# Patient Record
Sex: FEMALE | Race: WHITE | NOT HISPANIC OR LATINO | Employment: OTHER | URBAN - METROPOLITAN AREA
[De-identification: names, ages, dates, MRNs, and addresses within clinical notes are randomized per-mention and may not be internally consistent; named-entity substitution may affect disease eponyms.]

---

## 2017-01-23 ENCOUNTER — OFFICE VISIT (OUTPATIENT)
Dept: ORTHOPEDIC SURGERY | Facility: CLINIC | Age: 72
End: 2017-01-23

## 2017-01-23 VITALS — HEIGHT: 65 IN | BODY MASS INDEX: 19.99 KG/M2 | WEIGHT: 120 LBS

## 2017-01-23 DIAGNOSIS — Z98.890 S/P ROTATOR CUFF REPAIR: Primary | ICD-10-CM

## 2017-01-23 PROCEDURE — 99212 OFFICE O/P EST SF 10 MIN: CPT | Performed by: ORTHOPAEDIC SURGERY

## 2017-01-23 NOTE — MR AVS SNAPSHOT
Ashley Mata   1/23/2017 11:00 AM   Office Visit    Dept Phone:  [phone]   Encounter #:  [id number]    Provider:  Ash Jimenez MD   Department:  [facility]                Your Full Care Plan              Your Updated Medication List          This list is accurate as of: 1/23/17 11:53 AM.  Always use your most recent med list.                atorvastatin 80 MG tablet   Commonly known as:  LIPITOR       CALCIUM 600 PO       CoQ10 400 MG capsule       estrogens (conjugated) 0.625 MG tablet   Commonly known as:  PREMARIN       fexofenadine-pseudoephedrine 180-240 MG per 24 hr tablet   Commonly known as:  ALLEGRA-D 24   Take 1 tablet by mouth daily.       finasteride 5 MG tablet   Commonly known as:  PROSCAR       fluticasone 50 MCG/ACT nasal spray   Commonly known as:  FLONASE       ketoconazole 2 % shampoo   Commonly known as:  NIZORAL       lisinopril-hydrochlorothiazide 10-12.5 MG per tablet   Commonly known as:  PRINZIDE,ZESTORETIC   TAKE ONE TABLET BY MOUTH EVERY MORNING       MethylPREDNISolone 4 MG tablet   Commonly known as:  MEDROL (JUAN A)   Take as directed on package instructions.       multivitamin tablet tablet       naproxen 500 MG tablet   Commonly known as:  NAPROSYN   TAKE ONE TABLET BY MOUTH TWICE A DAY       omeprazole 40 MG capsule   Commonly known as:  priLOSEC   TAKE ONE CAPSULE BY MOUTH DAILY       saccharomyces boulardii 250 MG capsule   Commonly known as:  FLORASTOR               Instructions     None    Patient Instructions History      Upcoming Appointments     Visit Type Date Time Department    FOLLOW UP 1/23/2017 11:00 AM [facility]    FOLLOW UP 1/31/2017 11:00 AM [facility]      MyChart Signup     Our records indicate that you have an active [employer] account.    You can view your After Visit Summary by going to [employer] and logging in with your [employer]  "username and password.  If you don't have a [employer] username and password but a parent or guardian has access to your record, the parent or guardian should login with their own [employer] username and password and access your record to view the After Visit Summary.    If you have questions, you can email [email or web address] or call [phone] to talk to our [employer] staff.  Remember, [employer] is NOT to be used for urgent needs.  For medical emergencies, dial 911.               Other Info from Your Visit           Your Appointments     Jan 31, 2017 11:00 AM EST   Follow Up with Carloz SAXENA MD   [facility] (--)    [address] [place] [id number] [phone]           Arrive 15 minutes prior to appointment.              Allergies     No Known Allergies      Reason for Visit     Right Shoulder - Follow-up           Vital Signs     Height Weight Body Mass Index Smoking Status          65\" (165.1 cm) 120 lb (54.4 kg) 19.97 kg/m2 Never Smoker          "

## 2017-01-23 NOTE — PROGRESS NOTES
Ashley Mata : 1945 MRN: [id number] DATE: 2017      CC: 6 months s/p right shoulder rotator cuff repair     HPI: Pt. returns to clinic today for follow up.  Reports that pain is resolved.  Has some occasional soreness after activity only.  Therapy progressing well.  Denies any concerns or issues.      Current Outpatient Prescriptions:   •  atorvastatin (LIPITOR) 80 MG tablet, Take 80 mg by mouth every night., Disp: , Rfl:   •  Calcium Carbonate (CALCIUM 600 PO), Take 2 tablets by mouth daily., Disp: , Rfl:   •  Coenzyme Q10 (COQ10) 400 MG capsule, Take 1 capsule by mouth daily. Take with a meal., Disp: , Rfl:   •  estrogens, conjugated, (PREMARIN) 0.625 MG tablet, Take 1 tablet by mouth daily., Disp: , Rfl:   •  fexofenadine-pseudoephedrine (ALLEGRA-D 24) 180-240 MG per 24 hr tablet, Take 1 tablet by mouth daily., Disp: 30 tablet, Rfl: 5  •  finasteride (PROSCAR) 5 MG tablet, , Disp: , Rfl:   •  fluticasone (FLONASE) 50 MCG/ACT nasal spray, 2 sprays into each nostril daily. Spray in each nostril., Disp: , Rfl:   •  ketoconazole (NIZORAL) 2 % shampoo, , Disp: , Rfl:   •  lisinopril-hydrochlorothiazide (PRINZIDE,ZESTORETIC) 10-12.5 MG per tablet, TAKE ONE TABLET BY MOUTH EVERY MORNING, Disp: 30 tablet, Rfl: 2  •  MethylPREDNISolone (MEDROL, JUAN A,) 4 MG tablet, Take as directed on package instructions., Disp: 1 each, Rfl: 0  •  multivitamin (THERAGRAN) tablet tablet, Take 1 tablet by mouth daily., Disp: , Rfl:   •  naproxen (NAPROSYN) 500 MG tablet, TAKE ONE TABLET BY MOUTH TWICE A DAY, Disp: 180 tablet, Rfl: 0  •  omeprazole (PriLOSEC) 40 MG capsule, TAKE ONE CAPSULE BY MOUTH DAILY, Disp: 30 capsule, Rfl: 0  •  saccharomyces boulardii (FLORASTOR) 250 MG capsule, Take 1 capsule by mouth 2 (two) times a day., Disp: , Rfl:     Past Medical History   Diagnosis Date   • History of abdominal pain 2014--patient reports that her abdominal pain and diarrhea had improved but not totally  resolved. She developed worsening reflux symptoms while on the Cipro and Flagyl. She discontinued it 4 days prior and her reflux did not get better. Dexilant samples given. Patient instructed to restart the Cipro and Flagyl tomorrow and try to complete the course.   06/24/2014--patient presents with appro   • History of Acute upper respiratory infection 03/18/2014 03/18/2014--patient reports symptoms have resolved.  12/30/2013--patient reports a 2 day history of head congestion associated with posterior nasal drainage and cough without fever or chills. The phlegm was clear in color. There was no shortness of breath. She was diagnosed with acute upper respiratory infection, probable viral. She was given a prescription for CPAP and was instructed not to fill    • History of Bicipital tendinitis of right shoulder 3/17/2015     06/08/2015--patient seen in follow-up and reports she continues to have right upper arm/shoulder pain. Examination today reveals tenderness over the subdeltoid bursal region and possibly the long head of the biceps tendon.  The shoulder injected with 40 mg of Depo-Medrol in divided doses.  03/17/2015--patient reports roughly one-week history of right arm and shoulder pain. This occurred after lifting some heavy linens. Examination consistent with bicipital tendinitis. X-ray of the shoulder ordered which revealed mild right AC joint arthropathy. Nothing acute. Recommend Vimovo 500/20, one by mouth twice a day. Patient should hold naproxen while taking this. If symptoms persist we could consider a cortisone injection.   • History of bone density study 4/18/2016 06/09/2016--DEXA scan reveals lumbar spine T score -0.5.  Left hip T score -0.4.  05/01/2013--DEXA scan reveals lumbar spine T score -0.5. Left hip T score -0.9.  04/03/2010--DEXA scan reveals lumbar spine T score -0.8. Left hip T score -0.4.   • History of carotid Doppler/vascular screen 03/24/2014 03/24/2014--vascular screen  was negative for carotid plaque, negative for AAA, negative for PAD.   02/16/2011--vascular screen negative for carotid plaque, negative for AAA, negative for PAD.   • History of Change in bowel habits 09/22/2015 09/22/2015--patient seen in follow-up and reports her symptoms have definitely improved with the probiotic.   07/15/2015--patient presents with a 2 to three-week history of a change in her bowel habits. She has periodic discharge that is mucousy but sometimes is reddish brown. She notes loose stools and sometimes pellet-like stools and occasionally has constipation that is mild. This can be interm   • History of chest x-ray 4/18/2016 07/26/2016--preoperative chest x-ray reveals heart, mediastinum, and pulmonary vasculature normal.  Mild chronic pulmonary change without evidence of active process.  Negative chest.  06/15/2011--normal chest x-ray.   • History of diarrhea 10/08/2014     10/08/2014--patient seen in follow up and she has had no further abdominal pain or diarrhea after recent cholecystectomy. It is unsure if the gallbladder was playing a role in her abdominal pain and diarrhea.   07/03/2014--patient reports that her abdominal pain and diarrhea had improved but not totally resolved. She developed worsening reflux symptoms while on the Cipro and Flagyl. She discontinu   • History of Hyperplastic polyps of stomach 06/09/2015 06/09/2015--EGD revealed Z line irregular, 35 cm from incisors. Biopsied. Small hiatal hernia. Gastritis. Biopsied. Bilious gastric fluid. Fluid aspiration performed. Normal duodenal bulb and second part of duodenum. Biopsied. Pathology revealed the antral biopsy revealed small intestinal mucosa with no pathologic diagnosis. Good preservation of villous architecture. Duodenum biopsy revealed large   • History of Intramuscular hematoma, left 09/11/2013 09/11/2013--left humerus x-rays revealed mild degenerative change at the a.c. joint. The fluid collection  resolved spontaneously  09/06/2013--bilateral duplex scan of the veins of the upper extremities revealed a fluid collection of the left arm near the biceps. No evidence of DVT in the right internal jugular vein. No evidence of DVT in the right subclavian vein. No evidence of deep or superficia   • History of Left rotator cuff tear 07/10/2014     07/10/2014--left rotator cuff repair.   03/18/2014--patient seen in followup and reports that her range of motion has improved and her pain is not debilitating. She feels that she is making progress with physical therapy. Surgery scheduled 07/10/2014.   01/10/2014--patient was seen in evaluation by the orthopedist and he recommended conservative treatment consisting of physical therapy/rehabilitat   • History of mammogram 06/13/2016 06/13/2016--normal mammogram performed at [facility].  05/27/2015--normal mammogram.   05/12/2014--normal mammogram.   05/02/2013--normal mammogram.   04/26/2012--normal mammogram.   • History of Muscular aches 03/18/2014 03/18/2014--patient reports that her wrist symptoms have improved and her myalgias have resolved.  12/30/2013--patient reports a several month history of bilateral wrist pain as well as diffuse myalgias and she feels this is related to arthritis. There is no numbness or tingling of the hands and fingers that would be consistent with a neuro- compressive syndrome. She notes that her wrist pain incr   • History of pneumococcal vaccination 01/15/2016     01/15/2016--PPSV 23 given. No further pneumococcal vaccinations required.   07/15/2015--Prevnar 13 and given. Patient is pneumococcal vaccination sage and therefore will need PPSV 23 in 6-12 months.   • History of Subdeltoid bursitis of right shoulder joint 4/20/2016 07/12/2016--MR arthrogram of the right shoulder reveals a large full thickness supraspinatus tendon insertional tear with retraction.  Moderate acromioclavicular joint arthritis.   Patient scheduled for surgery 08/03/2016.  04/20/2016--patient presents with a one-month history of right shoulder pain that is located in the subdeltoid region.  No history of trauma.  Examination reveals tenderness over the subdeltoid bursa.  The area in question was injected with 40 mg of Depo-Medrol and 3 cc of Xylocaine.   • History of Zostavax administration 11/20/2014 11/20/2014--Zostavax given at the local drug store.       Past Surgical History   Procedure Laterality Date   • Colonoscopy  03/17/2009 03/17/2009--colonoscopy revealed external hemorrhoids, torturous colon with a sigmoid stricture, sigmoid diverticulosis.   • Esophagoscopy / egd  06/09/2015 06/09/2015--EGD revealed Z line irregular, 35 cm from incisors. Biopsied. Small hiatal hernia. Gastritis. Biopsied. Bilious gastric fluid. Fluid aspiration performed. Normal duodenal bulb and second part of duodenum. Biopsied. Pathology revealed the antral biopsy revealed small intestinal mucosa with no pathologic diagnosis. Good preservation of villous architecture. Duodenum biopsy revealed large   • Ercp w/ sphicterotomy  08/13/2014 08/13/2014--ERCP, endoscopic sphincterotomy and removal of common bile duct stones. 08/12/2014--laparoscopic cholecystectomy. This was complicated by retained common bile duct stones 2.   • Rotator cuff repair Left 07/10/2014     07/10/2014--left rotator cuff repair. 03/18/2014--patient seen in followup and reports that her range of motion has improved and her pain is not debilitating. She feels that she is making progress with physical therapy. Surgery scheduled 07/10/2014. 01/10/2014--patient was seen in evaluation by the orthopedist and he recommended conservative treatment consisting of physical therapy/rehabilitation.   • Partial hysterectomy  1978     Partial hysterectomy 1978.   • Laparoscopic cholecystectomy  08/12/2014 08/13/2014--ERCP, endoscopic sphincterotomy and removal of common bile duct  stones. 08/12/2014--laparoscopic cholecystectomy. This was complicated by retained common bile duct stones 2.       Family History   Problem Relation Age of Onset   • Other Father      Hodgkin's Disease   • Cancer Father      Lung Cancer       Social History     Social History   • Marital status:      Spouse name: N/A   • Number of children: N/A   • Years of education: N/A     Occupational History   •  - [employer]      Social History Main Topics   • Smoking status: Never Smoker   • Smokeless tobacco: Never Used   • Alcohol use Yes      Comment: Socially   • Drug use: No   • Sexual activity: Yes     Partners: Male     Other Topics Concern   • Not on file     Social History Narrative     Exam:    General:  Awake and alert.  No acute distress.    Extremities:  Wounds are healed.  Arm and forearm soft.  Motion is full. Good strength with resistive testing of rotator cuff and no pain.  Negative Neer and Nugent maneuver.  Good motor and sensory function distally.  Palpable pulses with good cap refill.      Imaging   none    Impression:  6 months s/p right shoulder rotator cuff repair     Plan:    1.  Patient is released to unrestricted activity.  2.  I explained that the patient can expect continued improvement in any residual soreness or strength deficits for up to 1 year.  3.  Follow up as needed.

## 2017-01-31 ENCOUNTER — OFFICE VISIT (OUTPATIENT)
Dept: GASTROENTEROLOGY | Facility: CLINIC | Age: 72
End: 2017-01-31

## 2017-01-31 VITALS
BODY MASS INDEX: 23.35 KG/M2 | DIASTOLIC BLOOD PRESSURE: 72 MMHG | WEIGHT: 136.8 LBS | HEIGHT: 64 IN | SYSTOLIC BLOOD PRESSURE: 128 MMHG

## 2017-01-31 DIAGNOSIS — R19.8 CHANGE IN BOWEL FUNCTION: ICD-10-CM

## 2017-01-31 DIAGNOSIS — Z83.71 FAMILY HISTORY OF POLYPS IN THE COLON: Primary | ICD-10-CM

## 2017-01-31 PROCEDURE — 99214 OFFICE O/P EST MOD 30 MIN: CPT | Performed by: INTERNAL MEDICINE

## 2017-01-31 RX ORDER — SODIUM CHLORIDE 0.9 % (FLUSH) 0.9 %
1-10 SYRINGE (ML) INJECTION AS NEEDED
Status: CANCELLED | OUTPATIENT
Start: 2017-01-31

## 2017-01-31 NOTE — MR AVS SNAPSHOT
Ashley Mata   1/31/2017 11:00 AM   Office Visit    Dept Phone:  [phone]   Encounter #:  [id number]    Provider:  Carloz SAXENA MD   Department:  [facility]                Your Full Care Plan              Your Updated Medication List          This list is accurate as of: 1/31/17 11:56 AM.  Always use your most recent med list.                atorvastatin 80 MG tablet   Commonly known as:  LIPITOR       CALCIUM 600 PO       CoQ10 400 MG capsule       estrogens (conjugated) 0.625 MG tablet   Commonly known as:  PREMARIN       fexofenadine-pseudoephedrine 180-240 MG per 24 hr tablet   Commonly known as:  ALLEGRA-D 24   Take 1 tablet by mouth daily.       finasteride 5 MG tablet   Commonly known as:  PROSCAR       fluticasone 50 MCG/ACT nasal spray   Commonly known as:  FLONASE       ketoconazole 2 % shampoo   Commonly known as:  NIZORAL       lisinopril-hydrochlorothiazide 10-12.5 MG per tablet   Commonly known as:  PRINZIDE,ZESTORETIC   TAKE ONE TABLET BY MOUTH EVERY MORNING       MethylPREDNISolone 4 MG tablet   Commonly known as:  MEDROL (JUAN A)   Take as directed on package instructions.       multivitamin tablet tablet       naproxen 500 MG tablet   Commonly known as:  NAPROSYN   TAKE ONE TABLET BY MOUTH TWICE A DAY       omeprazole 40 MG capsule   Commonly known as:  priLOSEC   TAKE ONE CAPSULE BY MOUTH DAILY       saccharomyces boulardii 250 MG capsule   Commonly known as:  FLORASTOR               Instructions     None    Patient Instructions History      Upcoming Appointments     Visit Type Date Time Department    FOLLOW UP 1/31/2017 11:00 AM [facility]      [employer] Signup     Our records indicate that you have an active [email or web address] account.    You can view your After Visit Summary by going to [employer] and logging in with your [employer] username and password.  If you don't have a [employer] username and  "password but a parent or guardian has access to your record, the parent or guardian should login with their own [employer] username and password and access your record to view the After Visit Summary.    If you have questions, you can email [email or web address] or call [phone] to talk to our [employer] staff.  Remember, [employer] is NOT to be used for urgent needs.  For medical emergencies, dial 911.               Other Info from Your Visit           Allergies     No Known Allergies      Reason for Visit     Heartburn     change in bowel habits           Vital Signs     Blood Pressure Height Weight Body Mass Index Smoking Status       128/72 64\" (162.6 cm) 136 lb 12.8 oz (62.1 kg) 23.48 kg/m2 Never Smoker         "

## 2017-01-31 NOTE — PROGRESS NOTES
Chief Complaint   Patient presents with   • Heartburn   • change in bowel habits        Ashley Mata is a  71 y.o. female here for a follow up visit for family history of colon polyps, change in bowel function.    HPI this 71-year-old white female presents in follow-up since her last upper endoscopy was performed in the June 2015.  That study had been done a cousin with a history of Hale's esophagus and reflux.  Biopsies of the GE junction were negative for any Hale's changes.  She presents now with report of polyps involving her mother diagnosed 2-3 years ago.  Her last colonoscopy was performed in March 2009.  She also notes that she's had a change in bowel pattern over the past year requiring use of a stimulant her laxative every few days because of constipation.  Her previous bowel pattern was once daily.  She also describes occasional mucus discharge with her bowel movements.  We discussed follow-up colonoscopic evaluation at this time.  Given her family history and change in bowel pattern this would be appropriate.    Past Medical History   Diagnosis Date   • Allergic rhinitis    • GERD (gastroesophageal reflux disease)    • History of abdominal pain 07/30/2014 07/03/2014--patient reports that her abdominal pain and diarrhea had improved but not totally resolved. She developed worsening reflux symptoms while on the Cipro and Flagyl. She discontinued it 4 days prior and her reflux did not get better. Dexilant samples given. Patient instructed to restart the Cipro and Flagyl tomorrow and try to complete the course.   06/24/2014--patient presents with appro   • History of Acute upper respiratory infection 03/18/2014 03/18/2014--patient reports symptoms have resolved.  12/30/2013--patient reports a 2 day history of head congestion associated with posterior nasal drainage and cough without fever or chills. The phlegm was clear in color. There was no shortness of breath. She was diagnosed with  acute upper respiratory infection, probable viral. She was given a prescription for CPAP and was instructed not to fill    • History of Bicipital tendinitis of right shoulder 3/17/2015     06/08/2015--patient seen in follow-up and reports she continues to have right upper arm/shoulder pain. Examination today reveals tenderness over the subdeltoid bursal region and possibly the long head of the biceps tendon.  The shoulder injected with 40 mg of Depo-Medrol in divided doses.  03/17/2015--patient reports roughly one-week history of right arm and shoulder pain. This occurred after lifting some heavy linens. Examination consistent with bicipital tendinitis. X-ray of the shoulder ordered which revealed mild right AC joint arthropathy. Nothing acute. Recommend Vimovo 500/20, one by mouth twice a day. Patient should hold naproxen while taking this. If symptoms persist we could consider a cortisone injection.   • History of bone density study 4/18/2016 06/09/2016--DEXA scan reveals lumbar spine T score -0.5.  Left hip T score -0.4.  05/01/2013--DEXA scan reveals lumbar spine T score -0.5. Left hip T score -0.9.  04/03/2010--DEXA scan reveals lumbar spine T score -0.8. Left hip T score -0.4.   • History of carotid Doppler/vascular screen 03/24/2014 03/24/2014--vascular screen was negative for carotid plaque, negative for AAA, negative for PAD.   02/16/2011--vascular screen negative for carotid plaque, negative for AAA, negative for PAD.   • History of Change in bowel habits 09/22/2015 09/22/2015--patient seen in follow-up and reports her symptoms have definitely improved with the probiotic.   07/15/2015--patient presents with a 2 to three-week history of a change in her bowel habits. She has periodic discharge that is mucousy but sometimes is reddish brown. She notes loose stools and sometimes pellet-like stools and occasionally has constipation that is mild. This can be interm   • History of chest x-ray  4/18/2016 07/26/2016--preoperative chest x-ray reveals heart, mediastinum, and pulmonary vasculature normal.  Mild chronic pulmonary change without evidence of active process.  Negative chest.  06/15/2011--normal chest x-ray.   • History of diarrhea 10/08/2014     10/08/2014--patient seen in follow up and she has had no further abdominal pain or diarrhea after recent cholecystectomy. It is unsure if the gallbladder was playing a role in her abdominal pain and diarrhea.   07/03/2014--patient reports that her abdominal pain and diarrhea had improved but not totally resolved. She developed worsening reflux symptoms while on the Cipro and Flagyl. She discontinu   • History of Hyperplastic polyps of stomach 06/09/2015 06/09/2015--EGD revealed Z line irregular, 35 cm from incisors. Biopsied. Small hiatal hernia. Gastritis. Biopsied. Bilious gastric fluid. Fluid aspiration performed. Normal duodenal bulb and second part of duodenum. Biopsied. Pathology revealed the antral biopsy revealed small intestinal mucosa with no pathologic diagnosis. Good preservation of villous architecture. Duodenum biopsy revealed large   • History of Intramuscular hematoma, left 09/11/2013 09/11/2013--left humerus x-rays revealed mild degenerative change at the a.c. joint. The fluid collection resolved spontaneously  09/06/2013--bilateral duplex scan of the veins of the upper extremities revealed a fluid collection of the left arm near the biceps. No evidence of DVT in the right internal jugular vein. No evidence of DVT in the right subclavian vein. No evidence of deep or superficia   • History of Left rotator cuff tear 07/10/2014     07/10/2014--left rotator cuff repair.   03/18/2014--patient seen in followup and reports that her range of motion has improved and her pain is not debilitating. She feels that she is making progress with physical therapy. Surgery scheduled 07/10/2014.   01/10/2014--patient was seen in evaluation by the  orthopedist and he recommended conservative treatment consisting of physical therapy/rehabilitat   • History of mammogram 06/13/2016 06/13/2016--normal mammogram performed at [facility] for [facility].  05/27/2015--normal mammogram.   05/12/2014--normal mammogram.   05/02/2013--normal mammogram.   04/26/2012--normal mammogram.   • History of Muscular aches 03/18/2014 03/18/2014--patient reports that her wrist symptoms have improved and her myalgias have resolved.  12/30/2013--patient reports a several month history of bilateral wrist pain as well as diffuse myalgias and she feels this is related to arthritis. There is no numbness or tingling of the hands and fingers that would be consistent with a neuro- compressive syndrome. She notes that her wrist pain incr   • History of pneumococcal vaccination 01/15/2016     01/15/2016--PPSV 23 given. No further pneumococcal vaccinations required.   07/15/2015--Prevnar 13 and given. Patient is pneumococcal vaccination sage and therefore will need PPSV 23 in 6-12 months.   • History of Subdeltoid bursitis of right shoulder joint 4/20/2016 07/12/2016--MR arthrogram of the right shoulder reveals a large full thickness supraspinatus tendon insertional tear with retraction.  Moderate acromioclavicular joint arthritis.  Patient scheduled for surgery 08/03/2016.  04/20/2016--patient presents with a one-month history of right shoulder pain that is located in the subdeltoid region.  No history of trauma.  Examination reveals tenderness over the subdeltoid bursa.  The area in question was injected with 40 mg of Depo-Medrol and 3 cc of Xylocaine.   • History of Zostavax administration 11/20/2014 11/20/2014--Zostavax given at the local drug store.   • Hypertension        Current Outpatient Prescriptions   Medication Sig Dispense Refill   • atorvastatin (LIPITOR) 80 MG tablet Take 80 mg by mouth every night.     • Calcium Carbonate (CALCIUM 600 PO) Take 2 tablets by  mouth daily.     • Coenzyme Q10 (COQ10) 400 MG capsule Take 1 capsule by mouth daily. Take with a meal.     • estrogens, conjugated, (PREMARIN) 0.625 MG tablet Take 1 tablet by mouth daily.     • fexofenadine-pseudoephedrine (ALLEGRA-D 24) 180-240 MG per 24 hr tablet Take 1 tablet by mouth daily. 30 tablet 5   • finasteride (PROSCAR) 5 MG tablet      • fluticasone (FLONASE) 50 MCG/ACT nasal spray 2 sprays into each nostril daily. Spray in each nostril.     • lisinopril-hydrochlorothiazide (PRINZIDE,ZESTORETIC) 10-12.5 MG per tablet TAKE ONE TABLET BY MOUTH EVERY MORNING 30 tablet 2   • multivitamin (THERAGRAN) tablet tablet Take 1 tablet by mouth daily.     • naproxen (NAPROSYN) 500 MG tablet TAKE ONE TABLET BY MOUTH TWICE A  tablet 0   • omeprazole (PriLOSEC) 40 MG capsule TAKE ONE CAPSULE BY MOUTH DAILY 30 capsule 0   • saccharomyces boulardii (FLORASTOR) 250 MG capsule Take 1 capsule by mouth 2 (two) times a day.     • ketoconazole (NIZORAL) 2 % shampoo      • MethylPREDNISolone (MEDROL, JUAN A,) 4 MG tablet Take as directed on package instructions. 1 each 0     No current facility-administered medications for this visit.        PRN Meds:.    No Known Allergies    Social History     Social History   • Marital status:      Spouse name: N/A   • Number of children: N/A   • Years of education: N/A     Occupational History   •  - [employer]      Social History Main Topics   • Smoking status: Never Smoker   • Smokeless tobacco: Never Used   • Alcohol use Yes      Comment: Socially   • Drug use: No   • Sexual activity: Yes     Partners: Male     Other Topics Concern   • Not on file     Social History Narrative       Family History   Problem Relation Age of Onset   • Other Father      Hodgkin's Disease   • Cancer Father      Lung Cancer       Review of Systems   Constitutional: Negative for activity change, appetite change, fatigue and unexpected weight change.   HENT: Negative for  congestion, facial swelling, sore throat, trouble swallowing and voice change.    Eyes: Negative for photophobia and visual disturbance.   Respiratory: Negative for cough and choking.    Cardiovascular: Negative for chest pain.   Gastrointestinal: Positive for constipation. Negative for abdominal distention, abdominal pain, anal bleeding, blood in stool, diarrhea, nausea, rectal pain and vomiting.   Endocrine: Negative for polyphagia.   Musculoskeletal: Negative for arthralgias, gait problem and joint swelling.   Skin: Negative for color change, pallor and rash.   Allergic/Immunologic: Negative for food allergies.   Neurological: Negative for speech difficulty and headaches.   Hematological: Does not bruise/bleed easily.   Psychiatric/Behavioral: Negative for agitation, confusion and sleep disturbance.       Vitals:    01/31/17 1105   BP: 128/72       Physical Exam   Constitutional: She is oriented to person, place, and time. She appears well-developed and well-nourished.   HENT:   Head: Normocephalic.   Mouth/Throat: Oropharynx is clear and moist.   Eyes: Conjunctivae and EOM are normal.   Neck: Normal range of motion.   Cardiovascular: Normal rate and regular rhythm.    Pulmonary/Chest: Breath sounds normal.   Abdominal: Soft. Bowel sounds are normal.   Musculoskeletal: Normal range of motion.   Neurological: She is alert and oriented to person, place, and time.   Skin: Skin is warm and dry.   Psychiatric: She has a normal mood and affect. Her behavior is normal.       ASSESSMENT AND PLAN      ICD-10-CM ICD-9-CM   1. Family history of polyps in the colon Z83.71 V18.51   2. Change in bowel function R19.4 787.99

## 2017-02-08 RX ORDER — FEXOFENADINE HCL AND PSEUDOEPHEDRINE HCI 180; 240 MG/1; MG/1
TABLET, EXTENDED RELEASE ORAL
Qty: 30 TABLET | Refills: 0 | Status: SHIPPED | OUTPATIENT
Start: 2017-02-08 | End: 2017-03-14 | Stop reason: SDUPTHER

## 2017-02-14 RX ORDER — NAPROXEN 500 MG/1
TABLET ORAL
Qty: 180 TABLET | Refills: 0 | Status: SHIPPED | OUTPATIENT
Start: 2017-02-14 | End: 2017-05-22 | Stop reason: SDUPTHER

## 2017-03-10 ENCOUNTER — ANESTHESIA EVENT (OUTPATIENT)
Dept: GASTROENTEROLOGY | Facility: HOSPITAL | Age: 72
End: 2017-03-10

## 2017-03-10 ENCOUNTER — ANESTHESIA (OUTPATIENT)
Dept: GASTROENTEROLOGY | Facility: HOSPITAL | Age: 72
End: 2017-03-10

## 2017-03-10 ENCOUNTER — HOSPITAL ENCOUNTER (OUTPATIENT)
Facility: HOSPITAL | Age: 72
Setting detail: HOSPITAL OUTPATIENT SURGERY
Discharge: HOME | End: 2017-03-10
Attending: INTERNAL MEDICINE | Admitting: INTERNAL MEDICINE

## 2017-03-10 VITALS
WEIGHT: 133.13 LBS | OXYGEN SATURATION: 99 % | HEART RATE: 78 BPM | BODY MASS INDEX: 22.18 KG/M2 | SYSTOLIC BLOOD PRESSURE: 152 MMHG | HEIGHT: 65 IN | DIASTOLIC BLOOD PRESSURE: 82 MMHG | TEMPERATURE: 97.9 F | RESPIRATION RATE: 16 BRPM

## 2017-03-10 DIAGNOSIS — R19.8 CHANGE IN BOWEL FUNCTION: ICD-10-CM

## 2017-03-10 DIAGNOSIS — Z83.71 FAMILY HISTORY OF POLYPS IN THE COLON: ICD-10-CM

## 2017-03-10 PROCEDURE — 45378 DIAGNOSTIC COLONOSCOPY: CPT | Performed by: INTERNAL MEDICINE

## 2017-03-10 PROCEDURE — 25010000002 PROPOFOL 10 MG/ML EMULSION: Performed by: ANESTHESIOLOGY

## 2017-03-10 RX ORDER — LIDOCAINE HYDROCHLORIDE 20 MG/ML
INJECTION, SOLUTION INFILTRATION; PERINEURAL AS NEEDED
Status: DISCONTINUED | OUTPATIENT
Start: 2017-03-10 | End: 2017-03-10 | Stop reason: SURG

## 2017-03-10 RX ORDER — PROPOFOL 10 MG/ML
VIAL (ML) INTRAVENOUS CONTINUOUS PRN
Status: DISCONTINUED | OUTPATIENT
Start: 2017-03-10 | End: 2017-03-10 | Stop reason: SURG

## 2017-03-10 RX ORDER — SODIUM CHLORIDE 0.9 % (FLUSH) 0.9 %
1-10 SYRINGE (ML) INJECTION AS NEEDED
Status: DISCONTINUED | OUTPATIENT
Start: 2017-03-10 | End: 2017-03-10 | Stop reason: HOSPADM

## 2017-03-10 RX ORDER — SODIUM CHLORIDE, SODIUM LACTATE, POTASSIUM CHLORIDE, CALCIUM CHLORIDE 600; 310; 30; 20 MG/100ML; MG/100ML; MG/100ML; MG/100ML
9 INJECTION, SOLUTION INTRAVENOUS CONTINUOUS PRN
Status: DISCONTINUED | OUTPATIENT
Start: 2017-03-10 | End: 2017-03-10 | Stop reason: HOSPADM

## 2017-03-10 RX ADMIN — LIDOCAINE HYDROCHLORIDE 4 MG: 20 INJECTION, SOLUTION INFILTRATION; PERINEURAL at 10:19

## 2017-03-10 RX ADMIN — PROPOFOL 160 MCG/KG/MIN: 10 INJECTION, EMULSION INTRAVENOUS at 10:19

## 2017-03-10 RX ADMIN — ALFENTANIL HYDROCHLORIDE 500 MCG: 500 INJECTION, SOLUTION INTRAVENOUS at 10:19

## 2017-03-10 RX ADMIN — SODIUM CHLORIDE, POTASSIUM CHLORIDE, SODIUM LACTATE AND CALCIUM CHLORIDE 9 ML/HR: 600; 310; 30; 20 INJECTION, SOLUTION INTRAVENOUS at 10:05

## 2017-03-10 NOTE — H&P
[facility], [facility]  Pre Procedure History & Physical    Chief Complaint:   Family history of polyps, change in bowel function    Subjective     HPI:   This 71-year-old female presents to the endoscopy suite for colonoscopic evaluation.  She has a family history of polyps involving her mother.  She also had a change in bowel pattern over the past year with constipation.  Last Colonoscopy of record was March 2009.    Past Medical History:   Past Medical History   Diagnosis Date   • Allergic rhinitis    • GERD (gastroesophageal reflux disease)    • History of abdominal pain 07/30/2014 07/03/2014--patient reports that her abdominal pain and diarrhea had improved but not totally resolved. She developed worsening reflux symptoms while on the Cipro and Flagyl. She discontinued it 4 days prior and her reflux did not get better. Dexilant samples given. Patient instructed to restart the Cipro and Flagyl tomorrow and try to complete the course.   06/24/2014--patient presents with appro   • History of Acute upper respiratory infection 03/18/2014 03/18/2014--patient reports symptoms have resolved.  12/30/2013--patient reports a 2 day history of head congestion associated with posterior nasal drainage and cough without fever or chills. The phlegm was clear in color. There was no shortness of breath. She was diagnosed with acute upper respiratory infection, probable viral. She was given a prescription for CPAP and was instructed not to fill    • History of Bicipital tendinitis of right shoulder 3/17/2015     06/08/2015--patient seen in follow-up and reports she continues to have right upper arm/shoulder pain. Examination today reveals tenderness over the subdeltoid bursal region and possibly the long head of the biceps tendon.  The shoulder injected with 40 mg of Depo-Medrol in divided doses.  03/17/2015--patient reports roughly one-week history of right arm and shoulder pain. This occurred after lifting  some heavy linens. Examination consistent with bicipital tendinitis. X-ray of the shoulder ordered which revealed mild right AC joint arthropathy. Nothing acute. Recommend Vimovo 500/20, one by mouth twice a day. Patient should hold naproxen while taking this. If symptoms persist we could consider a cortisone injection.   • History of bone density study 4/18/2016 06/09/2016--DEXA scan reveals lumbar spine T score -0.5.  Left hip T score -0.4.  05/01/2013--DEXA scan reveals lumbar spine T score -0.5. Left hip T score -0.9.  04/03/2010--DEXA scan reveals lumbar spine T score -0.8. Left hip T score -0.4.   • History of carotid Doppler/vascular screen 03/24/2014 03/24/2014--vascular screen was negative for carotid plaque, negative for AAA, negative for PAD.   02/16/2011--vascular screen negative for carotid plaque, negative for AAA, negative for PAD.   • History of Change in bowel habits 09/22/2015 09/22/2015--patient seen in follow-up and reports her symptoms have definitely improved with the probiotic.   07/15/2015--patient presents with a 2 to three-week history of a change in her bowel habits. She has periodic discharge that is mucousy but sometimes is reddish brown. She notes loose stools and sometimes pellet-like stools and occasionally has constipation that is mild. This can be interm   • History of chest x-ray 4/18/2016 07/26/2016--preoperative chest x-ray reveals heart, mediastinum, and pulmonary vasculature normal.  Mild chronic pulmonary change without evidence of active process.  Negative chest.  06/15/2011--normal chest x-ray.   • History of diarrhea 10/08/2014     10/08/2014--patient seen in follow up and she has had no further abdominal pain or diarrhea after recent cholecystectomy. It is unsure if the gallbladder was playing a role in her abdominal pain and diarrhea.   07/03/2014--patient reports that her abdominal pain and diarrhea had improved but not totally resolved. She developed  worsening reflux symptoms while on the Cipro and Flagyl. She discontinu   • History of Hyperplastic polyps of stomach 06/09/2015 06/09/2015--EGD revealed Z line irregular, 35 cm from incisors. Biopsied. Small hiatal hernia. Gastritis. Biopsied. Bilious gastric fluid. Fluid aspiration performed. Normal duodenal bulb and second part of duodenum. Biopsied. Pathology revealed the antral biopsy revealed small intestinal mucosa with no pathologic diagnosis. Good preservation of villous architecture. Duodenum biopsy revealed large   • History of Intramuscular hematoma, left 09/11/2013 09/11/2013--left humerus x-rays revealed mild degenerative change at the a.c. joint. The fluid collection resolved spontaneously  09/06/2013--bilateral duplex scan of the veins of the upper extremities revealed a fluid collection of the left arm near the biceps. No evidence of DVT in the right internal jugular vein. No evidence of DVT in the right subclavian vein. No evidence of deep or superficia   • History of Left rotator cuff tear 07/10/2014     07/10/2014--left rotator cuff repair.   03/18/2014--patient seen in followup and reports that her range of motion has improved and her pain is not debilitating. She feels that she is making progress with physical therapy. Surgery scheduled 07/10/2014.   01/10/2014--patient was seen in evaluation by the orthopedist and he recommended conservative treatment consisting of physical therapy/rehabilitat   • History of mammogram 06/13/2016 06/13/2016--normal mammogram performed at [facility].  05/27/2015--normal mammogram.   05/12/2014--normal mammogram.   05/02/2013--normal mammogram.   04/26/2012--normal mammogram.   • History of Muscular aches 03/18/2014 03/18/2014--patient reports that her wrist symptoms have improved and her myalgias have resolved.  12/30/2013--patient reports a several month history of bilateral wrist pain as well as diffuse myalgias and she  feels this is related to arthritis. There is no numbness or tingling of the hands and fingers that would be consistent with a neuro- compressive syndrome. She notes that her wrist pain incr   • History of pneumococcal vaccination 01/15/2016     01/15/2016--PPSV 23 given. No further pneumococcal vaccinations required.   07/15/2015--Prevnar 13 and given. Patient is pneumococcal vaccination sage and therefore will need PPSV 23 in 6-12 months.   • History of Subdeltoid bursitis of right shoulder joint 4/20/2016 07/12/2016--MR arthrogram of the right shoulder reveals a large full thickness supraspinatus tendon insertional tear with retraction.  Moderate acromioclavicular joint arthritis.  Patient scheduled for surgery 08/03/2016.  04/20/2016--patient presents with a one-month history of right shoulder pain that is located in the subdeltoid region.  No history of trauma.  Examination reveals tenderness over the subdeltoid bursa.  The area in question was injected with 40 mg of Depo-Medrol and 3 cc of Xylocaine.   • History of Zostavax administration 11/20/2014 11/20/2014--Zostavax given at the local drug store.   • Hypertension        Family History:  Family History   Problem Relation Age of Onset   • Other Father      Hodgkin's Disease   • Cancer Father      Lung Cancer       Social History:   reports that she has never smoked. She has never used smokeless tobacco. She reports that she drinks alcohol. She reports that she does not use illicit drugs.    Medications:   Prescriptions Prior to Admission   Medication Sig Dispense Refill Last Dose   • atorvastatin (LIPITOR) 80 MG tablet Take 80 mg by mouth every night.   Taking   • Calcium Carbonate (CALCIUM 600 PO) Take 2 tablets by mouth daily.   Taking   • Coenzyme Q10 (COQ10) 400 MG capsule Take 1 capsule by mouth daily. Take with a meal.   Taking   • estrogens, conjugated, (PREMARIN) 0.625 MG tablet Take 1 tablet by mouth daily.   Taking   •  fexofenadine-pseudoephedrine (ALLEGRA-D 24) 180-240 MG per 24 hr tablet TAKE ONE TABLET BY MOUTH DAILY 30 tablet 0    • finasteride (PROSCAR) 5 MG tablet    Taking   • fluticasone (FLONASE) 50 MCG/ACT nasal spray 2 sprays into each nostril daily. Spray in each nostril.   Taking   • ketoconazole (NIZORAL) 2 % shampoo    Taking Differently   • lisinopril-hydrochlorothiazide (PRINZIDE,ZESTORETIC) 10-12.5 MG per tablet TAKE ONE TABLET BY MOUTH EVERY MORNING 30 tablet 2 Taking   • MethylPREDNISolone (MEDROL, JUAN A,) 4 MG tablet Take as directed on package instructions. 1 each 0 Not Taking   • multivitamin (THERAGRAN) tablet tablet Take 1 tablet by mouth daily.   Taking   • naproxen (NAPROSYN) 500 MG tablet TAKE ONE TABLET BY MOUTH TWICE A  tablet 0    • omeprazole (PriLOSEC) 40 MG capsule TAKE ONE CAPSULE BY MOUTH DAILY 30 capsule 0 Taking   • saccharomyces boulardii (FLORASTOR) 250 MG capsule Take 1 capsule by mouth 2 (two) times a day.   Taking       Allergies:  Review of patient's allergies indicates no known allergies.    ROS:    Pertinent items are noted in HPI, all other systems reviewed and negative     Objective     There were no vitals taken for this visit.    Physical Exam   Constitutional: Pt is oriented to person, place, and time and well-developed, well-nourished, and in no distress.   HENT:   Mouth/Throat: Oropharynx is clear and moist.   Neck: Normal range of motion. Neck supple.   Cardiovascular: Normal rate, regular rhythm and normal heart sounds.    Pulmonary/Chest: Effort normal and breath sounds normal. No respiratory distress. No  wheezes.   Abdominal: Soft. Bowel sounds are normal.   Skin: Skin is warm and dry.   Psychiatric: Mood, memory, affect and judgment normal.     Assessment/Plan     Diagnosis:  Family history of polyps  Change in bowel pattern with constipation    Anticipated Surgical Procedure:  Colonoscopy    The risks, benefits, and alternatives of this procedure have been discussed  with the patient or the responsible party- the patient understands and agrees to proceed.

## 2017-03-10 NOTE — PLAN OF CARE
Problem: Patient Care Overview (Adult)  Goal: Plan of Care Review  Outcome: Ongoing (interventions implemented as appropriate)    03/10/17 0941   Coping/Psychosocial Response Interventions   Plan Of Care Reviewed With patient       Goal: Adult Individualization and Mutuality  Outcome: Ongoing (interventions implemented as appropriate)  Goal: Discharge Needs Assessment  Outcome: Ongoing (interventions implemented as appropriate)    03/10/17 0941   Discharge Needs Assessment   Concerns To Be Addressed no discharge needs identified   Discharge Disposition home or self-care   Living Environment   Transportation Available car         Problem: GI Endoscopy (Adult)  Goal: Signs and Symptoms of Listed Potential Problems Will be Absent or Manageable (GI Endoscopy)  Outcome: Ongoing (interventions implemented as appropriate)    03/10/17 0941   GI Endoscopy   Problems Assessed (GI Endoscopy) pain;bleeding   Problems Present (GI Endoscopy) none

## 2017-03-10 NOTE — ANESTHESIA POSTPROCEDURE EVALUATION
Patient: Ashley Mata    Procedure Summary     Date Anesthesia Start Anesthesia Stop Room / Location    03/10/17 1010 1051  [facility] /  [facility]       Procedure Diagnosis Surgeon Provider    COLONOSCOPY INTO TERMINAL ILEUM (N/A ) Diverticulosis; Tortuous colon; Hemorrhoids  (Family history of polyps in the colon [Z83.71]; Change in bowel function [R19.4]) MD Colton Hedrick MD          Anesthesia Type: MAC  Last vitals  /86 (03/10/17 1102)    Temp      Pulse 83 (03/10/17 1102)   Resp 16 (03/10/17 1102)    SpO2 99 % (03/10/17 1102)      Post Anesthesia Care and Evaluation    Patient location during evaluation: PACU  Patient participation: complete - patient participated  Level of consciousness: awake and alert  Pain management: adequate  Airway patency: patent  Anesthetic complications: No anesthetic complications  PONV Status: none  Cardiovascular status: acceptable  Hydration status: acceptable

## 2017-03-11 RX ORDER — ATORVASTATIN CALCIUM 80 MG/1
TABLET, FILM COATED ORAL
Qty: 30 TABLET | Refills: 0 | Status: SHIPPED | OUTPATIENT
Start: 2017-03-11 | End: 2017-03-14

## 2017-03-14 RX ORDER — FEXOFENADINE HCL AND PSEUDOEPHEDRINE HCI 180; 240 MG/1; MG/1
1 TABLET, EXTENDED RELEASE ORAL DAILY
Qty: 30 TABLET | Refills: 0 | Status: SHIPPED | OUTPATIENT
Start: 2017-03-14 | End: 2017-05-23 | Stop reason: SDUPTHER

## 2017-03-14 RX ORDER — LISINOPRIL AND HYDROCHLOROTHIAZIDE 12.5; 1 MG/1; MG/1
TABLET ORAL
Qty: 30 TABLET | Refills: 0 | Status: SHIPPED | OUTPATIENT
Start: 2017-03-14 | End: 2017-04-16 | Stop reason: SDUPTHER

## 2017-03-28 DIAGNOSIS — E55.9 VITAMIN D DEFICIENCY: ICD-10-CM

## 2017-03-28 DIAGNOSIS — Z51.81 ENCOUNTER FOR THERAPEUTIC DRUG MONITORING: ICD-10-CM

## 2017-03-28 DIAGNOSIS — I10 BENIGN ESSENTIAL HYPERTENSION: Primary | ICD-10-CM

## 2017-03-28 DIAGNOSIS — E78.5 HYPERLIPIDEMIA, UNSPECIFIED HYPERLIPIDEMIA TYPE: ICD-10-CM

## 2017-03-28 DIAGNOSIS — E03.8 SUBCLINICAL HYPOTHYROIDISM: ICD-10-CM

## 2017-03-29 ENCOUNTER — RESULTS ENCOUNTER (OUTPATIENT)
Dept: INTERNAL MEDICINE | Facility: CLINIC | Age: 72
End: 2017-03-29

## 2017-03-29 DIAGNOSIS — E78.5 HYPERLIPIDEMIA, UNSPECIFIED HYPERLIPIDEMIA TYPE: ICD-10-CM

## 2017-03-29 DIAGNOSIS — I10 BENIGN ESSENTIAL HYPERTENSION: ICD-10-CM

## 2017-03-29 DIAGNOSIS — Z51.81 ENCOUNTER FOR THERAPEUTIC DRUG MONITORING: ICD-10-CM

## 2017-03-29 DIAGNOSIS — E03.8 SUBCLINICAL HYPOTHYROIDISM: ICD-10-CM

## 2017-03-29 DIAGNOSIS — E55.9 VITAMIN D DEFICIENCY: ICD-10-CM

## 2017-03-30 LAB
25(OH)D3+25(OH)D2 SERPL-MCNC: 66.3 NG/ML (ref 30–100)
ALBUMIN SERPL-MCNC: 4 G/DL (ref 3.5–5.2)
ALBUMIN/GLOB SERPL: 1.6 G/DL
ALP SERPL-CCNC: 64 U/L (ref 39–117)
ALT SERPL-CCNC: 19 U/L (ref 1–33)
APPEARANCE UR: ABNORMAL
AST SERPL-CCNC: 23 U/L (ref 1–32)
BACTERIA #/AREA URNS HPF: ABNORMAL /HPF
BILIRUB SERPL-MCNC: 0.2 MG/DL (ref 0.1–1.2)
BILIRUB UR QL STRIP: NEGATIVE
BUN SERPL-MCNC: 27 MG/DL (ref 8–23)
BUN/CREAT SERPL: 33.3 (ref 7–25)
CALCIUM SERPL-MCNC: 10.1 MG/DL (ref 8.6–10.5)
CHLORIDE SERPL-SCNC: 103 MMOL/L (ref 98–107)
CHOLEST SERPL-MCNC: 153 MG/DL (ref 100–199)
CK SERPL-CCNC: 45 U/L (ref 20–180)
CO2 SERPL-SCNC: 25.7 MMOL/L (ref 22–29)
COLOR UR: ABNORMAL
CREAT SERPL-MCNC: 0.81 MG/DL (ref 0.57–1)
CRYSTALS URNS MICRO: ABNORMAL
EPI CELLS #/AREA URNS HPF: ABNORMAL /HPF
ERYTHROCYTE [DISTWIDTH] IN BLOOD BY AUTOMATED COUNT: 13 % (ref 11.7–13)
GLOBULIN SER CALC-MCNC: 2.5 GM/DL
GLUCOSE SERPL-MCNC: 98 MG/DL (ref 65–99)
GLUCOSE UR QL: NEGATIVE
HCT VFR BLD AUTO: 40.2 % (ref 35.6–45.5)
HDL SERPL-SCNC: 51.1 UMOL/L
HDLC SERPL-MCNC: 85 MG/DL
HGB BLD-MCNC: 13.3 G/DL (ref 11.9–15.5)
HGB UR QL STRIP: NEGATIVE
KETONES UR QL STRIP: ABNORMAL
LDL SERPL QN: 20.3 NM
LDL SERPL-SCNC: 741 NMOL/L
LDL SMALL SERPL-SCNC: 338 NMOL/L
LDLC SERPL CALC-MCNC: 36 MG/DL (ref 0–99)
LEUKOCYTE ESTERASE UR QL STRIP: ABNORMAL
MCH RBC QN AUTO: 30.4 PG (ref 26.9–32)
MCHC RBC AUTO-ENTMCNC: 33.1 G/DL (ref 32.4–36.3)
MCV RBC AUTO: 91.8 FL (ref 80.5–98.2)
MUCOUS THREADS URNS QL MICRO: ABNORMAL /HPF
NITRITE UR QL STRIP: NEGATIVE
PH UR STRIP: 5.5 [PH] (ref 5–8)
PLATELET # BLD AUTO: 311 10*3/MM3 (ref 140–500)
POTASSIUM SERPL-SCNC: 4.7 MMOL/L (ref 3.5–5.2)
PROT SERPL-MCNC: 6.5 G/DL (ref 6–8.5)
PROT UR QL STRIP: NEGATIVE
RBC # BLD AUTO: 4.38 10*6/MM3 (ref 3.9–5.2)
RBC #/AREA URNS HPF: ABNORMAL /HPF
SODIUM SERPL-SCNC: 143 MMOL/L (ref 136–145)
SP GR UR: ABNORMAL (ref 1–1.03)
T3FREE SERPL-MCNC: 3.2 PG/ML (ref 2–4.4)
T4 FREE SERPL-MCNC: 1.31 NG/DL (ref 0.93–1.7)
TRIGL SERPL-MCNC: 160 MG/DL (ref 0–149)
TSH SERPL DL<=0.005 MIU/L-ACNC: 3.07 MIU/ML (ref 0.27–4.2)
UROBILINOGEN UR STRIP-MCNC: ABNORMAL MG/DL
WBC # BLD AUTO: 7.53 10*3/MM3 (ref 4.5–10.7)
WBC #/AREA URNS HPF: ABNORMAL /HPF

## 2017-04-11 PROBLEM — Z51.81 THERAPEUTIC DRUG MONITORING: Status: ACTIVE | Noted: 2017-04-11

## 2017-04-12 ENCOUNTER — OFFICE VISIT (OUTPATIENT)
Dept: INTERNAL MEDICINE | Facility: CLINIC | Age: 72
End: 2017-04-12

## 2017-04-12 VITALS
HEIGHT: 65 IN | BODY MASS INDEX: 22.82 KG/M2 | SYSTOLIC BLOOD PRESSURE: 130 MMHG | WEIGHT: 137 LBS | OXYGEN SATURATION: 99 % | DIASTOLIC BLOOD PRESSURE: 82 MMHG | HEART RATE: 91 BPM

## 2017-04-12 DIAGNOSIS — M19.032 PRIMARY OSTEOARTHRITIS OF BOTH WRISTS: Chronic | ICD-10-CM

## 2017-04-12 DIAGNOSIS — E03.8 SUBCLINICAL HYPOTHYROIDISM: Chronic | ICD-10-CM

## 2017-04-12 DIAGNOSIS — E78.5 HYPERLIPIDEMIA, UNSPECIFIED HYPERLIPIDEMIA TYPE: Primary | Chronic | ICD-10-CM

## 2017-04-12 DIAGNOSIS — Z51.81 THERAPEUTIC DRUG MONITORING: ICD-10-CM

## 2017-04-12 DIAGNOSIS — M19.042 PRIMARY OSTEOARTHRITIS OF BOTH HANDS: Chronic | ICD-10-CM

## 2017-04-12 DIAGNOSIS — L65.9 ALOPECIA: Chronic | ICD-10-CM

## 2017-04-12 DIAGNOSIS — F51.04 CHRONIC INSOMNIA: Chronic | ICD-10-CM

## 2017-04-12 DIAGNOSIS — K21.9 GASTROESOPHAGEAL REFLUX DISEASE WITHOUT ESOPHAGITIS: Chronic | ICD-10-CM

## 2017-04-12 DIAGNOSIS — E55.9 VITAMIN D DEFICIENCY: Chronic | ICD-10-CM

## 2017-04-12 DIAGNOSIS — I10 BENIGN ESSENTIAL HYPERTENSION: Chronic | ICD-10-CM

## 2017-04-12 DIAGNOSIS — M19.041 PRIMARY OSTEOARTHRITIS OF BOTH HANDS: Chronic | ICD-10-CM

## 2017-04-12 DIAGNOSIS — Z91.09 MULTIPLE ENVIRONMENTAL ALLERGIES: Chronic | ICD-10-CM

## 2017-04-12 DIAGNOSIS — M19.031 PRIMARY OSTEOARTHRITIS OF BOTH WRISTS: Chronic | ICD-10-CM

## 2017-04-12 PROCEDURE — 99214 OFFICE O/P EST MOD 30 MIN: CPT | Performed by: INTERNAL MEDICINE

## 2017-04-12 RX ORDER — OMEPRAZOLE 20 MG/1
20 TABLET, DELAYED RELEASE ORAL DAILY
Start: 2017-04-12 | End: 2022-05-09

## 2017-04-12 RX ORDER — ATORVASTATIN CALCIUM 40 MG/1
TABLET, FILM COATED ORAL
Qty: 30 TABLET | Refills: 11
Start: 2017-04-12 | End: 2017-05-08 | Stop reason: SDUPTHER

## 2017-04-12 RX ORDER — FINASTERIDE 5 MG/1
5 TABLET, FILM COATED ORAL DAILY
COMMUNITY
End: 2017-04-12 | Stop reason: SDUPTHER

## 2017-04-12 RX ORDER — FINASTERIDE 5 MG/1
5 TABLET, FILM COATED ORAL DAILY
Start: 2017-04-12 | End: 2019-03-18

## 2017-04-12 NOTE — PROGRESS NOTES
04/12/2017    Patient Information  Ashley Mata                                                                                          [address] [facility] [address]      1945  [phone]    Chief Complaint:     Follow-up hyperlipidemia, hypertension, reflux, environmental allergies, chronic insomnia, subclinical hypothyroidism, recent colonoscopy, recent rotator cuff surgery, recent thyroid ultrasound, primary osteoarthritis of the wrists and hands.  No new acute complaints.    History of Present Illness:    Patient with a history of medical problems as outlined in the chief complaint that have been fairly stable over at least the past year.  She presents today for a follow-up with lab prior to monitor her chronic medical issues.  Patient had a colonoscopy last year which I reviewed today and it is normal except for diverticulosis.  She also had a right rotator cuff repair and is doing fine in that regard.  Patient has subclinical hypothyroidism and had a thyroid ultrasound which we will review today as well.  Her past medical history extensively reviewed and updated where necessary including her health maintenance parameters.  This reveals she needs a TDaP which we haven't declined today due to the fact that Medicare does not cover this.  She needs her annual Medicare wellness visit which we will schedule a later date.  The computer indicates she needs a pneumococcal vaccination but this is not true.  She is up-to-date and does not need any further pneumococcal vaccinations.    Review of Systems   Constitution: Negative.   HENT: Negative.    Eyes: Negative.    Cardiovascular: Negative.    Respiratory: Negative.    Endocrine: Negative.    Hematologic/Lymphatic: Negative.    Skin: Negative.    Musculoskeletal: Negative.    Gastrointestinal: Negative.    Genitourinary: Negative.    Neurological: Negative.    Psychiatric/Behavioral: Negative.     Allergic/Immunologic: Negative.        Active Problems:    Patient Active Problem List   Diagnosis   • Allergic rhinitis   • Benign essential hypertension   • Diverticulosis of colon   • Gastroesophageal reflux disease without esophagitis   • Hyperlipidemia   • Multiple environmental allergies   • Primary osteoarthritis of both wrists   • Primary osteoarthritis of both hands   • Chronic insomnia   • Postmenopausal hormone replacement therapy   • Subclinical hypothyroidism   • Vitamin D deficiency   • Therapeutic drug monitoring         Past Medical History:   Diagnosis Date   • History of abdominal pain 07/30/2014 07/03/2014--patient reports that her abdominal pain and diarrhea had improved but not totally resolved. She developed worsening reflux symptoms while on the Cipro and Flagyl. She discontinued it 4 days prior and her reflux did not get better. Dexilant samples given. Patient instructed to restart the Cipro and Flagyl tomorrow and try to complete the course.   06/24/2014--patient presents with appro   • History of Acute upper respiratory infection 03/18/2014 03/18/2014--patient reports symptoms have resolved.  12/30/2013--patient reports a 2 day history of head congestion associated with posterior nasal drainage and cough without fever or chills. The phlegm was clear in color. There was no shortness of breath. She was diagnosed with acute upper respiratory infection, probable viral. She was given a prescription for CPAP and was instructed not to fill    • History of Bicipital tendinitis of right shoulder 3/17/2015    06/08/2015--patient seen in follow-up and reports she continues to have right upper arm/shoulder pain. Examination today reveals tenderness over the subdeltoid bursal region and possibly the long head of the biceps tendon.  The shoulder injected with 40 mg of Depo-Medrol in divided doses.  03/17/2015--patient reports roughly one-week history of right arm and shoulder pain. This occurred  after lifting some heavy linens. Examination consistent with bicipital tendinitis. X-ray of the shoulder ordered which revealed mild right AC joint arthropathy. Nothing acute. Recommend Vimovo 500/20, one by mouth twice a day. Patient should hold naproxen while taking this. If symptoms persist we could consider a cortisone injection.   • History of bone density study 4/18/2016 06/09/2016--DEXA scan reveals lumbar spine T score -0.5.  Left hip T score -0.4.  05/01/2013--DEXA scan reveals lumbar spine T score -0.5. Left hip T score -0.9.  04/03/2010--DEXA scan reveals lumbar spine T score -0.8. Left hip T score -0.4.   • History of carotid Doppler/vascular screen 03/24/2014 03/24/2014--vascular screen was negative for carotid plaque, negative for AAA, negative for PAD.   02/16/2011--vascular screen negative for carotid plaque, negative for AAA, negative for PAD.   • History of Change in bowel habits 09/22/2015 09/22/2015--patient seen in follow-up and reports her symptoms have definitely improved with the probiotic.   07/15/2015--patient presents with a 2 to three-week history of a change in her bowel habits. She has periodic discharge that is mucousy but sometimes is reddish brown. She notes loose stools and sometimes pellet-like stools and occasionally has constipation that is mild. This can be interm   • History of chest x-ray 4/18/2016 07/26/2016--preoperative chest x-ray reveals heart, mediastinum, and pulmonary vasculature normal.  Mild chronic pulmonary change without evidence of active process.  Negative chest.  06/15/2011--normal chest x-ray.   • History of diarrhea 10/08/2014    10/08/2014--patient seen in follow up and she has had no further abdominal pain or diarrhea after recent cholecystectomy. It is unsure if the gallbladder was playing a role in her abdominal pain and diarrhea.   07/03/2014--patient reports that her abdominal pain and diarrhea had improved but not totally resolved. She  developed worsening reflux symptoms while on the Cipro and Flagyl. She discontinu   • History of Hyperplastic polyps of stomach 06/09/2015 06/09/2015--EGD revealed Z line irregular, 35 cm from incisors. Biopsied. Small hiatal hernia. Gastritis. Biopsied. Bilious gastric fluid. Fluid aspiration performed. Normal duodenal bulb and second part of duodenum. Biopsied. Pathology revealed the antral biopsy revealed small intestinal mucosa with no pathologic diagnosis. Good preservation of villous architecture. Duodenum biopsy revealed large   • History of Intramuscular hematoma, left 09/11/2013 09/11/2013--left humerus x-rays revealed mild degenerative change at the a.c. joint. The fluid collection resolved spontaneously  09/06/2013--bilateral duplex scan of the veins of the upper extremities revealed a fluid collection of the left arm near the biceps. No evidence of DVT in the right internal jugular vein. No evidence of DVT in the right subclavian vein. No evidence of deep or superficia   • History of Left rotator cuff tear 07/10/2014    07/10/2014--left rotator cuff repair.   03/18/2014--patient seen in followup and reports that her range of motion has improved and her pain is not debilitating. She feels that she is making progress with physical therapy. Surgery scheduled 07/10/2014.   01/10/2014--patient was seen in evaluation by the orthopedist and he recommended conservative treatment consisting of physical therapy/rehabilitat   • History of mammogram 06/13/2016 06/13/2016--normal mammogram performed at [facility].  05/27/2015--normal mammogram.   05/12/2014--normal mammogram.   05/02/2013--normal mammogram.   04/26/2012--normal mammogram.   • History of Muscular aches 03/18/2014 03/18/2014--patient reports that her wrist symptoms have improved and her myalgias have resolved.  12/30/2013--patient reports a several month history of bilateral wrist pain as well as diffuse myalgias and  she feels this is related to arthritis. There is no numbness or tingling of the hands and fingers that would be consistent with a neuro- compressive syndrome. She notes that her wrist pain incr   • History of pneumococcal vaccination 01/15/2016    01/15/2016--PPSV 23 given. No further pneumococcal vaccinations required.   07/15/2015--Prevnar 13 and given. Patient is pneumococcal vaccination sage and therefore will need PPSV 23 in 6-12 months.   • History of rotator cuff tear, right 7/1/2016 08/03/2016--arthroscopic right rotator cuff repair.  Debridement of degenerative anterior superior labral tear.  07/12/2016--MR arthrogram of the right shoulder reveals a large full thickness supraspinatus tendon insertional tear with retraction.  Moderate acromioclavicular joint arthritis.  Patient scheduled for surgery 08/03/2016.  04/20/2016--patient presents with a one-month history of right shoulder pain that is located in the subdeltoid region.  No history of trauma.  Examination reveals tenderness over the subdeltoid bursa.  The area in question was injected with 40 mg of Depo-Medrol and 3 cc of Xylocaine.   • History of Subdeltoid bursitis of right shoulder joint 4/20/2016 07/12/2016--MR arthrogram of the right shoulder reveals a large full thickness supraspinatus tendon insertional tear with retraction.  Moderate acromioclavicular joint arthritis.  Patient scheduled for surgery 08/03/2016.  04/20/2016--patient presents with a one-month history of right shoulder pain that is located in the subdeltoid region.  No history of trauma.  Examination reveals tenderness over the subdeltoid bursa.  The area in question was injected with 40 mg of Depo-Medrol and 3 cc of Xylocaine.   • History of Zostavax administration 11/20/2014 11/20/2014--Zostavax given at the local drug store.         Past Surgical History:   Procedure Laterality Date   • COLONOSCOPY  03/17/2009 03/17/2009--colonoscopy revealed external hemorrhoids,  torturous colon with a sigmoid stricture, sigmoid diverticulosis.   • COLONOSCOPY N/A 3/10/2017    03/10/2017--colonoscopy revealed many small and large mouth diverticula in the sigmoid colon and descending colon.  Nonthrombosed external hemorrhoids and internal hemorrhoids noted.  Otherwise, normal colonoscopy.   • ERCP WITH SPHINCTEROTOMY/PAPILLOTOMY  08/13/2014 08/13/2014--ERCP, endoscopic sphincterotomy and removal of common bile duct stones. 08/12/2014--laparoscopic cholecystectomy. This was complicated by retained common bile duct stones 2.   • ESOPHAGOSCOPY / EGD  06/09/2015 06/09/2015--EGD revealed Z line irregular, 35 cm from incisors. Biopsied. Small hiatal hernia. Gastritis. Biopsied. Bilious gastric fluid. Fluid aspiration performed. Normal duodenal bulb and second part of duodenum. Biopsied. Pathology revealed the antral biopsy revealed small intestinal mucosa with no pathologic diagnosis. Good preservation of villous architecture. Duodenum biopsy revealed large   • LAPAROSCOPIC CHOLECYSTECTOMY  08/12/2014 08/13/2014--ERCP, endoscopic sphincterotomy and removal of common bile duct stones. 08/12/2014--laparoscopic cholecystectomy. This was complicated by retained common bile duct stones 2.   • PARTIAL HYSTERECTOMY  1978    Partial hysterectomy 1978.   • ROTATOR CUFF REPAIR Left 07/10/2014    07/10/2014--left rotator cuff repair. 03/18/2014--patient seen in followup and reports that her range of motion has improved and her pain is not debilitating. She feels that she is making progress with physical therapy. Surgery scheduled 07/10/2014. 01/10/2014--patient was seen in evaluation by the orthopedist and he recommended conservative treatment consisting of physical therapy/rehabilitation.   • ROTATOR CUFF REPAIR Right 08/03/2016 08/03/2016--arthroscopic right rotator cuff repair.  Debridement of degenerative anterior superior labral tear.         No Known Allergies        Current Outpatient  Prescriptions:   •  Calcium Carbonate (CALCIUM 600 PO), Take 2 tablets by mouth daily., Disp: , Rfl:   •  Coenzyme Q10 (COQ10) 400 MG capsule, Take 1 capsule by mouth daily. Take with a meal., Disp: , Rfl:   •  estrogens, conjugated, (PREMARIN) 0.625 MG tablet, Take 1 tablet by mouth daily., Disp: , Rfl:   •  fexofenadine-pseudoephedrine (ALLEGRA-D 24) 180-240 MG per 24 hr tablet, Take 1 tablet by mouth Daily., Disp: 30 tablet, Rfl: 0  •  finasteride (PROSCAR) 5 MG tablet, Take 5 mg by mouth Daily., Disp: , Rfl:   •  fluticasone (FLONASE) 50 MCG/ACT nasal spray, 2 sprays into each nostril daily. Spray in each nostril., Disp: , Rfl:   •  ketoconazole (NIZORAL) 2 % shampoo, , Disp: , Rfl:   •  lisinopril-hydrochlorothiazide (PRINZIDE,ZESTORETIC) 10-12.5 MG per tablet, TAKE ONE TABLET BY MOUTH EVERY MORNING, Disp: 30 tablet, Rfl: 0  •  multivitamin (THERAGRAN) tablet tablet, Take 1 tablet by mouth daily., Disp: , Rfl:   •  naproxen (NAPROSYN) 500 MG tablet, TAKE ONE TABLET BY MOUTH TWICE A DAY, Disp: 180 tablet, Rfl: 0  •  omeprazole (PriLOSEC) 40 MG capsule, TAKE ONE CAPSULE BY MOUTH DAILY, Disp: 30 capsule, Rfl: 0  •  saccharomyces boulardii (FLORASTOR) 250 MG capsule, Take 1 capsule by mouth 2 (two) times a day., Disp: , Rfl:       Family History   Problem Relation Age of Onset   • Other Father      Hodgkin's Disease   • Cancer Father      Lung Cancer         Social History     Social History   • Marital status:      Spouse name: N/A   • Number of children: N/A   • Years of education: N/A     Occupational History   •  - [employer]      Social History Main Topics   • Smoking status: Never Smoker   • Smokeless tobacco: Never Used   • Alcohol use Yes      Comment: Socially   • Drug use: No   • Sexual activity: Yes     Partners: Male     Other Topics Concern   • Not on file     Social History Narrative         Vitals:    04/12/17 1556   BP: 130/82   Pulse: 91   SpO2: 99%   Weight: 137 lb (62.1  "kg)   Height: 64.5\" (163.8 cm)          Physical Exam:    General: Alert and oriented x 3.  No acute distress.  Normal affect.  HEENT: Pupils equal, round, reactive to light; extraocular movements intact; sclerae nonicteric; pharynx, ear canals and TMs normal.  Neck: Without JVD, thyromegaly, bruit, or adenopathy.  Lungs: Clear to auscultation in all fields.  Heart: Regular rate and rhythm without murmur, rub, gallop, or click.  Abdomen: Soft, nontender, without hepatosplenomegaly or hernia.  Bowel sounds normal.  : Deferred.  Rectal: Deferred.  Extremities: Without clubbing, cyanosis, edema, or pulse deficit.  Neurologic: Intact without focal deficit.  Normal station and gait observed during ingress and egress from the examination room.  Skin: Without significant lesion.  Musculoskeletal: Unremarkable.      Lab/other results:    I reviewed the results of the thyroid ultrasound with patient.    NMR is nearly perfect.  Total cholesterol 153.  Triglycerides slightly elevated at 160.  LDL particle number excellent at 741.  Small LDL particle number excellent at 338.  HDL particle number excellent at 51.1.cBC is normal.CMP essentially normal.thyroid function tests are normal.  CPK normal.  Urinalysis is consistent with skin contamination.    Assessment/Plan:     Diagnosis Plan   1. Hyperlipidemia, unspecified hyperlipidemia type     2. Benign essential hypertension     3. Gastroesophageal reflux disease without esophagitis     4. Multiple environmental allergies     5. Chronic insomnia     6. Subclinical hypothyroidism     7. Vitamin D deficiency     8. Primary osteoarthritis of both wrists     9. Primary osteoarthritis of both hands     10. Therapeutic drug monitoring       Patient has hyperlipidemia that is under excellent control. Hypertension is under excellent control.  Reflux symptoms controlled with a second OTC.  Apparently her insurance will not cover the prescription brand.  Her environmental allergies are " stable.  Chronic insomnia has resolved.  I'm not totally sure the patient has subclinical hypothyroidism.  We will continue to monitor.  Her thyroid ultrasound was essentially normal.  Vitamin D therapeutic.  Her osteoarthritis of the hands and wrists improved with use of naproxen.  Patient expressed some concern about long-term use of this medication.  Apparently the pharmacist/pharmacy benefit for immunization all alarmed her regarding this.  I assured her we will continue to monitor.    Plan is as follows: No change in current medical regimen.  Patient will follow-up in 6 months with lab prior or follow-up as needed.  Note that patient had a vascular screen more than 3 years ago.  It was totally normal and therefore I think we can plan on doing this may be in another year.  Also note patient is on Proscar for alopecia by the dermatologist.  I can assure you she does not have a prostate.            Procedures

## 2017-04-17 RX ORDER — LISINOPRIL AND HYDROCHLOROTHIAZIDE 12.5; 1 MG/1; MG/1
TABLET ORAL
Qty: 30 TABLET | Refills: 2 | Status: SHIPPED | OUTPATIENT
Start: 2017-04-17 | End: 2017-07-05 | Stop reason: SDUPTHER

## 2017-05-08 RX ORDER — ATORVASTATIN CALCIUM 80 MG/1
TABLET, FILM COATED ORAL
Qty: 30 TABLET | Refills: 1 | Status: SHIPPED | OUTPATIENT
Start: 2017-05-08 | End: 2017-09-11 | Stop reason: SDUPTHER

## 2017-05-22 RX ORDER — NAPROXEN 500 MG/1
TABLET ORAL
Qty: 60 TABLET | Refills: 1 | Status: SHIPPED | OUTPATIENT
Start: 2017-05-22 | End: 2017-07-28 | Stop reason: SDUPTHER

## 2017-05-23 RX ORDER — CONJUGATED ESTROGENS 0.62 MG/1
TABLET, FILM COATED ORAL
Qty: 90 TABLET | Refills: 0 | Status: SHIPPED | OUTPATIENT
Start: 2017-05-23 | End: 2017-06-27 | Stop reason: SDUPTHER

## 2017-05-23 RX ORDER — FEXOFENADINE HCL AND PSEUDOEPHEDRINE HCI 180; 240 MG/1; MG/1
1 TABLET, EXTENDED RELEASE ORAL DAILY
Qty: 30 TABLET | Refills: 5 | Status: SHIPPED | OUTPATIENT
Start: 2017-05-23 | End: 2017-06-05

## 2017-06-05 ENCOUNTER — OFFICE VISIT (OUTPATIENT)
Dept: INTERNAL MEDICINE | Facility: CLINIC | Age: 72
End: 2017-06-05

## 2017-06-05 VITALS
WEIGHT: 135 LBS | BODY MASS INDEX: 22.49 KG/M2 | DIASTOLIC BLOOD PRESSURE: 76 MMHG | HEIGHT: 65 IN | SYSTOLIC BLOOD PRESSURE: 130 MMHG | HEART RATE: 70 BPM | OXYGEN SATURATION: 95 %

## 2017-06-05 DIAGNOSIS — I10 BENIGN ESSENTIAL HYPERTENSION: Chronic | ICD-10-CM

## 2017-06-05 DIAGNOSIS — J30.1 SEASONAL ALLERGIC RHINITIS DUE TO POLLEN: Chronic | ICD-10-CM

## 2017-06-05 DIAGNOSIS — Z00.00 INITIAL MEDICARE ANNUAL WELLNESS VISIT: Primary | ICD-10-CM

## 2017-06-05 DIAGNOSIS — Z91.09 MULTIPLE ENVIRONMENTAL ALLERGIES: Chronic | ICD-10-CM

## 2017-06-05 DIAGNOSIS — J06.9 ACUTE UPPER RESPIRATORY INFECTION: ICD-10-CM

## 2017-06-05 PROCEDURE — G0438 PPPS, INITIAL VISIT: HCPCS | Performed by: INTERNAL MEDICINE

## 2017-06-05 PROCEDURE — 96372 THER/PROPH/DIAG INJ SC/IM: CPT | Performed by: INTERNAL MEDICINE

## 2017-06-05 PROCEDURE — 99214 OFFICE O/P EST MOD 30 MIN: CPT | Performed by: INTERNAL MEDICINE

## 2017-06-05 RX ORDER — AZITHROMYCIN 250 MG/1
TABLET, FILM COATED ORAL
Qty: 6 TABLET | Refills: 0 | Status: SHIPPED | OUTPATIENT
Start: 2017-06-05 | End: 2017-10-11

## 2017-06-05 RX ORDER — FLUTICASONE PROPIONATE 50 MCG
2 SPRAY, SUSPENSION (ML) NASAL DAILY
Qty: 1 EACH | Refills: 6 | Status: SHIPPED | OUTPATIENT
Start: 2017-06-05 | End: 2017-10-11

## 2017-06-05 RX ORDER — TRIAMCINOLONE ACETONIDE 40 MG/ML
40 INJECTION, SUSPENSION INTRA-ARTICULAR; INTRAMUSCULAR ONCE
Status: COMPLETED | OUTPATIENT
Start: 2017-06-05 | End: 2017-06-05

## 2017-06-05 RX ORDER — GUAIFENESIN AND CODEINE PHOSPHATE 100; 10 MG/5ML; MG/5ML
SOLUTION ORAL
Qty: 180 ML | Refills: 0 | Status: SHIPPED | OUTPATIENT
Start: 2017-06-05 | End: 2017-10-11

## 2017-06-05 RX ORDER — METHYLPREDNISOLONE ACETATE 40 MG/ML
40 INJECTION, SUSPENSION INTRA-ARTICULAR; INTRALESIONAL; INTRAMUSCULAR; SOFT TISSUE ONCE
Status: COMPLETED | OUTPATIENT
Start: 2017-06-05 | End: 2017-06-05

## 2017-06-05 RX ADMIN — METHYLPREDNISOLONE ACETATE 40 MG: 40 INJECTION, SUSPENSION INTRA-ARTICULAR; INTRALESIONAL; INTRAMUSCULAR; SOFT TISSUE at 14:31

## 2017-06-05 RX ADMIN — TRIAMCINOLONE ACETONIDE 40 MG: 40 INJECTION, SUSPENSION INTRA-ARTICULAR; INTRAMUSCULAR at 14:31

## 2017-06-05 NOTE — PROGRESS NOTES
QUICK REFERENCE INFORMATION:  The ABCs of the Annual Wellness Visit    Initial Medicare Wellness Visit    HEALTH RISK ASSESSMENT    1945    Recent Hospitalizations:  No hospitalization(s) within the last year..        Current Medical Providers:  Patient Care Team:  Carloz Shukla MD as PCP - General (Internal Medicine)  Ash Jimenez MD as PCP - Claims Attributed        Smoking Status:  History   Smoking Status   • Never Smoker   Smokeless Tobacco   • Never Used       Alcohol Consumption:  History   Alcohol Use   • Yes     Comment: Socially       Depression Screen:   PHQ-9 Depression Screening 6/5/2017   Little interest or pleasure in doing things 0   Feeling down, depressed, or hopeless 0   Trouble falling or staying asleep, or sleeping too much -   Feeling tired or having little energy -   Poor appetite or overeating -   Feeling bad about yourself - or that you are a failure or have let yourself or your family down -   Trouble concentrating on things, such as reading the newspaper or watching television -   Moving or speaking so slowly that other people could have noticed. Or the opposite - being so fidgety or restless that you have been moving around a lot more than usual -   Thoughts that you would be better off dead, or of hurting yourself in some way -   PHQ-9 Total Score 0   If you checked off any problems, how difficult have these problems made it for you to do your work, take care of things at home, or get along with other people? -       Health Habits and Functional and Cognitive Screening:  Functional & Cognitive Status 6/5/2017   Do you have difficulty preparing food and eating? No   Do you have difficulty bathing yourself? No   Do you have difficulty getting dressed? No   Do you have difficulty using the toilet? No   Do you have difficulty moving around from place to place? No   In the past year have you fallen or experienced a near fall? No   Do you need help using the phone?  No   Are you  deaf or do you have serious difficulty hearing?  No   Do you need help with transportation? No   Do you need help shopping? No   Do you need help preparing meals?  No   Do you need help with housework?  No   Do you need help with laundry? No   Do you need help taking your medications? No   Do you need help managing money? No   Do you have difficulty concentrating, remembering or making decisions? No       Health Habits  Current Diet: Well Balanced Diet  Dental Exam: Up to date  Eye Exam: Up to date  Exercise (times per week): 0 times per week  Current Exercise Activities Include: None          Does the patient have evidence of cognitive impairment? No    Asiprin use counseling: Start ASA 81 mg daily       Recent Lab Results:    Visual Acuity:  No exam data present    Age-appropriate Screening Schedule:  Refer to the list below for future screening recommendations based on patient's age, sex and/or medical conditions. Orders for these recommended tests are listed in the plan section. The patient has been provided with a written plan.    Health Maintenance   Topic Date Due   • INFLUENZA VACCINE  08/01/2017   • LIPID PANEL  03/29/2018   • DXA SCAN  06/09/2018   • MAMMOGRAM  06/13/2018   • COLONOSCOPY  03/10/2027   • TDAP/TD VACCINES (2 - Td) 04/12/2027   • PNEUMOCOCCAL VACCINES (65+ LOW/MEDIUM RISK)  Addressed   • ZOSTER VACCINE  Completed        Subjective   History of Present Illness    Ashley Mata is a 71 y.o. female who presents for an Annual Wellness Visit.    The following portions of the patient's history were reviewed and updated as appropriate: allergies, current medications, past family history, past medical history, past social history, past surgical history and problem list.    Outpatient Medications Prior to Visit   Medication Sig Dispense Refill   • atorvastatin (LIPITOR) 80 MG tablet TAKE ONE TABLET BY MOUTH EVERY NIGHT AT BEDTIME 30 tablet 1   • Calcium Carbonate (CALCIUM 600 PO) Take 2 tablets by  mouth daily.     • Coenzyme Q10 (COQ10) 400 MG capsule Take 1 capsule by mouth daily. Take with a meal.     • fexofenadine-pseudoephedrine (ALLEGRA-D 24) 180-240 MG per 24 hr tablet Take 1 tablet by mouth Daily. 30 tablet 5   • finasteride (PROSCAR) 5 MG tablet Take 1 tablet by mouth Daily.     • fluticasone (FLONASE) 50 MCG/ACT nasal spray 2 sprays into each nostril daily. Spray in each nostril.     • ketoconazole (NIZORAL) 2 % shampoo      • lisinopril-hydrochlorothiazide (PRINZIDE,ZESTORETIC) 10-12.5 MG per tablet TAKE ONE TABLET BY MOUTH EVERY MORNING 30 tablet 2   • multivitamin (THERAGRAN) tablet tablet Take 1 tablet by mouth daily.     • naproxen (NAPROSYN) 500 MG tablet TAKE ONE TABLET BY MOUTH TWICE A DAY 60 tablet 1   • omeprazole OTC (PRILOSEC OTC) 20 MG EC tablet Take 1 tablet by mouth Daily.     • PREMARIN 0.625 MG tablet TAKE 1 TABLET DAILY 90 tablet 0   • saccharomyces boulardii (FLORASTOR) 250 MG capsule Take 1 capsule by mouth 2 (two) times a day.       No facility-administered medications prior to visit.        Patient Active Problem List   Diagnosis   • Allergic rhinitis   • Benign essential hypertension   • Diverticulosis of colon   • Gastroesophageal reflux disease without esophagitis   • Hyperlipidemia   • Multiple environmental allergies   • Primary osteoarthritis of both wrists   • Primary osteoarthritis of both hands   • Postmenopausal hormone replacement therapy   • Subclinical hypothyroidism   • Vitamin D deficiency   • Acute upper respiratory infection   • Therapeutic drug monitoring   • Alopecia       Advance Care Planning:  has NO advance directive - information provided to the patient today    Identification of Risk Factors:  Risk factors include: weight  and cardiovascular risk.    Review of Systems   Constitutional: Negative.    HENT: Positive for congestion, ear pain, nosebleeds, postnasal drip, rhinorrhea and sneezing.    Eyes: Negative.    Respiratory: Negative.    Cardiovascular:  "Negative.    Gastrointestinal: Negative.    Endocrine: Negative.    Genitourinary: Negative.    Musculoskeletal: Negative.    Skin: Negative.    Allergic/Immunologic: Positive for environmental allergies.   Neurological: Negative.    Hematological: Negative.    Psychiatric/Behavioral: Negative.        Compared to one year ago, the patient feels her physical health is the same.  Compared to one year ago, the patient feels her mental health is the same.    Objective       Physical Exam    General: Alert and oriented x 3.  No acute distress but multiple episodes of coughing noted during the exam.  Normal affect.  HEENT: Pupils equal, round, reactive to light; extraocular movements intact; sclerae nonicteric; pharynx, ear canals and TMs normal.  Boggy nasal mucosa present bilaterally with no definite tenderness to percussion over the sinuses.  Neck: Without JVD, thyromegaly, bruit, or adenopathy.  Lungs: Clear to auscultation in all fields except for an occasional scattered rhonchi.  No signs of consolidation.  Heart: Regular rate and rhythm without murmur, rub, gallop, or click.  Abdomen: Soft, nontender, without hepatosplenomegaly or hernia.  Bowel sounds normal.  : Deferred.  Rectal: Deferred.  Extremities: Without clubbing, cyanosis, edema, or pulse deficit.  Neurologic: Intact without focal deficit.  Normal station and gait observed during ingress and egress from the examination room.  Skin: Without significant lesion.  Musculoskeletal: Unremarkable.    Vitals:    06/05/17 1250   BP: 130/76   BP Location: Right arm   Patient Position: Sitting   Cuff Size: Adult   Pulse: 70   SpO2: 95%   Weight: 135 lb (61.2 kg)   Height: 64.5\" (163.8 cm)   PainSc: 0-No pain       Body mass index is 22.81 kg/(m^2).  Discussed the patient's BMI with her. The BMI is above average; BMI management plan is completed.    Assessment/Plan   Patient Self-Management and Personalized Health Advice  The patient has been provided with " information about: diet, exercise, weight management, prevention of cardiac or vascular disease, the relationship between weight and GERD and fall prevention and preventive services including:   · Advance directive, Bone densitometry screening, Exercise counseling provided, Fall Risk assessment done, Glaucoma screening recommended.    Visit Diagnoses:    ICD-10-CM ICD-9-CM   1. Initial Medicare annual wellness visit Z00.00 V70.0   2. Acute upper respiratory infection J06.9 465.9   3. Multiple environmental allergies Z91.09 V15.09   4. Allergic rhinitis J30.1 477.0   5. Benign essential hypertension I10 401.1       No orders of the defined types were placed in this encounter.      Outpatient Encounter Prescriptions as of 6/5/2017   Medication Sig Dispense Refill   • atorvastatin (LIPITOR) 80 MG tablet TAKE ONE TABLET BY MOUTH EVERY NIGHT AT BEDTIME 30 tablet 1   • Calcium Carbonate (CALCIUM 600 PO) Take 2 tablets by mouth daily.     • Coenzyme Q10 (COQ10) 400 MG capsule Take 1 capsule by mouth daily. Take with a meal.     • fexofenadine-pseudoephedrine (ALLEGRA-D 24) 180-240 MG per 24 hr tablet Take 1 tablet by mouth Daily. 30 tablet 5   • finasteride (PROSCAR) 5 MG tablet Take 1 tablet by mouth Daily.     • fluticasone (FLONASE) 50 MCG/ACT nasal spray 2 sprays into each nostril daily. Spray in each nostril.     • ketoconazole (NIZORAL) 2 % shampoo      • lisinopril-hydrochlorothiazide (PRINZIDE,ZESTORETIC) 10-12.5 MG per tablet TAKE ONE TABLET BY MOUTH EVERY MORNING 30 tablet 2   • multivitamin (THERAGRAN) tablet tablet Take 1 tablet by mouth daily.     • naproxen (NAPROSYN) 500 MG tablet TAKE ONE TABLET BY MOUTH TWICE A DAY 60 tablet 1   • omeprazole OTC (PRILOSEC OTC) 20 MG EC tablet Take 1 tablet by mouth Daily.     • PREMARIN 0.625 MG tablet TAKE 1 TABLET DAILY 90 tablet 0   • saccharomyces boulardii (FLORASTOR) 250 MG capsule Take 1 capsule by mouth 2 (two) times a day.       No facility-administered encounter  medications on file as of 6/5/2017.        Reviewed use of high risk medication in the elderly: no  Reviewed for potential of harmful drug interactions in the elderly: yes    Follow Up:  No Follow-up on file.     An After Visit Summary and PPPS with all of these plans were given to the patient.

## 2017-06-05 NOTE — PROGRESS NOTES
06/05/2017    Patient Information  Ashley Mata                                                                                          [id number] [facility] [address]      1945  [phone]    Chief Complaint:     Complaining of cough, head congestion, worsening allergy symptoms.    History of Present Illness:    Patient with a history of allergic rhinitis/multiple environmental allergies, hypertension, reflux, hyperlipidemia, osteoarthritis, subclinical hypothyroidism.  She presents today with complaints of worsening allergy symptoms and cough associated with head congestion and this will be described in detail below past medical history reviewed and updated where necessary including health maintenance parameters.  This reveals she will be up-to-date after today's initial Medicare wellness visit.    The history regarding worsening environmental allergies/URI:    06/05/2017--patient presents with a one-week history of nasal congestion which causes difficulty breathing, posterior nasal drainage of questionable color phlegm and associated with a dry cough.  Patient also has had a lot of sneezing and questions whether or not her symptoms are related to her environmental allergy.  No fever, chills, or other systemic signs or symptoms.  Examination reveals an obvious cough.  She has boggy nasal mucosa but no definite tenderness to percussion over the sinuses.  Pharynx and tympanic membranes appear normal.  Her eyes are not inflamed.  Somewhat difficult assessment in regards to whether or not patient needs an antibiotic.  There is no doubt in my mind that environmental allergies are playing a role.  It may be a viral URI or perhaps no element of URI at all and just be strictly her allergies.  Given the severity of her symptoms and the fact this has been going on now for at least a week, I will err on the side of being more aggressive.  Plan is as follows: Kenalog 40  mg IM ×1.  Depo-Medrol 40 mg IM ×1.  Stahist AD one by mouth every 6-8 hours as needed for congestion, not greater than 3 in 24 hours.  Z-Antonio.  Refill Flonase.    Review of Systems   Constitution: Positive for malaise/fatigue.   HENT: Positive for congestion and ear pain.    Eyes: Negative.    Cardiovascular: Negative.    Respiratory: Positive for cough.    Endocrine: Negative.    Hematologic/Lymphatic: Negative.    Skin: Negative.    Musculoskeletal: Negative.    Gastrointestinal: Negative.    Genitourinary: Negative.    Neurological: Negative.    Psychiatric/Behavioral: Negative.    Allergic/Immunologic: Positive for environmental allergies.       Active Problems:    Patient Active Problem List   Diagnosis   • Allergic rhinitis   • Benign essential hypertension   • Diverticulosis of colon   • Gastroesophageal reflux disease without esophagitis   • Hyperlipidemia   • Multiple environmental allergies   • Primary osteoarthritis of both wrists   • Primary osteoarthritis of both hands   • Postmenopausal hormone replacement therapy   • Subclinical hypothyroidism   • Vitamin D deficiency   • Acute upper respiratory infection   • Therapeutic drug monitoring   • Alopecia         Past Medical History:   Diagnosis Date   • History of Bicipital tendinitis of right shoulder 3/17/2015    06/08/2015--patient seen in follow-up and reports she continues to have right upper arm/shoulder pain. Examination today reveals tenderness over the subdeltoid bursal region and possibly the long head of the biceps tendon.  The shoulder injected with 40 mg of Depo-Medrol in divided doses.  03/17/2015--patient reports roughly one-week history of right arm and shoulder pain. This occurred after lifting some heavy linens. Examination consistent with bicipital tendinitis. X-ray of the shoulder ordered which revealed mild right AC joint arthropathy. Nothing acute. Recommend Vimovo 500/20, one by mouth twice a day. Patient should hold naproxen while  taking this. If symptoms persist we could consider a cortisone injection.   • History of bone density study 4/18/2016 06/09/2016--DEXA scan reveals lumbar spine T score -0.5.  Left hip T score -0.4.  05/01/2013--DEXA scan reveals lumbar spine T score -0.5. Left hip T score -0.9.  04/03/2010--DEXA scan reveals lumbar spine T score -0.8. Left hip T score -0.4.   • History of carotid Doppler/vascular screen 03/24/2014 03/24/2014--vascular screen was negative for carotid plaque, negative for AAA, negative for PAD.   02/16/2011--vascular screen negative for carotid plaque, negative for AAA, negative for PAD.   • History of chest x-ray 4/18/2016 07/26/2016--preoperative chest x-ray reveals heart, mediastinum, and pulmonary vasculature normal.  Mild chronic pulmonary change without evidence of active process.  Negative chest.  06/15/2011--normal chest x-ray.   • History of Hyperplastic polyps of stomach 06/09/2015 06/09/2015--EGD revealed Z line irregular, 35 cm from incisors. Biopsied. Small hiatal hernia. Gastritis. Biopsied. Bilious gastric fluid. Fluid aspiration performed. Normal duodenal bulb and second part of duodenum. Biopsied. Pathology revealed the antral biopsy revealed small intestinal mucosa with no pathologic diagnosis. Good preservation of villous architecture. Duodenum biopsy revealed large   • History of Intramuscular hematoma, left 09/11/2013 09/11/2013--left humerus x-rays revealed mild degenerative change at the a.c. joint. The fluid collection resolved spontaneously  09/06/2013--bilateral duplex scan of the veins of the upper extremities revealed a fluid collection of the left arm near the biceps. No evidence of DVT in the right internal jugular vein. No evidence of DVT in the right subclavian vein. No evidence of deep or superficia   • History of Left rotator cuff tear 07/10/2014    07/10/2014--left rotator cuff repair.   03/18/2014--patient seen in followup and reports that her range  of motion has improved and her pain is not debilitating. She feels that she is making progress with physical therapy. Surgery scheduled 07/10/2014.   01/10/2014--patient was seen in evaluation by the orthopedist and he recommended conservative treatment consisting of physical therapy/rehabilitat   • History of mammogram 06/13/2016 06/13/2016--normal mammogram performed at [facility].  05/27/2015--normal mammogram.   05/12/2014--normal mammogram.   05/02/2013--normal mammogram.   04/26/2012--normal mammogram.   • History of pneumococcal vaccination 01/15/2016    01/15/2016--PPSV 23 given. No further pneumococcal vaccinations required.   07/15/2015--Prevnar 13 and given. Patient is pneumococcal vaccination sage and therefore will need PPSV 23 in 6-12 months.   • History of rotator cuff tear, right 7/1/2016 08/03/2016--arthroscopic right rotator cuff repair.  Debridement of degenerative anterior superior labral tear.  07/12/2016--MR arthrogram of the right shoulder reveals a large full thickness supraspinatus tendon insertional tear with retraction.  Moderate acromioclavicular joint arthritis.  Patient scheduled for surgery 08/03/2016.  04/20/2016--patient presents with a one-month history of right shoulder pain that is located in the subdeltoid region.  No history of trauma.  Examination reveals tenderness over the subdeltoid bursa.  The area in question was injected with 40 mg of Depo-Medrol and 3 cc of Xylocaine.   • History of Subdeltoid bursitis of right shoulder joint 4/20/2016 07/12/2016--MR arthrogram of the right shoulder reveals a large full thickness supraspinatus tendon insertional tear with retraction.  Moderate acromioclavicular joint arthritis.  Patient scheduled for surgery 08/03/2016.  04/20/2016--patient presents with a one-month history of right shoulder pain that is located in the subdeltoid region.  No history of trauma.  Examination reveals tenderness over the  subdeltoid bursa.  The area in question was injected with 40 mg of Depo-Medrol and 3 cc of Xylocaine.   • History of Zostavax administration 11/20/2014 11/20/2014--Zostavax given at the local drug store.         Past Surgical History:   Procedure Laterality Date   • COLONOSCOPY  03/17/2009 03/17/2009--colonoscopy revealed external hemorrhoids, torturous colon with a sigmoid stricture, sigmoid diverticulosis.   • COLONOSCOPY N/A 3/10/2017    03/10/2017--colonoscopy revealed many small and large mouth diverticula in the sigmoid colon and descending colon.  Nonthrombosed external hemorrhoids and internal hemorrhoids noted.  Otherwise, normal colonoscopy.   • ERCP WITH SPHINCTEROTOMY/PAPILLOTOMY  08/13/2014 08/13/2014--ERCP, endoscopic sphincterotomy and removal of common bile duct stones. 08/12/2014--laparoscopic cholecystectomy. This was complicated by retained common bile duct stones 2.   • ESOPHAGOSCOPY / EGD  06/09/2015 06/09/2015--EGD revealed Z line irregular, 35 cm from incisors. Biopsied. Small hiatal hernia. Gastritis. Biopsied. Bilious gastric fluid. Fluid aspiration performed. Normal duodenal bulb and second part of duodenum. Biopsied. Pathology revealed the antral biopsy revealed small intestinal mucosa with no pathologic diagnosis. Good preservation of villous architecture. Duodenum biopsy revealed large   • LAPAROSCOPIC CHOLECYSTECTOMY  08/12/2014 08/13/2014--ERCP, endoscopic sphincterotomy and removal of common bile duct stones. 08/12/2014--laparoscopic cholecystectomy. This was complicated by retained common bile duct stones 2.   • PARTIAL HYSTERECTOMY  1978    Partial hysterectomy 1978.   • ROTATOR CUFF REPAIR Left 07/10/2014    07/10/2014--left rotator cuff repair. 03/18/2014--patient seen in followup and reports that her range of motion has improved and her pain is not debilitating. She feels that she is making progress with physical therapy. Surgery scheduled 07/10/2014.  01/10/2014--patient was seen in evaluation by the orthopedist and he recommended conservative treatment consisting of physical therapy/rehabilitation.   • ROTATOR CUFF REPAIR Right 08/03/2016 08/03/2016--arthroscopic right rotator cuff repair.  Debridement of degenerative anterior superior labral tear.         No Known Allergies        Current Outpatient Prescriptions:   •  atorvastatin (LIPITOR) 80 MG tablet, TAKE ONE TABLET BY MOUTH EVERY NIGHT AT BEDTIME, Disp: 30 tablet, Rfl: 1  •  Calcium Carbonate (CALCIUM 600 PO), Take 2 tablets by mouth daily., Disp: , Rfl:   •  Coenzyme Q10 (COQ10) 400 MG capsule, Take 1 capsule by mouth daily. Take with a meal., Disp: , Rfl:   •  fexofenadine-pseudoephedrine (ALLEGRA-D 24) 180-240 MG per 24 hr tablet, Take 1 tablet by mouth Daily., Disp: 30 tablet, Rfl: 5  •  finasteride (PROSCAR) 5 MG tablet, Take 1 tablet by mouth Daily., Disp: , Rfl:   •  fluticasone (FLONASE) 50 MCG/ACT nasal spray, 2 sprays into each nostril daily. Spray in each nostril., Disp: , Rfl:   •  ketoconazole (NIZORAL) 2 % shampoo, , Disp: , Rfl:   •  lisinopril-hydrochlorothiazide (PRINZIDE,ZESTORETIC) 10-12.5 MG per tablet, TAKE ONE TABLET BY MOUTH EVERY MORNING, Disp: 30 tablet, Rfl: 2  •  multivitamin (THERAGRAN) tablet tablet, Take 1 tablet by mouth daily., Disp: , Rfl:   •  naproxen (NAPROSYN) 500 MG tablet, TAKE ONE TABLET BY MOUTH TWICE A DAY, Disp: 60 tablet, Rfl: 1  •  omeprazole OTC (PRILOSEC OTC) 20 MG EC tablet, Take 1 tablet by mouth Daily., Disp: , Rfl:   •  PREMARIN 0.625 MG tablet, TAKE 1 TABLET DAILY, Disp: 90 tablet, Rfl: 0  •  saccharomyces boulardii (FLORASTOR) 250 MG capsule, Take 1 capsule by mouth 2 (two) times a day., Disp: , Rfl:       Family History   Problem Relation Age of Onset   • Other Father      Hodgkin's Disease   • Cancer Father      Lung Cancer         Social History     Social History   • Marital status:      Spouse name: N/A   • Number of children: N/A   •  "Years of education: N/A     Occupational History   •  - [employer]      Social History Main Topics   • Smoking status: Never Smoker   • Smokeless tobacco: Never Used   • Alcohol use Yes      Comment: Socially   • Drug use: No   • Sexual activity: Yes     Partners: Male     Other Topics Concern   • Not on file     Social History Narrative         Vitals:    06/05/17 1250   BP: 130/76   BP Location: Right arm   Patient Position: Sitting   Cuff Size: Adult   Pulse: 70   SpO2: 95%   Weight: 135 lb (61.2 kg)   Height: 64.5\" (163.8 cm)          Physical Exam:    General: Alert and oriented x 3.  No acute distress but multiple episodes of coughing noted during the exam.  Normal affect.  HEENT: Pupils equal, round, reactive to light; extraocular movements intact; sclerae nonicteric; pharynx, ear canals and TMs normal.  Boggy nasal mucosa present bilaterally with no definite tenderness to percussion over the sinuses.  Neck: Without JVD, thyromegaly, bruit, or adenopathy.  Lungs: Clear to auscultation in all fields except for an occasional scattered rhonchi.  No signs of consolidation.  Heart: Regular rate and rhythm without murmur, rub, gallop, or click.  Abdomen: Soft, nontender, without hepatosplenomegaly or hernia.  Bowel sounds normal.  : Deferred.  Rectal: Deferred.  Extremities: Without clubbing, cyanosis, edema, or pulse deficit.  Neurologic: Intact without focal deficit.  Normal station and gait observed during ingress and egress from the examination room.  Skin: Without significant lesion.  Musculoskeletal: Unremarkable.    Lab/other results:      Assessment/Plan:     Diagnosis Plan   1. Initial Medicare annual wellness visit     2. Acute upper respiratory infection     3. Multiple environmental allergies     4. Allergic rhinitis     5. Benign essential hypertension       The initial Medicare annual wellness visit is documented under separate note.  Patient presents with the history and exam " consistent with URI but I believe that environmental allergies are playing a major role here.  Blood pressure assess today and appears to be adequate despite her illness.    Plan is as follows: Kenalog 40 mg IM ×1.  Depo-Medrol 40 mg IM ×1.  Stahist AD, 1 by mouth every 6-8 hours when necessary congestion, not greater than 3 in a 24-hour period.  Refill Flonase.  Z-Antonio times one.  Cheratussin cough syrup.  Follow-up if her symptoms persist and certainly if they should worsen.            Procedures

## 2017-06-27 ENCOUNTER — OFFICE VISIT (OUTPATIENT)
Dept: OBSTETRICS AND GYNECOLOGY | Facility: CLINIC | Age: 72
End: 2017-06-27

## 2017-06-27 ENCOUNTER — APPOINTMENT (OUTPATIENT)
Dept: MAMMOGRAPHY | Facility: CLINIC | Age: 72
End: 2017-06-27

## 2017-06-27 VITALS
SYSTOLIC BLOOD PRESSURE: 110 MMHG | BODY MASS INDEX: 22.33 KG/M2 | WEIGHT: 134 LBS | HEIGHT: 65 IN | DIASTOLIC BLOOD PRESSURE: 60 MMHG

## 2017-06-27 DIAGNOSIS — Z79.890 HORMONE REPLACEMENT THERAPY: ICD-10-CM

## 2017-06-27 DIAGNOSIS — Z01.419 WELL WOMAN EXAM WITH ROUTINE GYNECOLOGICAL EXAM: Primary | ICD-10-CM

## 2017-06-27 DIAGNOSIS — Z12.31 VISIT FOR SCREENING MAMMOGRAM: ICD-10-CM

## 2017-06-27 PROCEDURE — G0202 SCR MAMMO BI INCL CAD: HCPCS | Performed by: OBSTETRICS & GYNECOLOGY

## 2017-06-27 PROCEDURE — G0101 CA SCREEN;PELVIC/BREAST EXAM: HCPCS | Performed by: OBSTETRICS & GYNECOLOGY

## 2017-06-27 NOTE — PROGRESS NOTES
Subjective   Ashley Mata is a 71 y.o. female is here today as a self referral for annual.    History of Present Illness-here today for annual exam, checkup and refill of hormones.    The following portions of the patient's history were reviewed and updated as appropriate: allergies, current medications, past family history, past medical history, past social history, past surgical history and problem list.    Review of Systems   Constitutional: Negative.    HENT: Negative.    Eyes: Negative.    Respiratory: Negative.    Cardiovascular: Negative.    Gastrointestinal: Negative.    Endocrine: Negative.    Genitourinary: Negative.    Musculoskeletal: Negative.    Skin: Negative.    Allergic/Immunologic: Negative.    Neurological: Negative.    Hematological: Negative.    Psychiatric/Behavioral: Negative.        Objective   Physical Exam   Constitutional: She is oriented to person, place, and time. She appears well-developed and well-nourished.   HENT:   Head: Normocephalic and atraumatic.   Nose: Nose normal.   Eyes: Conjunctivae and EOM are normal. Pupils are equal, round, and reactive to light.   Neck: Normal range of motion. Neck supple.   Cardiovascular: Normal rate, regular rhythm and normal heart sounds.    Pulmonary/Chest: Effort normal and breath sounds normal. Right breast exhibits no inverted nipple, no mass, no nipple discharge, no skin change and no tenderness. Left breast exhibits no inverted nipple, no mass, no nipple discharge and no skin change. Breasts are symmetrical. There is no breast swelling.   Abdominal: Soft. Hernia confirmed negative in the right inguinal area and confirmed negative in the left inguinal area.   Genitourinary: Rectum normal. No breast tenderness, discharge or bleeding. Pelvic exam was performed with patient supine. No labial fusion. There is no rash, tenderness, lesion or injury on the right labia. There is no rash, tenderness, lesion or injury on the left labia. Right adnexum  displays no mass, no tenderness and no fullness. Left adnexum displays no mass, no tenderness and no fullness. No erythema or bleeding in the vagina. No foreign body in the vagina. No signs of injury around the vagina. No vaginal discharge found.   Neurological: She is alert and oriented to person, place, and time. She has normal reflexes.   Skin: Skin is warm and dry.   Psychiatric: She has a normal mood and affect. Her behavior is normal. Judgment and thought content normal.         Assessment/Plan   Problems Addressed this Visit     None      Visit Diagnoses     Well woman exam with routine gynecological exam    -  Primary    Hormone replacement therapy            Mammogram done today.  Current with colonoscopy and DEXA scan.  Hormones refilled.

## 2017-07-05 RX ORDER — FEXOFENADINE HCL AND PSEUDOEPHEDRINE HCI 180; 240 MG/1; MG/1
TABLET, EXTENDED RELEASE ORAL
Qty: 30 TABLET | Refills: 0 | OUTPATIENT
Start: 2017-07-05

## 2017-07-05 RX ORDER — LISINOPRIL AND HYDROCHLOROTHIAZIDE 12.5; 1 MG/1; MG/1
TABLET ORAL
Qty: 30 TABLET | Refills: 1 | Status: SHIPPED | OUTPATIENT
Start: 2017-07-05 | End: 2017-09-12 | Stop reason: SDUPTHER

## 2017-07-28 RX ORDER — NAPROXEN 500 MG/1
TABLET ORAL
Qty: 60 TABLET | Refills: 3 | Status: SHIPPED | OUTPATIENT
Start: 2017-07-28 | End: 2018-03-24 | Stop reason: SDUPTHER

## 2017-09-11 RX ORDER — ATORVASTATIN CALCIUM 80 MG/1
TABLET, FILM COATED ORAL
Qty: 30 TABLET | Refills: 0 | Status: SHIPPED | OUTPATIENT
Start: 2017-09-11 | End: 2017-11-06 | Stop reason: SDUPTHER

## 2017-09-12 RX ORDER — LISINOPRIL AND HYDROCHLOROTHIAZIDE 12.5; 1 MG/1; MG/1
TABLET ORAL
Qty: 30 TABLET | Refills: 0 | Status: SHIPPED | OUTPATIENT
Start: 2017-09-12 | End: 2017-10-12 | Stop reason: SDUPTHER

## 2017-10-02 DIAGNOSIS — E55.9 VITAMIN D DEFICIENCY: Chronic | ICD-10-CM

## 2017-10-02 DIAGNOSIS — E78.5 HYPERLIPIDEMIA, UNSPECIFIED HYPERLIPIDEMIA TYPE: Chronic | ICD-10-CM

## 2017-10-02 DIAGNOSIS — Z51.81 THERAPEUTIC DRUG MONITORING: ICD-10-CM

## 2017-10-02 DIAGNOSIS — I10 BENIGN ESSENTIAL HYPERTENSION: Primary | Chronic | ICD-10-CM

## 2017-10-02 DIAGNOSIS — E03.8 SUBCLINICAL HYPOTHYROIDISM: Chronic | ICD-10-CM

## 2017-10-05 LAB
25(OH)D3+25(OH)D2 SERPL-MCNC: 61.4 NG/ML (ref 30–100)
ALBUMIN SERPL-MCNC: 4.1 G/DL (ref 3.5–5.2)
ALBUMIN/GLOB SERPL: 1.7 G/DL
ALP SERPL-CCNC: 61 U/L (ref 39–117)
ALT SERPL-CCNC: 21 U/L (ref 1–33)
APPEARANCE UR: ABNORMAL
AST SERPL-CCNC: 24 U/L (ref 1–32)
BILIRUB SERPL-MCNC: 0.4 MG/DL (ref 0.1–1.2)
BILIRUB UR QL STRIP: NEGATIVE
BUN SERPL-MCNC: 27 MG/DL (ref 8–23)
BUN/CREAT SERPL: 34.6 (ref 7–25)
CALCIUM SERPL-MCNC: 9.7 MG/DL (ref 8.6–10.5)
CHLORIDE SERPL-SCNC: 101 MMOL/L (ref 98–107)
CHOLEST SERPL-MCNC: 165 MG/DL (ref 100–199)
CK SERPL-CCNC: 64 U/L (ref 20–180)
CO2 SERPL-SCNC: 27.9 MMOL/L (ref 22–29)
COLOR UR: ABNORMAL
CREAT SERPL-MCNC: 0.78 MG/DL (ref 0.57–1)
ERYTHROCYTE [DISTWIDTH] IN BLOOD BY AUTOMATED COUNT: 12.8 % (ref 11.7–13)
GLOBULIN SER CALC-MCNC: 2.4 GM/DL
GLUCOSE SERPL-MCNC: 93 MG/DL (ref 65–99)
GLUCOSE UR QL: NEGATIVE
HCT VFR BLD AUTO: 40.4 % (ref 35.6–45.5)
HDL SERPL-SCNC: 54.1 UMOL/L
HDLC SERPL-MCNC: 95 MG/DL
HGB BLD-MCNC: 13.3 G/DL (ref 11.9–15.5)
HGB UR QL STRIP: NEGATIVE
KETONES UR QL STRIP: NEGATIVE
LDL SERPL QN: 20.5 NM
LDL SERPL-SCNC: 833 NMOL/L
LDL SMALL SERPL-SCNC: 447 NMOL/L
LDLC SERPL CALC-MCNC: 44 MG/DL (ref 0–99)
LEUKOCYTE ESTERASE UR QL STRIP: NEGATIVE
MCH RBC QN AUTO: 31.1 PG (ref 26.9–32)
MCHC RBC AUTO-ENTMCNC: 32.9 G/DL (ref 32.4–36.3)
MCV RBC AUTO: 94.4 FL (ref 80.5–98.2)
NITRITE UR QL STRIP: NEGATIVE
PH UR STRIP: 6 [PH] (ref 5–8)
PLATELET # BLD AUTO: 299 10*3/MM3 (ref 140–500)
POTASSIUM SERPL-SCNC: 4.5 MMOL/L (ref 3.5–5.2)
PROT SERPL-MCNC: 6.5 G/DL (ref 6–8.5)
PROT UR QL STRIP: NEGATIVE
RBC # BLD AUTO: 4.28 10*6/MM3 (ref 3.9–5.2)
SODIUM SERPL-SCNC: 141 MMOL/L (ref 136–145)
SP GR UR: 1.02 (ref 1–1.03)
T3FREE SERPL-MCNC: 3.1 PG/ML (ref 2–4.4)
T4 FREE SERPL-MCNC: 1.25 NG/DL (ref 0.93–1.7)
TRIGL SERPL-MCNC: 132 MG/DL (ref 0–149)
TSH SERPL DL<=0.005 MIU/L-ACNC: 3.4 MIU/ML (ref 0.27–4.2)
UROBILINOGEN UR STRIP-MCNC: ABNORMAL MG/DL
WBC # BLD AUTO: 6.31 10*3/MM3 (ref 4.5–10.7)

## 2017-10-11 ENCOUNTER — OFFICE VISIT (OUTPATIENT)
Dept: INTERNAL MEDICINE | Facility: CLINIC | Age: 72
End: 2017-10-11

## 2017-10-11 VITALS
SYSTOLIC BLOOD PRESSURE: 126 MMHG | DIASTOLIC BLOOD PRESSURE: 72 MMHG | WEIGHT: 136 LBS | BODY MASS INDEX: 22.66 KG/M2 | HEIGHT: 65 IN | OXYGEN SATURATION: 99 % | HEART RATE: 105 BPM

## 2017-10-11 DIAGNOSIS — E55.9 VITAMIN D DEFICIENCY: Chronic | ICD-10-CM

## 2017-10-11 DIAGNOSIS — Z51.81 THERAPEUTIC DRUG MONITORING: ICD-10-CM

## 2017-10-11 DIAGNOSIS — Z23 NEED FOR INFLUENZA VACCINATION: ICD-10-CM

## 2017-10-11 DIAGNOSIS — E78.5 HYPERLIPIDEMIA, UNSPECIFIED HYPERLIPIDEMIA TYPE: Primary | Chronic | ICD-10-CM

## 2017-10-11 DIAGNOSIS — K21.9 GASTROESOPHAGEAL REFLUX DISEASE WITHOUT ESOPHAGITIS: Chronic | ICD-10-CM

## 2017-10-11 DIAGNOSIS — E03.8 SUBCLINICAL HYPOTHYROIDISM: Chronic | ICD-10-CM

## 2017-10-11 DIAGNOSIS — K57.30 DIVERTICULOSIS OF COLON: Chronic | ICD-10-CM

## 2017-10-11 DIAGNOSIS — I10 BENIGN ESSENTIAL HYPERTENSION: Chronic | ICD-10-CM

## 2017-10-11 PROCEDURE — 99213 OFFICE O/P EST LOW 20 MIN: CPT | Performed by: INTERNAL MEDICINE

## 2017-10-11 PROCEDURE — G0008 ADMIN INFLUENZA VIRUS VAC: HCPCS | Performed by: INTERNAL MEDICINE

## 2017-10-11 PROCEDURE — 90662 IIV NO PRSV INCREASED AG IM: CPT | Performed by: INTERNAL MEDICINE

## 2017-10-11 RX ORDER — FEXOFENADINE HCL AND PSEUDOEPHEDRINE HCI 180; 240 MG/1; MG/1
1 TABLET, EXTENDED RELEASE ORAL DAILY
COMMUNITY
End: 2018-03-14 | Stop reason: SDUPTHER

## 2017-10-11 NOTE — PROGRESS NOTES
10/11/2017    Patient Information  Ashley Mata                                                                                          [id number] [address] [id number]      1945  [phone]    Chief Complaint:     Follow-up hyperlipidemia, subclinical hypothyroidism, hypertension, esophageal reflux, vitamin D deficiency.  No new acute complaints.    History of Present Illness:    Patient with a history of medical problems as outlined in the chief complaint presents today for a follow-up with lab prior in order to monitor her chronic medical issues.  She is currently tolerating her medications well and has no new acute complaints.  Past medical history reviewed and updated where necessary including health maintenance parameters.  This reveals she is up-to-date except for an influenza vaccination which we will give today.    Review of Systems   Constitution: Negative.   HENT: Negative.    Eyes: Negative.    Cardiovascular: Negative.    Respiratory: Negative.    Endocrine: Negative.    Hematologic/Lymphatic: Negative.    Skin: Negative.    Musculoskeletal: Negative.    Gastrointestinal: Negative.    Genitourinary: Negative.    Neurological: Negative.    Psychiatric/Behavioral: Negative.    Allergic/Immunologic: Negative.        Active Problems:    Patient Active Problem List   Diagnosis   • Allergic rhinitis   • Benign essential hypertension   • Diverticulosis of colon   • Gastroesophageal reflux disease without esophagitis   • Hyperlipidemia   • Multiple environmental allergies   • Primary osteoarthritis of both wrists   • Primary osteoarthritis of both hands   • Postmenopausal hormone replacement therapy   • Subclinical hypothyroidism   • Vitamin D deficiency   • Therapeutic drug monitoring   • Alopecia         Past Medical History:   Diagnosis Date   • Allergic rhinitis 1/30/2015 11/13/2015--patient was evaluated by ENT and was started on generic Flonase and  patient reports this has improved her symptoms quite a bit.   01/30/2015--allergy testing revealed positive reactivity to cat, dust mite, cockroach, mold spores, and grass pollen. Environmental control measures.   • Alopecia 4/12/2017    Evaluated and treated by the dermatologist.   • Benign essential hypertension 4/18/2016   • Diverticulosis of colon 3/17/2009    03/10/2017--colonoscopy revealed many small and large mouth diverticula in the sigmoid colon and descending colon.  Nonthrombosed external hemorrhoids and internal hemorrhoids noted.  Otherwise, normal colonoscopy.  03/17/2009--colonoscopy revealed external hemorrhoids, torturous colon with a sigmoid stricture, sigmoid diverticulosis.   • Gastroesophageal reflux disease without esophagitis 3/17/2009    06/09/2015--EGD revealed Z line irregular, 35 cm from incisors. Biopsied. Small hiatal hernia. Gastritis. Biopsied. Bilious gastric fluid. Fluid aspiration performed. Normal duodenal bulb and second part of duodenum. Biopsied. Pathology revealed the antral biopsy revealed small intestinal mucosa with no pathologic diagnosis. Good preservation of villous architecture. Duodenum biopsy revealed largely antral type gastric mucosa with mild patchy superficial chronic gastritis. Gastroesophageal junction revealed squamous and glandular mucosa with mild chronic inflammation. Negative for intestinal metaplasia or dysplasia. There appeared to be mislabeling of the antral biopsies and duodenal biopsies.   04/17/2012--EGD revealed irregular Z line, hiatal hernia, gastritis, duodenitis.   03/17/2009--EGD revealed grade B. reflux esophagitis, hiatal hernia, gastritis, a few gastric polyps, duodenitis.   • History of Bicipital tendinitis of right shoulder 3/17/2015    06/08/2015--patient seen in follow-up and reports she continues to have right upper arm/shoulder pain. Examination today reveals tenderness over the subdeltoid bursal region and possibly the long head of the  biceps tendon.  The shoulder injected with 40 mg of Depo-Medrol in divided doses.  03/17/2015--patient reports roughly one-week history of right arm and shoulder pain. This occurred after lifting some heavy linens. Examination consistent with bicipital tendinitis. X-ray of the shoulder ordered which revealed mild right AC joint arthropathy. Nothing acute. Recommend Vimovo 500/20, one by mouth twice a day. Patient should hold naproxen while taking this. If symptoms persist we could consider a cortisone injection.   • History of bone density study 4/18/2016 06/09/2016--DEXA scan reveals lumbar spine T score -0.5.  Left hip T score -0.4.  05/01/2013--DEXA scan reveals lumbar spine T score -0.5. Left hip T score -0.9.  04/03/2010--DEXA scan reveals lumbar spine T score -0.8. Left hip T score -0.4.   • History of carotid Doppler/vascular screen 03/24/2014 03/24/2014--vascular screen was negative for carotid plaque, negative for AAA, negative for PAD.   02/16/2011--vascular screen negative for carotid plaque, negative for AAA, negative for PAD.   • History of chest x-ray 4/18/2016 07/26/2016--preoperative chest x-ray reveals heart, mediastinum, and pulmonary vasculature normal.  Mild chronic pulmonary change without evidence of active process.  Negative chest.  06/15/2011--normal chest x-ray.   • History of Hyperplastic polyps of stomach 06/09/2015 06/09/2015--EGD revealed Z line irregular, 35 cm from incisors. Biopsied. Small hiatal hernia. Gastritis. Biopsied. Bilious gastric fluid. Fluid aspiration performed. Normal duodenal bulb and second part of duodenum. Biopsied. Pathology revealed the antral biopsy revealed small intestinal mucosa with no pathologic diagnosis. Good preservation of villous architecture. Duodenum biopsy revealed large   • History of Intramuscular hematoma, left 09/11/2013 09/11/2013--left humerus x-rays revealed mild degenerative change at the a.c. joint. The fluid collection  resolved spontaneously  09/06/2013--bilateral duplex scan of the veins of the upper extremities revealed a fluid collection of the left arm near the biceps. No evidence of DVT in the right internal jugular vein. No evidence of DVT in the right subclavian vein. No evidence of deep or superficia   • History of Left rotator cuff tear 07/10/2014    07/10/2014--left rotator cuff repair.   03/18/2014--patient seen in followup and reports that her range of motion has improved and her pain is not debilitating. She feels that she is making progress with physical therapy. Surgery scheduled 07/10/2014.   01/10/2014--patient was seen in evaluation by the orthopedist and he recommended conservative treatment consisting of physical therapy/rehabilitat   • History of mammogram 06/13/2016 06/13/2016--normal mammogram performed at [facility].  05/27/2015--normal mammogram.   05/12/2014--normal mammogram.   05/02/2013--normal mammogram.   04/26/2012--normal mammogram.   • History of pneumococcal vaccination 01/15/2016    01/15/2016--PPSV 23 given. No further pneumococcal vaccinations required.   07/15/2015--Prevnar 13 and given. Patient is pneumococcal vaccination sage and therefore will need PPSV 23 in 6-12 months.   • History of rotator cuff tear, right 7/1/2016 08/03/2016--arthroscopic right rotator cuff repair.  Debridement of degenerative anterior superior labral tear.  07/12/2016--MR arthrogram of the right shoulder reveals a large full thickness supraspinatus tendon insertional tear with retraction.  Moderate acromioclavicular joint arthritis.  Patient scheduled for surgery 08/03/2016.  04/20/2016--patient presents with a one-month history of right shoulder pain that is located in the subdeltoid region.  No history of trauma.  Examination reveals tenderness over the subdeltoid bursa.  The area in question was injected with 40 mg of Depo-Medrol and 3 cc of Xylocaine.   • History of Subdeltoid bursitis  of right shoulder joint 4/20/2016 07/12/2016--MR arthrogram of the right shoulder reveals a large full thickness supraspinatus tendon insertional tear with retraction.  Moderate acromioclavicular joint arthritis.  Patient scheduled for surgery 08/03/2016.  04/20/2016--patient presents with a one-month history of right shoulder pain that is located in the subdeltoid region.  No history of trauma.  Examination reveals tenderness over the subdeltoid bursa.  The area in question was injected with 40 mg of Depo-Medrol and 3 cc of Xylocaine.   • History of URI (upper respiratory infection) 4/18/2016 06/05/2017--patient presents with a one-week history of nasal congestion which causes difficulty breathing, posterior nasal drainage of questionable color phlegm and associated with a dry cough.  Patient also has had a lot of sneezing and questions whether or not her symptoms are related to her environmental allergy.  No fever, chills, or other systemic signs or symptoms.  Examination reveals an obvious cough.  She has boggy nasal mucosa but no definite tenderness to percussion over the sinuses.  Pharynx and tympanic membranes appear normal.  Her eyes are not inflamed.  Somewhat difficult assessment in regards to whether or not patient needs an antibiotic.  There is no doubt in my mind that environmental allergies are playing a role.  It may be a viral URI or perhaps no element of URI at all and just be strictly her allergies.  Given the severity of her symptoms and the fact this has been going on now for at least a week, I will err on the side of being more aggressive.  Plan is as follows: Kenalog 40 mg IM ×   • History of Zostavax administration 11/20/2014 11/20/2014--Zostavax given at the local drug store.   • Hyperlipidemia 7/17/2012 07/17/2012--treatment for hyperlipidemia begun.   • Multiple environmental allergies 1/30/2015 01/30/2015--allergy testing revealed positive reactivity to cat, dust mite,  cockroach, mold spores, and grass pollen. Environmental control measures.   • Postmenopausal hormone replacement therapy 4/18/2016   • Primary osteoarthritis of both hands 12/30/2013 05/12/2014--patient seen in followup and reports that the Vimovo has helped. Uric acid levels are normal at 4.9. C. reactive protein mildly elevated at 2.3. X-rays of the hands reveals radiocarpal, first carpometacarpal, first metacarpophalangeal, and interphalangeal joint degeneration. This process is symmetric. The interphalangeal joint of the left is affected to the greatest degree. There is right sided scapholunate dissociation as well as deformity of the scaphoid suggesting prior traumatic injury with healing. Soft tissues are satisfactory. Overall appearance is most consistent with osteoarthritis.   05/05/2014--patient presents and reports that she is having worsening right wrist pain primarily at the base of the thumb. No new injury. Bilateral x-rays of the wrists and hands ordered. Uric acid level ordered as well as a CRP. Vimovo 500/20 one by mouth twice a day. Follow up in one week.   03/18/2014--patient reports that her wrist symptoms have improved.   12/30/2013--patient reports a several mon   • Primary osteoarthritis of both wrists 12/30/2013 05/12/2014--patient seen in followup and reports that the Vimovo has helped. Uric acid levels are normal at 4.9. C. reactive protein mildly elevated at 2.3. X-rays of the hands reveals radiocarpal, first carpometacarpal, first metacarpophalangeal, and interphalangeal joint degeneration. This process is symmetric. The interphalangeal joint of the left is affected to the greatest degree. There is right sided scapholunate dissociation as well as deformity of the scaphoid suggesting prior traumatic injury with healing. Soft tissues are satisfactory. Overall appearance is most consistent with osteoarthritis.   05/05/2014--patient presents and reports that she is having worsening right  wrist pain primarily at the base of the thumb. No new injury. Bilateral x-rays of the wrists and hands ordered. Uric acid level ordered as well as a CRP. Vimovo 500/20 one by mouth twice a day. Follow up in one week.   03/18/2014--patient reports that her wrist symptoms have improved.   12/30/2013--patient reports a several mon   • Subclinical hypothyroidism 10/3/2014    09/30/2016--thyroid ultrasound reveals a tiny 2 mm colloid cyst within the left gland.  No mass demonstrated within the right thyroid or isthmus.  Overall thyroid echotexture is normal.  Normal to slightly increased global vascularity.  07/27/2016--routine follow-up.  TSH elevated at 4.45.  Thyroid ultrasound ordered after patient recovers from rotator cuff surgery.  She will follow-up after the results are known.  07/09/2015--TSH mildly elevated at 4.34.  Free T4 and free T3 are normal.  Observation.  Subclinical hypothyroidism.  10/09/2014--thyroid antibodies are negative.  Repeat thyroid function tests are normal.  10/03/2014--TSH elevated at 6.17.  Repeat thyroid function tests including T3 and thyroid antibodies ordered.  If these come back abnormal we will perform an ultrasound of the thyroid.  07/09/2015--TSH mildly elevated at 4.34. Free T4 and free T3 are normal. Observation. Subclinical hypothyroidism.   10/09/2014--thyroid antibodies are negative. Repeat thyroid function tests are reshma   • Vitamin D deficiency 4/18/2016         Past Surgical History:   Procedure Laterality Date   • COLONOSCOPY  03/17/2009 03/17/2009--colonoscopy revealed external hemorrhoids, torturous colon with a sigmoid stricture, sigmoid diverticulosis.   • COLONOSCOPY N/A 3/10/2017    03/10/2017--colonoscopy revealed many small and large mouth diverticula in the sigmoid colon and descending colon.  Nonthrombosed external hemorrhoids and internal hemorrhoids noted.  Otherwise, normal colonoscopy.   • ERCP WITH SPHINCTEROTOMY/PAPILLOTOMY  08/13/2014     08/13/2014--ERCP, endoscopic sphincterotomy and removal of common bile duct stones. 08/12/2014--laparoscopic cholecystectomy. This was complicated by retained common bile duct stones 2.   • ESOPHAGOSCOPY / EGD  06/09/2015 06/09/2015--EGD revealed Z line irregular, 35 cm from incisors. Biopsied. Small hiatal hernia. Gastritis. Biopsied. Bilious gastric fluid. Fluid aspiration performed. Normal duodenal bulb and second part of duodenum. Biopsied. Pathology revealed the antral biopsy revealed small intestinal mucosa with no pathologic diagnosis. Good preservation of villous architecture. Duodenum biopsy revealed large   • LAPAROSCOPIC CHOLECYSTECTOMY  08/12/2014 08/13/2014--ERCP, endoscopic sphincterotomy and removal of common bile duct stones. 08/12/2014--laparoscopic cholecystectomy. This was complicated by retained common bile duct stones 2.   • PARTIAL HYSTERECTOMY  1978    Partial hysterectomy 1978.   • ROTATOR CUFF REPAIR Left 07/10/2014    07/10/2014--left rotator cuff repair. 03/18/2014--patient seen in followup and reports that her range of motion has improved and her pain is not debilitating. She feels that she is making progress with physical therapy. Surgery scheduled 07/10/2014. 01/10/2014--patient was seen in evaluation by the orthopedist and he recommended conservative treatment consisting of physical therapy/rehabilitation.   • ROTATOR CUFF REPAIR Right 08/03/2016 08/03/2016--arthroscopic right rotator cuff repair.  Debridement of degenerative anterior superior labral tear.         No Known Allergies        Current Outpatient Prescriptions:   •  atorvastatin (LIPITOR) 80 MG tablet, TAKE ONE TABLET BY MOUTH EVERY NIGHT AT BEDTIME, Disp: 30 tablet, Rfl: 0  •  Calcium Carbonate (CALCIUM 600 PO), Take 2 tablets by mouth daily., Disp: , Rfl:   •  Coenzyme Q10 (COQ10) 400 MG capsule, Take 1 capsule by mouth daily. Take with a meal., Disp: , Rfl:   •  estrogens, conjugated, (PREMARIN) 0.625 MG tablet,  "Take 1 tablet by mouth Daily. Take daily for 21 days then do not take for 7 days., Disp: 90 tablet, Rfl: 3  •  fexofenadine-pseudoephedrine (ALLEGRA-D 24) 180-240 MG per 24 hr tablet, Take 1 tablet by mouth Daily., Disp: , Rfl:   •  finasteride (PROSCAR) 5 MG tablet, Take 1 tablet by mouth Daily., Disp: , Rfl:   •  ketoconazole (NIZORAL) 2 % shampoo, , Disp: , Rfl:   •  lisinopril-hydrochlorothiazide (PRINZIDE,ZESTORETIC) 10-12.5 MG per tablet, TAKE ONE TABLET BY MOUTH EVERY MORNING, Disp: 30 tablet, Rfl: 0  •  multivitamin (THERAGRAN) tablet tablet, Take 1 tablet by mouth daily., Disp: , Rfl:   •  naproxen (NAPROSYN) 500 MG tablet, TAKE ONE TABLET BY MOUTH TWICE A DAY, Disp: 60 tablet, Rfl: 3  •  omeprazole OTC (PRILOSEC OTC) 20 MG EC tablet, Take 1 tablet by mouth Daily., Disp: , Rfl:       Family History   Problem Relation Age of Onset   • Other Father      Hodgkin's Disease   • Cancer Father      Lung Cancer         Social History     Social History   • Marital status:      Spouse name: N/A   • Number of children: N/A   • Years of education: N/A     Occupational History   •  - [employer]      Social History Main Topics   • Smoking status: Never Smoker   • Smokeless tobacco: Never Used   • Alcohol use Yes      Comment: Socially   • Drug use: No   • Sexual activity: Yes     Partners: Male     Other Topics Concern   • Not on file     Social History Narrative         Vitals:    10/11/17 0818   BP: 126/72   BP Location: Right arm   Patient Position: Sitting   Cuff Size: Adult   Pulse: 105   SpO2: 99%   Weight: 136 lb (61.7 kg)   Height: 64.5\" (163.8 cm)          Physical Exam:    General: Alert and oriented x 3.  No acute distress.  Normal affect.  HEENT: Pupils equal, round, reactive to light; extraocular movements intact; sclerae nonicteric; pharynx, ear canals and TMs normal.  Neck: Without JVD, thyromegaly, bruit, or adenopathy.  Lungs: Clear to auscultation in all fields.  Heart: Regular " rate and rhythm without murmur, rub, gallop, or click.  Abdomen: Soft, nontender, without hepatosplenomegaly or hernia.  Bowel sounds normal.  : Deferred.  Rectal: Deferred.  Extremities: Without clubbing, cyanosis, edema, or pulse deficit.  Neurologic: Intact without focal deficit.  Normal station and gait observed during ingress and egress from the examination room.  Skin: Without significant lesion.  Musculoskeletal: Unremarkable.      Lab/other results:    NMR is absolutely perfect.  CMP normal.  Urinalysis normal.  CBC normal.  Thyroid function tests normal.  Vitamin D normal at 61.4.  CPK normal.    Assessment/Plan:     Diagnosis Plan   1. Hyperlipidemia, unspecified hyperlipidemia type     2. Subclinical hypothyroidism     3. Benign essential hypertension     4. Gastroesophageal reflux disease without esophagitis     5. Vitamin D deficiency     6. Therapeutic drug monitoring     7. Diverticulosis of colon         Patient has hyperlipidemia that is under perfect control. She has subclinical hypothyroidism and her thyroid function tests today are normal.  We will continue to monitor.  Blood pressure well controlled on the current dose of lisinopril HCT.  Reflux symptoms seem to be controlled.  Vitamin D is therapeutic.  She had a recent colonoscopy which revealed diverticulosis only.    Plan is as follows: High-dose influenza vaccination given.  No change in current medical regimen.  Patient will follow-up after 06/05/2018 with lab prior.  We will do her some point Medicare wellness visit at that time.      Procedures

## 2017-10-12 RX ORDER — LISINOPRIL AND HYDROCHLOROTHIAZIDE 12.5; 1 MG/1; MG/1
TABLET ORAL
Qty: 30 TABLET | Refills: 8 | Status: SHIPPED | OUTPATIENT
Start: 2017-10-12 | End: 2018-07-07 | Stop reason: SDUPTHER

## 2017-11-06 RX ORDER — ATORVASTATIN CALCIUM 80 MG/1
TABLET, FILM COATED ORAL
Qty: 30 TABLET | Refills: 8 | Status: SHIPPED | OUTPATIENT
Start: 2017-11-06 | End: 2019-01-16 | Stop reason: SDUPTHER

## 2018-03-14 RX ORDER — FEXOFENADINE HCL AND PSEUDOEPHEDRINE HCI 180; 240 MG/1; MG/1
1 TABLET, EXTENDED RELEASE ORAL DAILY
Qty: 30 TABLET | Refills: 2 | Status: SHIPPED | OUTPATIENT
Start: 2018-03-14 | End: 2018-07-25 | Stop reason: SDUPTHER

## 2018-03-21 RX ORDER — FEXOFENADINE HCL AND PSEUDOEPHEDRINE HCI 180; 240 MG/1; MG/1
TABLET, EXTENDED RELEASE ORAL
Qty: 30 TABLET | Refills: 4 | Status: SHIPPED | OUTPATIENT
Start: 2018-03-21 | End: 2018-04-09 | Stop reason: SDUPTHER

## 2018-03-26 RX ORDER — NAPROXEN 500 MG/1
TABLET ORAL
Qty: 60 TABLET | Refills: 2 | Status: SHIPPED | OUTPATIENT
Start: 2018-03-26 | End: 2018-10-11 | Stop reason: SDUPTHER

## 2018-04-09 ENCOUNTER — OFFICE VISIT (OUTPATIENT)
Dept: INTERNAL MEDICINE | Facility: CLINIC | Age: 73
End: 2018-04-09

## 2018-04-09 VITALS
HEIGHT: 64 IN | SYSTOLIC BLOOD PRESSURE: 130 MMHG | HEART RATE: 89 BPM | BODY MASS INDEX: 22.71 KG/M2 | DIASTOLIC BLOOD PRESSURE: 74 MMHG | OXYGEN SATURATION: 97 % | WEIGHT: 133 LBS

## 2018-04-09 DIAGNOSIS — M25.50 DIFFUSE ARTHRALGIA: Primary | ICD-10-CM

## 2018-04-09 LAB
ALBUMIN SERPL-MCNC: 4.4 G/DL (ref 3.5–5.2)
ALBUMIN/GLOB SERPL: 1.6 G/DL
ALP SERPL-CCNC: 69 U/L (ref 39–117)
ALT SERPL-CCNC: 22 U/L (ref 1–33)
AST SERPL-CCNC: 25 U/L (ref 1–32)
BASOPHILS # BLD AUTO: 0.05 10*3/MM3 (ref 0–0.2)
BASOPHILS NFR BLD AUTO: 0.5 % (ref 0–1.5)
BILIRUB SERPL-MCNC: 0.3 MG/DL (ref 0.1–1.2)
BUN SERPL-MCNC: 26 MG/DL (ref 8–23)
BUN/CREAT SERPL: 32.5 (ref 7–25)
CALCIUM SERPL-MCNC: 10.2 MG/DL (ref 8.6–10.5)
CHLORIDE SERPL-SCNC: 98 MMOL/L (ref 98–107)
CO2 SERPL-SCNC: 27.9 MMOL/L (ref 22–29)
CREAT SERPL-MCNC: 0.8 MG/DL (ref 0.57–1)
CRP SERPL-MCNC: 0.14 MG/DL (ref 0–0.5)
EOSINOPHIL # BLD AUTO: 0.19 10*3/MM3 (ref 0–0.7)
EOSINOPHIL NFR BLD AUTO: 2 % (ref 0.3–6.2)
ERYTHROCYTE [DISTWIDTH] IN BLOOD BY AUTOMATED COUNT: 13.2 % (ref 11.7–13)
ERYTHROCYTE [SEDIMENTATION RATE] IN BLOOD BY WESTERGREN METHOD: 4 MM/HR (ref 0–30)
GFR SERPLBLD CREATININE-BSD FMLA CKD-EPI: 70 ML/MIN/1.73
GFR SERPLBLD CREATININE-BSD FMLA CKD-EPI: 85 ML/MIN/1.73
GLOBULIN SER CALC-MCNC: 2.7 GM/DL
GLUCOSE SERPL-MCNC: 93 MG/DL (ref 65–99)
HCT VFR BLD AUTO: 41.1 % (ref 35.6–45.5)
HGB BLD-MCNC: 13.7 G/DL (ref 11.9–15.5)
IMM GRANULOCYTES # BLD: 0.02 10*3/MM3 (ref 0–0.03)
IMM GRANULOCYTES NFR BLD: 0.2 % (ref 0–0.5)
LYMPHOCYTES # BLD AUTO: 3 10*3/MM3 (ref 0.9–4.8)
LYMPHOCYTES NFR BLD AUTO: 31.8 % (ref 19.6–45.3)
MCH RBC QN AUTO: 31.3 PG (ref 26.9–32)
MCHC RBC AUTO-ENTMCNC: 33.3 G/DL (ref 32.4–36.3)
MCV RBC AUTO: 93.8 FL (ref 80.5–98.2)
MONOCYTES # BLD AUTO: 0.47 10*3/MM3 (ref 0.2–1.2)
MONOCYTES NFR BLD AUTO: 5 % (ref 5–12)
NEUTROPHILS # BLD AUTO: 5.7 10*3/MM3 (ref 1.9–8.1)
NEUTROPHILS NFR BLD AUTO: 60.5 % (ref 42.7–76)
PLATELET # BLD AUTO: 308 10*3/MM3 (ref 140–500)
POTASSIUM SERPL-SCNC: 4.9 MMOL/L (ref 3.5–5.2)
PROT SERPL-MCNC: 7.1 G/DL (ref 6–8.5)
RBC # BLD AUTO: 4.38 10*6/MM3 (ref 3.9–5.2)
SODIUM SERPL-SCNC: 140 MMOL/L (ref 136–145)
WBC # BLD AUTO: 9.43 10*3/MM3 (ref 4.5–10.7)

## 2018-04-09 PROCEDURE — 99214 OFFICE O/P EST MOD 30 MIN: CPT | Performed by: INTERNAL MEDICINE

## 2018-04-09 NOTE — PROGRESS NOTES
04/09/2018    Patient Information  Ashley Mata                                                                                          [id number] [facility] [address]      1945  [phone]    Chief Complaint:     Complaining of diffuse achy joints and flulike symptoms.    History of Present Illness:    Patient with a history of environmental allergies/allergic rhinitis, hyperlipidemia, hypertension, esophageal reflux, osteoarthritis, subclinical hypothyroidism.  She presents today with complaints of diffuse arthralgia as described below.  Past medical history reviewed and updated where necessary including health maintenance parameters.  This reveals she is up-to-date.    The history regarding diffuse arthralgia and flulike symptoms:    04/09/2018--patient reports a one-week history of diffuse joint aches and pains that involved nearly all of her joints from the waist up.  No knee or hip pain.  Left arm/elbow begin to hurt her and she became concerned and made an appointment.  She reports she feels well now and her symptoms have resolved.  She was concerned that the symptoms from the left arm might be related to a heart problem but she had no complaints of chest pain, at rest or with exertion.  Has no complaints of chest pain or dyspnea with exertion such as climbing stairs either before or currently.  He reports it felt like she had the flu but she had no fever or respiratory symptoms.  I explained to the patient that I do not feel we are dealing with a cardiac issue and I don't feel we need to do any workup regarding.  It may be that she had a flulike illness but other considerations could be polymyalgia rheumatica.  I will obtain a CMP, CBC with differential, sedimentation rate and CRP.  Patient will follow-up on the phone for the results and she has been instructed to contact me should she have any return of her symptoms or any new symptoms.      Review of  Systems   Constitution: Negative.   HENT: Negative.    Eyes: Negative.    Cardiovascular: Negative.    Respiratory: Negative.    Endocrine: Negative.    Hematologic/Lymphatic: Negative.    Skin: Negative.    Musculoskeletal: Negative.    Gastrointestinal: Negative.    Genitourinary: Negative.    Neurological: Negative.    Psychiatric/Behavioral: Negative.    Allergic/Immunologic: Negative.        Active Problems:    Patient Active Problem List   Diagnosis   • Allergic rhinitis   • Benign essential hypertension   • Diverticulosis of colon   • Gastroesophageal reflux disease without esophagitis   • Hyperlipidemia   • Multiple environmental allergies   • Primary osteoarthritis of both wrists   • Primary osteoarthritis of both hands   • Postmenopausal hormone replacement therapy   • Subclinical hypothyroidism   • Vitamin D deficiency   • Therapeutic drug monitoring   • Alopecia   • Diffuse arthralgia         Past Medical History:   Diagnosis Date   • Allergic rhinitis 1/30/2015 11/13/2015--patient was evaluated by ENT and was started on generic Flonase and patient reports this has improved her symptoms quite a bit.   01/30/2015--allergy testing revealed positive reactivity to cat, dust mite, cockroach, mold spores, and grass pollen. Environmental control measures.   • Alopecia 4/12/2017    Evaluated and treated by the dermatologist.   • Benign essential hypertension 4/18/2016   • Diverticulosis of colon 3/17/2009    03/10/2017--colonoscopy revealed many small and large mouth diverticula in the sigmoid colon and descending colon.  Nonthrombosed external hemorrhoids and internal hemorrhoids noted.  Otherwise, normal colonoscopy.  03/17/2009--colonoscopy revealed external hemorrhoids, torturous colon with a sigmoid stricture, sigmoid diverticulosis.   • Gastroesophageal reflux disease without esophagitis 3/17/2009    06/09/2015--EGD revealed Z line irregular, 35 cm from incisors. Biopsied. Small hiatal hernia.  Gastritis. Biopsied. Bilious gastric fluid. Fluid aspiration performed. Normal duodenal bulb and second part of duodenum. Biopsied. Pathology revealed the antral biopsy revealed small intestinal mucosa with no pathologic diagnosis. Good preservation of villous architecture. Duodenum biopsy revealed largely antral type gastric mucosa with mild patchy superficial chronic gastritis. Gastroesophageal junction revealed squamous and glandular mucosa with mild chronic inflammation. Negative for intestinal metaplasia or dysplasia. There appeared to be mislabeling of the antral biopsies and duodenal biopsies.   04/17/2012--EGD revealed irregular Z line, hiatal hernia, gastritis, duodenitis.   03/17/2009--EGD revealed grade B. reflux esophagitis, hiatal hernia, gastritis, a few gastric polyps, duodenitis.   • History of Bicipital tendinitis of right shoulder 3/17/2015    06/08/2015--patient seen in follow-up and reports she continues to have right upper arm/shoulder pain. Examination today reveals tenderness over the subdeltoid bursal region and possibly the long head of the biceps tendon.  The shoulder injected with 40 mg of Depo-Medrol in divided doses.  03/17/2015--patient reports roughly one-week history of right arm and shoulder pain. This occurred after lifting some heavy linens. Examination consistent with bicipital tendinitis. X-ray of the shoulder ordered which revealed mild right AC joint arthropathy. Nothing acute. Recommend Vimovo 500/20, one by mouth twice a day. Patient should hold naproxen while taking this. If symptoms persist we could consider a cortisone injection.   • History of bone density study 4/18/2016 06/09/2016--DEXA scan reveals lumbar spine T score -0.5.  Left hip T score -0.4.  05/01/2013--DEXA scan reveals lumbar spine T score -0.5. Left hip T score -0.9.  04/03/2010--DEXA scan reveals lumbar spine T score -0.8. Left hip T score -0.4.   • History of carotid Doppler/vascular screen 03/24/2014     03/24/2014--vascular screen was negative for carotid plaque, negative for AAA, negative for PAD.   02/16/2011--vascular screen negative for carotid plaque, negative for AAA, negative for PAD.   • History of chest x-ray 4/18/2016 07/26/2016--preoperative chest x-ray reveals heart, mediastinum, and pulmonary vasculature normal.  Mild chronic pulmonary change without evidence of active process.  Negative chest.  06/15/2011--normal chest x-ray.   • History of Hyperplastic polyps of stomach 06/09/2015 06/09/2015--EGD revealed Z line irregular, 35 cm from incisors. Biopsied. Small hiatal hernia. Gastritis. Biopsied. Bilious gastric fluid. Fluid aspiration performed. Normal duodenal bulb and second part of duodenum. Biopsied. Pathology revealed the antral biopsy revealed small intestinal mucosa with no pathologic diagnosis. Good preservation of villous architecture. Duodenum biopsy revealed large   • History of Intramuscular hematoma, left 09/11/2013 09/11/2013--left humerus x-rays revealed mild degenerative change at the a.c. joint. The fluid collection resolved spontaneously  09/06/2013--bilateral duplex scan of the veins of the upper extremities revealed a fluid collection of the left arm near the biceps. No evidence of DVT in the right internal jugular vein. No evidence of DVT in the right subclavian vein. No evidence of deep or superficia   • History of Left rotator cuff tear 07/10/2014    07/10/2014--left rotator cuff repair.   03/18/2014--patient seen in followup and reports that her range of motion has improved and her pain is not debilitating. She feels that she is making progress with physical therapy. Surgery scheduled 07/10/2014.   01/10/2014--patient was seen in evaluation by the orthopedist and he recommended conservative treatment consisting of physical therapy/rehabilitat   • History of mammogram 06/13/2016 06/13/2016--normal mammogram performed at [facility].   05/27/2015--normal mammogram.   05/12/2014--normal mammogram.   05/02/2013--normal mammogram.   04/26/2012--normal mammogram.   • History of pneumococcal vaccination 01/15/2016    01/15/2016--PPSV 23 given. No further pneumococcal vaccinations required.   07/15/2015--Prevnar 13 and given. Patient is pneumococcal vaccination sage and therefore will need PPSV 23 in 6-12 months.   • History of rotator cuff tear, right 7/1/2016 08/03/2016--arthroscopic right rotator cuff repair.  Debridement of degenerative anterior superior labral tear.  07/12/2016--MR arthrogram of the right shoulder reveals a large full thickness supraspinatus tendon insertional tear with retraction.  Moderate acromioclavicular joint arthritis.  Patient scheduled for surgery 08/03/2016.  04/20/2016--patient presents with a one-month history of right shoulder pain that is located in the subdeltoid region.  No history of trauma.  Examination reveals tenderness over the subdeltoid bursa.  The area in question was injected with 40 mg of Depo-Medrol and 3 cc of Xylocaine.   • History of Subdeltoid bursitis of right shoulder joint 4/20/2016 07/12/2016--MR arthrogram of the right shoulder reveals a large full thickness supraspinatus tendon insertional tear with retraction.  Moderate acromioclavicular joint arthritis.  Patient scheduled for surgery 08/03/2016.  04/20/2016--patient presents with a one-month history of right shoulder pain that is located in the subdeltoid region.  No history of trauma.  Examination reveals tenderness over the subdeltoid bursa.  The area in question was injected with 40 mg of Depo-Medrol and 3 cc of Xylocaine.   • History of URI (upper respiratory infection) 4/18/2016 06/05/2017--patient presents with a one-week history of nasal congestion which causes difficulty breathing, posterior nasal drainage of questionable color phlegm and associated with a dry cough.  Patient also has had a lot of sneezing and questions  whether or not her symptoms are related to her environmental allergy.  No fever, chills, or other systemic signs or symptoms.  Examination reveals an obvious cough.  She has boggy nasal mucosa but no definite tenderness to percussion over the sinuses.  Pharynx and tympanic membranes appear normal.  Her eyes are not inflamed.  Somewhat difficult assessment in regards to whether or not patient needs an antibiotic.  There is no doubt in my mind that environmental allergies are playing a role.  It may be a viral URI or perhaps no element of URI at all and just be strictly her allergies.  Given the severity of her symptoms and the fact this has been going on now for at least a week, I will err on the side of being more aggressive.  Plan is as follows: Kenalog 40 mg IM ×   • History of Zostavax administration 11/20/2014 11/20/2014--Zostavax given at the local drug store.   • Hyperlipidemia 7/17/2012 07/17/2012--treatment for hyperlipidemia begun.   • Multiple environmental allergies 1/30/2015 01/30/2015--allergy testing revealed positive reactivity to cat, dust mite, cockroach, mold spores, and grass pollen. Environmental control measures.   • Postmenopausal hormone replacement therapy 4/18/2016   • Primary osteoarthritis of both hands 12/30/2013 05/12/2014--patient seen in followup and reports that the Vimovo has helped. Uric acid levels are normal at 4.9. C. reactive protein mildly elevated at 2.3. X-rays of the hands reveals radiocarpal, first carpometacarpal, first metacarpophalangeal, and interphalangeal joint degeneration. This process is symmetric. The interphalangeal joint of the left is affected to the greatest degree. There is right sided scapholunate dissociation as well as deformity of the scaphoid suggesting prior traumatic injury with healing. Soft tissues are satisfactory. Overall appearance is most consistent with osteoarthritis.   05/05/2014--patient presents and reports that she is having  worsening right wrist pain primarily at the base of the thumb. No new injury. Bilateral x-rays of the wrists and hands ordered. Uric acid level ordered as well as a CRP. Vimovo 500/20 one by mouth twice a day. Follow up in one week.   03/18/2014--patient reports that her wrist symptoms have improved.   12/30/2013--patient reports a several mon   • Primary osteoarthritis of both wrists 12/30/2013 05/12/2014--patient seen in followup and reports that the Vimovo has helped. Uric acid levels are normal at 4.9. C. reactive protein mildly elevated at 2.3. X-rays of the hands reveals radiocarpal, first carpometacarpal, first metacarpophalangeal, and interphalangeal joint degeneration. This process is symmetric. The interphalangeal joint of the left is affected to the greatest degree. There is right sided scapholunate dissociation as well as deformity of the scaphoid suggesting prior traumatic injury with healing. Soft tissues are satisfactory. Overall appearance is most consistent with osteoarthritis.   05/05/2014--patient presents and reports that she is having worsening right wrist pain primarily at the base of the thumb. No new injury. Bilateral x-rays of the wrists and hands ordered. Uric acid level ordered as well as a CRP. Vimovo 500/20 one by mouth twice a day. Follow up in one week.   03/18/2014--patient reports that her wrist symptoms have improved.   12/30/2013--patient reports a several mon   • Subclinical hypothyroidism 10/3/2014    09/30/2016--thyroid ultrasound reveals a tiny 2 mm colloid cyst within the left gland.  No mass demonstrated within the right thyroid or isthmus.  Overall thyroid echotexture is normal.  Normal to slightly increased global vascularity.  07/27/2016--routine follow-up.  TSH elevated at 4.45.  Thyroid ultrasound ordered after patient recovers from rotator cuff surgery.  She will follow-up after the results are known.  07/09/2015--TSH mildly elevated at 4.34.  Free T4 and free T3 are  normal.  Observation.  Subclinical hypothyroidism.  10/09/2014--thyroid antibodies are negative.  Repeat thyroid function tests are normal.  10/03/2014--TSH elevated at 6.17.  Repeat thyroid function tests including T3 and thyroid antibodies ordered.  If these come back abnormal we will perform an ultrasound of the thyroid.  07/09/2015--TSH mildly elevated at 4.34. Free T4 and free T3 are normal. Observation. Subclinical hypothyroidism.   10/09/2014--thyroid antibodies are negative. Repeat thyroid function tests are reshma   • Vitamin D deficiency 4/18/2016         Past Surgical History:   Procedure Laterality Date   • COLONOSCOPY  03/17/2009 03/17/2009--colonoscopy revealed external hemorrhoids, torturous colon with a sigmoid stricture, sigmoid diverticulosis.   • COLONOSCOPY N/A 3/10/2017    03/10/2017--colonoscopy revealed many small and large mouth diverticula in the sigmoid colon and descending colon.  Nonthrombosed external hemorrhoids and internal hemorrhoids noted.  Otherwise, normal colonoscopy.   • ERCP WITH SPHINCTEROTOMY/PAPILLOTOMY  08/13/2014 08/13/2014--ERCP, endoscopic sphincterotomy and removal of common bile duct stones. 08/12/2014--laparoscopic cholecystectomy. This was complicated by retained common bile duct stones 2.   • ESOPHAGOSCOPY / EGD  06/09/2015 06/09/2015--EGD revealed Z line irregular, 35 cm from incisors. Biopsied. Small hiatal hernia. Gastritis. Biopsied. Bilious gastric fluid. Fluid aspiration performed. Normal duodenal bulb and second part of duodenum. Biopsied. Pathology revealed the antral biopsy revealed small intestinal mucosa with no pathologic diagnosis. Good preservation of villous architecture. Duodenum biopsy revealed large   • LAPAROSCOPIC CHOLECYSTECTOMY  08/12/2014 08/13/2014--ERCP, endoscopic sphincterotomy and removal of common bile duct stones. 08/12/2014--laparoscopic cholecystectomy. This was complicated by retained common bile duct stones 2.   • PARTIAL  HYSTERECTOMY  1978    Partial hysterectomy 1978.   • ROTATOR CUFF REPAIR Left 07/10/2014    07/10/2014--left rotator cuff repair. 03/18/2014--patient seen in followup and reports that her range of motion has improved and her pain is not debilitating. She feels that she is making progress with physical therapy. Surgery scheduled 07/10/2014. 01/10/2014--patient was seen in evaluation by the orthopedist and he recommended conservative treatment consisting of physical therapy/rehabilitation.   • ROTATOR CUFF REPAIR Right 08/03/2016 08/03/2016--arthroscopic right rotator cuff repair.  Debridement of degenerative anterior superior labral tear.         No Known Allergies        Current Outpatient Prescriptions:   •  atorvastatin (LIPITOR) 80 MG tablet, TAKE ONE TABLET BY MOUTH EVERY NIGHT AT BEDTIME, Disp: 30 tablet, Rfl: 8  •  Calcium Carbonate (CALCIUM 600 PO), Take 2 tablets by mouth daily., Disp: , Rfl:   •  Coenzyme Q10 (COQ10) 400 MG capsule, Take 1 capsule by mouth daily. Take with a meal., Disp: , Rfl:   •  estrogens, conjugated, (PREMARIN) 0.625 MG tablet, Take 1 tablet by mouth Daily. Take daily for 21 days then do not take for 7 days., Disp: 90 tablet, Rfl: 3  •  fexofenadine-pseudoephedrine (ALLEGRA-D 24) 180-240 MG per 24 hr tablet, Take 1 tablet by mouth Daily., Disp: 30 tablet, Rfl: 2  •  finasteride (PROSCAR) 5 MG tablet, Take 1 tablet by mouth Daily., Disp: , Rfl:   •  ketoconazole (NIZORAL) 2 % shampoo, , Disp: , Rfl:   •  lisinopril-hydrochlorothiazide (PRINZIDE,ZESTORETIC) 10-12.5 MG per tablet, TAKE ONE TABLET BY MOUTH EVERY MORNING, Disp: 30 tablet, Rfl: 8  •  multivitamin (THERAGRAN) tablet tablet, Take 1 tablet by mouth daily., Disp: , Rfl:   •  naproxen (NAPROSYN) 500 MG tablet, TAKE ONE TABLET BY MOUTH TWICE A DAY, Disp: 60 tablet, Rfl: 2  •  omeprazole OTC (PRILOSEC OTC) 20 MG EC tablet, Take 1 tablet by mouth Daily., Disp: , Rfl:       Family History   Problem Relation Age of Onset   • Other  "Father      Hodgkin's Disease   • Cancer Father      Lung Cancer         Social History     Social History   • Marital status:      Spouse name: N/A   • Number of children: N/A   • Years of education: N/A     Occupational History   •  - [employer]      Social History Main Topics   • Smoking status: Never Smoker   • Smokeless tobacco: Never Used   • Alcohol use Yes      Comment: Socially   • Drug use: No   • Sexual activity: Yes     Partners: Male     Other Topics Concern   • Not on file     Social History Narrative   • No narrative on file         Vitals:    04/09/18 1015   BP: 130/74   Pulse: 89   SpO2: 97%   Weight: 60.3 kg (133 lb)   Height: 163.8 cm (64.49\")          Physical Exam:    General: Alert and oriented x 3.  No acute distress.  Normal affect.  HEENT: Pupils equal, round, reactive to light; extraocular movements intact; sclerae nonicteric; pharynx, ear canals and TMs normal.  Neck: Without JVD, thyromegaly, bruit, or adenopathy.  Lungs: Clear to auscultation in all fields.  Heart: Regular rate and rhythm without murmur, rub, gallop, or click.  Abdomen: Soft, nontender, without hepatosplenomegaly or hernia.  Bowel sounds normal.  : Deferred.  Rectal: Deferred.  Extremities: Without clubbing, cyanosis, edema, or pulse deficit.  Neurologic: Intact without focal deficit.  Normal station and gait observed during ingress and egress from the examination room.  Skin: Without significant lesion.  Musculoskeletal: Unremarkable.      Lab/other results:      Assessment/Plan:     Diagnosis Plan   1. Diffuse arthralgia       Patient presents with complaints of arthralgia for 1 week.  Now resolved.  She felt like she had a flu but had no fever or respiratory symptoms.  I think this may have been a viral illness.  She was concerned about her heart and I assured her that her symptoms were not related to that.  However, I cautioned her this does not guarantee that she does not have a heart " problem but we really don't have enough to pursue any further workup regarding heart problem.  I do think we need to rule out any sort of inflammatory arthritis such as myalgia rheumatica.    Plan is as follows: CBC with differential, CMP, sedimentation rate, CRP.  Patient will follow-up on the phone for the results.  Patient instructed to contact me if her symptoms return or if she develops any new symptoms.  Otherwise I will see her in June with lab prior as previously planned.        Procedures

## 2018-04-10 ENCOUNTER — TELEPHONE (OUTPATIENT)
Dept: INTERNAL MEDICINE | Facility: CLINIC | Age: 73
End: 2018-04-10

## 2018-05-01 ENCOUNTER — TRANSCRIBE ORDERS (OUTPATIENT)
Dept: ADMINISTRATIVE | Facility: HOSPITAL | Age: 73
End: 2018-05-01

## 2018-05-01 DIAGNOSIS — Z13.9 VISIT FOR SCREENING: Primary | ICD-10-CM

## 2018-06-18 DIAGNOSIS — E55.9 VITAMIN D DEFICIENCY: Chronic | ICD-10-CM

## 2018-06-18 DIAGNOSIS — E03.8 SUBCLINICAL HYPOTHYROIDISM: Chronic | ICD-10-CM

## 2018-06-18 DIAGNOSIS — E78.5 HYPERLIPIDEMIA, UNSPECIFIED HYPERLIPIDEMIA TYPE: Chronic | ICD-10-CM

## 2018-06-18 DIAGNOSIS — Z51.81 THERAPEUTIC DRUG MONITORING: ICD-10-CM

## 2018-06-21 LAB
25(OH)D3+25(OH)D2 SERPL-MCNC: 81.6 NG/ML (ref 30–100)
ALBUMIN SERPL-MCNC: 3.9 G/DL (ref 3.5–5.2)
ALBUMIN/GLOB SERPL: 1.6 G/DL
ALP SERPL-CCNC: 64 U/L (ref 39–117)
ALT SERPL-CCNC: 22 U/L (ref 1–33)
AST SERPL-CCNC: 24 U/L (ref 1–32)
BILIRUB SERPL-MCNC: 0.4 MG/DL (ref 0.1–1.2)
BUN SERPL-MCNC: 21 MG/DL (ref 8–23)
BUN/CREAT SERPL: 29.6 (ref 7–25)
CALCIUM SERPL-MCNC: 9.5 MG/DL (ref 8.6–10.5)
CHLORIDE SERPL-SCNC: 102 MMOL/L (ref 98–107)
CHOLEST SERPL-MCNC: 153 MG/DL (ref 100–199)
CK SERPL-CCNC: 60 U/L (ref 20–180)
CO2 SERPL-SCNC: 26.6 MMOL/L (ref 22–29)
CREAT SERPL-MCNC: 0.71 MG/DL (ref 0.57–1)
ERYTHROCYTE [DISTWIDTH] IN BLOOD BY AUTOMATED COUNT: 12.8 % (ref 11.7–13)
GFR SERPLBLD CREATININE-BSD FMLA CKD-EPI: 80 ML/MIN/1.73
GFR SERPLBLD CREATININE-BSD FMLA CKD-EPI: 98 ML/MIN/1.73
GLOBULIN SER CALC-MCNC: 2.5 GM/DL
GLUCOSE SERPL-MCNC: 90 MG/DL (ref 65–99)
HCT VFR BLD AUTO: 38.7 % (ref 35.6–45.5)
HDL SERPL-SCNC: 52.6 UMOL/L
HDLC SERPL-MCNC: 77 MG/DL
HGB BLD-MCNC: 12.9 G/DL (ref 11.9–15.5)
LDL SERPL QN: 20.2 NM
LDL SERPL-SCNC: 538 NMOL/L
LDL SMALL SERPL-SCNC: 386 NMOL/L
LDLC SERPL CALC-MCNC: 36 MG/DL (ref 0–99)
MCH RBC QN AUTO: 30.8 PG (ref 26.9–32)
MCHC RBC AUTO-ENTMCNC: 33.3 G/DL (ref 32.4–36.3)
MCV RBC AUTO: 92.4 FL (ref 80.5–98.2)
PLATELET # BLD AUTO: 297 10*3/MM3 (ref 140–500)
POTASSIUM SERPL-SCNC: 4.1 MMOL/L (ref 3.5–5.2)
PROT SERPL-MCNC: 6.4 G/DL (ref 6–8.5)
RBC # BLD AUTO: 4.19 10*6/MM3 (ref 3.9–5.2)
SODIUM SERPL-SCNC: 141 MMOL/L (ref 136–145)
T3FREE SERPL-MCNC: 3.3 PG/ML (ref 2–4.4)
T4 FREE SERPL-MCNC: 1.22 NG/DL (ref 0.93–1.7)
TRIGL SERPL-MCNC: 201 MG/DL (ref 0–149)
TSH SERPL DL<=0.005 MIU/L-ACNC: 4.31 MIU/ML (ref 0.27–4.2)
WBC # BLD AUTO: 6.61 10*3/MM3 (ref 4.5–10.7)

## 2018-06-28 ENCOUNTER — HOSPITAL ENCOUNTER (OUTPATIENT)
Dept: MAMMOGRAPHY | Facility: HOSPITAL | Age: 73
Discharge: HOME | End: 2018-06-28
Attending: OBSTETRICS & GYNECOLOGY | Admitting: OBSTETRICS & GYNECOLOGY

## 2018-06-28 DIAGNOSIS — Z13.9 VISIT FOR SCREENING: ICD-10-CM

## 2018-06-28 PROCEDURE — 77063 BREAST TOMOSYNTHESIS BI: CPT

## 2018-06-28 PROCEDURE — 77067 SCR MAMMO BI INCL CAD: CPT

## 2018-06-29 ENCOUNTER — OFFICE VISIT (OUTPATIENT)
Dept: OBSTETRICS AND GYNECOLOGY | Facility: CLINIC | Age: 73
End: 2018-06-29

## 2018-06-29 VITALS
BODY MASS INDEX: 21.83 KG/M2 | DIASTOLIC BLOOD PRESSURE: 64 MMHG | HEIGHT: 65 IN | SYSTOLIC BLOOD PRESSURE: 120 MMHG | WEIGHT: 131 LBS

## 2018-06-29 DIAGNOSIS — Z79.890 HORMONE REPLACEMENT THERAPY: ICD-10-CM

## 2018-06-29 DIAGNOSIS — Z01.419 WELL WOMAN EXAM WITH ROUTINE GYNECOLOGICAL EXAM: Primary | ICD-10-CM

## 2018-06-29 PROCEDURE — G0101 CA SCREEN;PELVIC/BREAST EXAM: HCPCS | Performed by: OBSTETRICS & GYNECOLOGY

## 2018-06-29 NOTE — PROGRESS NOTES
Subjective   Ashley Mata is a 72 y.o. female is here today as a self referral for annual.    History of Present Illness-here today for annual exam and checkup.  Needs a refill of her Premarin.  We discussed the pros and cons of taking hormones and she would like to continue.    The following portions of the patient's history were reviewed and updated as appropriate: allergies, current medications, past family history, past medical history, past social history, past surgical history and problem list.    Review of Systems   Constitutional: Negative.    HENT: Negative.    Eyes: Negative.    Respiratory: Negative.    Cardiovascular: Negative.    Gastrointestinal: Negative.    Endocrine: Negative.    Genitourinary: Negative.    Musculoskeletal: Negative.    Skin: Negative.    Allergic/Immunologic: Negative.    Neurological: Negative.    Hematological: Negative.    Psychiatric/Behavioral: Negative.        Objective   Physical Exam   Constitutional: She is oriented to person, place, and time. She appears well-developed and well-nourished.   HENT:   Head: Normocephalic and atraumatic.   Nose: Nose normal.   Eyes: Conjunctivae and EOM are normal. Pupils are equal, round, and reactive to light.   Neck: Normal range of motion. Neck supple.   Cardiovascular: Normal rate, regular rhythm and normal heart sounds.    Pulmonary/Chest: Effort normal and breath sounds normal. Right breast exhibits no inverted nipple, no mass, no nipple discharge, no skin change and no tenderness. Left breast exhibits no inverted nipple, no mass, no nipple discharge and no skin change. Breasts are symmetrical. There is no breast swelling.   Abdominal: Soft. Hernia confirmed negative in the right inguinal area and confirmed negative in the left inguinal area.   Genitourinary: Rectum normal. No breast tenderness, discharge or bleeding. Pelvic exam was performed with patient supine. No labial fusion. There is no rash, tenderness, lesion or injury on  the right labia. There is no rash, tenderness, lesion or injury on the left labia. Right adnexum displays no mass, no tenderness and no fullness. Left adnexum displays no mass, no tenderness and no fullness. No erythema or bleeding in the vagina. No foreign body in the vagina. No signs of injury around the vagina. No vaginal discharge found.   Neurological: She is alert and oriented to person, place, and time. She has normal reflexes.   Skin: Skin is warm and dry.   Psychiatric: She has a normal mood and affect. Her behavior is normal. Judgment and thought content normal.         Assessment/Plan   Problems Addressed this Visit     None      Visit Diagnoses     Well woman exam with routine gynecological exam    -  Primary    Hormone replacement therapy            Mammogram and colonoscopy current.  Consider DEXA scan next visit.  Discussed her daughter's recent breast cancer diagnosis and the recommendation for genetic testing

## 2018-07-09 ENCOUNTER — OFFICE VISIT (OUTPATIENT)
Dept: INTERNAL MEDICINE | Facility: CLINIC | Age: 73
End: 2018-07-09

## 2018-07-09 ENCOUNTER — HOSPITAL ENCOUNTER (OUTPATIENT)
Dept: GENERAL RADIOLOGY | Facility: HOSPITAL | Age: 73
Discharge: HOME | End: 2018-07-09
Admitting: INTERNAL MEDICINE

## 2018-07-09 VITALS
SYSTOLIC BLOOD PRESSURE: 120 MMHG | OXYGEN SATURATION: 99 % | HEIGHT: 64 IN | HEART RATE: 95 BPM | WEIGHT: 132.4 LBS | DIASTOLIC BLOOD PRESSURE: 76 MMHG | BODY MASS INDEX: 22.61 KG/M2

## 2018-07-09 DIAGNOSIS — K21.9 GASTROESOPHAGEAL REFLUX DISEASE WITHOUT ESOPHAGITIS: Chronic | ICD-10-CM

## 2018-07-09 DIAGNOSIS — M19.032 PRIMARY OSTEOARTHRITIS OF BOTH WRISTS: Chronic | ICD-10-CM

## 2018-07-09 DIAGNOSIS — M54.16 RIGHT LUMBAR RADICULOPATHY: ICD-10-CM

## 2018-07-09 DIAGNOSIS — M19.042 PRIMARY OSTEOARTHRITIS OF BOTH HANDS: Chronic | ICD-10-CM

## 2018-07-09 DIAGNOSIS — I10 BENIGN ESSENTIAL HYPERTENSION: Chronic | ICD-10-CM

## 2018-07-09 DIAGNOSIS — E03.8 SUBCLINICAL HYPOTHYROIDISM: Chronic | ICD-10-CM

## 2018-07-09 DIAGNOSIS — M19.031 PRIMARY OSTEOARTHRITIS OF BOTH WRISTS: Chronic | ICD-10-CM

## 2018-07-09 DIAGNOSIS — E78.5 HYPERLIPIDEMIA, UNSPECIFIED HYPERLIPIDEMIA TYPE: Chronic | ICD-10-CM

## 2018-07-09 DIAGNOSIS — Z00.00 MEDICARE ANNUAL WELLNESS VISIT, SUBSEQUENT: Primary | ICD-10-CM

## 2018-07-09 DIAGNOSIS — M54.16 LUMBAR BACK PAIN WITH RADICULOPATHY AFFECTING RIGHT LOWER EXTREMITY: ICD-10-CM

## 2018-07-09 DIAGNOSIS — Z51.81 THERAPEUTIC DRUG MONITORING: ICD-10-CM

## 2018-07-09 DIAGNOSIS — E55.9 VITAMIN D DEFICIENCY: Chronic | ICD-10-CM

## 2018-07-09 DIAGNOSIS — M19.041 PRIMARY OSTEOARTHRITIS OF BOTH HANDS: Chronic | ICD-10-CM

## 2018-07-09 DIAGNOSIS — M25.50 DIFFUSE ARTHRALGIA: ICD-10-CM

## 2018-07-09 PROCEDURE — 99214 OFFICE O/P EST MOD 30 MIN: CPT | Performed by: INTERNAL MEDICINE

## 2018-07-09 PROCEDURE — 72110 X-RAY EXAM L-2 SPINE 4/>VWS: CPT

## 2018-07-09 PROCEDURE — G0439 PPPS, SUBSEQ VISIT: HCPCS | Performed by: INTERNAL MEDICINE

## 2018-07-09 RX ORDER — PREDNISONE 10 MG/1
TABLET ORAL
Qty: 48 TABLET | Refills: 0 | Status: SHIPPED | OUTPATIENT
Start: 2018-07-09 | End: 2018-08-07

## 2018-07-09 RX ORDER — LISINOPRIL AND HYDROCHLOROTHIAZIDE 12.5; 1 MG/1; MG/1
TABLET ORAL
Qty: 30 TABLET | Refills: 7 | Status: SHIPPED | OUTPATIENT
Start: 2018-07-09 | End: 2018-07-09 | Stop reason: SDUPTHER

## 2018-07-09 RX ORDER — LISINOPRIL AND HYDROCHLOROTHIAZIDE 12.5; 1 MG/1; MG/1
TABLET ORAL
Qty: 90 TABLET | Refills: 3 | Status: SHIPPED | OUTPATIENT
Start: 2018-07-09 | End: 2020-12-21

## 2018-07-09 NOTE — PROGRESS NOTES
QUICK REFERENCE INFORMATION:  The ABCs of the Annual Wellness Visit    Subsequent Medicare Wellness Visit    HEALTH RISK ASSESSMENT    1945    Recent Hospitalizations:  No hospitalization(s) within the last year..        Current Medical Providers:  Patient Care Team:  Carloz Shukla MD as PCP - General (Internal Medicine)  Ash Jimenez MD as PCP - Claims Attributed        Smoking Status:  History   Smoking Status   • Never Smoker   Smokeless Tobacco   • Never Used       Alcohol Consumption:  History   Alcohol Use   • Yes     Comment: Socially       Depression Screen:   PHQ-2/PHQ-9 Depression Screening 7/9/2018   Little interest or pleasure in doing things 0   Feeling down, depressed, or hopeless 0   Trouble falling or staying asleep, or sleeping too much 0   Feeling tired or having little energy 0   Poor appetite or overeating 0   Feeling bad about yourself - or that you are a failure or have let yourself or your family down 0   Trouble concentrating on things, such as reading the newspaper or watching television 0   Moving or speaking so slowly that other people could have noticed. Or the opposite - being so fidgety or restless that you have been moving around a lot more than usual 0   Thoughts that you would be better off dead, or of hurting yourself in some way 0   Total Score 0   If you checked off any problems, how difficult have these problems made it for you to do your work, take care of things at home, or get along with other people? -       Health Habits and Functional and Cognitive Screening:  Functional & Cognitive Status 7/9/2018   Do you have difficulty preparing food and eating? No   Do you have difficulty bathing yourself, getting dressed or grooming yourself? No   Do you have difficulty using the toilet? No   Do you have difficulty moving around from place to place? No   Do you have trouble with steps or getting out of a bed or a chair? No   In the past year have you fallen or  experienced a near fall? No   Current Diet Low Carb Diet   Dental Exam Up to date   Eye Exam Up to date   Exercise (times per week) 3 times per week   Current Exercise Activities Include Housecleaning   Do you need help using the phone?  No   Are you deaf or do you have serious difficulty hearing?  No   Do you need help with transportation? No   Do you need help shopping? No   Do you need help preparing meals?  No   Do you need help with housework?  No   Do you need help with laundry? No   Do you need help taking your medications? No   Do you need help managing money? No   Do you ever drive or ride in a car without wearing a seat belt? No   Have you felt unusual stress, anger or loneliness in the last month? No   Who do you live with? Spouse   If you need help, do you have trouble finding someone available to you? No   Have you been bothered in the last four weeks by sexual problems? No   Do you have difficulty concentrating, remembering or making decisions? No           Does the patient have evidence of cognitive impairment? No    Aspirin use counseling: Taking ASA appropriately as indicated      Recent Lab Results:  CMP:  Lab Results   Component Value Date    GLU 90 06/19/2018    BUN 21 06/19/2018    CREATININE 0.71 06/19/2018    EGFRIFNONA 80 06/19/2018    EGFRIFAFRI 98 06/19/2018    BCR 29.6 (H) 06/19/2018     06/19/2018    K 4.1 06/19/2018    CO2 26.6 06/19/2018    CALCIUM 9.5 06/19/2018    PROTENTOTREF 6.4 06/19/2018    ALBUMIN 3.90 06/19/2018    LABGLOBREF 2.5 06/19/2018    LABIL2 1.6 06/19/2018    BILITOT 0.4 06/19/2018    ALKPHOS 64 06/19/2018    AST 24 06/19/2018    ALT 22 06/19/2018     Lipid Panel:  Lab Results   Component Value Date    TRIG 201 (H) 06/19/2018     HbA1c:       Visual Acuity:  No exam data present    Age-appropriate Screening Schedule:  Refer to the list below for future screening recommendations based on patient's age, sex and/or medical conditions. Orders for these recommended  tests are listed in the plan section. The patient has been provided with a written plan.    Health Maintenance   Topic Date Due   • DXA SCAN  07/01/2020 (Originally 6/9/2018)   • INFLUENZA VACCINE  08/01/2018   • LIPID PANEL  06/19/2019   • MAMMOGRAM  06/28/2020   • COLONOSCOPY  03/10/2027   • TDAP/TD VACCINES (2 - Td) 04/12/2027   • PNEUMOCOCCAL VACCINES (65+ LOW/MEDIUM RISK)  Completed   • ZOSTER VACCINE  Excluded        Subjective   History of Present Illness    Ashley Mata is a 72 y.o. female who presents for an Subsequent Wellness Visit.    The following portions of the patient's history were reviewed and updated as appropriate: allergies, current medications, past family history, past medical history, past social history, past surgical history and problem list.    Outpatient Medications Prior to Visit   Medication Sig Dispense Refill   • atorvastatin (LIPITOR) 80 MG tablet TAKE ONE TABLET BY MOUTH EVERY NIGHT AT BEDTIME 30 tablet 8   • Calcium Carbonate (CALCIUM 600 PO) Take 2 tablets by mouth daily.     • Coenzyme Q10 (COQ10) 400 MG capsule Take 1 capsule by mouth daily. Take with a meal.     • estrogens, conjugated, (PREMARIN) 0.625 MG tablet Take 1 tablet by mouth Daily. Take daily for 21 days then do not take for 7 days. 90 tablet 3   • fexofenadine-pseudoephedrine (ALLEGRA-D 24) 180-240 MG per 24 hr tablet Take 1 tablet by mouth Daily. 30 tablet 2   • finasteride (PROSCAR) 5 MG tablet Take 1 tablet by mouth Daily.     • ketoconazole (NIZORAL) 2 % shampoo      • multivitamin (THERAGRAN) tablet tablet Take 1 tablet by mouth daily.     • naproxen (NAPROSYN) 500 MG tablet TAKE ONE TABLET BY MOUTH TWICE A DAY 60 tablet 2   • omeprazole OTC (PRILOSEC OTC) 20 MG EC tablet Take 1 tablet by mouth Daily.     • lisinopril-hydrochlorothiazide (PRINZIDE,ZESTORETIC) 10-12.5 MG per tablet TAKE ONE TABLET BY MOUTH EVERY MORNING 30 tablet 8     No facility-administered medications prior to visit.        Patient  Active Problem List   Diagnosis   • Allergic rhinitis   • Benign essential hypertension   • Diverticulosis of colon   • Gastroesophageal reflux disease without esophagitis   • Hyperlipidemia   • Multiple environmental allergies   • Primary osteoarthritis of both wrists   • Primary osteoarthritis of both hands   • Postmenopausal hormone replacement therapy   • Subclinical hypothyroidism   • Vitamin D deficiency   • Therapeutic drug monitoring   • Alopecia   • Right lumbar radiculopathy   • Lumbar back pain with radiculopathy affecting right lower extremity       Advance Care Planning:  has NO advance directive - information provided to the patient today    Identification of Risk Factors:  Risk factors include: cardiovascular risk.    Review of Systems   Musculoskeletal: Positive for arthralgias and back pain.        Right lower back pain with radiation into the right leg.  Complaints of hand and wrist pain   All other systems reviewed and are negative.      Compared to one year ago, the patient feels her physical health is the same.  Compared to one year ago, the patient feels her mental health is the same.    Objective       Physical Exam    General: Alert and oriented x 3.  No acute distress.  Normal affect.  HEENT: Pupils equal, round, reactive to light; extraocular movements intact; sclerae nonicteric; pharynx, ear canals and TMs normal.  Neck: Without JVD, thyromegaly, bruit, or adenopathy.  Lungs: Clear to auscultation in all fields.  Heart: Regular rate and rhythm without murmur, rub, gallop, or click.  Abdomen: Soft, nontender, without hepatosplenomegaly or hernia.  Bowel sounds normal.  : Deferred.  Rectal: Deferred.  Extremities: Without clubbing, cyanosis, edema, or pulse deficit.  Neurologic: Intact without focal deficit.  Normal station and gait observed during ingress and egress from the examination room.  Straight leg raising is negative.  Skin: Without significant lesion.  Musculoskeletal:  "Unremarkable, except changes consistent with degenerative arthritis particularly about the hands and wrists.    Vitals:    07/09/18 0830   BP: 120/76   BP Location: Right arm   Pulse: 95   SpO2: 99%   Weight: 60.1 kg (132 lb 6.4 oz)   Height: 163.8 cm (64.49\")   PainSc: 2  Comment: sciatic nerve       Patient's Body mass index is 22.38 kg/m². BMI is within normal parameters. No follow-up required.      Assessment/Plan   Patient Self-Management and Personalized Health Advice  The patient has been provided with information about: exercise, prevention of cardiac or vascular disease, fall prevention and designing advance directives and preventive services including:   · Counseling for cardiovascular disease risk reduction, Diabetes screening, see lab orders, Exercise counseling provided, Fall Risk assessment done, Glaucoma screening recommended, Nutrition counseling provided, Zostavax vaccine (Herpes Zoster).    Visit Diagnoses:    ICD-10-CM ICD-9-CM   1. Medicare annual wellness visit, subsequent Z00.00 V70.0   2. Hyperlipidemia, unspecified hyperlipidemia type E78.5 272.4   3. Benign essential hypertension I10 401.1   4. Gastroesophageal reflux disease without esophagitis K21.9 530.81   5. Primary osteoarthritis of both hands M19.041 715.14    M19.042    6. Primary osteoarthritis of both wrists M19.031 715.13    M19.032    7. Subclinical hypothyroidism E03.9 244.8   8. Vitamin D deficiency E55.9 268.9   9. Diffuse arthralgia M25.50 719.40   10. Right lumbar radiculopathy M54.16 724.4   11. Lumbar back pain with radiculopathy affecting right lower extremity M54.17 724.4   12. Therapeutic drug monitoring Z51.81 V58.83       Orders Placed This Encounter   Procedures   • XR Spine Lumbar 4+ View     Order Specific Question:   Reason for Exam:     Answer:   5 day history of acute right lower back pain with radiation into the right lower extremity.  No history of trauma.   • CBC (No Diff)     Standing Status:   Future     " Standing Expiration Date:   7/9/2020   • CK     Standing Status:   Future     Standing Expiration Date:   7/9/2020   • Comprehensive Metabolic Panel     Standing Status:   Future     Standing Expiration Date:   7/9/2020   • NMR LipoProfile     Standing Status:   Future     Standing Expiration Date:   7/9/2020   • TSH     Standing Status:   Future     Standing Expiration Date:   7/9/2020   • T4, Free     Standing Status:   Future     Standing Expiration Date:   7/9/2020   • T3, Free     Standing Status:   Future     Standing Expiration Date:   7/9/2020       Outpatient Encounter Prescriptions as of 7/9/2018   Medication Sig Dispense Refill   • atorvastatin (LIPITOR) 80 MG tablet TAKE ONE TABLET BY MOUTH EVERY NIGHT AT BEDTIME 30 tablet 8   • Calcium Carbonate (CALCIUM 600 PO) Take 2 tablets by mouth daily.     • Coenzyme Q10 (COQ10) 400 MG capsule Take 1 capsule by mouth daily. Take with a meal.     • estrogens, conjugated, (PREMARIN) 0.625 MG tablet Take 1 tablet by mouth Daily. Take daily for 21 days then do not take for 7 days. 90 tablet 3   • fexofenadine-pseudoephedrine (ALLEGRA-D 24) 180-240 MG per 24 hr tablet Take 1 tablet by mouth Daily. 30 tablet 2   • finasteride (PROSCAR) 5 MG tablet Take 1 tablet by mouth Daily.     • ketoconazole (NIZORAL) 2 % shampoo      • multivitamin (THERAGRAN) tablet tablet Take 1 tablet by mouth daily.     • naproxen (NAPROSYN) 500 MG tablet TAKE ONE TABLET BY MOUTH TWICE A DAY 60 tablet 2   • omeprazole OTC (PRILOSEC OTC) 20 MG EC tablet Take 1 tablet by mouth Daily.     • [DISCONTINUED] lisinopril-hydrochlorothiazide (PRINZIDE,ZESTORETIC) 10-12.5 MG per tablet TAKE ONE TABLET BY MOUTH EVERY MORNING 30 tablet 8   • lisinopril-hydrochlorothiazide (PRINZIDE,ZESTORETIC) 10-12.5 MG per tablet Take 1 by mouth every morning for high blood pressure 90 tablet 3   • predniSONE (DELTASONE) 10 MG tablet 5 by mouth daily 5 days, then 4 daily 2 days, 3 daily 2 days, 2 daily 2 days, 1 daily 2  days, one half daily 2 days; D/C 48 tablet 0     No facility-administered encounter medications on file as of 7/9/2018.        Reviewed use of high risk medication in the elderly: not applicable  Reviewed for potential of harmful drug interactions in the elderly: yes    Follow Up:  Return in about 6 months (around 1/9/2019) for Next scheduled follow up with lab prior.     An After Visit Summary and PPPS with all of these plans were given to the patient.

## 2018-07-09 NOTE — PROGRESS NOTES
07/09/2018    Patient Information  Ashley Mata                                                                                          [id number] [facility] [id number]      1945  [phone]    Chief Complaint:     Subsequent Medicare wellness visit.  Follow-up hyperlipidemia, hypertension, esophageal reflux, primary osteoarthritis particularly in the hands and wrists, subclinical hypothyroidism, complaints of diffuse arthralgia, vitamin D deficiency.  Patient complaining of pain radiating down her right leg and thinks she has sciatica.    History of Present Illness:    Patient with a history of medical problems as outlined in chief complaint presents today for her subsequent Medicare wellness visit.  She also had lab work in order to monitor her chronic medical issues.  She has new complaints of right lower back pain with right sciatica as described below.  Patient was also evaluated in April of this year for complaints of diffuse arthralgia and this will be described below.  Symptoms have resolved.  Past medical history reviewed and updated where necessary including health maintenance parameters.  This reveals she is up-to-date or else accounted for with the exception of the new shingles vaccination.  I recommended that she stop by the local pharmacy to see if it is covered by Medicare.    The history regarding right lower back pain and right lumbar radiculopathy:    07/09/2018--patient presents with a 4-5 day history of pain involving her right lower extremity.  It started on the right side of her back and then radiates down to at least the level of the calf posteriorly laterally.  Occasionally will go out onto the foot and her foot does feel numb.  No known injury.  Exam is not revealing.  Prednisone 50 mg by mouth daily ×5 days, taper and discontinue.  X-ray lumbar spine.  The patient continues to have symptoms then we will need to proceed with MRI.    The  history regarding diffuse arthralgia:    07/19/2018--patient seen in follow-up and reports her diffuse arthralgias have resolved other than her baseline pain from osteoarthritis, particularly the hands and wrists.  The laboratory workup returned negative or unremarkable.  Resolve this issue.    04/09/2018--patient reports a one-week history of diffuse joint aches and pains that involved nearly all of her joints from the waist up.  No knee or hip pain.  Left arm/elbow begin to hurt her and she became concerned and made an appointment.  She reports she feels well now and her symptoms have resolved.  She was concerned that the symptoms from the left arm might be related to a heart problem but she had no complaints of chest pain, at rest or with exertion.  Has no complaints of chest pain or dyspnea with exertion such as climbing stairs either before or currently.  He reports it felt like she had the flu but she had no fever or respiratory symptoms.  I explained to the patient that I do not feel we are dealing with a cardiac issue and I don't feel we need to do any workup regarding.  It may be that she had a flulike illness but other considerations could be polymyalgia rheumatica.  I will obtain a CMP, CBC with differential, sedimentation rate and CRP.  Patient will follow-up on the phone for the results and she has been instructed to contact me should she have any return of her symptoms or any new symptoms.    Review of Systems   Constitution: Negative.   HENT: Negative.    Eyes: Negative.    Cardiovascular: Negative.    Respiratory: Negative.    Endocrine: Negative.    Hematologic/Lymphatic: Negative.    Skin: Negative.    Musculoskeletal: Positive for back pain and joint pain.        Pain radiating down right leg   Gastrointestinal: Negative.    Genitourinary: Negative.    Neurological: Negative.    Psychiatric/Behavioral: Negative.    Allergic/Immunologic: Negative.        Active Problems:    Patient Active Problem  List   Diagnosis   • Allergic rhinitis   • Benign essential hypertension   • Diverticulosis of colon   • Gastroesophageal reflux disease without esophagitis   • Hyperlipidemia   • Multiple environmental allergies   • Primary osteoarthritis of both wrists   • Primary osteoarthritis of both hands   • Postmenopausal hormone replacement therapy   • Subclinical hypothyroidism   • Vitamin D deficiency   • Therapeutic drug monitoring   • Alopecia   • Right lumbar radiculopathy         Past Medical History:   Diagnosis Date   • Allergic rhinitis 1/30/2015 11/13/2015--patient was evaluated by ENT and was started on generic Flonase and patient reports this has improved her symptoms quite a bit.   01/30/2015--allergy testing revealed positive reactivity to cat, dust mite, cockroach, mold spores, and grass pollen. Environmental control measures.   • Alopecia 4/12/2017    Evaluated and treated by the dermatologist.   • Benign essential hypertension 4/18/2016   • Diverticulosis of colon 3/17/2009    03/10/2017--colonoscopy revealed many small and large mouth diverticula in the sigmoid colon and descending colon.  Nonthrombosed external hemorrhoids and internal hemorrhoids noted.  Otherwise, normal colonoscopy.  03/17/2009--colonoscopy revealed external hemorrhoids, torturous colon with a sigmoid stricture, sigmoid diverticulosis.   • Gastroesophageal reflux disease without esophagitis 3/17/2009    06/09/2015--EGD revealed Z line irregular, 35 cm from incisors. Biopsied. Small hiatal hernia. Gastritis. Biopsied. Bilious gastric fluid. Fluid aspiration performed. Normal duodenal bulb and second part of duodenum. Biopsied. Pathology revealed the antral biopsy revealed small intestinal mucosa with no pathologic diagnosis. Good preservation of villous architecture. Duodenum biopsy revealed largely antral type gastric mucosa with mild patchy superficial chronic gastritis. Gastroesophageal junction revealed squamous and glandular  mucosa with mild chronic inflammation. Negative for intestinal metaplasia or dysplasia. There appeared to be mislabeling of the antral biopsies and duodenal biopsies.   04/17/2012--EGD revealed irregular Z line, hiatal hernia, gastritis, duodenitis.   03/17/2009--EGD revealed grade B. reflux esophagitis, hiatal hernia, gastritis, a few gastric polyps, duodenitis.   • History of Hyperplastic polyps of stomach 06/09/2015 06/09/2015--EGD revealed Z line irregular, 35 cm from incisors. Biopsied. Small hiatal hernia. Gastritis. Biopsied. Bilious gastric fluid. Fluid aspiration performed. Normal duodenal bulb and second part of duodenum. Biopsied. Pathology revealed the antral biopsy revealed small intestinal mucosa with no pathologic diagnosis. Good preservation of villous architecture. Duodenum biopsy revealed large   • Hyperlipidemia 7/17/2012 07/17/2012--treatment for hyperlipidemia begun.   • Multiple environmental allergies 1/30/2015 01/30/2015--allergy testing revealed positive reactivity to cat, dust mite, cockroach, mold spores, and grass pollen. Environmental control measures.   • Postmenopausal hormone replacement therapy 4/18/2016   • Primary osteoarthritis of both hands 12/30/2013 05/12/2014--patient seen in followup and reports that the Vimovo has helped. Uric acid levels are normal at 4.9. C. reactive protein mildly elevated at 2.3. X-rays of the hands reveals radiocarpal, first carpometacarpal, first metacarpophalangeal, and interphalangeal joint degeneration. This process is symmetric. The interphalangeal joint of the left is affected to the greatest degree. There is right sided scapholunate dissociation as well as deformity of the scaphoid suggesting prior traumatic injury with healing. Soft tissues are satisfactory. Overall appearance is most consistent with osteoarthritis.   05/05/2014--patient presents and reports that she is having worsening right wrist pain primarily at the base of the  thumb. No new injury. Bilateral x-rays of the wrists and hands ordered. Uric acid level ordered as well as a CRP. Vimovo 500/20 one by mouth twice a day. Follow up in one week.   03/18/2014--patient reports that her wrist symptoms have improved.   12/30/2013--patient reports a several mon   • Primary osteoarthritis of both wrists 12/30/2013 05/12/2014--patient seen in followup and reports that the Vimovo has helped. Uric acid levels are normal at 4.9. C. reactive protein mildly elevated at 2.3. X-rays of the hands reveals radiocarpal, first carpometacarpal, first metacarpophalangeal, and interphalangeal joint degeneration. This process is symmetric. The interphalangeal joint of the left is affected to the greatest degree. There is right sided scapholunate dissociation as well as deformity of the scaphoid suggesting prior traumatic injury with healing. Soft tissues are satisfactory. Overall appearance is most consistent with osteoarthritis.   05/05/2014--patient presents and reports that she is having worsening right wrist pain primarily at the base of the thumb. No new injury. Bilateral x-rays of the wrists and hands ordered. Uric acid level ordered as well as a CRP. Vimovo 500/20 one by mouth twice a day. Follow up in one week.   03/18/2014--patient reports that her wrist symptoms have improved.   12/30/2013--patient reports a several mon   • Subclinical hypothyroidism 10/3/2014    09/30/2016--thyroid ultrasound reveals a tiny 2 mm colloid cyst within the left gland.  No mass demonstrated within the right thyroid or isthmus.  Overall thyroid echotexture is normal.  Normal to slightly increased global vascularity.  07/27/2016--routine follow-up.  TSH elevated at 4.45.  Thyroid ultrasound ordered after patient recovers from rotator cuff surgery.  She will follow-up after the results are known.  07/09/2015--TSH mildly elevated at 4.34.  Free T4 and free T3 are normal.  Observation.  Subclinical hypothyroidism.   10/09/2014--thyroid antibodies are negative.  Repeat thyroid function tests are normal.  10/03/2014--TSH elevated at 6.17.  Repeat thyroid function tests including T3 and thyroid antibodies ordered.  If these come back abnormal we will perform an ultrasound of the thyroid.  07/09/2015--TSH mildly elevated at 4.34. Free T4 and free T3 are normal. Observation. Subclinical hypothyroidism.   10/09/2014--thyroid antibodies are negative. Repeat thyroid function tests are reshma   • Vitamin D deficiency 4/18/2016         Past Surgical History:   Procedure Laterality Date   • COLONOSCOPY  03/17/2009 03/17/2009--colonoscopy revealed external hemorrhoids, torturous colon with a sigmoid stricture, sigmoid diverticulosis.   • COLONOSCOPY N/A 3/10/2017    03/10/2017--colonoscopy revealed many small and large mouth diverticula in the sigmoid colon and descending colon.  Nonthrombosed external hemorrhoids and internal hemorrhoids noted.  Otherwise, normal colonoscopy.   • ERCP WITH SPHINCTEROTOMY/PAPILLOTOMY  08/13/2014 08/13/2014--ERCP, endoscopic sphincterotomy and removal of common bile duct stones. 08/12/2014--laparoscopic cholecystectomy. This was complicated by retained common bile duct stones 2.   • ESOPHAGOSCOPY / EGD  06/09/2015 06/09/2015--EGD revealed Z line irregular, 35 cm from incisors. Biopsied. Small hiatal hernia. Gastritis. Biopsied. Bilious gastric fluid. Fluid aspiration performed. Normal duodenal bulb and second part of duodenum. Biopsied. Pathology revealed the antral biopsy revealed small intestinal mucosa with no pathologic diagnosis. Good preservation of villous architecture. Duodenum biopsy revealed large   • LAPAROSCOPIC CHOLECYSTECTOMY  08/12/2014 08/13/2014--ERCP, endoscopic sphincterotomy and removal of common bile duct stones. 08/12/2014--laparoscopic cholecystectomy. This was complicated by retained common bile duct stones 2.   • PARTIAL HYSTERECTOMY  1978    Partial hysterectomy 1978.    • ROTATOR CUFF REPAIR Left 07/10/2014    07/10/2014--left rotator cuff repair. 03/18/2014--patient seen in followup and reports that her range of motion has improved and her pain is not debilitating. She feels that she is making progress with physical therapy. Surgery scheduled 07/10/2014. 01/10/2014--patient was seen in evaluation by the orthopedist and he recommended conservative treatment consisting of physical therapy/rehabilitation.   • ROTATOR CUFF REPAIR Right 08/03/2016 08/03/2016--arthroscopic right rotator cuff repair.  Debridement of degenerative anterior superior labral tear.         No Known Allergies        Current Outpatient Prescriptions:   •  atorvastatin (LIPITOR) 80 MG tablet, TAKE ONE TABLET BY MOUTH EVERY NIGHT AT BEDTIME, Disp: 30 tablet, Rfl: 8  •  Calcium Carbonate (CALCIUM 600 PO), Take 2 tablets by mouth daily., Disp: , Rfl:   •  Coenzyme Q10 (COQ10) 400 MG capsule, Take 1 capsule by mouth daily. Take with a meal., Disp: , Rfl:   •  estrogens, conjugated, (PREMARIN) 0.625 MG tablet, Take 1 tablet by mouth Daily. Take daily for 21 days then do not take for 7 days., Disp: 90 tablet, Rfl: 3  •  fexofenadine-pseudoephedrine (ALLEGRA-D 24) 180-240 MG per 24 hr tablet, Take 1 tablet by mouth Daily., Disp: 30 tablet, Rfl: 2  •  finasteride (PROSCAR) 5 MG tablet, Take 1 tablet by mouth Daily., Disp: , Rfl:   •  ketoconazole (NIZORAL) 2 % shampoo, , Disp: , Rfl:   •  multivitamin (THERAGRAN) tablet tablet, Take 1 tablet by mouth daily., Disp: , Rfl:   •  naproxen (NAPROSYN) 500 MG tablet, TAKE ONE TABLET BY MOUTH TWICE A DAY, Disp: 60 tablet, Rfl: 2  •  omeprazole OTC (PRILOSEC OTC) 20 MG EC tablet, Take 1 tablet by mouth Daily., Disp: , Rfl:   •  lisinopril-hydrochlorothiazide (PRINZIDE,ZESTORETIC) 10-12.5 MG per tablet, TAKE ONE TABLET BY MOUTH EVERY MORNING, Disp: 30 tablet, Rfl: 7      Family History   Problem Relation Age of Onset   • Other Father         Hodgkin's Disease   • Cancer  "Father         Lung Cancer   • Breast cancer Daughter 52         Social History     Social History   • Marital status:      Spouse name: N/A   • Number of children: N/A   • Years of education: N/A     Occupational History   •  - [employer]      Social History Main Topics   • Smoking status: Never Smoker   • Smokeless tobacco: Never Used   • Alcohol use Yes      Comment: Socially   • Drug use: No   • Sexual activity: Yes     Partners: Male     Other Topics Concern   • Not on file     Social History Narrative   • No narrative on file         Vitals:    07/09/18 0830   BP: 120/76   BP Location: Right arm   Pulse: 95   SpO2: 99%   Weight: 60.1 kg (132 lb 6.4 oz)   Height: 163.8 cm (64.49\")          Physical Exam:    General: Alert and oriented x 3.  No acute distress.  Normal affect.  HEENT: Pupils equal, round, reactive to light; extraocular movements intact; sclerae nonicteric; pharynx, ear canals and TMs normal.  Neck: Without JVD, thyromegaly, bruit, or adenopathy.  Lungs: Clear to auscultation in all fields.  Heart: Regular rate and rhythm without murmur, rub, gallop, or click.  Abdomen: Soft, nontender, without hepatosplenomegaly or hernia.  Bowel sounds normal.  : Deferred.  Rectal: Deferred.  Extremities: Without clubbing, cyanosis, edema, or pulse deficit.  Neurologic: Intact without focal deficit.  Normal station and gait observed during ingress and egress from the examination room.  Straight leg raising is negative.  Skin: Without significant lesion.  Musculoskeletal: Unremarkable, except changes consistent with degenerative arthritis.      Lab/other results:    NMR is absolutely perfect other than triglycerides slightly elevated at 201.  CMP normal.  CBC is normal.  TSH elevated at 4.31.  Free T3 and free T4 are normal.  CPK normal.    Assessment/Plan:     Diagnosis Plan   1. Medicare annual wellness visit, subsequent     2. Hyperlipidemia, unspecified hyperlipidemia type     3. " Benign essential hypertension     4. Gastroesophageal reflux disease without esophagitis     5. Primary osteoarthritis of both hands     6. Primary osteoarthritis of both wrists     7. Subclinical hypothyroidism     8. Vitamin D deficiency     9. Diffuse arthralgia     10. Right lumbar radiculopathy     11. Therapeutic drug monitoring       The subsequent Medicare wellness visit is documented on a separate note.    Patient has hyperlipidemia which is under excellent control.  Her blood pressure seems to be controlled on the lisinopril HCT.  Reflux symptoms seem to be controlled with when necessary OTC Prilosec.  Patient continues to have problems with pain in her hands and wrists from degenerative arthritis.  She has subclinical hypothyroidism that is not bad enough to warrant treatment at the present time.  We will continue to monitor.  Vitamin D is therapeutic.  Her bone density is followed by the gynecologist.  She is due for a DEXA scan.  Her diffuse arthralgias have resolved.  She presents with new complaints of right lumbar radicular symptoms and right-sided lower back pain as described above.  This needs further evaluation and treatment.    Plan is as follows: Check x-ray of the lumbar spine.  Patient will follow-up on the phone for the results and possible further instructions.  Prednisone 50 mg by mouth daily ×5 days, taper and discontinue.  I instructed patient that if her symptoms persist and certainly if they worsen then she should contact me and we will proceed with MRI.  I encouraged her to check with the local pharmacy regarding the shingles vaccination.  However, I would like for her to wait to receive this until about 2 weeks after being off of the prednisone.    Procedures

## 2018-07-12 ENCOUNTER — TELEPHONE (OUTPATIENT)
Dept: INTERNAL MEDICINE | Facility: CLINIC | Age: 73
End: 2018-07-12

## 2018-07-25 ENCOUNTER — TELEPHONE (OUTPATIENT)
Dept: INTERNAL MEDICINE | Facility: CLINIC | Age: 73
End: 2018-07-25

## 2018-07-25 DIAGNOSIS — Z51.81 THERAPEUTIC DRUG MONITORING: ICD-10-CM

## 2018-07-25 DIAGNOSIS — M54.16 LUMBAR BACK PAIN WITH RADICULOPATHY AFFECTING RIGHT LOWER EXTREMITY: Primary | ICD-10-CM

## 2018-07-25 RX ORDER — FEXOFENADINE HCL AND PSEUDOEPHEDRINE HCI 180; 240 MG/1; MG/1
1 TABLET, EXTENDED RELEASE ORAL DAILY
Qty: 30 TABLET | Refills: 5 | Status: SHIPPED | OUTPATIENT
Start: 2018-07-25 | End: 2019-08-28 | Stop reason: SDUPTHER

## 2018-07-25 NOTE — TELEPHONE ENCOUNTER
Ordered an MRI and patient should follow up after the results are known.  Go ahead and refill her prednisone prescription.  This was not a Medrol Dosepak.

## 2018-07-25 NOTE — TELEPHONE ENCOUNTER
Pt called stated was on steroid pack for back pain last week, finished pack and back pain is back. Wants to know if she should come back in or send in additional meds. Also said you mentioned MRI.

## 2018-08-02 ENCOUNTER — HOSPITAL ENCOUNTER (OUTPATIENT)
Dept: MRI IMAGING | Facility: HOSPITAL | Age: 73
Discharge: HOME | End: 2018-08-02
Admitting: INTERNAL MEDICINE

## 2018-08-02 DIAGNOSIS — M54.16 LUMBAR BACK PAIN WITH RADICULOPATHY AFFECTING RIGHT LOWER EXTREMITY: ICD-10-CM

## 2018-08-02 DIAGNOSIS — Z51.81 THERAPEUTIC DRUG MONITORING: ICD-10-CM

## 2018-08-02 PROCEDURE — 72148 MRI LUMBAR SPINE W/O DYE: CPT

## 2018-08-07 ENCOUNTER — OFFICE VISIT (OUTPATIENT)
Dept: INTERNAL MEDICINE | Facility: CLINIC | Age: 73
End: 2018-08-07

## 2018-08-07 VITALS
DIASTOLIC BLOOD PRESSURE: 66 MMHG | HEIGHT: 64 IN | TEMPERATURE: 98.3 F | HEART RATE: 84 BPM | SYSTOLIC BLOOD PRESSURE: 110 MMHG | OXYGEN SATURATION: 97 % | WEIGHT: 131 LBS | RESPIRATION RATE: 15 BRPM | BODY MASS INDEX: 22.36 KG/M2

## 2018-08-07 DIAGNOSIS — M54.16 RIGHT LUMBAR RADICULOPATHY: ICD-10-CM

## 2018-08-07 DIAGNOSIS — M41.26 OTHER IDIOPATHIC SCOLIOSIS, LUMBAR REGION: ICD-10-CM

## 2018-08-07 DIAGNOSIS — M54.16 LUMBAR BACK PAIN WITH RADICULOPATHY AFFECTING RIGHT LOWER EXTREMITY: Primary | ICD-10-CM

## 2018-08-07 DIAGNOSIS — M51.26 LUMBAR DISC HERNIATION: ICD-10-CM

## 2018-08-07 PROCEDURE — 99214 OFFICE O/P EST MOD 30 MIN: CPT | Performed by: INTERNAL MEDICINE

## 2018-08-07 NOTE — PROGRESS NOTES
08/07/2018    Patient Information  Ashley Mata                                                                                          [address] [facility] [address]      1945  [phone]    Chief Complaint:     Follow-up low back pain with lumbar radiculopathy.    History of Present Illness:    Patient who has a previous history of hypertension, environmental allergies, esophageal reflux, hyperlipidemia and osteoarthritis as well as subclinical hypothyroidism.  Overall she is fairly healthy.  She presents today to follow-up on rather sudden onset right lower back pain with right sided lumbar radicular symptoms.  This will be described in detail below.  Past medical history reviewed and updated where necessary including health maintenance parameters.  This reveals she is up-to-date or else accounted for.    The history regarding right lower back pain with right lumbar radiculopathy, lumbar scoliosis and herniated disc at L4-L5:    08/07/2018--patient seen in follow-up and reports her pain is much better after taking prednisone.  She is now down to half a pill per day and is almost off of it.  I reviewed the results of the MRI of lumbar spine and it appears that the L4-L5 level is what is causing the problem and it correlates highly with her symptoms.  I am concerned that her symptoms may very well return given the level of pathology there and therefore I have recommended that she be evaluated by the neurosurgeon to become established in case she has any future problems.    08/02/2018--MRI of the lumbar spine reveals dextroscoliotic curvature with apex at L1-L2 and a focal level scoliotic curvature at L3-L4.  T11-T12, T12-L1, L1-L2 reveals no significant disease.  At L2-L3 and at L3-L4 there is no canal or foraminal narrowing.  At L4-L5 there is rotational anterolisthesis of L4 with respect to L5 with 5-6 mm anterior listhesis of the right side and only a 2 mm  to 3 mm anterolisthesis of the left side.  Disc material is extending into the inferior right foramen and herniated along the right foraminal and fell right lateral inferior body were moderately narrows the lateral aspect of the right foramen impresses on the anterior inferior medial aspect of the right L4 nerve root.  Furthermore there is prominent synovial thickening and probable development of a synovial cyst.  The combination of which severely narrows the right lateral recess wart presses on the posterior margin of the right L5 nerve root.  L5-S1 is normal.    07/09/2018--patient presents with a 4-5 day history of pain involving her right lower extremity.  It started on the right side of her back and then radiates down to at least the level of the calf posteriorly laterally.  Occasionally will go out onto the foot and her foot does feel numb.  No known injury.  Exam is not revealing.  Prednisone 50 mg by mouth daily ×5 days, taper and discontinue.  X-ray lumbar spine.  The patient continues to have symptoms then we will need to proceed with MRI.    Review of Systems   Constitution: Negative.   HENT: Negative.    Eyes: Negative.    Cardiovascular: Negative.    Respiratory: Negative.    Endocrine: Negative.    Hematologic/Lymphatic: Negative.    Skin: Negative.    Musculoskeletal: Positive for back pain.   Gastrointestinal: Negative.    Genitourinary: Negative.    Neurological: Positive for numbness and paresthesias.   Psychiatric/Behavioral: Negative.    Allergic/Immunologic: Negative.        Active Problems:    Patient Active Problem List   Diagnosis   • Allergic rhinitis   • Benign essential hypertension   • Diverticulosis of colon   • Gastroesophageal reflux disease without esophagitis   • Hyperlipidemia   • Multiple environmental allergies   • Primary osteoarthritis of both wrists   • Primary osteoarthritis of both hands   • Postmenopausal hormone replacement therapy   • Subclinical hypothyroidism   • Vitamin  D deficiency   • Therapeutic drug monitoring   • Alopecia   • Right lumbar radiculopathy   • Lumbar back pain with radiculopathy affecting right lower extremity   • Lumbar scoliosis   • Lumbar disc herniation, L4-L5         Past Medical History:   Diagnosis Date   • Allergic rhinitis 1/30/2015 11/13/2015--patient was evaluated by ENT and was started on generic Flonase and patient reports this has improved her symptoms quite a bit.   01/30/2015--allergy testing revealed positive reactivity to cat, dust mite, cockroach, mold spores, and grass pollen. Environmental control measures.   • Alopecia 4/12/2017    Evaluated and treated by the dermatologist.   • Benign essential hypertension 4/18/2016   • Diverticulosis of colon 3/17/2009    03/10/2017--colonoscopy revealed many small and large mouth diverticula in the sigmoid colon and descending colon.  Nonthrombosed external hemorrhoids and internal hemorrhoids noted.  Otherwise, normal colonoscopy.  03/17/2009--colonoscopy revealed external hemorrhoids, torturous colon with a sigmoid stricture, sigmoid diverticulosis.   • Gastroesophageal reflux disease without esophagitis 3/17/2009    06/09/2015--EGD revealed Z line irregular, 35 cm from incisors. Biopsied. Small hiatal hernia. Gastritis. Biopsied. Bilious gastric fluid. Fluid aspiration performed. Normal duodenal bulb and second part of duodenum. Biopsied. Pathology revealed the antral biopsy revealed small intestinal mucosa with no pathologic diagnosis. Good preservation of villous architecture. Duodenum biopsy revealed largely antral type gastric mucosa with mild patchy superficial chronic gastritis. Gastroesophageal junction revealed squamous and glandular mucosa with mild chronic inflammation. Negative for intestinal metaplasia or dysplasia. There appeared to be mislabeling of the antral biopsies and duodenal biopsies.   04/17/2012--EGD revealed irregular Z line, hiatal hernia, gastritis, duodenitis.    03/17/2009--EGD revealed grade B. reflux esophagitis, hiatal hernia, gastritis, a few gastric polyps, duodenitis.   • History of Hyperplastic polyps of stomach 06/09/2015 06/09/2015--EGD revealed Z line irregular, 35 cm from incisors. Biopsied. Small hiatal hernia. Gastritis. Biopsied. Bilious gastric fluid. Fluid aspiration performed. Normal duodenal bulb and second part of duodenum. Biopsied. Pathology revealed the antral biopsy revealed small intestinal mucosa with no pathologic diagnosis. Good preservation of villous architecture. Duodenum biopsy revealed large   • Hyperlipidemia 7/17/2012 07/17/2012--treatment for hyperlipidemia begun.   • Multiple environmental allergies 1/30/2015 01/30/2015--allergy testing revealed positive reactivity to cat, dust mite, cockroach, mold spores, and grass pollen. Environmental control measures.   • Postmenopausal hormone replacement therapy 4/18/2016   • Primary osteoarthritis of both hands 12/30/2013 05/12/2014--patient seen in followup and reports that the Vimovo has helped. Uric acid levels are normal at 4.9. C. reactive protein mildly elevated at 2.3. X-rays of the hands reveals radiocarpal, first carpometacarpal, first metacarpophalangeal, and interphalangeal joint degeneration. This process is symmetric. The interphalangeal joint of the left is affected to the greatest degree. There is right sided scapholunate dissociation as well as deformity of the scaphoid suggesting prior traumatic injury with healing. Soft tissues are satisfactory. Overall appearance is most consistent with osteoarthritis.   05/05/2014--patient presents and reports that she is having worsening right wrist pain primarily at the base of the thumb. No new injury. Bilateral x-rays of the wrists and hands ordered. Uric acid level ordered as well as a CRP. Vimovo 500/20 one by mouth twice a day. Follow up in one week.   03/18/2014--patient reports that her wrist symptoms have improved.    12/30/2013--patient reports a several mon   • Primary osteoarthritis of both wrists 12/30/2013 05/12/2014--patient seen in followup and reports that the Vimovo has helped. Uric acid levels are normal at 4.9. C. reactive protein mildly elevated at 2.3. X-rays of the hands reveals radiocarpal, first carpometacarpal, first metacarpophalangeal, and interphalangeal joint degeneration. This process is symmetric. The interphalangeal joint of the left is affected to the greatest degree. There is right sided scapholunate dissociation as well as deformity of the scaphoid suggesting prior traumatic injury with healing. Soft tissues are satisfactory. Overall appearance is most consistent with osteoarthritis.   05/05/2014--patient presents and reports that she is having worsening right wrist pain primarily at the base of the thumb. No new injury. Bilateral x-rays of the wrists and hands ordered. Uric acid level ordered as well as a CRP. Vimovo 500/20 one by mouth twice a day. Follow up in one week.   03/18/2014--patient reports that her wrist symptoms have improved.   12/30/2013--patient reports a several mon   • Subclinical hypothyroidism 10/3/2014    09/30/2016--thyroid ultrasound reveals a tiny 2 mm colloid cyst within the left gland.  No mass demonstrated within the right thyroid or isthmus.  Overall thyroid echotexture is normal.  Normal to slightly increased global vascularity.  07/27/2016--routine follow-up.  TSH elevated at 4.45.  Thyroid ultrasound ordered after patient recovers from rotator cuff surgery.  She will follow-up after the results are known.  07/09/2015--TSH mildly elevated at 4.34.  Free T4 and free T3 are normal.  Observation.  Subclinical hypothyroidism.  10/09/2014--thyroid antibodies are negative.  Repeat thyroid function tests are normal.  10/03/2014--TSH elevated at 6.17.  Repeat thyroid function tests including T3 and thyroid antibodies ordered.  If these come back abnormal we will perform an  ultrasound of the thyroid.  07/09/2015--TSH mildly elevated at 4.34. Free T4 and free T3 are normal. Observation. Subclinical hypothyroidism.   10/09/2014--thyroid antibodies are negative. Repeat thyroid function tests are reshma   • Vitamin D deficiency 4/18/2016         Past Surgical History:   Procedure Laterality Date   • COLONOSCOPY  03/17/2009 03/17/2009--colonoscopy revealed external hemorrhoids, torturous colon with a sigmoid stricture, sigmoid diverticulosis.   • COLONOSCOPY N/A 3/10/2017    03/10/2017--colonoscopy revealed many small and large mouth diverticula in the sigmoid colon and descending colon.  Nonthrombosed external hemorrhoids and internal hemorrhoids noted.  Otherwise, normal colonoscopy.   • ERCP WITH SPHINCTEROTOMY/PAPILLOTOMY  08/13/2014 08/13/2014--ERCP, endoscopic sphincterotomy and removal of common bile duct stones. 08/12/2014--laparoscopic cholecystectomy. This was complicated by retained common bile duct stones 2.   • ESOPHAGOSCOPY / EGD  06/09/2015 06/09/2015--EGD revealed Z line irregular, 35 cm from incisors. Biopsied. Small hiatal hernia. Gastritis. Biopsied. Bilious gastric fluid. Fluid aspiration performed. Normal duodenal bulb and second part of duodenum. Biopsied. Pathology revealed the antral biopsy revealed small intestinal mucosa with no pathologic diagnosis. Good preservation of villous architecture. Duodenum biopsy revealed large   • LAPAROSCOPIC CHOLECYSTECTOMY  08/12/2014 08/13/2014--ERCP, endoscopic sphincterotomy and removal of common bile duct stones. 08/12/2014--laparoscopic cholecystectomy. This was complicated by retained common bile duct stones 2.   • PARTIAL HYSTERECTOMY  1978    Partial hysterectomy 1978.   • ROTATOR CUFF REPAIR Left 07/10/2014    07/10/2014--left rotator cuff repair. 03/18/2014--patient seen in followup and reports that her range of motion has improved and her pain is not debilitating. She feels that she is making progress with  physical therapy. Surgery scheduled 07/10/2014. 01/10/2014--patient was seen in evaluation by the orthopedist and he recommended conservative treatment consisting of physical therapy/rehabilitation.   • ROTATOR CUFF REPAIR Right 08/03/2016 08/03/2016--arthroscopic right rotator cuff repair.  Debridement of degenerative anterior superior labral tear.         No Known Allergies        Current Outpatient Prescriptions:   •  atorvastatin (LIPITOR) 80 MG tablet, TAKE ONE TABLET BY MOUTH EVERY NIGHT AT BEDTIME, Disp: 30 tablet, Rfl: 8  •  Calcium Carbonate (CALCIUM 600 PO), Take 2 tablets by mouth daily., Disp: , Rfl:   •  Coenzyme Q10 (COQ10) 400 MG capsule, Take 1 capsule by mouth daily. Take with a meal., Disp: , Rfl:   •  estrogens, conjugated, (PREMARIN) 0.625 MG tablet, Take 1 tablet by mouth Daily. Take daily for 21 days then do not take for 7 days., Disp: 90 tablet, Rfl: 3  •  fexofenadine-pseudoephedrine (ALLEGRA-D 24) 180-240 MG per 24 hr tablet, Take 1 tablet by mouth Daily., Disp: 30 tablet, Rfl: 5  •  finasteride (PROSCAR) 5 MG tablet, Take 1 tablet by mouth Daily., Disp: , Rfl:   •  ketoconazole (NIZORAL) 2 % shampoo, , Disp: , Rfl:   •  lisinopril-hydrochlorothiazide (PRINZIDE,ZESTORETIC) 10-12.5 MG per tablet, Take 1 by mouth every morning for high blood pressure, Disp: 90 tablet, Rfl: 3  •  multivitamin (THERAGRAN) tablet tablet, Take 1 tablet by mouth daily., Disp: , Rfl:   •  naproxen (NAPROSYN) 500 MG tablet, TAKE ONE TABLET BY MOUTH TWICE A DAY, Disp: 60 tablet, Rfl: 2  •  omeprazole OTC (PRILOSEC OTC) 20 MG EC tablet, Take 1 tablet by mouth Daily., Disp: , Rfl:   •  predniSONE (DELTASONE) 10 MG tablet, 5 by mouth daily 5 days, then 4 daily 2 days, 3 daily 2 days, 2 daily 2 days, 1 daily 2 days, one half daily 2 days; D/C, Disp: 48 tablet, Rfl: 0      Family History   Problem Relation Age of Onset   • Other Father         Hodgkin's Disease   • Cancer Father         Lung Cancer   • Breast cancer  "Daughter 52         Social History     Social History   • Marital status:      Spouse name: N/A   • Number of children: N/A   • Years of education: N/A     Occupational History   •  - [employer]      Social History Main Topics   • Smoking status: Never Smoker   • Smokeless tobacco: Never Used   • Alcohol use Yes      Comment: Socially   • Drug use: No   • Sexual activity: Yes     Partners: Male     Other Topics Concern   • Not on file     Social History Narrative   • No narrative on file         Vitals:    08/07/18 1026   BP: 110/66   BP Location: Right arm   Patient Position: Sitting   Cuff Size: Adult   Pulse: 84   Resp: 15   Temp: 98.3 °F (36.8 °C)   TempSrc: Oral   SpO2: 97%   Weight: 59.4 kg (131 lb)   Height: 163.8 cm (64.49\")          Physical Exam:    General: Alert and oriented x 3.  No acute distress.  Normal affect.  HEENT: Pupils equal, round, reactive to light; extraocular movements intact; sclerae nonicteric; pharynx, ear canals and TMs normal.  Neck: Without JVD, thyromegaly, bruit, or adenopathy.  Lungs: Clear to auscultation in all fields.  Heart: Regular rate and rhythm without murmur, rub, gallop, or click.  Abdomen: Soft, nontender, without hepatosplenomegaly or hernia.  Bowel sounds normal.  : Deferred.  Rectal: Deferred.  Extremities: Without clubbing, cyanosis, edema, or pulse deficit.  Neurologic: Intact without focal deficit.  Normal station and gait observed during ingress and egress from the examination room.  Skin: Without significant lesion.  Musculoskeletal: Unremarkable.      Lab/other results:    I reviewed the results of the MRI of the lumbar spine with patient today.    Assessment/Plan:     Diagnosis Plan   1. Lumbar back pain with radiculopathy affecting right lower extremity     2. Right lumbar radiculopathy     3. Lumbar disc herniation, L4-L5     4. Lumbar scoliosis         Patient with rather acute lumbar back pain with right-sided radiculopathy and " workup reveals lumbar disc herniation of L4-L5 along with lumbar scoliosis.  The pathology noted on the MRI correlates highly with her symptoms.  I am concerned that even though she is currently not having any significant symptoms after treatment with prednisone, she is likely to have future problems.    Plan is as follows: Neurosurgery referral given.  I will also speak to Dr. Remigio Olvera personally regarding this case.  Patient instructed to contact me for any return of severe symptoms.  Otherwise, she will keep her previously scheduled follow-up appointment in January.      Procedures

## 2018-09-04 ENCOUNTER — OFFICE VISIT (OUTPATIENT)
Dept: NEUROSURGERY | Facility: CLINIC | Age: 73
End: 2018-09-04

## 2018-09-04 VITALS
BODY MASS INDEX: 22.36 KG/M2 | HEART RATE: 107 BPM | HEIGHT: 64 IN | WEIGHT: 131 LBS | SYSTOLIC BLOOD PRESSURE: 132 MMHG | DIASTOLIC BLOOD PRESSURE: 73 MMHG

## 2018-09-04 DIAGNOSIS — M51.26 LUMBAR DISC HERNIATION: Primary | ICD-10-CM

## 2018-09-04 PROCEDURE — 99203 OFFICE O/P NEW LOW 30 MIN: CPT | Performed by: NEUROLOGICAL SURGERY

## 2018-09-19 ENCOUNTER — TREATMENT (OUTPATIENT)
Dept: PHYSICAL THERAPY | Facility: CLINIC | Age: 73
End: 2018-09-19

## 2018-09-19 DIAGNOSIS — M54.16 LUMBAR RADICULOPATHY, RIGHT: Primary | ICD-10-CM

## 2018-09-19 PROCEDURE — 97161 PT EVAL LOW COMPLEX 20 MIN: CPT | Performed by: PHYSICAL THERAPIST

## 2018-09-19 PROCEDURE — G8981 BODY POS CURRENT STATUS: HCPCS | Performed by: PHYSICAL THERAPIST

## 2018-09-19 PROCEDURE — G8982 BODY POS GOAL STATUS: HCPCS | Performed by: PHYSICAL THERAPIST

## 2018-09-19 PROCEDURE — 97110 THERAPEUTIC EXERCISES: CPT | Performed by: PHYSICAL THERAPIST

## 2018-09-19 NOTE — PROGRESS NOTES
Physical Therapy Initial Evaluation and Plan of Care      Patient: Ashley Mata   : 1945  Diagnosis/ICD-10 Code:  Lumbar radiculopathy, right [M54.16]  Referring practitioner: Remigio Olvera MD    Subjective Evaluation    History of Present Illness  Mechanism of injury: Pain first thing in the morning now for 15 mins or so the I am find the rest of the day. Feels better if I sleep on my stomach.  Onset few months ago. USing rake stooped over. Pain was very intense first day but I kept trying to stretch it out      Patient Occupation: . Retiring end of October Quality of life: good    Pain  Current pain rating: 3  Location: R buttock to calf  Quality: radiating and knife-like  Relieving factors: change in position (move)  Aggravating factors: ambulation and movement (only first thing int he morning)  Progression: improved    Social Support  Lives in: multiple-level home  Lives with: spouse    Diagnostic Tests  X-ray: abnormal  MRI studies: abnormal (L4-5 disc)    Treatments  No previous or current treatments  Patient Goals  Patient goals for therapy: decreased pain and independence with ADLs/IADLs             Objective       Active Range of Motion     Lumbar   Flexion: WFL  Extension: WFL  Left lateral flexion: WFL  Right lateral flexion: WFL  Left rotation: WFL  Right rotation: WFL    Additional Active Range of Motion Details  Pain into R post thigh with flexion  Mild T-L scoliosis present with flexion    Strength/Myotome Testing     Lumbar   Left   Normal strength    Right   Normal strength    Additional Strength Details  Lower abs 3+/5  DEcreased seg mobility sonia T L4-5  L    Tests     Lumbar     Right   Negative crossed SLR, femoral stretch, passive SLR and quadrant.     Ambulation   Weight-Bearing Status   Assistive device used: none         Assessment & Plan     Assessment  Impairments: impaired physical strength, lacks appropriate home exercise program and pain with  function  Assessment details: Pt is a good candidate for skilled PT intervention, sonia manual therapy for spinal joint mobility, alignment, postural restoration and core strengthening to restore functional AROM and strength to return to previous level of ADL's.  Prognosis: good  Prognosis details: Short Term Goals: ( 3 weeks)  1.Pt to be independent with HEP  2. Pt to exhibit improved lumbar flexion to 100% without R LE pain to allow for increased ease with ADL's  3. Pt to demonstrate proper lifting technique  4. Pt to improve lower ab strength to 4/5 to allow for improved standing/stairclimbing tolerance    Long Term Goals (6 weeks )  1. Pt able to wake in morning and have no pain with initial weightbearing  2. Pt to exhibit 4+/5 lower abdominal strength to allow for more strenuous daily activities  3. Pt to exhibit lumbar AROM to 100% to allow for reaching and bending with min to no pain  4. Pt to score <15% on Back Index  Functional Limitations: lifting, pushing, uncomfortable because of pain, stooping and unable to perform repetitive tasks      Manual Therapy:    5     mins  95607;  Therapeutic Exercise:    12     mins  89840;     Neuromuscular Sadaf:        mins  41854;    Therapeutic Activity:          mins  71742;     Gait Training:           mins  95682;     Ultrasound:          mins  20992;    Electrical Stimulation:         mins  63966 ( );  Dry Needling          mins self-pay    Timed Treatment:   17   mins   Total Treatment:     50   mins    PT SIGNATURE: Violet Woodson PT   [place] License # [id number]  DATE TREATMENT INITIATED: 9/19/2018    Medicare Initial Certification  Certification Period: 12/18/2018  I certify that the therapy services are furnished while this patient is under my care.  The services outlined above are required by this patient, and will be reviewed every 90 days.     PHYSICIAN: Remiigo Olvera MD      DATE:     Please sign and return via fax to [phone].. Thank you, [facility].

## 2018-09-27 ENCOUNTER — TREATMENT (OUTPATIENT)
Dept: PHYSICAL THERAPY | Facility: CLINIC | Age: 73
End: 2018-09-27

## 2018-09-27 PROCEDURE — 97110 THERAPEUTIC EXERCISES: CPT | Performed by: PHYSICAL THERAPIST

## 2018-09-27 PROCEDURE — 97140 MANUAL THERAPY 1/> REGIONS: CPT | Performed by: PHYSICAL THERAPIST

## 2018-09-27 NOTE — PROGRESS NOTES
Physical Therapy Daily Progress Note        Subjective     Ashley Adolfo reports: I feel so much better with the stretches/HEP. No leg pain. One stretch bothers my R knee    Objective   See Exercise, Manual, and Modality Logs for complete treatment.       Assessment/Plan  Added bridge with hip ab/ad to HEP. (-) SLR today    Progress per Plan of Care           Manual Therapy:  20       mins  64646;  Therapeutic Exercise: 15      mins  11537;     Neuromuscular Sadaf:       mins  10242;    Therapeutic Activity:         mins  69734;     Gait Training:         mins  02823;     Ultrasound:         mins  72042;    Electrical Stimulation:        mins  51408 ( );  Dry Needling         mins self-pay    Timed Treatment:   35   mins   Total Treatment:     35   mins    Violet Woodson, PT  Physical Therapist  [place] License # [id number]

## 2018-10-01 ENCOUNTER — TREATMENT (OUTPATIENT)
Dept: PHYSICAL THERAPY | Facility: CLINIC | Age: 73
End: 2018-10-01

## 2018-10-01 DIAGNOSIS — M54.16 LUMBAR RADICULOPATHY, RIGHT: Primary | ICD-10-CM

## 2018-10-01 PROCEDURE — 97140 MANUAL THERAPY 1/> REGIONS: CPT | Performed by: PHYSICAL THERAPIST

## 2018-10-01 PROCEDURE — 97110 THERAPEUTIC EXERCISES: CPT | Performed by: PHYSICAL THERAPIST

## 2018-10-01 NOTE — PROGRESS NOTES
Physical Therapy Daily Progress Note        Subjective     Ashley Mata reports: Only very mild pain R lateral thigh. Feeling better in the morning. I am wanting to join a gym once I retire at end of the month    Objective   See Exercise, Manual, and Modality Logs for complete treatment.       Assessment/Plan  Improving lower ab control. Needed several verbal reminders for proper squat techniques to limit forward tibial excursion . Improved with doing sit tostand    Progress per Plan of Care           Manual Therapy:  15       mins  12546;  Therapeutic Exercise: 15      mins  49927;     Neuromuscular Sadaf:       mins  47757;    Therapeutic Activity:    5     mins  25768;     Gait Training:         mins  17879;     Ultrasound:         mins  07592;    Electrical Stimulation:        mins  88268 ( );  Dry Needling         mins self-pay    Timed Treatment:   35   mins   Total Treatment:     45   mins    Violet Woodson PT  Physical Therapist  [place] License # [id number]

## 2018-10-03 ENCOUNTER — TREATMENT (OUTPATIENT)
Dept: PHYSICAL THERAPY | Facility: CLINIC | Age: 73
End: 2018-10-03

## 2018-10-03 DIAGNOSIS — M54.16 LUMBAR RADICULOPATHY, RIGHT: Primary | ICD-10-CM

## 2018-10-03 PROCEDURE — 97140 MANUAL THERAPY 1/> REGIONS: CPT | Performed by: PHYSICAL THERAPIST

## 2018-10-03 PROCEDURE — 97110 THERAPEUTIC EXERCISES: CPT | Performed by: PHYSICAL THERAPIST

## 2018-10-03 PROCEDURE — 97112 NEUROMUSCULAR REEDUCATION: CPT | Performed by: PHYSICAL THERAPIST

## 2018-10-03 NOTE — PROGRESS NOTES
Physical Therapy Daily Progress Note        Subjective     Ashley Adolfo reports: I was stiffer this morning for some reason. The last 4-5 days I have woken up not stiff. Not sure why    Objective   See Exercise, Manual, and Modality Logs for complete treatment.       Assessment/Plan  Pt had difficulty with SLS on Tfoam. Added SLS practice at home    Progress per Plan of Care           Manual Therapy:  15       mins  84576;  Therapeutic Exercise: 20      mins  18699;     Neuromuscular Sadaf:6       mins  62676;    Therapeutic Activity:         mins  42390;     Gait Training:         mins  68150;     Ultrasound:         mins  76998;    Electrical Stimulation:        mins  41733 ( );  Dry Needling         mins self-pay    Timed Treatment:   41   mins   Total Treatment:     41   mins    Violet Woodson, PT  Physical Therapist  [place] License # [id number]

## 2018-10-09 ENCOUNTER — TREATMENT (OUTPATIENT)
Dept: PHYSICAL THERAPY | Facility: CLINIC | Age: 73
End: 2018-10-09

## 2018-10-09 DIAGNOSIS — M54.16 LUMBAR RADICULOPATHY, RIGHT: Primary | ICD-10-CM

## 2018-10-09 PROCEDURE — 97140 MANUAL THERAPY 1/> REGIONS: CPT | Performed by: PHYSICAL THERAPIST

## 2018-10-09 PROCEDURE — 97112 NEUROMUSCULAR REEDUCATION: CPT | Performed by: PHYSICAL THERAPIST

## 2018-10-09 PROCEDURE — 97110 THERAPEUTIC EXERCISES: CPT | Performed by: PHYSICAL THERAPIST

## 2018-10-09 NOTE — PROGRESS NOTES
Physical Therapy Daily Progress Note        Subjective     Ashley Mata reports: I am doing really well. No pain in morning or throughout the day.    Objective   See Exercise, Manual, and Modality Logs for complete treatment.       Assessment/Plan  Much improved . No pain with any treatment. Compliant with HEP    Anticipate DC next Visit           Manual Therapy:  12       mins  10666;  Therapeutic Exercise: 20      mins  18809;     Neuromuscular Sadaf:6       mins  21364;    Therapeutic Activity:         mins  09463;     Gait Training:         mins  32064;     Ultrasound:         mins  53311;    Electrical Stimulation:        mins  68409 ( );  Dry Needling         mins self-pay    Timed Treatment:   38   mins   Total Treatment:     38   mins    Violet Woodson, PT  Physical Therapist  [place] License # [id number]

## 2018-10-11 RX ORDER — NAPROXEN 500 MG/1
500 TABLET ORAL 2 TIMES DAILY
Qty: 60 TABLET | Refills: 4 | Status: SHIPPED | OUTPATIENT
Start: 2018-10-11 | End: 2019-08-12 | Stop reason: SDUPTHER

## 2018-10-17 ENCOUNTER — TREATMENT (OUTPATIENT)
Dept: PHYSICAL THERAPY | Facility: CLINIC | Age: 73
End: 2018-10-17

## 2018-10-17 DIAGNOSIS — M54.16 LUMBAR RADICULOPATHY, RIGHT: Primary | ICD-10-CM

## 2018-10-17 PROCEDURE — 97140 MANUAL THERAPY 1/> REGIONS: CPT | Performed by: PHYSICAL THERAPIST

## 2018-10-17 PROCEDURE — G8981 BODY POS CURRENT STATUS: HCPCS | Performed by: PHYSICAL THERAPIST

## 2018-10-17 PROCEDURE — 97110 THERAPEUTIC EXERCISES: CPT | Performed by: PHYSICAL THERAPIST

## 2018-10-17 PROCEDURE — G8983 BODY POS D/C STATUS: HCPCS | Performed by: PHYSICAL THERAPIST

## 2018-10-17 NOTE — PROGRESS NOTES
DIscharge Note      Patient: Ashley Mata   : 1945  Diagnosis/ICD-10 Code:  Lumbar radiculopathy, right [M54.16]  Referring practitioner: Remigio Olvera MD  Date of Initial Visit: 2018  Today's Date: 10/17/2018  Patient seen for 6 sessions      Subjective:   Ashley Mata reports: Only get pain with slight stooping. I don't wash my hair under sink anymore  Subjective Questionnaire: Oswestry: 0%  Clinical Progress: improved  Home Program Compliance: Yes  Treatment has included: therapeutic exercise, neuromuscular re-education and manual therapy    Objective   Assessment/Plan   Short Term Goals: ( 3 weeks) MET  1.Pt to be independent with HEP  2. Pt to exhibit improved lumbar flexion to 100% without R LE pain to allow for increased ease with ADL's  3. Pt to demonstrate proper lifting technique  4. Pt to improve lower ab strength to 4/5 to allow for improved standing/stairclimbing tolerance    Long Term Goals (6 weeks )  1. Pt able to wake in morning and have no pain with initial weightbearing  2. Pt to exhibit 4+/5 lower abdominal strength to allow for more strenuous daily activities  3. Pt to exhibit lumbar AROM to 100% to allow for reaching and bending with min to no pain  4. Pt to score <15% on Back Index  Progress toward previous goals: All Met        Recommendations: Discharge    PT Signature: Violet Woodson, PT  [place] License # [id number]    Based upon review of the patient's progress and continued therapy plan, it is my medical opinion that Ashley Mata should discontinue physical therapy treatment at [facility]  [address] [place] [id number] [phone].      Manual Therapy:    15     mins  97603;  Therapeutic Exercise:    20     mins  69268;     Neuromuscular Sadaf:        mins  17467;    Therapeutic Activity:          mins  32249;     Gait Training:           mins  73303;     Ultrasound:          mins  63262;    Electrical  Stimulation:         mins  70178 ( );  Dry Needling          mins self-pay    Timed Treatment:   35   mins   Total Treatment:     35   mins

## 2019-01-08 DIAGNOSIS — Z51.81 THERAPEUTIC DRUG MONITORING: ICD-10-CM

## 2019-01-08 DIAGNOSIS — E78.5 HYPERLIPIDEMIA, UNSPECIFIED HYPERLIPIDEMIA TYPE: Chronic | ICD-10-CM

## 2019-01-08 DIAGNOSIS — E03.8 SUBCLINICAL HYPOTHYROIDISM: Chronic | ICD-10-CM

## 2019-01-12 LAB
ALBUMIN SERPL-MCNC: 4 G/DL (ref 3.5–5.2)
ALBUMIN/GLOB SERPL: 1.5 G/DL
ALP SERPL-CCNC: 63 U/L (ref 39–117)
ALT SERPL-CCNC: 22 U/L (ref 1–33)
AST SERPL-CCNC: 26 U/L (ref 1–32)
BILIRUB SERPL-MCNC: 0.4 MG/DL (ref 0.1–1.2)
BUN SERPL-MCNC: 20 MG/DL (ref 8–23)
BUN/CREAT SERPL: 25.3 (ref 7–25)
CALCIUM SERPL-MCNC: 9.6 MG/DL (ref 8.6–10.5)
CHLORIDE SERPL-SCNC: 102 MMOL/L (ref 98–107)
CHOLEST SERPL-MCNC: 171 MG/DL (ref 100–199)
CK SERPL-CCNC: 57 U/L (ref 20–180)
CO2 SERPL-SCNC: 27.6 MMOL/L (ref 22–29)
CREAT SERPL-MCNC: 0.79 MG/DL (ref 0.57–1)
ERYTHROCYTE [DISTWIDTH] IN BLOOD BY AUTOMATED COUNT: 13.4 % (ref 11.7–13)
GLOBULIN SER CALC-MCNC: 2.6 GM/DL
GLUCOSE SERPL-MCNC: 94 MG/DL (ref 65–99)
HCT VFR BLD AUTO: 40.2 % (ref 35.6–45.5)
HDL SERPL-SCNC: 49.2 UMOL/L
HDLC SERPL-MCNC: 81 MG/DL
HGB BLD-MCNC: 13.2 G/DL (ref 11.9–15.5)
LDL SERPL QN: 20.9 NM
LDL SERPL-SCNC: 522 NMOL/L
LDL SMALL SERPL-SCNC: <90 NMOL/L
LDLC SERPL CALC-MCNC: 46 MG/DL (ref 0–99)
MCH RBC QN AUTO: 30.4 PG (ref 26.9–32)
MCHC RBC AUTO-ENTMCNC: 32.8 G/DL (ref 32.4–36.3)
MCV RBC AUTO: 92.6 FL (ref 80.5–98.2)
PLATELET # BLD AUTO: 343 10*3/MM3 (ref 140–500)
POTASSIUM SERPL-SCNC: 4.3 MMOL/L (ref 3.5–5.2)
PROT SERPL-MCNC: 6.6 G/DL (ref 6–8.5)
RBC # BLD AUTO: 4.34 10*6/MM3 (ref 3.9–5.2)
SODIUM SERPL-SCNC: 140 MMOL/L (ref 136–145)
T3FREE SERPL-MCNC: 3.3 PG/ML (ref 2–4.4)
T4 FREE SERPL-MCNC: 1.17 NG/DL (ref 0.93–1.7)
TRIGL SERPL-MCNC: 220 MG/DL (ref 0–149)
TSH SERPL DL<=0.005 MIU/L-ACNC: 4.07 MIU/ML (ref 0.27–4.2)
WBC # BLD AUTO: 7.28 10*3/MM3 (ref 4.5–10.7)

## 2019-01-16 ENCOUNTER — OFFICE VISIT (OUTPATIENT)
Dept: INTERNAL MEDICINE | Facility: CLINIC | Age: 74
End: 2019-01-16

## 2019-01-16 VITALS
DIASTOLIC BLOOD PRESSURE: 82 MMHG | BODY MASS INDEX: 23.05 KG/M2 | SYSTOLIC BLOOD PRESSURE: 120 MMHG | HEART RATE: 104 BPM | OXYGEN SATURATION: 98 % | WEIGHT: 135 LBS | HEIGHT: 64 IN

## 2019-01-16 DIAGNOSIS — I10 BENIGN ESSENTIAL HYPERTENSION: Chronic | ICD-10-CM

## 2019-01-16 DIAGNOSIS — J30.1 CHRONIC SEASONAL ALLERGIC RHINITIS DUE TO POLLEN: Chronic | ICD-10-CM

## 2019-01-16 DIAGNOSIS — E78.5 HYPERLIPIDEMIA, UNSPECIFIED HYPERLIPIDEMIA TYPE: Primary | Chronic | ICD-10-CM

## 2019-01-16 DIAGNOSIS — Z51.81 THERAPEUTIC DRUG MONITORING: ICD-10-CM

## 2019-01-16 DIAGNOSIS — Z91.09 MULTIPLE ENVIRONMENTAL ALLERGIES: Chronic | ICD-10-CM

## 2019-01-16 DIAGNOSIS — M41.26 OTHER IDIOPATHIC SCOLIOSIS, LUMBAR REGION: ICD-10-CM

## 2019-01-16 DIAGNOSIS — K21.9 GASTROESOPHAGEAL REFLUX DISEASE WITHOUT ESOPHAGITIS: Chronic | ICD-10-CM

## 2019-01-16 DIAGNOSIS — E55.9 VITAMIN D DEFICIENCY: Chronic | ICD-10-CM

## 2019-01-16 DIAGNOSIS — M54.16 LUMBAR BACK PAIN WITH RADICULOPATHY AFFECTING RIGHT LOWER EXTREMITY: ICD-10-CM

## 2019-01-16 DIAGNOSIS — E03.8 SUBCLINICAL HYPOTHYROIDISM: Chronic | ICD-10-CM

## 2019-01-16 DIAGNOSIS — M51.26 LUMBAR DISC HERNIATION: ICD-10-CM

## 2019-01-16 DIAGNOSIS — Z79.890 POSTMENOPAUSAL HORMONE REPLACEMENT THERAPY: Chronic | ICD-10-CM

## 2019-01-16 PROCEDURE — 99214 OFFICE O/P EST MOD 30 MIN: CPT | Performed by: INTERNAL MEDICINE

## 2019-01-16 RX ORDER — ATORVASTATIN CALCIUM 80 MG/1
80 TABLET, FILM COATED ORAL
Qty: 30 TABLET | Refills: 6 | Status: SHIPPED | OUTPATIENT
Start: 2019-01-16 | End: 2020-07-06

## 2019-01-16 NOTE — PROGRESS NOTES
01/16/2019    Patient Information  Ashley Mata                                                                                          [id number] [facility] [address]      1945  [unfilled]  [phone] (work)    Chief Complaint:     Follow-up hyperlipidemia, hypertension, esophageal reflux, environmental allergies/allergic rhinitis, subclinical hypothyroidism, vitamin D deficiency, lower back pain with right lumbar radiculopathy due to lumbar disc herniation at L4-L5 and lumbar scoliosis.  No new acute complaints.    History of Present Illness:    Patient with a history of medical problems as outlined in chief complaint presents today for a follow-up with lab prior in order to monitor her chronic medical issues.  He is also here to follow-up on her chronic lower back pain and this will be described in detail below.  Past medical history reviewed and updated where necessary including health maintenance parameters.  This reveals she is up-to-date or else accounted for.    History regarding lower back pain with right lumbar radiculopathy and herniated disc:    01/16/2019--patient seen in follow-up by Dr. Shukla.  She has seen the neurosurgeon who recommended conservative therapy with physical therapy.  Patient reports that has been extremely helpful.  Currently has no significant pain.    08/07/2018--patient seen in follow-up and reports her pain is much better after taking prednisone.  She is now down to half a pill per day and is almost off of it.  I reviewed the results of the MRI of lumbar spine and it appears that the L4-L5 level is what is causing the problem and it correlates highly with her symptoms.  I am concerned that her symptoms may very well return given the level of pathology there and therefore I have recommended that she be evaluated by the neurosurgeon to become established in case she has any future problems.    08/02/2018--MRI of the lumbar spine reveals  dextroscoliotic curvature with apex at L1-L2 and a focal level scoliotic curvature at L3-L4.  T11-T12, T12-L1, L1-L2 reveals no significant disease.  At L2-L3 and at L3-L4 there is no canal or foraminal narrowing.  At L4-L5 there is rotational anterolisthesis of L4 with respect to L5 with 5-6 mm anterior listhesis of the right side and only a 2 mm to 3 mm anterolisthesis of the left side.  Disc material is extending into the inferior right foramen and herniated along the right foraminal and fell right lateral inferior body were moderately narrows the lateral aspect of the right foramen impresses on the anterior inferior medial aspect of the right L4 nerve root.  Furthermore there is prominent synovial thickening and probable development of a synovial cyst.  The combination of which severely narrows the right lateral recess wart presses on the posterior margin of the right L5 nerve root.  L5-S1 is normal.    07/09/2018--patient presents with a 4-5 day history of pain involving her right lower extremity.  It started on the right side of her back and then radiates down to at least the level of the calf posteriorly laterally.  Occasionally will go out onto the foot and her foot does feel numb.  No known injury.  Exam is not revealing.  Prednisone 50 mg by mouth daily ×5 days, taper and discontinue.  X-ray lumbar spine.  The patient continues to have symptoms then we will need to proceed with MRI.    Review of Systems   Constitution: Negative.   HENT: Negative.    Eyes: Negative.    Cardiovascular: Negative.    Respiratory: Negative.    Endocrine: Negative.    Hematologic/Lymphatic: Negative.    Skin: Negative.    Musculoskeletal: Positive for back pain.   Gastrointestinal: Negative.    Genitourinary: Negative.    Neurological: Negative.    Psychiatric/Behavioral: Negative.    Allergic/Immunologic: Negative.        Active Problems:    Patient Active Problem List   Diagnosis   • Allergic rhinitis   • Benign essential  hypertension   • Diverticulosis of colon   • Gastroesophageal reflux disease without esophagitis   • Hyperlipidemia   • Multiple environmental allergies   • Primary osteoarthritis of both wrists   • Primary osteoarthritis of both hands   • Postmenopausal hormone replacement therapy   • Subclinical hypothyroidism   • Vitamin D deficiency   • Therapeutic drug monitoring   • Alopecia   • Lumbar back pain with radiculopathy affecting right lower extremity   • Lumbar scoliosis   • Lumbar disc herniation, L4-L5         Past Medical History:   Diagnosis Date   • Allergic rhinitis 1/30/2015 11/13/2015--patient was evaluated by ENT and was started on generic Flonase and patient reports this has improved her symptoms quite a bit.   01/30/2015--allergy testing revealed positive reactivity to cat, dust mite, cockroach, mold spores, and grass pollen. Environmental control measures.   • Alopecia 4/12/2017    Evaluated and treated by the dermatologist.   • Benign essential hypertension 4/18/2016   • Diverticulosis of colon 3/17/2009    03/10/2017--colonoscopy revealed many small and large mouth diverticula in the sigmoid colon and descending colon.  Nonthrombosed external hemorrhoids and internal hemorrhoids noted.  Otherwise, normal colonoscopy.  03/17/2009--colonoscopy revealed external hemorrhoids, torturous colon with a sigmoid stricture, sigmoid diverticulosis.   • Gastroesophageal reflux disease without esophagitis 3/17/2009    06/09/2015--EGD revealed Z line irregular, 35 cm from incisors. Biopsied. Small hiatal hernia. Gastritis. Biopsied. Bilious gastric fluid. Fluid aspiration performed. Normal duodenal bulb and second part of duodenum. Biopsied. Pathology revealed the antral biopsy revealed small intestinal mucosa with no pathologic diagnosis. Good preservation of villous architecture. Duodenum biopsy revealed largely antral type gastric mucosa with mild patchy superficial chronic gastritis. Gastroesophageal junction  revealed squamous and glandular mucosa with mild chronic inflammation. Negative for intestinal metaplasia or dysplasia. There appeared to be mislabeling of the antral biopsies and duodenal biopsies.   04/17/2012--EGD revealed irregular Z line, hiatal hernia, gastritis, duodenitis.   03/17/2009--EGD revealed grade B. reflux esophagitis, hiatal hernia, gastritis, a few gastric polyps, duodenitis.   • History of Hyperplastic polyps of stomach 06/09/2015 06/09/2015--EGD revealed Z line irregular, 35 cm from incisors. Biopsied. Small hiatal hernia. Gastritis. Biopsied. Bilious gastric fluid. Fluid aspiration performed. Normal duodenal bulb and second part of duodenum. Biopsied. Pathology revealed the antral biopsy revealed small intestinal mucosa with no pathologic diagnosis. Good preservation of villous architecture. Duodenum biopsy revealed large   • Hyperlipidemia 7/17/2012 07/17/2012--treatment for hyperlipidemia begun.   • Multiple environmental allergies 1/30/2015 01/30/2015--allergy testing revealed positive reactivity to cat, dust mite, cockroach, mold spores, and grass pollen. Environmental control measures.   • Postmenopausal hormone replacement therapy 4/18/2016   • Primary osteoarthritis of both hands 12/30/2013 05/12/2014--patient seen in followup and reports that the Vimovo has helped. Uric acid levels are normal at 4.9. C. reactive protein mildly elevated at 2.3. X-rays of the hands reveals radiocarpal, first carpometacarpal, first metacarpophalangeal, and interphalangeal joint degeneration. This process is symmetric. The interphalangeal joint of the left is affected to the greatest degree. There is right sided scapholunate dissociation as well as deformity of the scaphoid suggesting prior traumatic injury with healing. Soft tissues are satisfactory. Overall appearance is most consistent with osteoarthritis.   05/05/2014--patient presents and reports that she is having worsening right wrist  pain primarily at the base of the thumb. No new injury. Bilateral x-rays of the wrists and hands ordered. Uric acid level ordered as well as a CRP. Vimovo 500/20 one by mouth twice a day. Follow up in one week.   03/18/2014--patient reports that her wrist symptoms have improved.   12/30/2013--patient reports a several mon   • Primary osteoarthritis of both wrists 12/30/2013 05/12/2014--patient seen in followup and reports that the Vimovo has helped. Uric acid levels are normal at 4.9. C. reactive protein mildly elevated at 2.3. X-rays of the hands reveals radiocarpal, first carpometacarpal, first metacarpophalangeal, and interphalangeal joint degeneration. This process is symmetric. The interphalangeal joint of the left is affected to the greatest degree. There is right sided scapholunate dissociation as well as deformity of the scaphoid suggesting prior traumatic injury with healing. Soft tissues are satisfactory. Overall appearance is most consistent with osteoarthritis.   05/05/2014--patient presents and reports that she is having worsening right wrist pain primarily at the base of the thumb. No new injury. Bilateral x-rays of the wrists and hands ordered. Uric acid level ordered as well as a CRP. Vimovo 500/20 one by mouth twice a day. Follow up in one week.   03/18/2014--patient reports that her wrist symptoms have improved.   12/30/2013--patient reports a several mon   • Subclinical hypothyroidism 10/3/2014    09/30/2016--thyroid ultrasound reveals a tiny 2 mm colloid cyst within the left gland.  No mass demonstrated within the right thyroid or isthmus.  Overall thyroid echotexture is normal.  Normal to slightly increased global vascularity.  07/27/2016--routine follow-up.  TSH elevated at 4.45.  Thyroid ultrasound ordered after patient recovers from rotator cuff surgery.  She will follow-up after the results are known.  07/09/2015--TSH mildly elevated at 4.34.  Free T4 and free T3 are normal.  Observation.   Subclinical hypothyroidism.  10/09/2014--thyroid antibodies are negative.  Repeat thyroid function tests are normal.  10/03/2014--TSH elevated at 6.17.  Repeat thyroid function tests including T3 and thyroid antibodies ordered.  If these come back abnormal we will perform an ultrasound of the thyroid.  07/09/2015--TSH mildly elevated at 4.34. Free T4 and free T3 are normal. Observation. Subclinical hypothyroidism.   10/09/2014--thyroid antibodies are negative. Repeat thyroid function tests are reshma   • Vitamin D deficiency 4/18/2016         Past Surgical History:   Procedure Laterality Date   • COLONOSCOPY  03/17/2009 03/17/2009--colonoscopy revealed external hemorrhoids, torturous colon with a sigmoid stricture, sigmoid diverticulosis.   • COLONOSCOPY N/A 3/10/2017    03/10/2017--colonoscopy revealed many small and large mouth diverticula in the sigmoid colon and descending colon.  Nonthrombosed external hemorrhoids and internal hemorrhoids noted.  Otherwise, normal colonoscopy.   • ERCP WITH SPHINCTEROTOMY/PAPILLOTOMY  08/13/2014 08/13/2014--ERCP, endoscopic sphincterotomy and removal of common bile duct stones. 08/12/2014--laparoscopic cholecystectomy. This was complicated by retained common bile duct stones 2.   • ESOPHAGOSCOPY / EGD  06/09/2015 06/09/2015--EGD revealed Z line irregular, 35 cm from incisors. Biopsied. Small hiatal hernia. Gastritis. Biopsied. Bilious gastric fluid. Fluid aspiration performed. Normal duodenal bulb and second part of duodenum. Biopsied. Pathology revealed the antral biopsy revealed small intestinal mucosa with no pathologic diagnosis. Good preservation of villous architecture. Duodenum biopsy revealed large   • LAPAROSCOPIC CHOLECYSTECTOMY  08/12/2014 08/13/2014--ERCP, endoscopic sphincterotomy and removal of common bile duct stones. 08/12/2014--laparoscopic cholecystectomy. This was complicated by retained common bile duct stones 2.   • PARTIAL HYSTERECTOMY  1978     Partial hysterectomy 1978.   • ROTATOR CUFF REPAIR Left 07/10/2014    07/10/2014--left rotator cuff repair. 03/18/2014--patient seen in followup and reports that her range of motion has improved and her pain is not debilitating. She feels that she is making progress with physical therapy. Surgery scheduled 07/10/2014. 01/10/2014--patient was seen in evaluation by the orthopedist and he recommended conservative treatment consisting of physical therapy/rehabilitation.   • ROTATOR CUFF REPAIR Right 08/03/2016 08/03/2016--arthroscopic right rotator cuff repair.  Debridement of degenerative anterior superior labral tear.         No Known Allergies        Current Outpatient Medications:   •  atorvastatin (LIPITOR) 80 MG tablet, TAKE ONE TABLET BY MOUTH EVERY NIGHT AT BEDTIME, Disp: 30 tablet, Rfl: 8  •  Calcium Carbonate (CALCIUM 600 PO), Take 2 tablets by mouth daily., Disp: , Rfl:   •  Coenzyme Q10 (COQ10) 400 MG capsule, Take 1 capsule by mouth daily. Take with a meal., Disp: , Rfl:   •  estrogens, conjugated, (PREMARIN) 0.625 MG tablet, Take 1 tablet by mouth Daily. Take daily for 21 days then do not take for 7 days., Disp: 90 tablet, Rfl: 3  •  fexofenadine-pseudoephedrine (ALLEGRA-D 24) 180-240 MG per 24 hr tablet, Take 1 tablet by mouth Daily., Disp: 30 tablet, Rfl: 5  •  finasteride (PROSCAR) 5 MG tablet, Take 1 tablet by mouth Daily., Disp: , Rfl:   •  ketoconazole (NIZORAL) 2 % shampoo, , Disp: , Rfl:   •  lisinopril-hydrochlorothiazide (PRINZIDE,ZESTORETIC) 10-12.5 MG per tablet, Take 1 by mouth every morning for high blood pressure, Disp: 90 tablet, Rfl: 3  •  multivitamin (THERAGRAN) tablet tablet, Take 1 tablet by mouth daily., Disp: , Rfl:   •  naproxen (NAPROSYN) 500 MG tablet, Take 1 tablet by mouth 2 (Two) Times a Day., Disp: 60 tablet, Rfl: 4  •  omeprazole OTC (PRILOSEC OTC) 20 MG EC tablet, Take 1 tablet by mouth Daily., Disp: , Rfl:       Family History   Problem Relation Age of Onset   • Other  "Father         Hodgkin's Disease   • Cancer Father         Lung Cancer   • Breast cancer Daughter 52   • Alzheimer's disease Mother          Social History     Socioeconomic History   • Marital status:      Spouse name: Not on file   • Number of children: 2   • Years of education: BA   • Highest education level: Associate degree: occupational, technical, or vocational program   Social Needs   • Financial resource strain: Not hard at all   • Food insecurity - worry: Never true   • Food insecurity - inability: Never true   • Transportation needs - medical: No   • Transportation needs - non-medical: No   Occupational History   • Occupation:  - [employer]   Tobacco Use   • Smoking status: Never Smoker   • Smokeless tobacco: Never Used   Substance and Sexual Activity   • Alcohol use: Yes     Frequency: 2-3 times a week     Drinks per session: 1 or 2     Comment: Socially   • Drug use: No   • Sexual activity: Yes     Partners: Male   Other Topics Concern   • Not on file   Social History Narrative    LIVES WITH SPOUSE         Vitals:    01/16/19 0843   BP: 120/82   Pulse: 104   SpO2: 98%   Weight: 61.2 kg (135 lb)   Height: 163.8 cm (64.49\")          Physical Exam:    General: Alert and oriented x 3.  No acute distress.  Normal affect.  HEENT: Pupils equal, round, reactive to light; extraocular movements intact; sclerae nonicteric; pharynx, ear canals and TMs normal.  Neck: Without JVD, thyromegaly, bruit, or adenopathy.  Lungs: Clear to auscultation in all fields.  Heart: Regular rate and rhythm without murmur, rub, gallop, or click.  Abdomen: Soft, nontender, without hepatosplenomegaly or hernia.  Bowel sounds normal.  : Deferred.  Rectal: Deferred.  Extremities: Without clubbing, cyanosis, edema, or pulse deficit.  Neurologic: Intact without focal deficit.  Normal station and gait observed during ingress and egress from the examination room.  Skin: Without significant lesion.  Musculoskeletal: " Unremarkable.    Lab/other results:    NMR is perfect other than triglycerides slightly elevated at 220.  CMP normal.  CBC is normal.  Thyroid function tests are normal.  CPK normal.    Assessment/Plan:     Diagnosis Plan   1. Hyperlipidemia, unspecified hyperlipidemia type     2. Benign essential hypertension     3. Gastroesophageal reflux disease without esophagitis     4. Multiple environmental allergies     5. Allergic rhinitis     6. Subclinical hypothyroidism     7. Vitamin D deficiency     8. Lumbar back pain with radiculopathy affecting right lower extremity     9. Lumbar scoliosis     10. Lumbar disc herniation, L4-L5     11. Therapeutic drug monitoring       Patient has hyperlipidemia that is under excellent control.  Her blood pressure is under good control.  Reflux symptoms controlled with omeprazole.  Her environmental allergies/allergic rhinitis controlled on the current regimen.  The diagnosis of subclinical hypothyroidism is suspect that we will continue to monitor.  Vitamin D therapeutic.  Her lower back pain is better after physical therapy despite the fact she had a ruptured lumbar disc at L4-L5. Patient is asking what kind of exercises that she can be doing and whether or not she should be working out.  I think the best thing would be to ask the physical therapist regarding this.  They should be able to get some good advice.    Plan is as follows: No change in current medical regimen.  Also recommended the new shingles vaccine as well as hepatitis A vaccine and I explained to patient she would have to get this at the local drug store because of Medicare.  Patient will follow-up after 07/09/2019 with lab prior and this will be her subsequent Medicare wellness visit.    Addendum: At the end of visit patient reports that her Premarin prescription was incorrect.  She takes her Premarin daily because she's had a hysterectomy.  The incorrect prescription indicates she just takes a 21 days a month.  I  have corrected this and sent the new prescription to her pharmacy.    Procedures

## 2019-01-16 NOTE — ADDENDUM NOTE
Addended by: PRASANTH MEI on: 1/16/2019 09:15 AM     Modules accepted: Orders     multifactorial-effusion, anemia, asthma

## 2019-03-18 ENCOUNTER — OFFICE VISIT (OUTPATIENT)
Dept: INTERNAL MEDICINE | Facility: CLINIC | Age: 74
End: 2019-03-18

## 2019-03-18 ENCOUNTER — TELEPHONE (OUTPATIENT)
Dept: INTERNAL MEDICINE | Facility: CLINIC | Age: 74
End: 2019-03-18

## 2019-03-18 ENCOUNTER — HOSPITAL ENCOUNTER (OUTPATIENT)
Dept: GENERAL RADIOLOGY | Facility: HOSPITAL | Age: 74
Discharge: HOME | End: 2019-03-18
Admitting: INTERNAL MEDICINE

## 2019-03-18 VITALS
OXYGEN SATURATION: 100 % | DIASTOLIC BLOOD PRESSURE: 68 MMHG | BODY MASS INDEX: 23.22 KG/M2 | SYSTOLIC BLOOD PRESSURE: 116 MMHG | TEMPERATURE: 97.6 F | HEART RATE: 98 BPM | HEIGHT: 64 IN | WEIGHT: 136 LBS

## 2019-03-18 DIAGNOSIS — J20.9 ACUTE BRONCHITIS WITH BRONCHOSPASM: ICD-10-CM

## 2019-03-18 DIAGNOSIS — Z91.09 MULTIPLE ENVIRONMENTAL ALLERGIES: Chronic | ICD-10-CM

## 2019-03-18 DIAGNOSIS — R05.3 PERSISTENT COUGH: Primary | ICD-10-CM

## 2019-03-18 DIAGNOSIS — J30.1 CHRONIC SEASONAL ALLERGIC RHINITIS DUE TO POLLEN: Chronic | ICD-10-CM

## 2019-03-18 PROCEDURE — 71046 X-RAY EXAM CHEST 2 VIEWS: CPT

## 2019-03-18 PROCEDURE — 99214 OFFICE O/P EST MOD 30 MIN: CPT | Performed by: INTERNAL MEDICINE

## 2019-03-18 RX ORDER — PREDNISONE 10 MG/1
TABLET ORAL
Qty: 46 TABLET | Refills: 0 | Status: SHIPPED | OUTPATIENT
Start: 2019-03-18 | End: 2019-07-23

## 2019-03-18 NOTE — PROGRESS NOTES
03/18/2019    Patient Information  Ashley Mata                                                                                          [id number] [facility] [address]      1945  [unfilled]  [phone] (work)    Chief Complaint:     Complaining of persistent cough.    History of Present Illness:    Patient with a history of environmental allergies/allergic rhinitis, hypertension, hyperlipidemia, subclinical hypothyroidism, chronic lower back pain.  She presents today with complaints of a persistent cough following sinusitis symptoms that were treated in the urgent care as follows:    March 18, 2019--patient presents with a greater than 6-week history of head congestion, posterior nasal drainage, and cough that is only occasionally productive.  She went to the urgent care March 1 and received Ceftin ear 300 mg p.o. twice daily times 10 days.  Patient completed this but has a persistent cough.  The cough remains nonproductive.  Her ENT symptoms are better but she continues to have nasal congestion and drainage.  Patient does have a history of environmental allergies and she gets a runny nose and watery eyes which seems to initiate the cough.  On exam her lungs are mostly clear although there is a few scattered rhonchi and occasional slight wheeze.  ENT exam is normal other than boggy nasal mucosa.  I do think that her environmental allergies are playing a role and I do not think she needs additional antibiotic at this time.  Given the duration of her cough, I do think chest x-ray is indicated.  We will treat with prednisone 50 mg p.o. daily times 5 days, taper and discontinue.  Patient instructed to contact me if her symptoms persist and certainly if they worsen.     Past medical history reviewed and updated were necessary including health maintenance parameters.  This reveals she is up-to-date or else accounted for.    Review of Systems   Constitution: Negative.   HENT:  Negative.    Eyes: Negative.    Cardiovascular: Negative.  Negative for dyspnea on exertion.   Respiratory: Positive for cough and sputum production. Negative for shortness of breath.    Endocrine: Negative.    Hematologic/Lymphatic: Negative.    Skin: Negative.    Musculoskeletal: Positive for arthritis and back pain.   Gastrointestinal: Negative.    Genitourinary: Negative.    Neurological: Negative.    Psychiatric/Behavioral: Negative.    Allergic/Immunologic: Positive for environmental allergies.       Active Problems:    Patient Active Problem List   Diagnosis   • Allergic rhinitis   • Benign essential hypertension   • Diverticulosis of colon   • Gastroesophageal reflux disease without esophagitis   • Hyperlipidemia   • Multiple environmental allergies   • Primary osteoarthritis of both wrists   • Primary osteoarthritis of both hands   • Postmenopausal hormone replacement therapy   • Subclinical hypothyroidism   • Vitamin D deficiency   • Therapeutic drug monitoring   • Alopecia   • Lumbar back pain with radiculopathy affecting right lower extremity   • Lumbar scoliosis   • Lumbar disc herniation, L4-L5   • Persistent cough   • Acute bronchitis with bronchospasm         Past Medical History:   Diagnosis Date   • Allergic rhinitis 1/30/2015 11/13/2015--patient was evaluated by ENT and was started on generic Flonase and patient reports this has improved her symptoms quite a bit.   01/30/2015--allergy testing revealed positive reactivity to cat, dust mite, cockroach, mold spores, and grass pollen. Environmental control measures.   • Alopecia 4/12/2017    Evaluated and treated by the dermatologist.   • Benign essential hypertension 4/18/2016   • Diverticulosis of colon 3/17/2009    03/10/2017--colonoscopy revealed many small and large mouth diverticula in the sigmoid colon and descending colon.  Nonthrombosed external hemorrhoids and internal hemorrhoids noted.  Otherwise, normal colonoscopy.   03/17/2009--colonoscopy revealed external hemorrhoids, torturous colon with a sigmoid stricture, sigmoid diverticulosis.   • Gastroesophageal reflux disease without esophagitis 3/17/2009    06/09/2015--EGD revealed Z line irregular, 35 cm from incisors. Biopsied. Small hiatal hernia. Gastritis. Biopsied. Bilious gastric fluid. Fluid aspiration performed. Normal duodenal bulb and second part of duodenum. Biopsied. Pathology revealed the antral biopsy revealed small intestinal mucosa with no pathologic diagnosis. Good preservation of villous architecture. Duodenum biopsy revealed largely antral type gastric mucosa with mild patchy superficial chronic gastritis. Gastroesophageal junction revealed squamous and glandular mucosa with mild chronic inflammation. Negative for intestinal metaplasia or dysplasia. There appeared to be mislabeling of the antral biopsies and duodenal biopsies.   04/17/2012--EGD revealed irregular Z line, hiatal hernia, gastritis, duodenitis.   03/17/2009--EGD revealed grade B. reflux esophagitis, hiatal hernia, gastritis, a few gastric polyps, duodenitis.   • History of Hyperplastic polyps of stomach 06/09/2015 06/09/2015--EGD revealed Z line irregular, 35 cm from incisors. Biopsied. Small hiatal hernia. Gastritis. Biopsied. Bilious gastric fluid. Fluid aspiration performed. Normal duodenal bulb and second part of duodenum. Biopsied. Pathology revealed the antral biopsy revealed small intestinal mucosa with no pathologic diagnosis. Good preservation of villous architecture. Duodenum biopsy revealed large   • Hyperlipidemia 7/17/2012 07/17/2012--treatment for hyperlipidemia begun.   • Multiple environmental allergies 1/30/2015 01/30/2015--allergy testing revealed positive reactivity to cat, dust mite, cockroach, mold spores, and grass pollen. Environmental control measures.   • Postmenopausal hormone replacement therapy 4/18/2016   • Primary osteoarthritis of both hands 12/30/2013     05/12/2014--patient seen in followup and reports that the Vimovo has helped. Uric acid levels are normal at 4.9. C. reactive protein mildly elevated at 2.3. X-rays of the hands reveals radiocarpal, first carpometacarpal, first metacarpophalangeal, and interphalangeal joint degeneration. This process is symmetric. The interphalangeal joint of the left is affected to the greatest degree. There is right sided scapholunate dissociation as well as deformity of the scaphoid suggesting prior traumatic injury with healing. Soft tissues are satisfactory. Overall appearance is most consistent with osteoarthritis.   05/05/2014--patient presents and reports that she is having worsening right wrist pain primarily at the base of the thumb. No new injury. Bilateral x-rays of the wrists and hands ordered. Uric acid level ordered as well as a CRP. Vimovo 500/20 one by mouth twice a day. Follow up in one week.   03/18/2014--patient reports that her wrist symptoms have improved.   12/30/2013--patient reports a several mon   • Primary osteoarthritis of both wrists 12/30/2013 05/12/2014--patient seen in followup and reports that the Vimovo has helped. Uric acid levels are normal at 4.9. C. reactive protein mildly elevated at 2.3. X-rays of the hands reveals radiocarpal, first carpometacarpal, first metacarpophalangeal, and interphalangeal joint degeneration. This process is symmetric. The interphalangeal joint of the left is affected to the greatest degree. There is right sided scapholunate dissociation as well as deformity of the scaphoid suggesting prior traumatic injury with healing. Soft tissues are satisfactory. Overall appearance is most consistent with osteoarthritis.   05/05/2014--patient presents and reports that she is having worsening right wrist pain primarily at the base of the thumb. No new injury. Bilateral x-rays of the wrists and hands ordered. Uric acid level ordered as well as a CRP. Vimovo 500/20 one by mouth  twice a day. Follow up in one week.   03/18/2014--patient reports that her wrist symptoms have improved.   12/30/2013--patient reports a several mon   • Subclinical hypothyroidism 10/3/2014    09/30/2016--thyroid ultrasound reveals a tiny 2 mm colloid cyst within the left gland.  No mass demonstrated within the right thyroid or isthmus.  Overall thyroid echotexture is normal.  Normal to slightly increased global vascularity.  07/27/2016--routine follow-up.  TSH elevated at 4.45.  Thyroid ultrasound ordered after patient recovers from rotator cuff surgery.  She will follow-up after the results are known.  07/09/2015--TSH mildly elevated at 4.34.  Free T4 and free T3 are normal.  Observation.  Subclinical hypothyroidism.  10/09/2014--thyroid antibodies are negative.  Repeat thyroid function tests are normal.  10/03/2014--TSH elevated at 6.17.  Repeat thyroid function tests including T3 and thyroid antibodies ordered.  If these come back abnormal we will perform an ultrasound of the thyroid.  07/09/2015--TSH mildly elevated at 4.34. Free T4 and free T3 are normal. Observation. Subclinical hypothyroidism.   10/09/2014--thyroid antibodies are negative. Repeat thyroid function tests are reshma   • Vitamin D deficiency 4/18/2016         Past Surgical History:   Procedure Laterality Date   • COLONOSCOPY  03/17/2009 03/17/2009--colonoscopy revealed external hemorrhoids, torturous colon with a sigmoid stricture, sigmoid diverticulosis.   • COLONOSCOPY N/A 3/10/2017    03/10/2017--colonoscopy revealed many small and large mouth diverticula in the sigmoid colon and descending colon.  Nonthrombosed external hemorrhoids and internal hemorrhoids noted.  Otherwise, normal colonoscopy.   • ERCP WITH SPHINCTEROTOMY/PAPILLOTOMY  08/13/2014 08/13/2014--ERCP, endoscopic sphincterotomy and removal of common bile duct stones. 08/12/2014--laparoscopic cholecystectomy. This was complicated by retained common bile duct stones 2.   •  ESOPHAGOSCOPY / EGD  06/09/2015 06/09/2015--EGD revealed Z line irregular, 35 cm from incisors. Biopsied. Small hiatal hernia. Gastritis. Biopsied. Bilious gastric fluid. Fluid aspiration performed. Normal duodenal bulb and second part of duodenum. Biopsied. Pathology revealed the antral biopsy revealed small intestinal mucosa with no pathologic diagnosis. Good preservation of villous architecture. Duodenum biopsy revealed large   • LAPAROSCOPIC CHOLECYSTECTOMY  08/12/2014 08/13/2014--ERCP, endoscopic sphincterotomy and removal of common bile duct stones. 08/12/2014--laparoscopic cholecystectomy. This was complicated by retained common bile duct stones 2.   • PARTIAL HYSTERECTOMY  1978    Partial hysterectomy 1978.   • ROTATOR CUFF REPAIR Left 07/10/2014    07/10/2014--left rotator cuff repair. 03/18/2014--patient seen in followup and reports that her range of motion has improved and her pain is not debilitating. She feels that she is making progress with physical therapy. Surgery scheduled 07/10/2014. 01/10/2014--patient was seen in evaluation by the orthopedist and he recommended conservative treatment consisting of physical therapy/rehabilitation.   • ROTATOR CUFF REPAIR Right 08/03/2016 08/03/2016--arthroscopic right rotator cuff repair.  Debridement of degenerative anterior superior labral tear.         No Known Allergies        Current Outpatient Medications:   •  atorvastatin (LIPITOR) 80 MG tablet, Take 1 tablet by mouth every night at bedtime., Disp: 30 tablet, Rfl: 6  •  Calcium Carbonate (CALCIUM 600 PO), Take 2 tablets by mouth daily., Disp: , Rfl:   •  Coenzyme Q10 (COQ10) 400 MG capsule, Take 1 capsule by mouth daily. Take with a meal., Disp: , Rfl:   •  estrogens, conjugated, (PREMARIN) 0.625 MG tablet, Take 1 by mouth daily as directed., Disp: 90 tablet, Rfl: 3  •  fexofenadine-pseudoephedrine (ALLEGRA-D 24) 180-240 MG per 24 hr tablet, Take 1 tablet by mouth Daily., Disp: 30 tablet, Rfl:  "5  •  lisinopril-hydrochlorothiazide (PRINZIDE,ZESTORETIC) 10-12.5 MG per tablet, Take 1 by mouth every morning for high blood pressure, Disp: 90 tablet, Rfl: 3  •  multivitamin (THERAGRAN) tablet tablet, Take 1 tablet by mouth daily., Disp: , Rfl:   •  naproxen (NAPROSYN) 500 MG tablet, Take 1 tablet by mouth 2 (Two) Times a Day., Disp: 60 tablet, Rfl: 4  •  omeprazole OTC (PRILOSEC OTC) 20 MG EC tablet, Take 1 tablet by mouth Daily., Disp: , Rfl:   •  ketoconazole (NIZORAL) 2 % shampoo, , Disp: , Rfl:       Family History   Problem Relation Age of Onset   • Other Father         Hodgkin's Disease   • Cancer Father         Lung Cancer   • Breast cancer Daughter 52   • Alzheimer's disease Mother          Social History     Socioeconomic History   • Marital status:      Spouse name: Not on file   • Number of children: 2   • Years of education: BA   • Highest education level: Associate degree: occupational, technical, or vocational program   Social Needs   • Financial resource strain: Not hard at all   • Food insecurity - worry: Never true   • Food insecurity - inability: Never true   • Transportation needs - medical: No   • Transportation needs - non-medical: No   Occupational History   • Occupation:  - [employer]   Tobacco Use   • Smoking status: Never Smoker   • Smokeless tobacco: Never Used   Substance and Sexual Activity   • Alcohol use: Yes     Frequency: 2-3 times a week     Drinks per session: 1 or 2     Comment: Socially   • Drug use: No   • Sexual activity: Yes     Partners: Male   Other Topics Concern   • Not on file   Social History Narrative    LIVES WITH SPOUSE         Vitals:    03/18/19 0902   BP: 116/68   BP Location: Right arm   Pulse: 98   Temp: 97.6 °F (36.4 °C)   TempSrc: Oral   SpO2: 100%   Weight: 61.7 kg (136 lb)   Height: 163.8 cm (64.49\")          Physical Exam:    General: Alert and oriented x 3.  No acute distress.  Normal affect.  HEENT: Pupils equal, round, " reactive to light; extraocular movements intact; sclerae nonicteric; pharynx, ear canals and TMs normal.  Neck: Without JVD, thyromegaly, bruit, or adenopathy.  Lungs: Clear to auscultation in all fields, except for slight scattered rhonchi/wheeze.  Heart: Regular rate and rhythm without murmur, rub, gallop, or click.  Abdomen: Soft, nontender, without hepatosplenomegaly or hernia.  Bowel sounds normal.  : Deferred.  Rectal: Deferred.  Extremities: Without clubbing, cyanosis, edema, or pulse deficit.  Neurologic: Intact without focal deficit.  Normal station and gait observed during ingress and egress from the examination room.  Skin: Without significant lesion.  Musculoskeletal: Unremarkable.    Lab/other results:      Assessment/Plan:     Diagnosis Plan   1. Persistent cough     2. Acute bronchitis with bronchospasm     3. Multiple environmental allergies     4. Allergic rhinitis       Patient with environmental allergies/allergic rhinitis presents with a persistent cough which I do think allergies is playing a major role.  She was treated for sinus infection and no symptoms have improved although not totally resolved.  I do not think any further antibiotics are indicated at this time but instead we will treat the environmental allergy aggressively.  It has been at least 2 years since she has had a chest x-ray and given the persistent nature of the cough, I do think we should obtain a chest x-ray PA lateral to rule out any sort of pulmonary pathology radiographically.    Plan is as follows: Chest x-ray PA lateral ordered.  Patient will follow-up on the phone for the results and possible further instructions.  Prednisone 50 mg p.o. daily times 5 days, taper and discontinue.  Patient instructed to contact me if her symptoms persist.        Procedures

## 2019-03-18 NOTE — TELEPHONE ENCOUNTER
----- Message from Carloz Shukla MD sent at 3/18/2019 10:43 AM EDT -----  Call patient with normal results.  Inform patient her chest x-ray is normal.

## 2019-07-09 ENCOUNTER — APPOINTMENT (OUTPATIENT)
Dept: WOMENS IMAGING | Facility: HOSPITAL | Age: 74
End: 2019-07-09

## 2019-07-09 ENCOUNTER — PROCEDURE VISIT (OUTPATIENT)
Dept: OBSTETRICS AND GYNECOLOGY | Facility: CLINIC | Age: 74
End: 2019-07-09

## 2019-07-09 DIAGNOSIS — Z12.31 VISIT FOR SCREENING MAMMOGRAM: Primary | ICD-10-CM

## 2019-07-09 PROCEDURE — 77067 SCR MAMMO BI INCL CAD: CPT | Performed by: RADIOLOGY

## 2019-07-09 PROCEDURE — 77067 SCR MAMMO BI INCL CAD: CPT | Performed by: OBSTETRICS & GYNECOLOGY

## 2019-07-10 DIAGNOSIS — E55.9 VITAMIN D DEFICIENCY: Chronic | ICD-10-CM

## 2019-07-10 DIAGNOSIS — Z51.81 THERAPEUTIC DRUG MONITORING: ICD-10-CM

## 2019-07-10 DIAGNOSIS — E03.8 SUBCLINICAL HYPOTHYROIDISM: Chronic | ICD-10-CM

## 2019-07-10 DIAGNOSIS — E78.5 HYPERLIPIDEMIA, UNSPECIFIED HYPERLIPIDEMIA TYPE: Chronic | ICD-10-CM

## 2019-07-11 LAB
25(OH)D3+25(OH)D2 SERPL-MCNC: 69.3 NG/ML (ref 30–100)
ALBUMIN SERPL-MCNC: 4.3 G/DL (ref 3.5–5.2)
ALBUMIN/GLOB SERPL: 1.8 G/DL
ALP SERPL-CCNC: 72 U/L (ref 39–117)
ALT SERPL-CCNC: 19 U/L (ref 1–33)
AST SERPL-CCNC: 22 U/L (ref 1–32)
BILIRUB SERPL-MCNC: 0.3 MG/DL (ref 0.2–1.2)
BUN SERPL-MCNC: 21 MG/DL (ref 8–23)
BUN/CREAT SERPL: 31.3 (ref 7–25)
CALCIUM SERPL-MCNC: 9.7 MG/DL (ref 8.6–10.5)
CHLORIDE SERPL-SCNC: 101 MMOL/L (ref 98–107)
CHOLEST SERPL-MCNC: 166 MG/DL (ref 100–199)
CK SERPL-CCNC: 58 U/L (ref 20–180)
CO2 SERPL-SCNC: 29.9 MMOL/L (ref 22–29)
CREAT SERPL-MCNC: 0.67 MG/DL (ref 0.57–1)
ERYTHROCYTE [DISTWIDTH] IN BLOOD BY AUTOMATED COUNT: 12.7 % (ref 12.3–15.4)
GLOBULIN SER CALC-MCNC: 2.4 GM/DL
GLUCOSE SERPL-MCNC: 98 MG/DL (ref 65–99)
HCT VFR BLD AUTO: 39.9 % (ref 34–46.6)
HDL SERPL-SCNC: 51.4 UMOL/L
HDLC SERPL-MCNC: 78 MG/DL
HGB BLD-MCNC: 13.2 G/DL (ref 12–15.9)
LDL SERPL QN: 20.5 NM
LDL SERPL-SCNC: 843 NMOL/L
LDL SMALL SERPL-SCNC: 486 NMOL/L
LDLC SERPL CALC-MCNC: 56 MG/DL (ref 0–99)
MCH RBC QN AUTO: 30.8 PG (ref 26.6–33)
MCHC RBC AUTO-ENTMCNC: 33.1 G/DL (ref 31.5–35.7)
MCV RBC AUTO: 93 FL (ref 79–97)
PLATELET # BLD AUTO: 294 10*3/MM3 (ref 140–450)
POTASSIUM SERPL-SCNC: 4.1 MMOL/L (ref 3.5–5.2)
PROT SERPL-MCNC: 6.7 G/DL (ref 6–8.5)
RBC # BLD AUTO: 4.29 10*6/MM3 (ref 3.77–5.28)
SODIUM SERPL-SCNC: 141 MMOL/L (ref 136–145)
T3FREE SERPL-MCNC: 3.4 PG/ML (ref 2–4.4)
T4 FREE SERPL-MCNC: 1.25 NG/DL (ref 0.93–1.7)
TRIGL SERPL-MCNC: 161 MG/DL (ref 0–149)
TSH SERPL DL<=0.005 MIU/L-ACNC: 4.69 MIU/ML (ref 0.27–4.2)
WBC # BLD AUTO: 7.93 10*3/MM3 (ref 3.4–10.8)

## 2019-07-23 ENCOUNTER — OFFICE VISIT (OUTPATIENT)
Dept: INTERNAL MEDICINE | Facility: CLINIC | Age: 74
End: 2019-07-23

## 2019-07-23 VITALS
SYSTOLIC BLOOD PRESSURE: 122 MMHG | DIASTOLIC BLOOD PRESSURE: 64 MMHG | WEIGHT: 130.2 LBS | HEART RATE: 88 BPM | BODY MASS INDEX: 22.23 KG/M2 | HEIGHT: 64 IN | OXYGEN SATURATION: 98 %

## 2019-07-23 DIAGNOSIS — E03.8 SUBCLINICAL HYPOTHYROIDISM: Chronic | ICD-10-CM

## 2019-07-23 DIAGNOSIS — M51.26 LUMBAR DISC HERNIATION: ICD-10-CM

## 2019-07-23 DIAGNOSIS — M19.031 PRIMARY OSTEOARTHRITIS OF BOTH WRISTS: Chronic | ICD-10-CM

## 2019-07-23 DIAGNOSIS — M19.032 PRIMARY OSTEOARTHRITIS OF BOTH WRISTS: Chronic | ICD-10-CM

## 2019-07-23 DIAGNOSIS — M19.041 PRIMARY OSTEOARTHRITIS OF BOTH HANDS: Chronic | ICD-10-CM

## 2019-07-23 DIAGNOSIS — M19.042 PRIMARY OSTEOARTHRITIS OF BOTH HANDS: Chronic | ICD-10-CM

## 2019-07-23 DIAGNOSIS — M54.16 LUMBAR BACK PAIN WITH RADICULOPATHY AFFECTING RIGHT LOWER EXTREMITY: ICD-10-CM

## 2019-07-23 DIAGNOSIS — E55.9 VITAMIN D DEFICIENCY: Chronic | ICD-10-CM

## 2019-07-23 DIAGNOSIS — Z51.81 THERAPEUTIC DRUG MONITORING: ICD-10-CM

## 2019-07-23 DIAGNOSIS — J30.1 CHRONIC SEASONAL ALLERGIC RHINITIS DUE TO POLLEN: Chronic | ICD-10-CM

## 2019-07-23 DIAGNOSIS — Z00.00 MEDICARE ANNUAL WELLNESS VISIT, SUBSEQUENT: Primary | ICD-10-CM

## 2019-07-23 DIAGNOSIS — Z82.62 FAMILY HISTORY OF OSTEOPOROSIS IN MOTHER: ICD-10-CM

## 2019-07-23 DIAGNOSIS — I10 BENIGN ESSENTIAL HYPERTENSION: Chronic | ICD-10-CM

## 2019-07-23 DIAGNOSIS — Z91.09 MULTIPLE ENVIRONMENTAL ALLERGIES: Chronic | ICD-10-CM

## 2019-07-23 DIAGNOSIS — E78.5 HYPERLIPIDEMIA, UNSPECIFIED HYPERLIPIDEMIA TYPE: Chronic | ICD-10-CM

## 2019-07-23 DIAGNOSIS — N95.1 MENOPAUSAL STATE: ICD-10-CM

## 2019-07-23 DIAGNOSIS — K21.9 GASTROESOPHAGEAL REFLUX DISEASE WITHOUT ESOPHAGITIS: Chronic | ICD-10-CM

## 2019-07-23 DIAGNOSIS — M41.26 OTHER IDIOPATHIC SCOLIOSIS, LUMBAR REGION: Chronic | ICD-10-CM

## 2019-07-23 PROBLEM — J20.9 ACUTE BRONCHITIS WITH BRONCHOSPASM: Status: RESOLVED | Noted: 2019-03-18 | Resolved: 2019-07-23

## 2019-07-23 PROBLEM — R05.3 PERSISTENT COUGH: Status: RESOLVED | Noted: 2019-03-18 | Resolved: 2019-07-23

## 2019-07-23 PROCEDURE — G0439 PPPS, SUBSEQ VISIT: HCPCS | Performed by: INTERNAL MEDICINE

## 2019-07-23 PROCEDURE — 99214 OFFICE O/P EST MOD 30 MIN: CPT | Performed by: INTERNAL MEDICINE

## 2019-07-23 NOTE — PROGRESS NOTES
The ABCs of the Annual Wellness Visit  Subsequent Medicare Wellness Visit    No chief complaint on file.      Subjective   History of Present Illness:  Ashley Mata is a 73 y.o. female who presents for a Subsequent Medicare Wellness Visit.    HEALTH RISK ASSESSMENT    Recent Hospitalizations:  No hospitalization(s) within the last year.    Current Medical Providers:  Patient Care Team:  Carloz Shukla MD as PCP - General (Internal Medicine)  Carloz Shukla MD as PCP - Claims Attributed    Smoking Status:  Social History     Tobacco Use   Smoking Status Never Smoker   Smokeless Tobacco Never Used       Alcohol Consumption:  Social History     Substance and Sexual Activity   Alcohol Use Yes   • Frequency: 2-3 times a week   • Drinks per session: 1 or 2    Comment: Socially       Depression Screen:   PHQ-2/PHQ-9 Depression Screening 1/16/2019   Little interest or pleasure in doing things 0   Feeling down, depressed, or hopeless 0   Trouble falling or staying asleep, or sleeping too much -   Feeling tired or having little energy -   Poor appetite or overeating -   Feeling bad about yourself - or that you are a failure or have let yourself or your family down -   Trouble concentrating on things, such as reading the newspaper or watching television -   Moving or speaking so slowly that other people could have noticed. Or the opposite - being so fidgety or restless that you have been moving around a lot more than usual -   Thoughts that you would be better off dead, or of hurting yourself in some way -   Total Score 0   If you checked off any problems, how difficult have these problems made it for you to do your work, take care of things at home, or get along with other people? -       Fall Risk Screen:  STEADI Fall Risk Assessment has not been completed.    Health Habits and Functional and Cognitive Screening:  Functional & Cognitive Status 7/23/2019   Do you have difficulty preparing food and eating? No   Do  you have difficulty bathing yourself, getting dressed or grooming yourself? No   Do you have difficulty using the toilet? No   Do you have difficulty moving around from place to place? No   Do you have trouble with steps or getting out of a bed or a chair? No   Current Diet Limited Junk Food   Dental Exam Up to date   Eye Exam Up to date   Exercise (times per week) 2 times per week   Current Exercise Activities Include Yard Work   Do you need help using the phone?  No   Are you deaf or do you have serious difficulty hearing?  No   Do you need help with transportation? No   Do you need help shopping? No   Do you need help preparing meals?  No   Do you need help with housework?  No   Do you need help with laundry? No   Do you need help taking your medications? No   Do you need help managing money? No   Do you ever drive or ride in a car without wearing a seat belt? No   Have you felt unusual stress, anger or loneliness in the last month? No   Who do you live with? Spouse   If you need help, do you have trouble finding someone available to you? No   Have you been bothered in the last four weeks by sexual problems? No   Do you have difficulty concentrating, remembering or making decisions? No         Does the patient have evidence of cognitive impairment? No    Asprin use counseling:Does not need ASA (and currently is not on it)    Age-appropriate Screening Schedule:  Refer to the list below for future screening recommendations based on patient's age, sex and/or medical conditions. Orders for these recommended tests are listed in the plan section. The patient has been provided with a written plan.    Health Maintenance   Topic Date Due   • DXA SCAN  07/01/2020 (Originally 6/9/2018)   • INFLUENZA VACCINE  08/01/2019   • LIPID PANEL  07/10/2020   • MAMMOGRAM  07/09/2021   • COLONOSCOPY  03/10/2022   • TDAP/TD VACCINES (2 - Td) 04/12/2027   • PNEUMOCOCCAL VACCINES (65+ LOW/MEDIUM RISK)  Completed   • ZOSTER VACCINE   Discontinued          The following portions of the patient's history were reviewed and updated as appropriate: allergies, current medications, past family history, past medical history, past social history, past surgical history and problem list.    Outpatient Medications Prior to Visit   Medication Sig Dispense Refill   • atorvastatin (LIPITOR) 80 MG tablet Take 1 tablet by mouth every night at bedtime. 30 tablet 6   • Calcium Carbonate (CALCIUM 600 PO) Take 2 tablets by mouth daily.     • Coenzyme Q10 (COQ10) 400 MG capsule Take 1 capsule by mouth daily. Take with a meal.     • estrogens, conjugated, (PREMARIN) 0.625 MG tablet Take 1 by mouth daily as directed. 90 tablet 3   • fexofenadine-pseudoephedrine (ALLEGRA-D 24) 180-240 MG per 24 hr tablet Take 1 tablet by mouth Daily. 30 tablet 5   • lisinopril-hydrochlorothiazide (PRINZIDE,ZESTORETIC) 10-12.5 MG per tablet Take 1 by mouth every morning for high blood pressure 90 tablet 3   • multivitamin (THERAGRAN) tablet tablet Take 1 tablet by mouth daily.     • naproxen (NAPROSYN) 500 MG tablet Take 1 tablet by mouth 2 (Two) Times a Day. 60 tablet 4   • omeprazole OTC (PRILOSEC OTC) 20 MG EC tablet Take 1 tablet by mouth Daily.     • ketoconazole (NIZORAL) 2 % shampoo      • predniSONE (DELTASONE) 10 MG tablet 5 by mouth daily 5 days, then 4 daily 2 days, 3 daily 2 days, 2 daily 2 days, 1 daily 2 days, one half daily 2 days and then discontinue. 46 tablet 0     No facility-administered medications prior to visit.        Patient Active Problem List   Diagnosis   • Allergic rhinitis   • Benign essential hypertension   • Diverticulosis of colon   • Gastroesophageal reflux disease without esophagitis   • Hyperlipidemia   • Multiple environmental allergies   • Primary osteoarthritis of both wrists   • Primary osteoarthritis of both hands   • Postmenopausal hormone replacement therapy   • Subclinical hypothyroidism   • Vitamin D deficiency   • Therapeutic drug monitoring  "  • Alopecia   • Lumbar back pain with radiculopathy affecting right lower extremity   • Lumbar scoliosis   • Lumbar disc herniation, L4-L5       Advanced Care Planning:  Patient has an advance directive - a copy has not been provided. Have asked the patient to send this to us to add to record    Review of Systems   Musculoskeletal: Positive for arthralgias and back pain.   All other systems reviewed and are negative.      Compared to one year ago, the patient feels her physical health is the same.  Compared to one year ago, the patient feels her mental health is the same.    Reviewed chart for potential of high risk medication in the elderly: yes  Reviewed chart for potential of harmful drug interactions in the elderly:yes    Objective         Vitals:    07/23/19 1104   BP: 122/64   Pulse: 88   SpO2: 98%   Weight: 59.1 kg (130 lb 3.2 oz)   Height: 163.8 cm (64.49\")       Body mass index is 22.01 kg/m².  Discussed the patient's BMI with her. The BMI is in the acceptable range.    Physical Exam    General: Alert and oriented x 3.  No acute distress.  Normal affect.  HEENT: Pupils equal, round, reactive to light; extraocular movements intact; sclerae nonicteric; pharynx, ear canals and TMs normal.  Neck: Without JVD, thyromegaly, bruit, or adenopathy.  Lungs: Clear to auscultation in all fields.  Heart: Regular rate and rhythm without murmur, rub, gallop, or click.  Abdomen: Soft, nontender, without hepatosplenomegaly or hernia.  Bowel sounds normal.  : Deferred.  Rectal: Deferred.  Extremities: Without clubbing, cyanosis, edema, or pulse deficit.  Neurologic: Intact without focal deficit.  Normal station and gait observed during ingress and egress from the examination room.  Skin: Without significant lesion.  Musculoskeletal: Unremarkable.    Lab Results   Component Value Date    GLU 98 07/10/2019    CHLPL 166 07/10/2019    TRIG 161 (H) 07/10/2019        Assessment/Plan   Medicare Risks and Personalized Health " Plan  CMS Preventative Services Quick Reference  Breast Cancer/Mammogram Screening  Colon Cancer Screening  Diabetic Lab Screening   Osteoprorosis Risk    The above risks/problems have been discussed with the patient.  Pertinent information has been shared with the patient in the After Visit Summary.  Follow up plans and orders are seen below in the Assessment/Plan Section.    Diagnoses and all orders for this visit:    1. Medicare annual wellness visit, subsequent (Primary)    2. Hyperlipidemia, unspecified hyperlipidemia type  -     CK; Future  -     Comprehensive Metabolic Panel; Future  -     NMR LipoProfile; Future    3. Gastroesophageal reflux disease without esophagitis    4. Benign essential hypertension    5. Multiple environmental allergies    6. Allergic rhinitis    7. Subclinical hypothyroidism  -     TSH; Future  -     T4, Free; Future  -     T3, Free; Future    8. Vitamin D deficiency  -     Vitamin D 25 Hydroxy; Future    9. Primary osteoarthritis of both wrists    10. Primary osteoarthritis of both hands    11. Lumbar scoliosis    12. Lumbar back pain with radiculopathy affecting right lower extremity    13. Lumbar disc herniation, L4-L5    14. Therapeutic drug monitoring  -     CBC (No Diff); Future      Follow Up:  Return in about 6 months (around 1/23/2020) for Next scheduled follow up with lab prior.     An After Visit Summary and PPPS were given to the patient.

## 2019-07-23 NOTE — PROGRESS NOTES
07/23/2019    Patient Information  Ashley Mata                                                                                          [address] [facility] [id number]      1945  [unfilled]  There is no work phone number on file.    Chief Complaint:     Subsequent Medicare wellness visit.  Follow-up hyperlipidemia, esophageal reflux, hypertension, multiple environmental allergies/allergic rhinitis, subclinical hypothyroidism, vitamin D deficiency, primary osteoarthritis multiple joints including wrists and hands, lumbar scoliosis with chronic lower back pain and history of L4-5 disc herniation.  No new acute complaints.    History of Present Illness:    Patient with a history of medical problems as outlined in the chief complaint presents today for subsequent Medicare wellness visit.  She also had lab work in order to monitor her chronic medical issues.  Her past medical history reviewed and updated were necessary including health maintenance parameters.  This reveals she will be up-to-date or else accounted for after today's visit.    Review of Systems   Constitution: Negative.   HENT: Negative.    Eyes: Negative.    Cardiovascular: Negative.    Respiratory: Negative.    Endocrine: Negative.    Hematologic/Lymphatic: Negative.    Skin: Negative.    Musculoskeletal: Positive for arthritis, back pain and joint pain.   Gastrointestinal: Negative.    Genitourinary: Negative.    Neurological: Negative.    Psychiatric/Behavioral: Negative.    Allergic/Immunologic: Negative.        Active Problems:    Patient Active Problem List   Diagnosis   • Allergic rhinitis   • Benign essential hypertension   • Diverticulosis of colon   • Gastroesophageal reflux disease without esophagitis   • Hyperlipidemia   • Multiple environmental allergies   • Primary osteoarthritis of both wrists   • Primary osteoarthritis of both hands   • Postmenopausal hormone replacement therapy   • Subclinical  hypothyroidism   • Vitamin D deficiency   • Therapeutic drug monitoring   • Alopecia   • Lumbar back pain with radiculopathy affecting right lower extremity   • Lumbar scoliosis   • Lumbar disc herniation, L4-L5         Past Medical History:   Diagnosis Date   • Allergic rhinitis 1/30/2015 11/13/2015--patient was evaluated by ENT and was started on generic Flonase and patient reports this has improved her symptoms quite a bit.   01/30/2015--allergy testing revealed positive reactivity to cat, dust mite, cockroach, mold spores, and grass pollen. Environmental control measures.   • Alopecia 4/12/2017    Evaluated and treated by the dermatologist.   • Benign essential hypertension 4/18/2016   • Diverticulosis of colon 3/17/2009    03/10/2017--colonoscopy revealed many small and large mouth diverticula in the sigmoid colon and descending colon.  Nonthrombosed external hemorrhoids and internal hemorrhoids noted.  Otherwise, normal colonoscopy.  03/17/2009--colonoscopy revealed external hemorrhoids, torturous colon with a sigmoid stricture, sigmoid diverticulosis.   • Gastroesophageal reflux disease without esophagitis 3/17/2009    06/09/2015--EGD revealed Z line irregular, 35 cm from incisors. Biopsied. Small hiatal hernia. Gastritis. Biopsied. Bilious gastric fluid. Fluid aspiration performed. Normal duodenal bulb and second part of duodenum. Biopsied. Pathology revealed the antral biopsy revealed small intestinal mucosa with no pathologic diagnosis. Good preservation of villous architecture. Duodenum biopsy revealed largely antral type gastric mucosa with mild patchy superficial chronic gastritis. Gastroesophageal junction revealed squamous and glandular mucosa with mild chronic inflammation. Negative for intestinal metaplasia or dysplasia. There appeared to be mislabeling of the antral biopsies and duodenal biopsies.   04/17/2012--EGD revealed irregular Z line, hiatal hernia, gastritis, duodenitis.    03/17/2009--EGD revealed grade B. reflux esophagitis, hiatal hernia, gastritis, a few gastric polyps, duodenitis.   • History of Hyperplastic polyps of stomach 06/09/2015 06/09/2015--EGD revealed Z line irregular, 35 cm from incisors. Biopsied. Small hiatal hernia. Gastritis. Biopsied. Bilious gastric fluid. Fluid aspiration performed. Normal duodenal bulb and second part of duodenum. Biopsied. Pathology revealed the antral biopsy revealed small intestinal mucosa with no pathologic diagnosis. Good preservation of villous architecture. Duodenum biopsy revealed large   • Hyperlipidemia 7/17/2012 07/17/2012--treatment for hyperlipidemia begun.   • Lumbar scoliosis 8/7/2018 01/16/2019--patient seen in follow-up by Dr. Shukla.  She has seen the neurosurgeon who recommended conservative therapy with physical therapy.  Patient reports that has been extremely helpful.  Currently has no significant pain.  08/07/2018--patient seen in follow-up and reports her pain is much better after taking prednisone.  She is now down to half a pill per day and is almost off of it.  I rev   • Multiple environmental allergies 1/30/2015 01/30/2015--allergy testing revealed positive reactivity to cat, dust mite, cockroach, mold spores, and grass pollen. Environmental control measures.   • Postmenopausal hormone replacement therapy 4/18/2016   • Primary osteoarthritis of both hands 12/30/2013 05/12/2014--patient seen in followup and reports that the Vimovo has helped. Uric acid levels are normal at 4.9. C. reactive protein mildly elevated at 2.3. X-rays of the hands reveals radiocarpal, first carpometacarpal, first metacarpophalangeal, and interphalangeal joint degeneration. This process is symmetric. The interphalangeal joint of the left is affected to the greatest degree. There is right sided scapholunate dissociation as well as deformity of the scaphoid suggesting prior traumatic injury with healing. Soft tissues are  satisfactory. Overall appearance is most consistent with osteoarthritis.   05/05/2014--patient presents and reports that she is having worsening right wrist pain primarily at the base of the thumb. No new injury. Bilateral x-rays of the wrists and hands ordered. Uric acid level ordered as well as a CRP. Vimovo 500/20 one by mouth twice a day. Follow up in one week.   03/18/2014--patient reports that her wrist symptoms have improved.   12/30/2013--patient reports a several mon   • Primary osteoarthritis of both wrists 12/30/2013 05/12/2014--patient seen in followup and reports that the Vimovo has helped. Uric acid levels are normal at 4.9. C. reactive protein mildly elevated at 2.3. X-rays of the hands reveals radiocarpal, first carpometacarpal, first metacarpophalangeal, and interphalangeal joint degeneration. This process is symmetric. The interphalangeal joint of the left is affected to the greatest degree. There is right sided scapholunate dissociation as well as deformity of the scaphoid suggesting prior traumatic injury with healing. Soft tissues are satisfactory. Overall appearance is most consistent with osteoarthritis.   05/05/2014--patient presents and reports that she is having worsening right wrist pain primarily at the base of the thumb. No new injury. Bilateral x-rays of the wrists and hands ordered. Uric acid level ordered as well as a CRP. Vimovo 500/20 one by mouth twice a day. Follow up in one week.   03/18/2014--patient reports that her wrist symptoms have improved.   12/30/2013--patient reports a several mon   • Subclinical hypothyroidism 10/3/2014    09/30/2016--thyroid ultrasound reveals a tiny 2 mm colloid cyst within the left gland.  No mass demonstrated within the right thyroid or isthmus.  Overall thyroid echotexture is normal.  Normal to slightly increased global vascularity.  07/27/2016--routine follow-up.  TSH elevated at 4.45.  Thyroid ultrasound ordered after patient recovers from  rotator cuff surgery.  She will follow-up after the results are known.  07/09/2015--TSH mildly elevated at 4.34.  Free T4 and free T3 are normal.  Observation.  Subclinical hypothyroidism.  10/09/2014--thyroid antibodies are negative.  Repeat thyroid function tests are normal.  10/03/2014--TSH elevated at 6.17.  Repeat thyroid function tests including T3 and thyroid antibodies ordered.  If these come back abnormal we will perform an ultrasound of the thyroid.  07/09/2015--TSH mildly elevated at 4.34. Free T4 and free T3 are normal. Observation. Subclinical hypothyroidism.   10/09/2014--thyroid antibodies are negative. Repeat thyroid function tests are reshma   • Vitamin D deficiency 4/18/2016         Past Surgical History:   Procedure Laterality Date   • COLONOSCOPY  03/17/2009 03/17/2009--colonoscopy revealed external hemorrhoids, torturous colon with a sigmoid stricture, sigmoid diverticulosis.   • COLONOSCOPY N/A 3/10/2017    03/10/2017--colonoscopy revealed many small and large mouth diverticula in the sigmoid colon and descending colon.  Nonthrombosed external hemorrhoids and internal hemorrhoids noted.  Otherwise, normal colonoscopy.   • ERCP WITH SPHINCTEROTOMY/PAPILLOTOMY  08/13/2014 08/13/2014--ERCP, endoscopic sphincterotomy and removal of common bile duct stones. 08/12/2014--laparoscopic cholecystectomy. This was complicated by retained common bile duct stones 2.   • ESOPHAGOSCOPY / EGD  06/09/2015 06/09/2015--EGD revealed Z line irregular, 35 cm from incisors. Biopsied. Small hiatal hernia. Gastritis. Biopsied. Bilious gastric fluid. Fluid aspiration performed. Normal duodenal bulb and second part of duodenum. Biopsied. Pathology revealed the antral biopsy revealed small intestinal mucosa with no pathologic diagnosis. Good preservation of villous architecture. Duodenum biopsy revealed large   • LAPAROSCOPIC CHOLECYSTECTOMY  08/12/2014 08/13/2014--ERCP, endoscopic sphincterotomy and removal of  common bile duct stones. 08/12/2014--laparoscopic cholecystectomy. This was complicated by retained common bile duct stones 2.   • PARTIAL HYSTERECTOMY  1978    Partial hysterectomy 1978.   • ROTATOR CUFF REPAIR Left 07/10/2014    07/10/2014--left rotator cuff repair. 03/18/2014--patient seen in followup and reports that her range of motion has improved and her pain is not debilitating. She feels that she is making progress with physical therapy. Surgery scheduled 07/10/2014. 01/10/2014--patient was seen in evaluation by the orthopedist and he recommended conservative treatment consisting of physical therapy/rehabilitation.   • ROTATOR CUFF REPAIR Right 08/03/2016 08/03/2016--arthroscopic right rotator cuff repair.  Debridement of degenerative anterior superior labral tear.         No Known Allergies        Current Outpatient Medications:   •  atorvastatin (LIPITOR) 80 MG tablet, Take 1 tablet by mouth every night at bedtime., Disp: 30 tablet, Rfl: 6  •  Calcium Carbonate (CALCIUM 600 PO), Take 2 tablets by mouth daily., Disp: , Rfl:   •  Coenzyme Q10 (COQ10) 400 MG capsule, Take 1 capsule by mouth daily. Take with a meal., Disp: , Rfl:   •  estrogens, conjugated, (PREMARIN) 0.625 MG tablet, Take 1 by mouth daily as directed., Disp: 90 tablet, Rfl: 3  •  fexofenadine-pseudoephedrine (ALLEGRA-D 24) 180-240 MG per 24 hr tablet, Take 1 tablet by mouth Daily., Disp: 30 tablet, Rfl: 5  •  lisinopril-hydrochlorothiazide (PRINZIDE,ZESTORETIC) 10-12.5 MG per tablet, Take 1 by mouth every morning for high blood pressure, Disp: 90 tablet, Rfl: 3  •  multivitamin (THERAGRAN) tablet tablet, Take 1 tablet by mouth daily., Disp: , Rfl:   •  naproxen (NAPROSYN) 500 MG tablet, Take 1 tablet by mouth 2 (Two) Times a Day., Disp: 60 tablet, Rfl: 4  •  omeprazole OTC (PRILOSEC OTC) 20 MG EC tablet, Take 1 tablet by mouth Daily., Disp: , Rfl:       Family History   Problem Relation Age of Onset   • Other Father         Hodgkin's  "Disease   • Cancer Father         Lung Cancer   • Breast cancer Daughter 52   • Alzheimer's disease Mother          Social History     Socioeconomic History   • Marital status:      Spouse name: Not on file   • Number of children: 2   • Years of education: BA   • Highest education level: Associate degree: occupational, technical, or vocational program   Occupational History   • Occupation:  - [employer]   Social Needs   • Financial resource strain: Not hard at all   • Food insecurity:     Worry: Never true     Inability: Never true   • Transportation needs:     Medical: No     Non-medical: No   Tobacco Use   • Smoking status: Never Smoker   • Smokeless tobacco: Never Used   Substance and Sexual Activity   • Alcohol use: Yes     Frequency: 2-3 times a week     Drinks per session: 1 or 2     Comment: Socially   • Drug use: No   • Sexual activity: Yes     Partners: Male   Lifestyle   • Physical activity:     Days per week: 0 days     Minutes per session: 0 min   • Stress: Not at all   Relationships   • Social connections:     Talks on phone: Once a week     Gets together: Twice a week     Attends [employer] service: More than 4 times per year     Active member of club or organization: Yes     Attends meetings of clubs or organizations: More than 4 times per year     Relationship status:          Vitals:    07/23/19 1104   BP: 122/64   Pulse: 88   SpO2: 98%   Weight: 59.1 kg (130 lb 3.2 oz)   Height: 163.8 cm (64.49\")          Physical Exam:    General: Alert and oriented x 3.  No acute distress.  Normal affect.  HEENT: Pupils equal, round, reactive to light; extraocular movements intact; sclerae nonicteric; pharynx, ear canals and TMs normal.  Neck: Without JVD, thyromegaly, bruit, or adenopathy.  Lungs: Clear to auscultation in all fields.  Heart: Regular rate and rhythm without murmur, rub, gallop, or click.  Abdomen: Soft, nontender, without hepatosplenomegaly or hernia.  Bowel sounds " normal.  : Deferred.  Rectal: Deferred.  Extremities: Without clubbing, cyanosis, edema, or pulse deficit.  Neurologic: Intact without focal deficit.  Normal station and gait observed during ingress and egress from the examination room.  Skin: Without significant lesion.  Musculoskeletal: Unremarkable.    Lab/other results:    NMR reveals a total cholesterol 166.  Triglycerides slightly elevated 161.  LDL particle number excellent at 843.  Small LDL particle number excellent at 486.  HDL particle number excellent at 51.4.  CMP essentially normal.  CBC normal.  TSH slightly elevated at 4.69.  Free T3 and free T4 are normal.  Vitamin D normal at 69.3.  CPK normal.    Assessment/Plan:     Diagnosis Plan   1. Medicare annual wellness visit, subsequent     2. Hyperlipidemia, unspecified hyperlipidemia type     3. Gastroesophageal reflux disease without esophagitis     4. Benign essential hypertension     5. Multiple environmental allergies     6. Allergic rhinitis     7. Subclinical hypothyroidism     8. Vitamin D deficiency     9. Primary osteoarthritis of both wrists     10. Primary osteoarthritis of both hands     11. Lumbar scoliosis     12. Lumbar back pain with radiculopathy affecting right lower extremity     13. Lumbar disc herniation, L4-L5     14. Therapeutic drug monitoring       The subsequent Medicare wellness visit is documented on separate note.    Patient has hyperlipidemia which is under excellent control.  Reflux controlled with Prilosec OTC.  Blood pressure seems controlled.  Patient has multiple environmental allergies/allergic rhinitis which is reasonably controlled with Allegra-D.  It appears she does have subclinical hypothyroidism but this is not bad enough to warrant medication at the present time.  Her vitamin D is therapeutic.  Patient does suffer from generalized osteoarthritis particularly involving the hands and wrists and she also has chronic lower back pain related to lumbar scoliosis  and herniated L4-L5 disc.    Plan is as follows: No change in current medical regimen.  DEXA scan ordered.  Patient will follow-up on the phone for the results and possible further instructions.  Patient follow-up in 6 months with lab prior, otherwise she will follow-up as needed.    Procedures

## 2019-08-12 RX ORDER — NAPROXEN 500 MG/1
TABLET ORAL
Qty: 60 TABLET | Refills: 3 | Status: SHIPPED | OUTPATIENT
Start: 2019-08-12 | End: 2020-01-10

## 2019-08-21 ENCOUNTER — PROCEDURE VISIT (OUTPATIENT)
Dept: OBSTETRICS AND GYNECOLOGY | Facility: CLINIC | Age: 74
End: 2019-08-21

## 2019-08-21 ENCOUNTER — OFFICE VISIT (OUTPATIENT)
Dept: OBSTETRICS AND GYNECOLOGY | Facility: CLINIC | Age: 74
End: 2019-08-21

## 2019-08-21 VITALS
WEIGHT: 130 LBS | SYSTOLIC BLOOD PRESSURE: 126 MMHG | BODY MASS INDEX: 22.2 KG/M2 | DIASTOLIC BLOOD PRESSURE: 71 MMHG | HEIGHT: 64 IN

## 2019-08-21 DIAGNOSIS — Z80.3 FAMILY HISTORY OF BREAST CANCER IN FIRST DEGREE RELATIVE: ICD-10-CM

## 2019-08-21 DIAGNOSIS — Z00.00 PREVENTATIVE HEALTH CARE: Primary | ICD-10-CM

## 2019-08-21 DIAGNOSIS — Z78.0 MENOPAUSE: Primary | ICD-10-CM

## 2019-08-21 DIAGNOSIS — Z78.0 POST-MENOPAUSAL: ICD-10-CM

## 2019-08-21 DIAGNOSIS — Z79.890 POSTMENOPAUSAL HORMONE REPLACEMENT THERAPY: Chronic | ICD-10-CM

## 2019-08-21 PROCEDURE — 77080 DXA BONE DENSITY AXIAL: CPT | Performed by: OBSTETRICS & GYNECOLOGY

## 2019-08-21 PROCEDURE — 99214 OFFICE O/P EST MOD 30 MIN: CPT | Performed by: OBSTETRICS & GYNECOLOGY

## 2019-08-28 RX ORDER — FEXOFENADINE HCL AND PSEUDOEPHEDRINE HCI 180; 240 MG/1; MG/1
1 TABLET, EXTENDED RELEASE ORAL DAILY
Qty: 30 TABLET | Refills: 5 | Status: SHIPPED | OUTPATIENT
Start: 2019-08-28 | End: 2022-04-30 | Stop reason: HOSPADM

## 2019-10-04 RX ORDER — LISINOPRIL AND HYDROCHLOROTHIAZIDE 12.5; 1 MG/1; MG/1
TABLET ORAL
Qty: 90 TABLET | Refills: 6 | Status: SHIPPED | OUTPATIENT
Start: 2019-10-04 | End: 2020-03-04

## 2020-01-10 RX ORDER — NAPROXEN 500 MG/1
TABLET ORAL
Qty: 60 TABLET | Refills: 2 | Status: SHIPPED | OUTPATIENT
Start: 2020-01-10 | End: 2020-01-22

## 2020-01-14 DIAGNOSIS — E55.9 VITAMIN D DEFICIENCY: Chronic | ICD-10-CM

## 2020-01-14 DIAGNOSIS — Z51.81 THERAPEUTIC DRUG MONITORING: ICD-10-CM

## 2020-01-14 DIAGNOSIS — E78.5 HYPERLIPIDEMIA, UNSPECIFIED HYPERLIPIDEMIA TYPE: Chronic | ICD-10-CM

## 2020-01-14 DIAGNOSIS — E03.8 SUBCLINICAL HYPOTHYROIDISM: Chronic | ICD-10-CM

## 2020-01-16 LAB
25(OH)D3+25(OH)D2 SERPL-MCNC: 62.1 NG/ML (ref 30–100)
ALBUMIN SERPL-MCNC: 4.5 G/DL (ref 3.5–5.2)
ALBUMIN/GLOB SERPL: 1.8 G/DL
ALP SERPL-CCNC: 74 U/L (ref 39–117)
ALT SERPL-CCNC: 17 U/L (ref 1–33)
AST SERPL-CCNC: 21 U/L (ref 1–32)
BILIRUB SERPL-MCNC: 0.3 MG/DL (ref 0.2–1.2)
BUN SERPL-MCNC: 19 MG/DL (ref 8–23)
BUN/CREAT SERPL: 22.4 (ref 7–25)
CALCIUM SERPL-MCNC: 9.5 MG/DL (ref 8.6–10.5)
CHLORIDE SERPL-SCNC: 99 MMOL/L (ref 98–107)
CHOLEST SERPL-MCNC: 174 MG/DL (ref 100–199)
CK SERPL-CCNC: 46 U/L (ref 20–180)
CO2 SERPL-SCNC: 27.1 MMOL/L (ref 22–29)
CREAT SERPL-MCNC: 0.85 MG/DL (ref 0.57–1)
ERYTHROCYTE [DISTWIDTH] IN BLOOD BY AUTOMATED COUNT: 12.5 % (ref 12.3–15.4)
GLOBULIN SER CALC-MCNC: 2.5 GM/DL
GLUCOSE SERPL-MCNC: 94 MG/DL (ref 65–99)
HCT VFR BLD AUTO: 39.7 % (ref 34–46.6)
HDL SERPL-SCNC: 56 UMOL/L
HDLC SERPL-MCNC: 95 MG/DL
HGB BLD-MCNC: 13.3 G/DL (ref 12–15.9)
LDL SERPL QN: 21 NM
LDL SERPL-SCNC: 506 NMOL/L
LDL SMALL SERPL-SCNC: <90 NMOL/L
LDLC SERPL CALC-MCNC: 39 MG/DL (ref 0–99)
MCH RBC QN AUTO: 30.4 PG (ref 26.6–33)
MCHC RBC AUTO-ENTMCNC: 33.5 G/DL (ref 31.5–35.7)
MCV RBC AUTO: 90.8 FL (ref 79–97)
PLATELET # BLD AUTO: 319 10*3/MM3 (ref 140–450)
POTASSIUM SERPL-SCNC: 4.2 MMOL/L (ref 3.5–5.2)
PROT SERPL-MCNC: 7 G/DL (ref 6–8.5)
RBC # BLD AUTO: 4.37 10*6/MM3 (ref 3.77–5.28)
SODIUM SERPL-SCNC: 139 MMOL/L (ref 136–145)
T3FREE SERPL-MCNC: 3.2 PG/ML (ref 2–4.4)
T4 FREE SERPL-MCNC: 1.18 NG/DL (ref 0.93–1.7)
TRIGL SERPL-MCNC: 200 MG/DL (ref 0–149)
TSH SERPL DL<=0.005 MIU/L-ACNC: 5 UIU/ML (ref 0.27–4.2)
WBC # BLD AUTO: 8.31 10*3/MM3 (ref 3.4–10.8)

## 2020-01-22 ENCOUNTER — OFFICE VISIT (OUTPATIENT)
Dept: INTERNAL MEDICINE | Facility: CLINIC | Age: 75
End: 2020-01-22

## 2020-01-22 VITALS
HEART RATE: 88 BPM | WEIGHT: 131.2 LBS | DIASTOLIC BLOOD PRESSURE: 64 MMHG | HEIGHT: 64 IN | BODY MASS INDEX: 22.4 KG/M2 | OXYGEN SATURATION: 98 % | SYSTOLIC BLOOD PRESSURE: 116 MMHG

## 2020-01-22 DIAGNOSIS — E03.8 SUBCLINICAL HYPOTHYROIDISM: Chronic | ICD-10-CM

## 2020-01-22 DIAGNOSIS — I10 BENIGN ESSENTIAL HYPERTENSION: Chronic | ICD-10-CM

## 2020-01-22 DIAGNOSIS — E78.2 MIXED HYPERLIPIDEMIA: Primary | Chronic | ICD-10-CM

## 2020-01-22 DIAGNOSIS — M54.16 LUMBAR BACK PAIN WITH RADICULOPATHY AFFECTING RIGHT LOWER EXTREMITY: ICD-10-CM

## 2020-01-22 DIAGNOSIS — K21.9 GASTROESOPHAGEAL REFLUX DISEASE WITHOUT ESOPHAGITIS: Chronic | ICD-10-CM

## 2020-01-22 DIAGNOSIS — M41.26 OTHER IDIOPATHIC SCOLIOSIS, LUMBAR REGION: Chronic | ICD-10-CM

## 2020-01-22 DIAGNOSIS — M19.041 PRIMARY OSTEOARTHRITIS OF BOTH HANDS: Chronic | ICD-10-CM

## 2020-01-22 DIAGNOSIS — M19.042 PRIMARY OSTEOARTHRITIS OF BOTH HANDS: Chronic | ICD-10-CM

## 2020-01-22 DIAGNOSIS — Z91.09 MULTIPLE ENVIRONMENTAL ALLERGIES: Chronic | ICD-10-CM

## 2020-01-22 DIAGNOSIS — B07.0 PLANTAR WART OF LEFT FOOT: ICD-10-CM

## 2020-01-22 DIAGNOSIS — M19.032 PRIMARY OSTEOARTHRITIS OF BOTH WRISTS: Chronic | ICD-10-CM

## 2020-01-22 DIAGNOSIS — E03.9 PRIMARY HYPOTHYROIDISM: ICD-10-CM

## 2020-01-22 DIAGNOSIS — E55.9 VITAMIN D DEFICIENCY: Chronic | ICD-10-CM

## 2020-01-22 DIAGNOSIS — M19.031 PRIMARY OSTEOARTHRITIS OF BOTH WRISTS: Chronic | ICD-10-CM

## 2020-01-22 DIAGNOSIS — M51.26 LUMBAR DISC HERNIATION: ICD-10-CM

## 2020-01-22 PROCEDURE — 99214 OFFICE O/P EST MOD 30 MIN: CPT | Performed by: INTERNAL MEDICINE

## 2020-01-22 RX ORDER — MELOXICAM 15 MG/1
TABLET ORAL
Qty: 90 TABLET | Refills: 1 | Status: SHIPPED | OUTPATIENT
Start: 2020-01-22 | End: 2020-07-13

## 2020-01-22 RX ORDER — LEVOTHYROXINE SODIUM 0.05 MG/1
TABLET ORAL
Qty: 90 TABLET | Refills: 3 | Status: SHIPPED | OUTPATIENT
Start: 2020-01-22 | End: 2021-01-12

## 2020-01-22 NOTE — PROGRESS NOTES
01/22/2020    Patient Information  Ashley Mata                                                                                          [address] [facility] [address]      1945  [unfilled]  There is no work phone number on file.    Chief Complaint:     Follow-up recent lab work in order to monitor chronic medical issues outlined in the history of present illness.  No new acute complaints.    History of Present Illness:    Patient with a history of hyperlipidemia, hypertension, esophageal reflux, environmental allergies, subclinical hypothyroidism, vitamin D deficiency, lumbar back pain with radiculopathy and history of herniated disc, primary osteoarthritis of the hands and wrists.  She presents today for follow-up with lab prior in order to monitor her chronic medical issues.  Her past medical history reviewed and updated were necessary including health maintenance parameters.  This reveals she is up-to-date or else accounted for.  Also we have been monitoring patient with subclinical hypothyroidism for quite some time and history regarding this is described below.    The history regarding hypothyroidism:    January 22, 2020--TSH is elevated at 5.0.  Levothyroxine 50 mcg/day initiated.  Patient will follow-up with nonfasting lab work in about 6 to 8 weeks to reassess.    07/09/2018--routine follow-up.  TSH elevated slightly at 4.31.  Free T4 normal at 1.22.  Free T3 normal at 3.3.  Continued observation.    10/11/2017--thyroid function tests are normal.    09/30/2016--thyroid ultrasound reveals a tiny 2 mm colloid cyst within the left gland.  No mass demonstrated within the right thyroid or isthmus.  Overall thyroid echotexture is normal.  Normal to slightly increased global vascularity.    07/27/2016--routine follow-up.  TSH elevated at 4.45.  Thyroid ultrasound ordered after patient recovers from rotator cuff surgery.  She will follow-up after the results are  known.    07/09/2015--TSH mildly elevated at 4.34.  Free T4 and free T3 are normal.  Observation.  Subclinical hypothyroidism.    10/09/2014--thyroid antibodies are negative.  Repeat thyroid function tests are normal.    10/03/2014--TSH elevated at 6.17.  Repeat thyroid function tests including T3 and thyroid antibodies ordered.  If these come back abnormal we will perform an ultrasound of the thyroid.    07/09/2015--TSH mildly elevated at 4.34. Free T4 and free T3 are normal. Observation. Subclinical hypothyroidism.     10/09/2014--thyroid antibodies are negative. Repeat thyroid function tests are normal.     10/03/2014--TSH elevated at 6.17. Repeat thyroid function tests including T3 and thyroid antibodies ordered. If these come back abnormal we will perform an ultrasound of the thyroid.    Review of Systems   Constitution: Negative.   HENT: Negative.    Eyes: Negative.    Cardiovascular: Negative.    Respiratory: Negative.    Endocrine: Negative.    Hematologic/Lymphatic: Negative.    Skin: Negative.    Musculoskeletal: Positive for arthritis and joint pain.   Gastrointestinal: Negative.    Genitourinary: Negative.    Neurological: Negative.    Psychiatric/Behavioral: Negative.    Allergic/Immunologic: Negative.        Active Problems:    Patient Active Problem List   Diagnosis   • Allergic rhinitis   • Benign essential hypertension   • Diverticulosis of colon   • Gastroesophageal reflux disease without esophagitis   • Hyperlipidemia   • Multiple environmental allergies   • Primary osteoarthritis of both wrists   • Primary osteoarthritis of both hands   • Postmenopausal hormone replacement therapy   • Vitamin D deficiency   • Therapeutic drug monitoring   • Alopecia   • Lumbar scoliosis   • Lumbar disc herniation, L4-L5   • Primary hypothyroidism   • Plantar wart of left foot         Past Medical History:   Diagnosis Date   • Allergic rhinitis 1/30/2015 11/13/2015--patient was evaluated by ENT and was started  on generic Flonase and patient reports this has improved her symptoms quite a bit.   01/30/2015--allergy testing revealed positive reactivity to cat, dust mite, cockroach, mold spores, and grass pollen. Environmental control measures.   • Alopecia 4/12/2017    Evaluated and treated by the dermatologist.   • Benign essential hypertension 4/18/2016   • Diverticulosis of colon 3/17/2009    03/10/2017--colonoscopy revealed many small and large mouth diverticula in the sigmoid colon and descending colon.  Nonthrombosed external hemorrhoids and internal hemorrhoids noted.  Otherwise, normal colonoscopy.  03/17/2009--colonoscopy revealed external hemorrhoids, torturous colon with a sigmoid stricture, sigmoid diverticulosis.   • Gastroesophageal reflux disease without esophagitis 3/17/2009    06/09/2015--EGD revealed Z line irregular, 35 cm from incisors. Biopsied. Small hiatal hernia. Gastritis. Biopsied. Bilious gastric fluid. Fluid aspiration performed. Normal duodenal bulb and second part of duodenum. Biopsied. Pathology revealed the antral biopsy revealed small intestinal mucosa with no pathologic diagnosis. Good preservation of villous architecture. Duodenum biopsy revealed largely antral type gastric mucosa with mild patchy superficial chronic gastritis. Gastroesophageal junction revealed squamous and glandular mucosa with mild chronic inflammation. Negative for intestinal metaplasia or dysplasia. There appeared to be mislabeling of the antral biopsies and duodenal biopsies.   04/17/2012--EGD revealed irregular Z line, hiatal hernia, gastritis, duodenitis.   03/17/2009--EGD revealed grade B. reflux esophagitis, hiatal hernia, gastritis, a few gastric polyps, duodenitis.   • History of Hyperplastic polyps of stomach 06/09/2015 06/09/2015--EGD revealed Z line irregular, 35 cm from incisors. Biopsied. Small hiatal hernia. Gastritis. Biopsied. Bilious gastric fluid. Fluid aspiration performed. Normal duodenal bulb and  second part of duodenum. Biopsied. Pathology revealed the antral biopsy revealed small intestinal mucosa with no pathologic diagnosis. Good preservation of villous architecture. Duodenum biopsy revealed large   • Hyperlipidemia 7/17/2012 07/17/2012--treatment for hyperlipidemia begun.   • Lumbar disc herniation, L4-L5 8/7/2018 01/16/2019--patient seen in follow-up by Dr. Shukla.  She has seen the neurosurgeon who recommended conservative therapy with physical therapy.  Patient reports that has been extremely helpful.  Currently has no significant pain.  08/07/2018--patient seen in follow-up and reports her pain is much better after taking prednisone.  She is now down to half a pill per day and is almost off of it.  I rev   • Lumbar scoliosis 8/7/2018 01/16/2019--patient seen in follow-up by Dr. Shukla.  She has seen the neurosurgeon who recommended conservative therapy with physical therapy.  Patient reports that has been extremely helpful.  Currently has no significant pain.  08/07/2018--patient seen in follow-up and reports her pain is much better after taking prednisone.  She is now down to half a pill per day and is almost off of it.  I rev   • Multiple environmental allergies 1/30/2015 01/30/2015--allergy testing revealed positive reactivity to cat, dust mite, cockroach, mold spores, and grass pollen. Environmental control measures.   • Postmenopausal hormone replacement therapy 4/18/2016   • Primary hypothyroidism 1/22/2020 January 22, 2020--TSH is elevated at 5.0.  Levothyroxine 50 mcg/day initiated.  Patient will follow-up with nonfasting lab work in about 6 to 8 weeks to reassess.  07/09/2018--routine follow-up.  TSH elevated slightly at 4.31.  Free T4 normal at 1.22.  Free T3 normal at 3.3.  Continued observation.  10/11/2017--thyroid function tests are normal.  09/30/2016--thyroid ultrasound reveals a tiny 2 mm    • Primary osteoarthritis of both hands 12/30/2013 05/12/2014--patient seen  in followup and reports that the Vimovo has helped. Uric acid levels are normal at 4.9. C. reactive protein mildly elevated at 2.3. X-rays of the hands reveals radiocarpal, first carpometacarpal, first metacarpophalangeal, and interphalangeal joint degeneration. This process is symmetric. The interphalangeal joint of the left is affected to the greatest degree. There is right sided scapholunate dissociation as well as deformity of the scaphoid suggesting prior traumatic injury with healing. Soft tissues are satisfactory. Overall appearance is most consistent with osteoarthritis.   05/05/2014--patient presents and reports that she is having worsening right wrist pain primarily at the base of the thumb. No new injury. Bilateral x-rays of the wrists and hands ordered. Uric acid level ordered as well as a CRP. Vimovo 500/20 one by mouth twice a day. Follow up in one week.   03/18/2014--patient reports that her wrist symptoms have improved.   12/30/2013--patient reports a several mon   • Primary osteoarthritis of both wrists 12/30/2013 05/12/2014--patient seen in followup and reports that the Vimovo has helped. Uric acid levels are normal at 4.9. C. reactive protein mildly elevated at 2.3. X-rays of the hands reveals radiocarpal, first carpometacarpal, first metacarpophalangeal, and interphalangeal joint degeneration. This process is symmetric. The interphalangeal joint of the left is affected to the greatest degree. There is right sided scapholunate dissociation as well as deformity of the scaphoid suggesting prior traumatic injury with healing. Soft tissues are satisfactory. Overall appearance is most consistent with osteoarthritis.   05/05/2014--patient presents and reports that she is having worsening right wrist pain primarily at the base of the thumb. No new injury. Bilateral x-rays of the wrists and hands ordered. Uric acid level ordered as well as a CRP. Vimovo 500/20 one by mouth twice a day. Follow up in one  week.   03/18/2014--patient reports that her wrist symptoms have improved.   12/30/2013--patient reports a several mon   • Vitamin D deficiency 4/18/2016         Past Surgical History:   Procedure Laterality Date   • COLONOSCOPY  03/17/2009 03/17/2009--colonoscopy revealed external hemorrhoids, torturous colon with a sigmoid stricture, sigmoid diverticulosis.   • COLONOSCOPY N/A 3/10/2017    03/10/2017--colonoscopy revealed many small and large mouth diverticula in the sigmoid colon and descending colon.  Nonthrombosed external hemorrhoids and internal hemorrhoids noted.  Otherwise, normal colonoscopy.   • ERCP WITH SPHINCTEROTOMY/PAPILLOTOMY  08/13/2014 08/13/2014--ERCP, endoscopic sphincterotomy and removal of common bile duct stones. 08/12/2014--laparoscopic cholecystectomy. This was complicated by retained common bile duct stones 2.   • ESOPHAGOSCOPY / EGD  06/09/2015 06/09/2015--EGD revealed Z line irregular, 35 cm from incisors. Biopsied. Small hiatal hernia. Gastritis. Biopsied. Bilious gastric fluid. Fluid aspiration performed. Normal duodenal bulb and second part of duodenum. Biopsied. Pathology revealed the antral biopsy revealed small intestinal mucosa with no pathologic diagnosis. Good preservation of villous architecture. Duodenum biopsy revealed large   • LAPAROSCOPIC CHOLECYSTECTOMY  08/12/2014 08/13/2014--ERCP, endoscopic sphincterotomy and removal of common bile duct stones. 08/12/2014--laparoscopic cholecystectomy. This was complicated by retained common bile duct stones 2.   • ROTATOR CUFF REPAIR Left 07/10/2014    07/10/2014--left rotator cuff repair. 03/18/2014--patient seen in followup and reports that her range of motion has improved and her pain is not debilitating. She feels that she is making progress with physical therapy. Surgery scheduled 07/10/2014. 01/10/2014--patient was seen in evaluation by the orthopedist and he recommended conservative treatment consisting of physical  therapy/rehabilitation.   • ROTATOR CUFF REPAIR Right 08/03/2016 08/03/2016--arthroscopic right rotator cuff repair.  Debridement of degenerative anterior superior labral tear.   • SUBTOTAL HYSTERECTOMY  1978    Partial hysterectomy 1978.         No Known Allergies        Current Outpatient Medications:   •  atorvastatin (LIPITOR) 80 MG tablet, Take 1 tablet by mouth every night at bedtime., Disp: 30 tablet, Rfl: 6  •  Calcium Carbonate (CALCIUM 600 PO), Take 2 tablets by mouth daily., Disp: , Rfl:   •  Coenzyme Q10 (COQ10) 400 MG capsule, Take 1 capsule by mouth daily. Take with a meal., Disp: , Rfl:   •  estrogens, conjugated, (PREMARIN) 0.625 MG tablet, Take 1 by mouth daily as directed., Disp: 90 tablet, Rfl: 3  •  fexofenadine-pseudoephedrine (ALLEGRA-D 24) 180-240 MG per 24 hr tablet, Take 1 tablet by mouth Daily., Disp: 30 tablet, Rfl: 5  •  lisinopril-hydrochlorothiazide (PRINZIDE,ZESTORETIC) 10-12.5 MG per tablet, Take 1 by mouth every morning for high blood pressure, Disp: 90 tablet, Rfl: 3  •  multivitamin (THERAGRAN) tablet tablet, Take 1 tablet by mouth daily., Disp: , Rfl:   •  omeprazole OTC (PRILOSEC OTC) 20 MG EC tablet, Take 1 tablet by mouth Daily., Disp: , Rfl:   •  levothyroxine (SYNTHROID) 50 MCG tablet, Take 1 p.o. daily for low thyroid, Disp: 90 tablet, Rfl: 3  •  lisinopril-hydrochlorothiazide (PRINZIDE,ZESTORETIC) 10-12.5 MG per tablet, TAKE ONE TABLET BY MOUTH EVERY MORNING, Disp: 90 tablet, Rfl: 6  •  meloxicam (MOBIC) 15 MG tablet, Take 1 p.o. daily for arthritis, Disp: 90 tablet, Rfl: 1      Family History   Problem Relation Age of Onset   • Other Father         Hodgkin's Disease   • Cancer Father         Lung Cancer   • Breast cancer Daughter 52   • Alzheimer's disease Mother          Social History     Socioeconomic History   • Marital status:      Spouse name: Not on file   • Number of children: 2   • Years of education: BA   • Highest education level: Associate degree:  "occupational, technical, or vocational program   Occupational History   • Occupation:  - [employer]   Social Needs   • Financial resource strain: Not hard at all   • Food insecurity:     Worry: Never true     Inability: Never true   • Transportation needs:     Medical: No     Non-medical: No   Tobacco Use   • Smoking status: Never Smoker   • Smokeless tobacco: Never Used   Substance and Sexual Activity   • Alcohol use: Yes     Frequency: 2-3 times a week     Drinks per session: 1 or 2     Binge frequency: Never     Comment: Socially   • Drug use: No   • Sexual activity: Yes     Partners: Male   Lifestyle   • Physical activity:     Days per week: 2 days     Minutes per session: 30 min   • Stress: Not at all   Relationships   • Social connections:     Talks on phone: Once a week     Gets together: Twice a week     Attends Spiritism service: More than 4 times per year     Active member of club or organization: Yes     Attends meetings of clubs or organizations: More than 4 times per year     Relationship status:          Vitals:    01/22/20 0946   BP: 116/64   BP Location: Left arm   Pulse: 88   SpO2: 98%   Weight: 59.5 kg (131 lb 3.2 oz)   Height: 163.8 cm (64.49\")        Body mass index is 22.18 kg/m².      Physical Exam:    General: Alert and oriented x 3.  No acute distress.  Normal affect.  HEENT: Pupils equal, round, reactive to light; extraocular movements intact; sclerae nonicteric; pharynx, ear canals and TMs normal.  Neck: Without JVD, thyromegaly, bruit, or adenopathy.  Lungs: Clear to auscultation in all fields.  Heart: Regular rate and rhythm without murmur, rub, gallop, or click.  Abdomen: Soft, nontender, without hepatosplenomegaly or hernia.  Bowel sounds normal.  : Deferred.  Rectal: Deferred.  Extremities: Without clubbing, cyanosis, edema, or pulse deficit.  Neurologic: Intact without focal deficit.  Normal station and gait observed during ingress and egress from the " examination room.  Skin: Without significant lesion.  Musculoskeletal: Diffuse changes consistent with osteoarthritis, particularly the hands and wrists.    Lab/other results:    NMR is perfect other than triglycerides mildly elevated at 200.  CMP normal.  CBC normal.  TSH elevated at 5.0.  Vitamin D normal.  CPK normal.    Assessment/Plan:     Diagnosis Plan   1. Hyperlipidemia  Comprehensive Metabolic Panel   2. Benign essential hypertension  Comprehensive Metabolic Panel   3. Gastroesophageal reflux disease without esophagitis     4. Multiple environmental allergies     5. Subclinical hypothyroidism     6. Vitamin D deficiency     7. Lumbar back pain with radiculopathy affecting right lower extremity     8. Lumbar disc herniation, L4-L5     9. Lumbar scoliosis     10. Primary osteoarthritis of both hands  meloxicam (MOBIC) 15 MG tablet   11. Primary osteoarthritis of both wrists  meloxicam (MOBIC) 15 MG tablet   12. Primary hypothyroidism  levothyroxine (SYNTHROID) 50 MCG tablet    TSH    T4, Free    T3, Free   13. Plantar wart of left foot       Patient has hyperlipidemia which is under good control on the current regimen.  Her blood pressure is also well controlled.  Reflux controlled with Prilosec OTC.  Her environmental allergies are currently not a big issue.  Subclinical hypothyroidism needs to be treated.  Patient now has primary hypothyroidism.  We have been monitoring this for quite some time.  Vitamin D in the normal range.  Her back pain and lumbar radicular symptoms are better but still present.  Patient suffers from primary osteoarthritis of the hands and wrists in particular and she is concerned about side effects of naproxen.  I explained to her that any of these arthritis medications have the potential for side effects but as a general rule most patients tolerate them well.  I think she might do better on meloxicam and I think that is worth a try.    Plan is as follows: Start levothyroxine 50  mcg/day.  Start meloxicam 15 mg/day.  I will have patient obtain nonfasting lab work in about 6 to 8 weeks and then follow-up a few days later to reassess.  We can schedule her future routine follow-ups at that time.    Addendum: January 22, 2020--patient presents with a somewhat tender nodular type lesion on the sole of her left foot in the middle of the foot over the metatarsal heads.  Exam reveals an obvious plantar wart.  Patient will call and then schedule appointment for destruction of the wart at her convenience.    Procedures

## 2020-03-03 DIAGNOSIS — I10 BENIGN ESSENTIAL HYPERTENSION: Chronic | ICD-10-CM

## 2020-03-03 DIAGNOSIS — E78.2 MIXED HYPERLIPIDEMIA: Chronic | ICD-10-CM

## 2020-03-03 DIAGNOSIS — E03.9 PRIMARY HYPOTHYROIDISM: ICD-10-CM

## 2020-03-04 ENCOUNTER — OFFICE VISIT (OUTPATIENT)
Dept: INTERNAL MEDICINE | Facility: CLINIC | Age: 75
End: 2020-03-04

## 2020-03-04 ENCOUNTER — TELEPHONE (OUTPATIENT)
Dept: INTERNAL MEDICINE | Facility: CLINIC | Age: 75
End: 2020-03-04

## 2020-03-04 VITALS
BODY MASS INDEX: 22.13 KG/M2 | HEIGHT: 64 IN | WEIGHT: 129.6 LBS | DIASTOLIC BLOOD PRESSURE: 74 MMHG | HEART RATE: 100 BPM | SYSTOLIC BLOOD PRESSURE: 128 MMHG | OXYGEN SATURATION: 99 %

## 2020-03-04 DIAGNOSIS — B07.0 PLANTAR WART OF LEFT FOOT: ICD-10-CM

## 2020-03-04 DIAGNOSIS — Z79.890 POSTMENOPAUSAL HORMONE REPLACEMENT THERAPY: Chronic | ICD-10-CM

## 2020-03-04 DIAGNOSIS — M79.672 CHRONIC FOOT PAIN, LEFT: Primary | ICD-10-CM

## 2020-03-04 DIAGNOSIS — G89.29 CHRONIC FOOT PAIN, LEFT: Primary | ICD-10-CM

## 2020-03-04 PROCEDURE — 99212 OFFICE O/P EST SF 10 MIN: CPT | Performed by: INTERNAL MEDICINE

## 2020-03-04 PROCEDURE — 17000 DESTRUCT PREMALG LESION: CPT | Performed by: INTERNAL MEDICINE

## 2020-03-04 NOTE — TELEPHONE ENCOUNTER
Yes, she can keep an elevated and this will help with any bleeding.  A small amount of bleeding is expected.  That being said, I do not want her to just sit around and not being mobile.

## 2020-03-04 NOTE — PROGRESS NOTES
03/04/2020    Patient Information  Ashley Mata                                                                                          [id number] [facility] [id number]      1945  [unfilled]  There is no work phone number on file.    Chief Complaint:     Complaining of pain of the left foot.    History of Present Illness:    Patient with a history of hyperlipidemia, hypertension, esophageal reflux, osteoarthritis, environmental allergies, vitamin D deficiency, lumbar scoliosis with disc herniation, primary hypothyroidism.  She presents today with complaints of pain and tenderness involving her left foot as described below:    March 4, 2020--patient presents with a somewhat tender nodular type lesion on the sole of her left foot in the middle of the foot over the metatarsal heads.  Exam reveals an obvious plantar wart.  Patient would like to have this destroyed.  The area in question was anesthetized with 2% Xylocaine and the wart was pared back using a #11 blade.  The lesion was then destroyed using the Hyfrecator.  Post procedure instructions given and we discussed the signs and symptoms of infection.  Patient should contact me if the wart does not totally resolve or if it recurs.     Review of Systems   Constitution: Negative.   HENT: Negative.    Eyes: Negative.    Cardiovascular: Negative.    Respiratory: Negative.    Endocrine: Negative.    Hematologic/Lymphatic: Negative.    Skin: Negative.         Tender skin lesion left foot   Musculoskeletal: Positive for arthritis, back pain and joint pain.   Gastrointestinal: Negative.    Genitourinary: Negative.    Neurological: Negative.    Psychiatric/Behavioral: Negative.    Allergic/Immunologic: Negative.        Active Problems:    Patient Active Problem List   Diagnosis   • Allergic rhinitis   • Benign essential hypertension   • Diverticulosis of colon   • Gastroesophageal reflux disease without esophagitis   • Hyperlipidemia    • Multiple environmental allergies   • Primary osteoarthritis of both wrists   • Primary osteoarthritis of both hands   • Postmenopausal hormone replacement therapy   • Vitamin D deficiency   • Therapeutic drug monitoring   • Alopecia   • Lumbar scoliosis   • Lumbar disc herniation, L4-L5   • Primary hypothyroidism   • Plantar wart of left foot   • Chronic foot pain, left         Past Medical History:   Diagnosis Date   • Allergic rhinitis 1/30/2015 11/13/2015--patient was evaluated by ENT and was started on generic Flonase and patient reports this has improved her symptoms quite a bit.   01/30/2015--allergy testing revealed positive reactivity to cat, dust mite, cockroach, mold spores, and grass pollen. Environmental control measures.   • Alopecia 4/12/2017    Evaluated and treated by the dermatologist.   • Benign essential hypertension 4/18/2016   • Diverticulosis of colon 3/17/2009    03/10/2017--colonoscopy revealed many small and large mouth diverticula in the sigmoid colon and descending colon.  Nonthrombosed external hemorrhoids and internal hemorrhoids noted.  Otherwise, normal colonoscopy.  03/17/2009--colonoscopy revealed external hemorrhoids, torturous colon with a sigmoid stricture, sigmoid diverticulosis.   • Gastroesophageal reflux disease without esophagitis 3/17/2009    06/09/2015--EGD revealed Z line irregular, 35 cm from incisors. Biopsied. Small hiatal hernia. Gastritis. Biopsied. Bilious gastric fluid. Fluid aspiration performed. Normal duodenal bulb and second part of duodenum. Biopsied. Pathology revealed the antral biopsy revealed small intestinal mucosa with no pathologic diagnosis. Good preservation of villous architecture. Duodenum biopsy revealed largely antral type gastric mucosa with mild patchy superficial chronic gastritis. Gastroesophageal junction revealed squamous and glandular mucosa with mild chronic inflammation. Negative for intestinal metaplasia or dysplasia. There  appeared to be mislabeling of the antral biopsies and duodenal biopsies.   04/17/2012--EGD revealed irregular Z line, hiatal hernia, gastritis, duodenitis.   03/17/2009--EGD revealed grade B. reflux esophagitis, hiatal hernia, gastritis, a few gastric polyps, duodenitis.   • History of Hyperplastic polyps of stomach 06/09/2015 06/09/2015--EGD revealed Z line irregular, 35 cm from incisors. Biopsied. Small hiatal hernia. Gastritis. Biopsied. Bilious gastric fluid. Fluid aspiration performed. Normal duodenal bulb and second part of duodenum. Biopsied. Pathology revealed the antral biopsy revealed small intestinal mucosa with no pathologic diagnosis. Good preservation of villous architecture. Duodenum biopsy revealed large   • Hyperlipidemia 7/17/2012 07/17/2012--treatment for hyperlipidemia begun.   • Lumbar disc herniation, L4-L5 8/7/2018 01/16/2019--patient seen in follow-up by Dr. Shukla.  She has seen the neurosurgeon who recommended conservative therapy with physical therapy.  Patient reports that has been extremely helpful.  Currently has no significant pain.  08/07/2018--patient seen in follow-up and reports her pain is much better after taking prednisone.  She is now down to half a pill per day and is almost off of it.  I rev   • Lumbar scoliosis 8/7/2018 01/16/2019--patient seen in follow-up by Dr. Shukla.  She has seen the neurosurgeon who recommended conservative therapy with physical therapy.  Patient reports that has been extremely helpful.  Currently has no significant pain.  08/07/2018--patient seen in follow-up and reports her pain is much better after taking prednisone.  She is now down to half a pill per day and is almost off of it.  I rev   • Multiple environmental allergies 1/30/2015 01/30/2015--allergy testing revealed positive reactivity to cat, dust mite, cockroach, mold spores, and grass pollen. Environmental control measures.   • Postmenopausal hormone replacement therapy  4/18/2016   • Primary hypothyroidism 1/22/2020 January 22, 2020--TSH is elevated at 5.0.  Levothyroxine 50 mcg/day initiated.  Patient will follow-up with nonfasting lab work in about 6 to 8 weeks to reassess.  07/09/2018--routine follow-up.  TSH elevated slightly at 4.31.  Free T4 normal at 1.22.  Free T3 normal at 3.3.  Continued observation.  10/11/2017--thyroid function tests are normal.  09/30/2016--thyroid ultrasound reveals a tiny 2 mm    • Primary osteoarthritis of both hands 12/30/2013 05/12/2014--patient seen in followup and reports that the Vimovo has helped. Uric acid levels are normal at 4.9. C. reactive protein mildly elevated at 2.3. X-rays of the hands reveals radiocarpal, first carpometacarpal, first metacarpophalangeal, and interphalangeal joint degeneration. This process is symmetric. The interphalangeal joint of the left is affected to the greatest degree. There is right sided scapholunate dissociation as well as deformity of the scaphoid suggesting prior traumatic injury with healing. Soft tissues are satisfactory. Overall appearance is most consistent with osteoarthritis.   05/05/2014--patient presents and reports that she is having worsening right wrist pain primarily at the base of the thumb. No new injury. Bilateral x-rays of the wrists and hands ordered. Uric acid level ordered as well as a CRP. Vimovo 500/20 one by mouth twice a day. Follow up in one week.   03/18/2014--patient reports that her wrist symptoms have improved.   12/30/2013--patient reports a several mon   • Primary osteoarthritis of both wrists 12/30/2013 05/12/2014--patient seen in followup and reports that the Vimovo has helped. Uric acid levels are normal at 4.9. C. reactive protein mildly elevated at 2.3. X-rays of the hands reveals radiocarpal, first carpometacarpal, first metacarpophalangeal, and interphalangeal joint degeneration. This process is symmetric. The interphalangeal joint of the left is affected to  the greatest degree. There is right sided scapholunate dissociation as well as deformity of the scaphoid suggesting prior traumatic injury with healing. Soft tissues are satisfactory. Overall appearance is most consistent with osteoarthritis.   05/05/2014--patient presents and reports that she is having worsening right wrist pain primarily at the base of the thumb. No new injury. Bilateral x-rays of the wrists and hands ordered. Uric acid level ordered as well as a CRP. Vimovo 500/20 one by mouth twice a day. Follow up in one week.   03/18/2014--patient reports that her wrist symptoms have improved.   12/30/2013--patient reports a several mon   • Vitamin D deficiency 4/18/2016         Past Surgical History:   Procedure Laterality Date   • COLONOSCOPY  03/17/2009 03/17/2009--colonoscopy revealed external hemorrhoids, torturous colon with a sigmoid stricture, sigmoid diverticulosis.   • COLONOSCOPY N/A 3/10/2017    03/10/2017--colonoscopy revealed many small and large mouth diverticula in the sigmoid colon and descending colon.  Nonthrombosed external hemorrhoids and internal hemorrhoids noted.  Otherwise, normal colonoscopy.   • ERCP WITH SPHINCTEROTOMY/PAPILLOTOMY  08/13/2014 08/13/2014--ERCP, endoscopic sphincterotomy and removal of common bile duct stones. 08/12/2014--laparoscopic cholecystectomy. This was complicated by retained common bile duct stones 2.   • ESOPHAGOSCOPY / EGD  06/09/2015 06/09/2015--EGD revealed Z line irregular, 35 cm from incisors. Biopsied. Small hiatal hernia. Gastritis. Biopsied. Bilious gastric fluid. Fluid aspiration performed. Normal duodenal bulb and second part of duodenum. Biopsied. Pathology revealed the antral biopsy revealed small intestinal mucosa with no pathologic diagnosis. Good preservation of villous architecture. Duodenum biopsy revealed large   • LAPAROSCOPIC CHOLECYSTECTOMY  08/12/2014 08/13/2014--ERCP, endoscopic sphincterotomy and removal of common bile  duct stones. 08/12/2014--laparoscopic cholecystectomy. This was complicated by retained common bile duct stones 2.   • ROTATOR CUFF REPAIR Left 07/10/2014    07/10/2014--left rotator cuff repair. 03/18/2014--patient seen in followup and reports that her range of motion has improved and her pain is not debilitating. She feels that she is making progress with physical therapy. Surgery scheduled 07/10/2014. 01/10/2014--patient was seen in evaluation by the orthopedist and he recommended conservative treatment consisting of physical therapy/rehabilitation.   • ROTATOR CUFF REPAIR Right 08/03/2016 08/03/2016--arthroscopic right rotator cuff repair.  Debridement of degenerative anterior superior labral tear.   • SUBTOTAL HYSTERECTOMY  1978    Partial hysterectomy 1978.         No Known Allergies        Current Outpatient Medications:   •  atorvastatin (LIPITOR) 80 MG tablet, Take 1 tablet by mouth every night at bedtime., Disp: 30 tablet, Rfl: 6  •  Calcium Carbonate (CALCIUM 600 PO), Take 2 tablets by mouth daily., Disp: , Rfl:   •  Coenzyme Q10 (COQ10) 400 MG capsule, Take 1 capsule by mouth daily. Take with a meal., Disp: , Rfl:   •  fexofenadine-pseudoephedrine (ALLEGRA-D 24) 180-240 MG per 24 hr tablet, Take 1 tablet by mouth Daily., Disp: 30 tablet, Rfl: 5  •  levothyroxine (SYNTHROID) 50 MCG tablet, Take 1 p.o. daily for low thyroid, Disp: 90 tablet, Rfl: 3  •  lisinopril-hydrochlorothiazide (PRINZIDE,ZESTORETIC) 10-12.5 MG per tablet, Take 1 by mouth every morning for high blood pressure, Disp: 90 tablet, Rfl: 3  •  meloxicam (MOBIC) 15 MG tablet, Take 1 p.o. daily for arthritis, Disp: 90 tablet, Rfl: 1  •  multivitamin (THERAGRAN) tablet tablet, Take 1 tablet by mouth daily., Disp: , Rfl:   •  omeprazole OTC (PRILOSEC OTC) 20 MG EC tablet, Take 1 tablet by mouth Daily., Disp: , Rfl:   •  estrogens, conjugated, (PREMARIN) 0.625 MG tablet, Take 1 by mouth daily as directed., Disp: 90 tablet, Rfl: 3      Family  "History   Problem Relation Age of Onset   • Other Father         Hodgkin's Disease   • Cancer Father         Lung Cancer   • Breast cancer Daughter 52   • Alzheimer's disease Mother          Social History     Socioeconomic History   • Marital status:      Spouse name: Not on file   • Number of children: 2   • Years of education: BA   • Highest education level: Associate degree: occupational, technical, or vocational program   Occupational History   • Occupation:  - [employer]   Social Needs   • Financial resource strain: Not hard at all   • Food insecurity:     Worry: Never true     Inability: Never true   • Transportation needs:     Medical: No     Non-medical: No   Tobacco Use   • Smoking status: Never Smoker   • Smokeless tobacco: Never Used   Substance and Sexual Activity   • Alcohol use: Yes     Frequency: 2-3 times a week     Drinks per session: 1 or 2     Binge frequency: Never     Comment: Socially   • Drug use: No   • Sexual activity: Yes     Partners: Male   Lifestyle   • Physical activity:     Days per week: 2 days     Minutes per session: 30 min   • Stress: Only a little   Relationships   • Social connections:     Talks on phone: Once a week     Gets together: Twice a week     Attends Congregation service: More than 4 times per year     Active member of club or organization: Yes     Attends meetings of clubs or organizations: More than 4 times per year     Relationship status:          Vitals:    03/04/20 0949   BP: 128/74   BP Location: Left arm   Pulse: 100   SpO2: 99%   Weight: 58.8 kg (129 lb 9.6 oz)   Height: 163.8 cm (64.49\")        Body mass index is 21.91 kg/m².      Physical Exam:    Brief physical examination limited to the left foot and this reveals an obvious plantar wart in the middle of the foot over the metatarsal heads.  No sign of infection or cellulitis.  No drainage.    Lab/other results:      Assessment/Plan:     Diagnosis Plan   1. Chronic foot pain, " left     2. Plantar wart of left foot       Patient presents with chronic left foot pain I believe this is related to a plantar wart of the left foot.  This needs to be destroyed.  See procedure note.      Procedures    Verbal informed consent given.  Risks, benefits, potential complications and alternatives discussed.  The area in question was prepped with alcohol wipes x2 and anesthetized with 2% Xylocaine.  The lesion was pared back using a #15 blade.  The base of the wart was destroyed using the Hyfrecator.  Patient tolerated procedure well without apparent complication.  Post procedure instructions and signs of infection discussed.

## 2020-03-04 NOTE — TELEPHONE ENCOUNTER
Pt was here this morning and had a wart removed off her food.  She asked if she was supposed to keep her foot elevated.  She says it has started bleeding

## 2020-03-05 LAB
ALBUMIN SERPL-MCNC: 4.1 G/DL (ref 3.5–5.2)
ALBUMIN/GLOB SERPL: 1.8 G/DL
ALP SERPL-CCNC: 70 U/L (ref 39–117)
ALT SERPL-CCNC: 20 U/L (ref 1–33)
AST SERPL-CCNC: 22 U/L (ref 1–32)
BILIRUB SERPL-MCNC: 0.3 MG/DL (ref 0.2–1.2)
BUN SERPL-MCNC: 29 MG/DL (ref 8–23)
BUN/CREAT SERPL: 32.2 (ref 7–25)
CALCIUM SERPL-MCNC: 8.9 MG/DL (ref 8.6–10.5)
CHLORIDE SERPL-SCNC: 99 MMOL/L (ref 98–107)
CO2 SERPL-SCNC: 24.4 MMOL/L (ref 22–29)
CREAT SERPL-MCNC: 0.9 MG/DL (ref 0.57–1)
GLOBULIN SER CALC-MCNC: 2.3 GM/DL
GLUCOSE SERPL-MCNC: 87 MG/DL (ref 65–99)
POTASSIUM SERPL-SCNC: 4.7 MMOL/L (ref 3.5–5.2)
PROT SERPL-MCNC: 6.4 G/DL (ref 6–8.5)
SODIUM SERPL-SCNC: 136 MMOL/L (ref 136–145)
T3FREE SERPL-MCNC: 3 PG/ML (ref 2–4.4)
T4 FREE SERPL-MCNC: 1.83 NG/DL (ref 0.93–1.7)
TSH SERPL DL<=0.005 MIU/L-ACNC: 1.87 UIU/ML (ref 0.27–4.2)

## 2020-03-17 ENCOUNTER — TELEPHONE (OUTPATIENT)
Dept: INTERNAL MEDICINE | Facility: CLINIC | Age: 75
End: 2020-03-17

## 2020-03-17 NOTE — TELEPHONE ENCOUNTER
Pt says she has a follow up on Thursday from what you did to her foot and wants to know if its necessary for her to come in

## 2020-03-17 NOTE — TELEPHONE ENCOUNTER
The follow-up appointment was not to follow-up on the plantar wart.  It was to follow-up on her thyroid.  We started thyroid medication back in January and she is scheduled to have that tested with lab and follow-up.  However, we can put this off another month or so if necessary.

## 2020-03-17 NOTE — TELEPHONE ENCOUNTER
Pt informed, says she doesn't want to come to a doctors office at this time, she says she will call back to reschedule for a month out from now

## 2020-07-06 RX ORDER — ATORVASTATIN CALCIUM 80 MG/1
TABLET, FILM COATED ORAL
Qty: 90 TABLET | Refills: 0 | Status: SHIPPED | OUTPATIENT
Start: 2020-07-06 | End: 2021-05-10

## 2020-07-12 DIAGNOSIS — M19.042 PRIMARY OSTEOARTHRITIS OF BOTH HANDS: Chronic | ICD-10-CM

## 2020-07-12 DIAGNOSIS — M19.032 PRIMARY OSTEOARTHRITIS OF BOTH WRISTS: Chronic | ICD-10-CM

## 2020-07-12 DIAGNOSIS — M19.041 PRIMARY OSTEOARTHRITIS OF BOTH HANDS: Chronic | ICD-10-CM

## 2020-07-12 DIAGNOSIS — M19.031 PRIMARY OSTEOARTHRITIS OF BOTH WRISTS: Chronic | ICD-10-CM

## 2020-07-13 ENCOUNTER — OFFICE VISIT (OUTPATIENT)
Dept: OBSTETRICS AND GYNECOLOGY | Facility: CLINIC | Age: 75
End: 2020-07-13

## 2020-07-13 ENCOUNTER — PROCEDURE VISIT (OUTPATIENT)
Dept: OBSTETRICS AND GYNECOLOGY | Facility: CLINIC | Age: 75
End: 2020-07-13

## 2020-07-13 ENCOUNTER — APPOINTMENT (OUTPATIENT)
Dept: WOMENS IMAGING | Facility: HOSPITAL | Age: 75
End: 2020-07-13

## 2020-07-13 VITALS
SYSTOLIC BLOOD PRESSURE: 140 MMHG | BODY MASS INDEX: 21.17 KG/M2 | WEIGHT: 124 LBS | HEIGHT: 64 IN | DIASTOLIC BLOOD PRESSURE: 72 MMHG

## 2020-07-13 DIAGNOSIS — Z79.890 POSTMENOPAUSAL HORMONE REPLACEMENT THERAPY: Chronic | ICD-10-CM

## 2020-07-13 DIAGNOSIS — Z78.0 MENOPAUSE: ICD-10-CM

## 2020-07-13 DIAGNOSIS — Z01.419 ENCOUNTER FOR GYNECOLOGICAL EXAMINATION WITHOUT ABNORMAL FINDING: Primary | ICD-10-CM

## 2020-07-13 DIAGNOSIS — Z80.3 FAMILY HISTORY OF BREAST CANCER IN FIRST DEGREE RELATIVE: ICD-10-CM

## 2020-07-13 DIAGNOSIS — Z12.31 VISIT FOR SCREENING MAMMOGRAM: Primary | ICD-10-CM

## 2020-07-13 PROCEDURE — 77067 SCR MAMMO BI INCL CAD: CPT | Performed by: RADIOLOGY

## 2020-07-13 PROCEDURE — G0101 CA SCREEN;PELVIC/BREAST EXAM: HCPCS | Performed by: OBSTETRICS & GYNECOLOGY

## 2020-07-13 PROCEDURE — 77067 SCR MAMMO BI INCL CAD: CPT | Performed by: OBSTETRICS & GYNECOLOGY

## 2020-07-13 PROCEDURE — 77063 BREAST TOMOSYNTHESIS BI: CPT | Performed by: OBSTETRICS & GYNECOLOGY

## 2020-07-13 PROCEDURE — 77063 BREAST TOMOSYNTHESIS BI: CPT | Performed by: RADIOLOGY

## 2020-07-13 RX ORDER — MELOXICAM 15 MG/1
TABLET ORAL
Qty: 90 TABLET | Refills: 1 | Status: SHIPPED | OUTPATIENT
Start: 2020-07-13 | End: 2021-01-12

## 2020-07-13 NOTE — PROGRESS NOTES
Subjective    Ashley Mata is a 74 y.o. female for annual gyn exam    History of Present Illness   74-year-old postmenopausal white female presents for routine gynecologic exam.  She is status post hysterectomy in the distant past.  She received a mammogram in the office today.  She is current on her screening colonoscopies.  There is a family history of breast cancer in her daughter around age 40.  There is no family history of colon cancer.  She has been maintained on hormone replacement therapy.  She has no complaints.      The following portions of the patient's history were reviewed and updated as appropriate: allergies, current medications, past family history, past medical history, past social history, past surgical history and problem list.      Review of Systems   Constitutional: Negative for activity change, appetite change, fatigue and unexpected weight change.   HENT: Negative.    Eyes: Negative.    Respiratory: Negative.    Cardiovascular: Negative.    Gastrointestinal: Negative for abdominal distention, abdominal pain, anal bleeding, constipation, diarrhea, nausea and vomiting.   Endocrine: Negative for cold intolerance, heat intolerance, polydipsia, polyphagia and polyuria.   Genitourinary: Negative for difficulty urinating, dysuria, flank pain, frequency, hematuria, pelvic pain, urgency, vaginal bleeding and vaginal discharge.   Musculoskeletal: Negative.    Skin: Negative.    Allergic/Immunologic: Negative.    Neurological: Negative.    Hematological: Negative.    Psychiatric/Behavioral: Negative.            Physical Exam   Constitutional: She is oriented to person, place, and time. Vital signs are normal. She appears well-developed and well-nourished. She is cooperative.   HENT:   Head: Normocephalic.   Eyes: Pupils are equal, round, and reactive to light. Conjunctivae are normal.   Neck: Normal range of motion. Neck supple. No tracheal deviation present. No thyromegaly present.    Cardiovascular: Normal rate, regular rhythm and normal heart sounds. Exam reveals no gallop and no friction rub.   No murmur heard.  Pulmonary/Chest: Effort normal and breath sounds normal. No respiratory distress. She has no wheezes. Right breast exhibits no inverted nipple, no mass, no nipple discharge, no skin change and no tenderness. Left breast exhibits no inverted nipple, no mass, no nipple discharge, no skin change and no tenderness. No breast swelling. Breasts are symmetrical.   Abdominal: Soft. Bowel sounds are normal. She exhibits no distension, no ascites and no mass. There is no hepatosplenomegaly. There is no tenderness. There is no rebound, no guarding and no CVA tenderness. No hernia. Hernia confirmed negative in the right inguinal area and confirmed negative in the left inguinal area.   Genitourinary: Rectal exam shows no fissure, no mass, no tenderness and anal tone normal. No breast swelling. Pelvic exam was performed with patient supine. No labial fusion. There is no rash, tenderness, lesion or injury on the right labia. There is no rash, tenderness, lesion or injury on the left labia. Right adnexum displays no mass, no tenderness and no fullness. Left adnexum displays no mass, no tenderness and no fullness. No erythema, tenderness or bleeding in the vagina. No foreign body in the vagina. No signs of injury around the vagina. No vaginal discharge found.   Genitourinary Comments: The vaginal cuff is well-healed.  Bimanual exam is without mass or tenderness.   Musculoskeletal: Normal range of motion. She exhibits no edema or deformity.   Lymphadenopathy:     She has no cervical adenopathy.        Right: No inguinal adenopathy present.        Left: No inguinal adenopathy present.   Neurological: She is alert and oriented to person, place, and time. She has normal reflexes. No cranial nerve deficit. Coordination normal.   Skin: Skin is warm and dry. No rash noted. No erythema.   Psychiatric: She  has a normal mood and affect. Her behavior is normal.         Ashley was seen today for gynecologic exam.    Diagnoses and all orders for this visit:    Encounter for gynecological examination without abnormal finding  -     Pap IG, Rfx HPV ASCU    Menopause    Postmenopausal hormone replacement therapy    Family history of breast cancer in first degree relative    The patient is doing well.  We discussed pros and cons of hormone replacement therapy.  I encourage nutritional supplementation and weightbearing exercise for bone health.  Periodic gynecologic exams and annual mammograms were recommended.

## 2020-07-14 DIAGNOSIS — N63.10 MASS OF RIGHT BREAST ON MAMMOGRAM: ICD-10-CM

## 2020-07-14 DIAGNOSIS — R92.8 ABNORMALITY OF RIGHT BREAST ON SCREENING MAMMOGRAM: Primary | ICD-10-CM

## 2020-07-15 LAB
CONV .: NORMAL
CYTOLOGIST CVX/VAG CYTO: NORMAL
CYTOLOGY CVX/VAG DOC CYTO: NORMAL
CYTOLOGY CVX/VAG DOC THIN PREP: NORMAL
DX ICD CODE: NORMAL
HIV 1 & 2 AB SER-IMP: NORMAL
OTHER STN SPEC: NORMAL
STAT OF ADQ CVX/VAG CYTO-IMP: NORMAL

## 2020-07-29 ENCOUNTER — APPOINTMENT (OUTPATIENT)
Dept: WOMENS IMAGING | Facility: HOSPITAL | Age: 75
End: 2020-07-29

## 2020-07-29 PROCEDURE — G0279 TOMOSYNTHESIS, MAMMO: HCPCS | Performed by: RADIOLOGY

## 2020-07-29 PROCEDURE — 76641 ULTRASOUND BREAST COMPLETE: CPT | Performed by: RADIOLOGY

## 2020-07-29 PROCEDURE — 77065 DX MAMMO INCL CAD UNI: CPT | Performed by: RADIOLOGY

## 2020-07-31 ENCOUNTER — TELEPHONE (OUTPATIENT)
Dept: OBSTETRICS AND GYNECOLOGY | Facility: CLINIC | Age: 75
End: 2020-07-31

## 2020-07-31 DIAGNOSIS — N63.10 MASS OF RIGHT BREAST ON MAMMOGRAM: ICD-10-CM

## 2020-07-31 DIAGNOSIS — N60.01 BREAST CYST, RIGHT: ICD-10-CM

## 2020-07-31 DIAGNOSIS — R92.8 ABNORMAL MAMMOGRAM OF RIGHT BREAST: Primary | ICD-10-CM

## 2020-07-31 DIAGNOSIS — R92.8 ABNORMALITY OF RIGHT BREAST ON SCREENING MAMMOGRAM: ICD-10-CM

## 2020-08-11 ENCOUNTER — APPOINTMENT (OUTPATIENT)
Dept: WOMENS IMAGING | Facility: HOSPITAL | Age: 75
End: 2020-08-11

## 2020-08-11 PROCEDURE — 76942 ECHO GUIDE FOR BIOPSY: CPT | Performed by: RADIOLOGY

## 2020-08-11 PROCEDURE — 19000 PUNCTURE ASPIR CYST BREAST: CPT | Performed by: RADIOLOGY

## 2020-08-18 DIAGNOSIS — R92.8 ABNORMAL MAMMOGRAM OF RIGHT BREAST: ICD-10-CM

## 2020-08-18 DIAGNOSIS — N60.01 BREAST CYST, RIGHT: ICD-10-CM

## 2020-11-12 ENCOUNTER — OFFICE VISIT (OUTPATIENT)
Dept: INTERNAL MEDICINE | Facility: CLINIC | Age: 75
End: 2020-11-12

## 2020-11-12 VITALS
WEIGHT: 124 LBS | DIASTOLIC BLOOD PRESSURE: 70 MMHG | OXYGEN SATURATION: 100 % | BODY MASS INDEX: 21.17 KG/M2 | SYSTOLIC BLOOD PRESSURE: 114 MMHG | HEART RATE: 79 BPM | HEIGHT: 64 IN

## 2020-11-12 DIAGNOSIS — I10 BENIGN ESSENTIAL HYPERTENSION: Chronic | ICD-10-CM

## 2020-11-12 DIAGNOSIS — M51.26 LUMBAR DISC HERNIATION: Chronic | ICD-10-CM

## 2020-11-12 DIAGNOSIS — Z91.09 MULTIPLE ENVIRONMENTAL ALLERGIES: Chronic | ICD-10-CM

## 2020-11-12 DIAGNOSIS — E78.2 MIXED HYPERLIPIDEMIA: Chronic | ICD-10-CM

## 2020-11-12 DIAGNOSIS — Z51.81 THERAPEUTIC DRUG MONITORING: ICD-10-CM

## 2020-11-12 DIAGNOSIS — Z11.59 NEED FOR HEPATITIS C SCREENING TEST: ICD-10-CM

## 2020-11-12 DIAGNOSIS — M19.031 PRIMARY OSTEOARTHRITIS OF BOTH WRISTS: Chronic | ICD-10-CM

## 2020-11-12 DIAGNOSIS — E03.9 PRIMARY HYPOTHYROIDISM: Chronic | ICD-10-CM

## 2020-11-12 DIAGNOSIS — M19.032 PRIMARY OSTEOARTHRITIS OF BOTH WRISTS: Chronic | ICD-10-CM

## 2020-11-12 DIAGNOSIS — J30.1 CHRONIC SEASONAL ALLERGIC RHINITIS DUE TO POLLEN: Chronic | ICD-10-CM

## 2020-11-12 DIAGNOSIS — E55.9 VITAMIN D DEFICIENCY: Chronic | ICD-10-CM

## 2020-11-12 DIAGNOSIS — M19.042 PRIMARY OSTEOARTHRITIS OF BOTH HANDS: Chronic | ICD-10-CM

## 2020-11-12 DIAGNOSIS — M19.041 PRIMARY OSTEOARTHRITIS OF BOTH HANDS: Chronic | ICD-10-CM

## 2020-11-12 DIAGNOSIS — K21.9 GASTROESOPHAGEAL REFLUX DISEASE WITHOUT ESOPHAGITIS: Chronic | ICD-10-CM

## 2020-11-12 DIAGNOSIS — Z00.00 ENCOUNTER FOR SUBSEQUENT ANNUAL WELLNESS VISIT (AWV) IN MEDICARE PATIENT: Primary | ICD-10-CM

## 2020-11-12 PROBLEM — N95.1 MENOPAUSAL STATE: Status: ACTIVE | Noted: 2020-07-13

## 2020-11-12 PROBLEM — M79.672 CHRONIC FOOT PAIN, LEFT: Status: RESOLVED | Noted: 2020-03-04 | Resolved: 2020-11-12

## 2020-11-12 PROBLEM — N95.1 MENOPAUSAL STATE: Chronic | Status: ACTIVE | Noted: 2020-07-13

## 2020-11-12 PROBLEM — Z80.3 FAMILY HISTORY OF BREAST CANCER IN FIRST DEGREE RELATIVE: Chronic | Status: ACTIVE | Noted: 2020-07-13

## 2020-11-12 PROBLEM — G89.29 CHRONIC FOOT PAIN, LEFT: Status: RESOLVED | Noted: 2020-03-04 | Resolved: 2020-11-12

## 2020-11-12 PROBLEM — B07.0 PLANTAR WART OF LEFT FOOT: Status: RESOLVED | Noted: 2020-01-22 | Resolved: 2020-11-12

## 2020-11-12 PROCEDURE — G0439 PPPS, SUBSEQ VISIT: HCPCS | Performed by: INTERNAL MEDICINE

## 2020-11-12 PROCEDURE — 99214 OFFICE O/P EST MOD 30 MIN: CPT | Performed by: INTERNAL MEDICINE

## 2020-11-12 RX ORDER — BIOTIN 1000 MCG
3000 TABLET,CHEWABLE ORAL
COMMUNITY
End: 2022-04-11 | Stop reason: HOSPADM

## 2020-11-12 RX ORDER — MULTIVIT WITH MINERALS/LUTEIN
250 TABLET ORAL DAILY
COMMUNITY

## 2020-11-12 NOTE — PROGRESS NOTES
The ABCs of the Annual Wellness Visit  Subsequent Medicare Wellness Visit    No chief complaint on file.      Subjective   History of Present Illness:  Ashley Mata is a 75 y.o. female who presents for a Subsequent Medicare Wellness Visit.    HEALTH RISK ASSESSMENT    Recent Hospitalizations:  No hospitalization(s) within the last year.    Current Medical Providers:  Patient Care Team:  Carloz Shukla MD as PCP - General (Internal Medicine)    Smoking Status:  Social History     Tobacco Use   Smoking Status Never Smoker   Smokeless Tobacco Never Used       Alcohol Consumption:  Social History     Substance and Sexual Activity   Alcohol Use Yes   • Frequency: 2-3 times a week   • Drinks per session: 1 or 2   • Binge frequency: Never    Comment: Socially       Depression Screen:   PHQ-2/PHQ-9 Depression Screening 11/12/2020   Little interest or pleasure in doing things 0   Feeling down, depressed, or hopeless 0   Trouble falling or staying asleep, or sleeping too much -   Feeling tired or having little energy -   Poor appetite or overeating -   Feeling bad about yourself - or that you are a failure or have let yourself or your family down -   Trouble concentrating on things, such as reading the newspaper or watching television -   Moving or speaking so slowly that other people could have noticed. Or the opposite - being so fidgety or restless that you have been moving around a lot more than usual -   Thoughts that you would be better off dead, or of hurting yourself in some way -   Total Score 0   If you checked off any problems, how difficult have these problems made it for you to do your work, take care of things at home, or get along with other people? -       Fall Risk Screen:  STEADI Fall Risk Assessment was completed, and patient is at LOW risk for falls.Assessment completed on:11/12/2020    Health Habits and Functional and Cognitive Screening:  Functional & Cognitive Status 11/12/2020   Do you have  difficulty preparing food and eating? No   Do you have difficulty bathing yourself, getting dressed or grooming yourself? No   Do you have difficulty using the toilet? No   Do you have difficulty moving around from place to place? No   Do you have trouble with steps or getting out of a bed or a chair? No   Current Diet Well Balanced Diet   Dental Exam Up to date   Eye Exam Up to date   Exercise (times per week) 1 times per week   Current Exercise Activities Include Walking   Do you need help using the phone?  No   Are you deaf or do you have serious difficulty hearing?  No   Do you need help with transportation? No   Do you need help shopping? No   Do you need help preparing meals?  No   Do you need help with housework?  No   Do you need help with laundry? No   Do you need help taking your medications? No   Do you need help managing money? No   Do you ever drive or ride in a car without wearing a seat belt? No   Have you felt unusual stress, anger or loneliness in the last month? No   Who do you live with? Spouse   If you need help, do you have trouble finding someone available to you? No   Have you been bothered in the last four weeks by sexual problems? No   Do you have difficulty concentrating, remembering or making decisions? No         Does the patient have evidence of cognitive impairment? No    Asprin use counseling:Does not need ASA (and currently is not on it)    Age-appropriate Screening Schedule:  Refer to the list below for future screening recommendations based on patient's age, sex and/or medical conditions. Orders for these recommended tests are listed in the plan section. The patient has been provided with a written plan.    Health Maintenance   Topic Date Due   • LIPID PANEL  01/15/2021   • DXA SCAN  08/21/2021   • COLONOSCOPY  03/10/2022   • MAMMOGRAM  07/29/2022   • TDAP/TD VACCINES (2 - Td) 04/12/2027   • INFLUENZA VACCINE  Completed   • ZOSTER VACCINE  Discontinued          The following  portions of the patient's history were reviewed and updated as appropriate: allergies, current medications, past family history, past medical history, past social history, past surgical history and problem list.    Outpatient Medications Prior to Visit   Medication Sig Dispense Refill   • atorvastatin (LIPITOR) 80 MG tablet TAKE ONE TABLET BY MOUTH EVERY NIGHT AT BEDTIME 90 tablet 0   • Biotin 1000 MCG chewable tablet Chew 3,000 mg.     • Calcium Carbonate (CALCIUM 600 PO) Take 2 tablets by mouth daily.     • Coenzyme Q10 (COQ10) 400 MG capsule Take 1 capsule by mouth daily. Take with a meal.     • estrogens, conjugated, (PREMARIN) 0.625 MG tablet Take 1 by mouth daily as directed. 90 tablet 3   • fexofenadine-pseudoephedrine (ALLEGRA-D 24) 180-240 MG per 24 hr tablet Take 1 tablet by mouth Daily. 30 tablet 5   • levothyroxine (SYNTHROID) 50 MCG tablet Take 1 p.o. daily for low thyroid 90 tablet 3   • lisinopril-hydrochlorothiazide (PRINZIDE,ZESTORETIC) 10-12.5 MG per tablet Take 1 by mouth every morning for high blood pressure 90 tablet 3   • meloxicam (MOBIC) 15 MG tablet TAKE ONE TABLET BY MOUTH DAILY FOR ARTHRITIS 90 tablet 1   • multivitamin (THERAGRAN) tablet tablet Take 1 tablet by mouth daily.     • omeprazole OTC (PRILOSEC OTC) 20 MG EC tablet Take 1 tablet by mouth Daily.     • vitamin C (ASCORBIC ACID) 250 MG tablet Take 250 mg by mouth Daily.       No facility-administered medications prior to visit.        Patient Active Problem List   Diagnosis   • Allergic rhinitis   • Benign essential hypertension   • Diverticulosis of colon   • Gastroesophageal reflux disease without esophagitis   • Hyperlipidemia   • Multiple environmental allergies   • Primary osteoarthritis of both wrists   • Primary osteoarthritis of both hands   • Postmenopausal hormone replacement therapy   • Vitamin D deficiency   • Therapeutic drug monitoring   • Alopecia   • Lumbar scoliosis   • Lumbar disc herniation, L4-L5   • Primary  "hypothyroidism   • Plantar wart of left foot   • Chronic foot pain, left   • Family history of breast cancer in first degree relative   • Menopausal state       Advanced Care Planning:  ACP discussion was held with the patient during this visit. Patient has an advance directive (not in EMR), copy requested.    Review of Systems   Constitutional: Negative.    HENT: Negative.    Respiratory: Negative.    Cardiovascular: Negative.    Gastrointestinal: Negative.    Genitourinary: Negative.    Musculoskeletal: Positive for arthralgias and back pain.   Skin: Negative.    Allergic/Immunologic: Negative.    Neurological: Negative.    Hematological: Negative.    Psychiatric/Behavioral: Negative.        Compared to one year ago, the patient feels her physical health is the same.  Compared to one year ago, the patient feels her mental health is the same.    Reviewed chart for potential of high risk medication in the elderly: yes  Reviewed chart for potential of harmful drug interactions in the elderly:yes    Objective         Vitals:    11/12/20 1000   BP: 114/70   BP Location: Left arm   Pulse: 79   SpO2: 100%   Weight: 56.2 kg (124 lb)   Height: 163.8 cm (64.49\")       Body mass index is 20.96 kg/m².  Discussed the patient's BMI with her. The BMI is in the acceptable range.    Physical Exam      General: Alert and oriented x 3.  No acute distress.  Normal affect.  HEENT: Pupils equal, round, reactive to light; extraocular movements intact; sclerae nonicteric; pharynx, ear canals and TMs normal.  Neck: Without JVD, thyromegaly, bruit, or adenopathy.  Lungs: Clear to auscultation in all fields.  Heart: Regular rate and rhythm without murmur, rub, gallop, or click.  Abdomen: Soft, nontender, without hepatosplenomegaly or hernia.  Bowel sounds normal.  : Deferred.  Rectal: Deferred.  Extremities: Without clubbing, cyanosis, edema, or pulse deficit.  Neurologic: Intact without focal deficit.  Normal station and gait observed " during ingress and egress from the examination room.  Skin: Without significant lesion.  Musculoskeletal: Changes consistent with degenerative arthritis noted of both hands and wrists.     Assessment/Plan   Medicare Risks and Personalized Health Plan  CMS Preventative Services Quick Reference  Advance Directive Discussion  Cardiovascular risk  Diabetic Lab Screening   Osteoprorosis Risk    The above risks/problems have been discussed with the patient.  Pertinent information has been shared with the patient in the After Visit Summary.  Follow up plans and orders are seen below in the Assessment/Plan Section.    Diagnoses and all orders for this visit:    1. Encounter for subsequent annual wellness visit (AWV) in Medicare patient (Primary)    2. Hyperlipidemia  -     Comprehensive Metabolic Panel  -     CK  -     TSH  -     T4, Free  -     T3, Free  -     NMR LipoProfile    3. Benign essential hypertension    4. Gastroesophageal reflux disease without esophagitis    5. Vitamin D deficiency  -     Vitamin D 25 Hydroxy    6. Primary hypothyroidism    7. Multiple environmental allergies    8. Allergic rhinitis    9. Primary osteoarthritis of both hands    10. Primary osteoarthritis of both wrists    11. Lumbar disc herniation, L4-L5    12. Therapeutic drug monitoring  -     CBC (No Diff)    13. Need for hepatitis C screening test  -     Hepatitis C Antibody; Future  -     Hepatitis C Antibody      Follow Up:  No follow-ups on file.     An After Visit Summary and PPPS were given to the patient.

## 2020-11-12 NOTE — PROGRESS NOTES
11/12/2020    Patient Information  Ashley Mata                                                                                          [address] [facility] [address]      1945  [unfilled]  There is no work phone number on file.    Chief Complaint:     Subsequent Medicare wellness visit.  Follow-up lab work in order to monitor chronic medical issues listed in history of present illness.  No new acute complaints.    History of Present Illness:    Patient with history of hyperlipidemia, hypertension, esophageal reflux, vitamin D deficiency, hypothyroidism, multiple environmental allergies/allergic rhinitis, osteoarthritis primarily involving the hands and wrists, lumbar disc herniation.  She presents today for her subsequent Medicare wellness visit and follow-up on recent blood work in order to monitor chronic medical issues.  Her past medical history reviewed and updated were necessary including health maintenance parameters.  This reveals she will be up-to-date or else accounted for after today's visit.    Review of Systems   Constitution: Negative.   HENT: Negative.    Eyes: Negative.    Cardiovascular: Negative.    Respiratory: Negative.    Endocrine: Negative.    Hematologic/Lymphatic: Negative.    Skin: Negative.    Musculoskeletal: Positive for arthritis, back pain and joint pain.   Gastrointestinal: Negative.    Genitourinary: Negative.    Neurological: Negative.    Psychiatric/Behavioral: Negative.    Allergic/Immunologic: Negative.        Active Problems:    Patient Active Problem List   Diagnosis   • Allergic rhinitis   • Benign essential hypertension   • Diverticulosis of colon   • Gastroesophageal reflux disease without esophagitis   • Hyperlipidemia   • Multiple environmental allergies   • Primary osteoarthritis of both wrists   • Primary osteoarthritis of both hands   • Postmenopausal hormone replacement therapy   • Vitamin D deficiency   • Therapeutic drug  monitoring   • Alopecia   • Lumbar scoliosis   • Lumbar disc herniation, L4-L5   • Primary hypothyroidism   • Family history of breast cancer in first degree relative   • Menopausal state         Past Medical History:   Diagnosis Date   • Allergic rhinitis 1/30/2015 11/13/2015--patient was evaluated by ENT and was started on generic Flonase and patient reports this has improved her symptoms quite a bit.   01/30/2015--allergy testing revealed positive reactivity to cat, dust mite, cockroach, mold spores, and grass pollen. Environmental control measures.   • Alopecia 4/12/2017    Evaluated and treated by the dermatologist.   • Benign essential hypertension 4/18/2016   • Diverticulosis of colon 3/17/2009    03/10/2017--colonoscopy revealed many small and large mouth diverticula in the sigmoid colon and descending colon.  Nonthrombosed external hemorrhoids and internal hemorrhoids noted.  Otherwise, normal colonoscopy.  03/17/2009--colonoscopy revealed external hemorrhoids, torturous colon with a sigmoid stricture, sigmoid diverticulosis.   • Family history of breast cancer in first degree relative 7/13/2020   • Gastroesophageal reflux disease without esophagitis 3/17/2009    06/09/2015--EGD revealed Z line irregular, 35 cm from incisors. Biopsied. Small hiatal hernia. Gastritis. Biopsied. Bilious gastric fluid. Fluid aspiration performed. Normal duodenal bulb and second part of duodenum. Biopsied. Pathology revealed the antral biopsy revealed small intestinal mucosa with no pathologic diagnosis. Good preservation of villous architecture. Duodenum biopsy revealed largely antral type gastric mucosa with mild patchy superficial chronic gastritis. Gastroesophageal junction revealed squamous and glandular mucosa with mild chronic inflammation. Negative for intestinal metaplasia or dysplasia. There appeared to be mislabeling of the antral biopsies and duodenal biopsies.   04/17/2012--EGD revealed irregular Z line, hiatal  hernia, gastritis, duodenitis.   03/17/2009--EGD revealed grade B. reflux esophagitis, hiatal hernia, gastritis, a few gastric polyps, duodenitis.   • History of Hyperplastic polyps of stomach 06/09/2015 06/09/2015--EGD revealed Z line irregular, 35 cm from incisors. Biopsied. Small hiatal hernia. Gastritis. Biopsied. Bilious gastric fluid. Fluid aspiration performed. Normal duodenal bulb and second part of duodenum. Biopsied. Pathology revealed the antral biopsy revealed small intestinal mucosa with no pathologic diagnosis. Good preservation of villous architecture. Duodenum biopsy revealed large   • Hyperlipidemia 7/17/2012 07/17/2012--treatment for hyperlipidemia begun.   • Lumbar disc herniation, L4-L5 8/7/2018 01/16/2019--patient seen in follow-up by Dr. Shukla.  She has seen the neurosurgeon who recommended conservative therapy with physical therapy.  Patient reports that has been extremely helpful.  Currently has no significant pain.  08/07/2018--patient seen in follow-up and reports her pain is much better after taking prednisone.  She is now down to half a pill per day and is almost off of it.  I rev   • Lumbar scoliosis 8/7/2018 01/16/2019--patient seen in follow-up by Dr. Shukla.  She has seen the neurosurgeon who recommended conservative therapy with physical therapy.  Patient reports that has been extremely helpful.  Currently has no significant pain.  08/07/2018--patient seen in follow-up and reports her pain is much better after taking prednisone.  She is now down to half a pill per day and is almost off of it.  I rev   • Menopausal state 7/13/2020   • Multiple environmental allergies 1/30/2015 01/30/2015--allergy testing revealed positive reactivity to cat, dust mite, cockroach, mold spores, and grass pollen. Environmental control measures.   • Postmenopausal hormone replacement therapy 4/18/2016   • Primary hypothyroidism 1/22/2020 January 22, 2020--TSH is elevated at 5.0.   Levothyroxine 50 mcg/day initiated.  Patient will follow-up with nonfasting lab work in about 6 to 8 weeks to reassess.  07/09/2018--routine follow-up.  TSH elevated slightly at 4.31.  Free T4 normal at 1.22.  Free T3 normal at 3.3.  Continued observation.  10/11/2017--thyroid function tests are normal.  09/30/2016--thyroid ultrasound reveals a tiny 2 mm    • Primary osteoarthritis of both hands 12/30/2013 05/12/2014--patient seen in followup and reports that the Vimovo has helped. Uric acid levels are normal at 4.9. C. reactive protein mildly elevated at 2.3. X-rays of the hands reveals radiocarpal, first carpometacarpal, first metacarpophalangeal, and interphalangeal joint degeneration. This process is symmetric. The interphalangeal joint of the left is affected to the greatest degree. There is right sided scapholunate dissociation as well as deformity of the scaphoid suggesting prior traumatic injury with healing. Soft tissues are satisfactory. Overall appearance is most consistent with osteoarthritis.   05/05/2014--patient presents and reports that she is having worsening right wrist pain primarily at the base of the thumb. No new injury. Bilateral x-rays of the wrists and hands ordered. Uric acid level ordered as well as a CRP. Vimovo 500/20 one by mouth twice a day. Follow up in one week.   03/18/2014--patient reports that her wrist symptoms have improved.   12/30/2013--patient reports a several mon   • Primary osteoarthritis of both wrists 12/30/2013 05/12/2014--patient seen in followup and reports that the Vimovo has helped. Uric acid levels are normal at 4.9. C. reactive protein mildly elevated at 2.3. X-rays of the hands reveals radiocarpal, first carpometacarpal, first metacarpophalangeal, and interphalangeal joint degeneration. This process is symmetric. The interphalangeal joint of the left is affected to the greatest degree. There is right sided scapholunate dissociation as well as deformity of  the scaphoid suggesting prior traumatic injury with healing. Soft tissues are satisfactory. Overall appearance is most consistent with osteoarthritis.   05/05/2014--patient presents and reports that she is having worsening right wrist pain primarily at the base of the thumb. No new injury. Bilateral x-rays of the wrists and hands ordered. Uric acid level ordered as well as a CRP. Vimovo 500/20 one by mouth twice a day. Follow up in one week.   03/18/2014--patient reports that her wrist symptoms have improved.   12/30/2013--patient reports a several mon   • Vitamin D deficiency 4/18/2016         Past Surgical History:   Procedure Laterality Date   • COLONOSCOPY  03/17/2009 03/17/2009--colonoscopy revealed external hemorrhoids, torturous colon with a sigmoid stricture, sigmoid diverticulosis.   • COLONOSCOPY N/A 3/10/2017    03/10/2017--colonoscopy revealed many small and large mouth diverticula in the sigmoid colon and descending colon.  Nonthrombosed external hemorrhoids and internal hemorrhoids noted.  Otherwise, normal colonoscopy.   • ERCP WITH SPHINCTEROTOMY/PAPILLOTOMY  08/13/2014 08/13/2014--ERCP, endoscopic sphincterotomy and removal of common bile duct stones. 08/12/2014--laparoscopic cholecystectomy. This was complicated by retained common bile duct stones 2.   • ESOPHAGOSCOPY / EGD  06/09/2015 06/09/2015--EGD revealed Z line irregular, 35 cm from incisors. Biopsied. Small hiatal hernia. Gastritis. Biopsied. Bilious gastric fluid. Fluid aspiration performed. Normal duodenal bulb and second part of duodenum. Biopsied. Pathology revealed the antral biopsy revealed small intestinal mucosa with no pathologic diagnosis. Good preservation of villous architecture. Duodenum biopsy revealed large   • LAPAROSCOPIC CHOLECYSTECTOMY  08/12/2014 08/13/2014--ERCP, endoscopic sphincterotomy and removal of common bile duct stones. 08/12/2014--laparoscopic cholecystectomy. This was complicated by retained common  bile duct stones 2.   • ROTATOR CUFF REPAIR Left 07/10/2014    07/10/2014--left rotator cuff repair. 03/18/2014--patient seen in followup and reports that her range of motion has improved and her pain is not debilitating. She feels that she is making progress with physical therapy. Surgery scheduled 07/10/2014. 01/10/2014--patient was seen in evaluation by the orthopedist and he recommended conservative treatment consisting of physical therapy/rehabilitation.   • ROTATOR CUFF REPAIR Right 08/03/2016 08/03/2016--arthroscopic right rotator cuff repair.  Debridement of degenerative anterior superior labral tear.   • SUBTOTAL HYSTERECTOMY  1978    Partial hysterectomy 1978.         No Known Allergies        Current Outpatient Medications:   •  atorvastatin (LIPITOR) 80 MG tablet, TAKE ONE TABLET BY MOUTH EVERY NIGHT AT BEDTIME, Disp: 90 tablet, Rfl: 0  •  Biotin 1000 MCG chewable tablet, Chew 3,000 mg., Disp: , Rfl:   •  Calcium Carbonate (CALCIUM 600 PO), Take 2 tablets by mouth daily., Disp: , Rfl:   •  Coenzyme Q10 (COQ10) 400 MG capsule, Take 1 capsule by mouth daily. Take with a meal., Disp: , Rfl:   •  estrogens, conjugated, (PREMARIN) 0.625 MG tablet, Take 1 by mouth daily as directed., Disp: 90 tablet, Rfl: 3  •  fexofenadine-pseudoephedrine (ALLEGRA-D 24) 180-240 MG per 24 hr tablet, Take 1 tablet by mouth Daily., Disp: 30 tablet, Rfl: 5  •  levothyroxine (SYNTHROID) 50 MCG tablet, Take 1 p.o. daily for low thyroid, Disp: 90 tablet, Rfl: 3  •  lisinopril-hydrochlorothiazide (PRINZIDE,ZESTORETIC) 10-12.5 MG per tablet, Take 1 by mouth every morning for high blood pressure, Disp: 90 tablet, Rfl: 3  •  meloxicam (MOBIC) 15 MG tablet, TAKE ONE TABLET BY MOUTH DAILY FOR ARTHRITIS, Disp: 90 tablet, Rfl: 1  •  multivitamin (THERAGRAN) tablet tablet, Take 1 tablet by mouth daily., Disp: , Rfl:   •  omeprazole OTC (PRILOSEC OTC) 20 MG EC tablet, Take 1 tablet by mouth Daily., Disp: , Rfl:   •  vitamin C (ASCORBIC  "ACID) 250 MG tablet, Take 250 mg by mouth Daily., Disp: , Rfl:       Family History   Problem Relation Age of Onset   • Other Father         Hodgkin's Disease   • Cancer Father         Lung Cancer   • Breast cancer Daughter 52   • Alzheimer's disease Mother          Social History     Socioeconomic History   • Marital status:      Spouse name: Not on file   • Number of children: 2   • Years of education: BA   • Highest education level: Associate degree: occupational, technical, or vocational program   Occupational History   • Occupation:  - [employer]   Social Needs   • Financial resource strain: Not hard at all   • Food insecurity     Worry: Never true     Inability: Never true   • Transportation needs     Medical: No     Non-medical: No   Tobacco Use   • Smoking status: Never Smoker   • Smokeless tobacco: Never Used   Substance and Sexual Activity   • Alcohol use: Yes     Frequency: 2-3 times a week     Drinks per session: 1 or 2     Binge frequency: Never     Comment: Socially   • Drug use: No   • Sexual activity: Yes     Partners: Male   Lifestyle   • Physical activity     Days per week: 2 days     Minutes per session: 30 min   • Stress: Only a little   Relationships   • Social connections     Talks on phone: Once a week     Gets together: Twice a week     Attends [employer] service: More than 4 times per year     Active member of club or organization: Yes     Attends meetings of clubs or organizations: More than 4 times per year     Relationship status:          Vitals:    11/12/20 1000   BP: 114/70   BP Location: Left arm   Pulse: 79   SpO2: 100%   Weight: 56.2 kg (124 lb)   Height: 163.8 cm (64.49\")        Body mass index is 20.96 kg/m².      Physical Exam:    General: Alert and oriented x 3.  No acute distress.  Normal affect.  HEENT: Pupils equal, round, reactive to light; extraocular movements intact; sclerae nonicteric; pharynx, ear canals and TMs normal.  Neck: Without JVD, " thyromegaly, bruit, or adenopathy.  Lungs: Clear to auscultation in all fields.  Heart: Regular rate and rhythm without murmur, rub, gallop, or click.  Abdomen: Soft, nontender, without hepatosplenomegaly or hernia.  Bowel sounds normal.  : Deferred.  Rectal: Deferred.  Extremities: Without clubbing, cyanosis, edema, or pulse deficit.  Neurologic: Intact without focal deficit.  Normal station and gait observed during ingress and egress from the examination room.  Skin: Without significant lesion.  Musculoskeletal: Changes consistent with degenerative arthritis noted of both hands and wrists.    Lab/other results:    Laboratory work was not performed for reasons that are unclear.    Assessment/Plan:     Diagnosis Plan   1. Encounter for subsequent annual wellness visit (AWV) in Medicare patient     2. Hyperlipidemia  Comprehensive Metabolic Panel    CK    TSH    T4, Free    T3, Free    NMR LipoProfile    CK    Comprehensive Metabolic Panel    NMR LipoProfile    TSH    T4, Free    T3, Free   3. Benign essential hypertension     4. Gastroesophageal reflux disease without esophagitis     5. Vitamin D deficiency  Vitamin D 25 Hydroxy    Vitamin D 25 Hydroxy   6. Primary hypothyroidism     7. Multiple environmental allergies     8. Allergic rhinitis     9. Primary osteoarthritis of both hands     10. Primary osteoarthritis of both wrists     11. Lumbar disc herniation, L4-L5     12. Therapeutic drug monitoring  CBC (No Diff)    CBC (No Diff)    Urinalysis With Microscopic If Indicated (No Culture) - Urine, Clean Catch   13. Need for hepatitis C screening test  Hepatitis C Antibody    Hepatitis C Antibody     The subsequent Medicare wellness visit is documented on separate note.    Plan is as follows: Patient will come in for fasting lab work in the near future and then follow-up on the phone for the results and possible further instructions.  I am not going to make any changes in her current medical regimen at this  time.  We will go ahead and schedule her for fasting lab and follow-up as well as subsequent Medicare wellness visit in 1 year.        Procedures

## 2020-11-20 ENCOUNTER — LAB (OUTPATIENT)
Dept: LAB | Facility: HOSPITAL | Age: 75
End: 2020-11-20

## 2020-11-20 LAB
25(OH)D3 SERPL-MCNC: 59.3 NG/ML (ref 30–100)
ALBUMIN SERPL-MCNC: 4.1 G/DL (ref 3.5–5.2)
ALBUMIN/GLOB SERPL: 1.7 G/DL
ALP SERPL-CCNC: 67 U/L (ref 39–117)
ALT SERPL W P-5'-P-CCNC: 18 U/L (ref 1–33)
ANION GAP SERPL CALCULATED.3IONS-SCNC: 5.5 MMOL/L (ref 5–15)
AST SERPL-CCNC: 19 U/L (ref 1–32)
BILIRUB SERPL-MCNC: 0.4 MG/DL (ref 0–1.2)
BUN SERPL-MCNC: 20 MG/DL (ref 8–23)
BUN/CREAT SERPL: 29 (ref 7–25)
CALCIUM SPEC-SCNC: 9.2 MG/DL (ref 8.6–10.5)
CHLORIDE SERPL-SCNC: 104 MMOL/L (ref 98–107)
CK SERPL-CCNC: 49 U/L (ref 20–180)
CO2 SERPL-SCNC: 29.5 MMOL/L (ref 22–29)
CREAT SERPL-MCNC: 0.69 MG/DL (ref 0.57–1)
DEPRECATED RDW RBC AUTO: 42.1 FL (ref 37–54)
ERYTHROCYTE [DISTWIDTH] IN BLOOD BY AUTOMATED COUNT: 12.4 % (ref 12.3–15.4)
GFR SERPL CREATININE-BSD FRML MDRD: 82 ML/MIN/1.73
GLOBULIN UR ELPH-MCNC: 2.4 GM/DL
GLUCOSE SERPL-MCNC: 94 MG/DL (ref 65–99)
HCT VFR BLD AUTO: 38 % (ref 34–46.6)
HCV AB SER DONR QL: NORMAL
HGB BLD-MCNC: 12.7 G/DL (ref 12–15.9)
MCH RBC QN AUTO: 30.8 PG (ref 26.6–33)
MCHC RBC AUTO-ENTMCNC: 33.4 G/DL (ref 31.5–35.7)
MCV RBC AUTO: 92.2 FL (ref 79–97)
PLATELET # BLD AUTO: 273 10*3/MM3 (ref 140–450)
PMV BLD AUTO: 10.5 FL (ref 6–12)
POTASSIUM SERPL-SCNC: 4 MMOL/L (ref 3.5–5.2)
PROT SERPL-MCNC: 6.5 G/DL (ref 6–8.5)
RBC # BLD AUTO: 4.12 10*6/MM3 (ref 3.77–5.28)
SODIUM SERPL-SCNC: 139 MMOL/L (ref 136–145)
T3FREE SERPL-MCNC: 3.19 PG/ML (ref 2–4.4)
T4 FREE SERPL-MCNC: 1.53 NG/DL (ref 0.93–1.7)
TSH SERPL DL<=0.05 MIU/L-ACNC: 2.07 UIU/ML (ref 0.27–4.2)
WBC # BLD AUTO: 7.9 10*3/MM3 (ref 3.4–10.8)

## 2020-11-20 PROCEDURE — 82306 VITAMIN D 25 HYDROXY: CPT | Performed by: INTERNAL MEDICINE

## 2020-11-20 PROCEDURE — 80053 COMPREHEN METABOLIC PANEL: CPT | Performed by: INTERNAL MEDICINE

## 2020-11-20 PROCEDURE — 80061 LIPID PANEL: CPT | Performed by: INTERNAL MEDICINE

## 2020-11-20 PROCEDURE — 82550 ASSAY OF CK (CPK): CPT | Performed by: INTERNAL MEDICINE

## 2020-11-20 PROCEDURE — 84439 ASSAY OF FREE THYROXINE: CPT | Performed by: INTERNAL MEDICINE

## 2020-11-20 PROCEDURE — 83704 LIPOPROTEIN BLD QUAN PART: CPT | Performed by: INTERNAL MEDICINE

## 2020-11-20 PROCEDURE — 36415 COLL VENOUS BLD VENIPUNCTURE: CPT | Performed by: INTERNAL MEDICINE

## 2020-11-20 PROCEDURE — 84481 FREE ASSAY (FT-3): CPT | Performed by: INTERNAL MEDICINE

## 2020-11-20 PROCEDURE — 86803 HEPATITIS C AB TEST: CPT | Performed by: INTERNAL MEDICINE

## 2020-11-20 PROCEDURE — 84443 ASSAY THYROID STIM HORMONE: CPT | Performed by: INTERNAL MEDICINE

## 2020-11-20 PROCEDURE — 85027 COMPLETE CBC AUTOMATED: CPT | Performed by: INTERNAL MEDICINE

## 2020-11-23 LAB
CHOLEST SERPL-MCNC: 159 MG/DL (ref 100–199)
HDL SERPL-SCNC: 48.5 UMOL/L
HDLC SERPL-MCNC: 80 MG/DL
LDL SERPL QN: 21 NM
LDL SERPL-SCNC: 639 NMOL/L
LDL SMALL SERPL-SCNC: <90 NMOL/L
LDLC SERPL CALC-MCNC: 45 MG/DL (ref 0–99)
TRIGL SERPL-MCNC: 222 MG/DL (ref 0–149)

## 2020-12-19 DIAGNOSIS — I10 BENIGN ESSENTIAL HYPERTENSION: Chronic | ICD-10-CM

## 2020-12-21 RX ORDER — LISINOPRIL AND HYDROCHLOROTHIAZIDE 12.5; 1 MG/1; MG/1
TABLET ORAL
Qty: 90 TABLET | Refills: 1 | Status: SHIPPED | OUTPATIENT
Start: 2020-12-21 | End: 2021-06-18

## 2021-01-12 DIAGNOSIS — M19.041 PRIMARY OSTEOARTHRITIS OF BOTH HANDS: Chronic | ICD-10-CM

## 2021-01-12 DIAGNOSIS — M19.042 PRIMARY OSTEOARTHRITIS OF BOTH HANDS: Chronic | ICD-10-CM

## 2021-01-12 DIAGNOSIS — M19.031 PRIMARY OSTEOARTHRITIS OF BOTH WRISTS: Chronic | ICD-10-CM

## 2021-01-12 DIAGNOSIS — M19.032 PRIMARY OSTEOARTHRITIS OF BOTH WRISTS: Chronic | ICD-10-CM

## 2021-01-12 DIAGNOSIS — E03.9 PRIMARY HYPOTHYROIDISM: ICD-10-CM

## 2021-01-12 RX ORDER — LEVOTHYROXINE SODIUM 0.05 MG/1
TABLET ORAL
Qty: 90 TABLET | Refills: 2 | Status: SHIPPED | OUTPATIENT
Start: 2021-01-12 | End: 2021-10-12

## 2021-01-12 RX ORDER — MELOXICAM 15 MG/1
TABLET ORAL
Qty: 90 TABLET | Refills: 0 | Status: SHIPPED | OUTPATIENT
Start: 2021-01-12 | End: 2021-04-21

## 2021-03-02 DIAGNOSIS — Z79.890 POSTMENOPAUSAL HORMONE REPLACEMENT THERAPY: Chronic | ICD-10-CM

## 2021-04-21 DIAGNOSIS — M19.032 PRIMARY OSTEOARTHRITIS OF BOTH WRISTS: Chronic | ICD-10-CM

## 2021-04-21 DIAGNOSIS — M19.042 PRIMARY OSTEOARTHRITIS OF BOTH HANDS: Chronic | ICD-10-CM

## 2021-04-21 DIAGNOSIS — M19.031 PRIMARY OSTEOARTHRITIS OF BOTH WRISTS: Chronic | ICD-10-CM

## 2021-04-21 DIAGNOSIS — M19.041 PRIMARY OSTEOARTHRITIS OF BOTH HANDS: Chronic | ICD-10-CM

## 2021-04-21 RX ORDER — MELOXICAM 15 MG/1
TABLET ORAL
Qty: 90 TABLET | Refills: 0 | Status: SHIPPED | OUTPATIENT
Start: 2021-04-21 | End: 2021-07-27

## 2021-05-10 RX ORDER — ATORVASTATIN CALCIUM 80 MG/1
TABLET, FILM COATED ORAL
Qty: 90 TABLET | Refills: 0 | Status: SHIPPED | OUTPATIENT
Start: 2021-05-10 | End: 2022-04-18 | Stop reason: SDUPTHER

## 2021-06-17 DIAGNOSIS — I10 BENIGN ESSENTIAL HYPERTENSION: Chronic | ICD-10-CM

## 2021-06-18 RX ORDER — LISINOPRIL AND HYDROCHLOROTHIAZIDE 12.5; 1 MG/1; MG/1
TABLET ORAL
Qty: 90 TABLET | Refills: 0 | Status: SHIPPED | OUTPATIENT
Start: 2021-06-18 | End: 2021-09-28 | Stop reason: SDUPTHER

## 2021-07-07 ENCOUNTER — OFFICE VISIT (OUTPATIENT)
Dept: OBSTETRICS AND GYNECOLOGY | Facility: CLINIC | Age: 76
End: 2021-07-07

## 2021-07-07 VITALS
BODY MASS INDEX: 21.17 KG/M2 | SYSTOLIC BLOOD PRESSURE: 140 MMHG | HEIGHT: 64 IN | WEIGHT: 124 LBS | DIASTOLIC BLOOD PRESSURE: 78 MMHG

## 2021-07-07 DIAGNOSIS — N95.0 POSTMENOPAUSAL BLEEDING: Primary | ICD-10-CM

## 2021-07-07 LAB
BILIRUB BLD-MCNC: NEGATIVE MG/DL
CLARITY, POC: CLEAR
COLOR UR: YELLOW
GLUCOSE UR STRIP-MCNC: NEGATIVE MG/DL
KETONES UR QL: NEGATIVE
LEUKOCYTE EST, POC: NEGATIVE
NITRITE UR-MCNC: NEGATIVE MG/ML
PH UR: 5.5 [PH] (ref 5–8)
PROT UR STRIP-MCNC: NEGATIVE MG/DL
RBC # UR STRIP: NEGATIVE /UL
SP GR UR: 1.02 (ref 1–1.03)
UROBILINOGEN UR QL: NORMAL

## 2021-07-07 PROCEDURE — 81002 URINALYSIS NONAUTO W/O SCOPE: CPT | Performed by: OBSTETRICS & GYNECOLOGY

## 2021-07-07 PROCEDURE — 99213 OFFICE O/P EST LOW 20 MIN: CPT | Performed by: OBSTETRICS & GYNECOLOGY

## 2021-07-07 NOTE — PROGRESS NOTES
"        REASON FOR FOLLOWUP/CHIEF COMPLAINT: Episode of postmenopausal bleeding     HISTORY OF PRESENT ILLNESS: Patient is a 75-year-old postmenopausal female who had a transvaginal hysterectomy in 1978.  Her ovaries remain.  This was not a malignant process as she recalls.  She has had no problems and several days ago while at a picnic noted about a tablespoon of old blood on her panty liner.  She denies any pain or trauma or any abnormal activity just prior to that and the bleeding resolved and she is not having any today.  She denies any vaginal discharge or irritation or irritative symptoms.  Denies feeling any lesions or seeing anything externally when she had a self exam that would have accounted for the bleeding.  She denies dysuria or frequency.      Past Medical History, Past Surgical History, Social History, Family History have been reviewed and are without significant changes except as mentioned.    Review of Systems   Review of Systems   Constitutional: Negative for fever.   Genitourinary: Positive for vaginal bleeding. Negative for dysuria and genital sores.       Medications:  The current medication list was reviewed in the EMR    ALLERGIES:  No Known Allergies           Vitals:    07/07/21 0947   BP: 140/78   Weight: 56.2 kg (124 lb)   Height: 163.8 cm (64.49\")     Physical Exam    CONSTITUTIONAL:  Vital signs reviewed.  No distress, looks comfortable.  EYES:  Conjunctiva and lids unremarkable.  PERRLA  RESPIRATORY:  Normal respiratory effort.   GASTROINTESTINAL: Abdomen appears unremarkable.  Nontender.  No hepatomegaly.  No splenomegaly.  Laparoscopic cholecystectomy scars  NEURO: cranial nerves 2-12 grossly intact.  No focal deficits.  Appears to have equal strength all 4 extremities.  MUSCULOSKELETAL:  Unremarkable digits/nails.  No cyanosis or clubbing.  SKIN:  Warm.  No rashes.  Small cherry hemangiomas on the lower abdomen noted  PSYCHIATRIC:  Normal judgment and insight.  Normal mood and " affect.  PELVIC: Normal external genitalia with no evidence of lesions or fissures.  There is no blood present.  Very mild descent of the anterior and posterior vaginal walls and perineum.  Vaginal sidewalls are clear and on speculum exam there is no blood and no lesions seen careful inspection of the vaginal cuff showed no evidence of any blood or lesions.  Bimanual exam confirms this as well as normal adnexal assessment with no tenderness.    RECENT LABS:  WBC   Date Value Ref Range Status   11/20/2020 7.90 3.40 - 10.80 10*3/mm3 Final   01/15/2020 8.31 3.40 - 10.80 10*3/mm3 Final   07/10/2019 7.93 3.40 - 10.80 10*3/mm3 Final   01/09/2019 7.28 4.50 - 10.70 10*3/mm3 Final   06/19/2018 6.61 4.50 - 10.70 10*3/mm3 Final   04/09/2018 9.43 4.50 - 10.70 10*3/mm3 Final   10/03/2017 6.31 4.50 - 10.70 10*3/mm3 Final   03/29/2017 7.53 4.50 - 10.70 10*3/mm3 Final   07/26/2016 7.38 4.50 - 10.70 10*3/mm3 Final   07/20/2016 7.41 4.50 - 10.70 10*3/mm3 Final   01/08/2016 6.9 3.4 - 10.8 x10E3/uL Final   07/08/2015 7.18 4.50 - 10.70 K/Cumm Final   01/08/2015 12.53 (H) 4.50 - 10.70 K/Cumm Final   08/15/2014 10.41 4.50 - 10.70 K/Cumm Final   08/14/2014 13.44 (H) 4.50 - 10.70 K/Cumm Final   08/11/2014 9.35 4.50 - 10.70 K/Cumm Final   06/26/2014 8.03 4.50 - 10.70 K/Cumm Final     Hemoglobin   Date Value Ref Range Status   11/20/2020 12.7 12.0 - 15.9 g/dL Final   01/15/2020 13.3 12.0 - 15.9 g/dL Final   07/10/2019 13.2 12.0 - 15.9 g/dL Final   01/09/2019 13.2 11.9 - 15.5 g/dL Final   06/19/2018 12.9 11.9 - 15.5 g/dL Final   04/09/2018 13.7 11.9 - 15.5 g/dL Final   10/03/2017 13.3 11.9 - 15.5 g/dL Final   03/29/2017 13.3 11.9 - 15.5 g/dL Final   07/26/2016 12.8 11.9 - 15.5 g/dL Final   07/20/2016 13.4 11.9 - 15.5 g/dL Final   01/08/2016 13.1 11.1 - 15.9 g/dL Final   07/08/2015 13.0 11.9 - 15.5 g/dL Final   01/08/2015 13.0 11.9 - 15.5 g/dL Final   08/15/2014 10.4 (L) 11.9 - 15.5 g/dL Final   08/14/2014 12.2 11.9 - 15.5 g/dL Final    08/11/2014 13.2 11.9 - 15.5 g/dL Final   06/26/2014 13.2 11.9 - 15.5 g/dL Final     Platelets   Date Value Ref Range Status   11/20/2020 273 140 - 450 10*3/mm3 Final   01/15/2020 319 140 - 450 10*3/mm3 Final   07/10/2019 294 140 - 450 10*3/mm3 Final   01/09/2019 343 140 - 500 10*3/mm3 Final   06/19/2018 297 140 - 500 10*3/mm3 Final   04/09/2018 308 140 - 500 10*3/mm3 Final   10/03/2017 299 140 - 500 10*3/mm3 Final   03/29/2017 311 140 - 500 10*3/mm3 Final   07/26/2016 275 140 - 500 10*3/mm3 Final   07/20/2016 327 140 - 500 10*3/mm3 Final   01/08/2016 295 150 - 379 x10E3/uL Final   07/08/2015 261 140 - 500 K/Cumm Final   01/08/2015 332 140 - 500 K/Cumm Final   08/15/2014 239 140 - 500 K/Cumm Final   08/14/2014 308 140 - 500 K/Cumm Final   08/11/2014 332 140 - 500 K/Cumm Final   06/26/2014 315 140 - 500 K/Cumm Final       ASSESSMENT/PLAN:  Episode of postmenopausal bleeding presumed vaginal in origin in a patient who has had hysterectomy in the past.  Normal exam with no evidence of lesion on today's visit.  We will get a urinalysis to rule out significant microhematuria.  I told her that this is all good and that we may not be able to explain why she had that episode or where it came from.  The way to follow this would be to have her call us if and when she ever has another episode and to place a tampon and an external pad and come directly to the office for evaluation.  He is in agreement and comfortable with this game plan.  Ashley Mata Room/bed info not found

## 2021-07-14 ENCOUNTER — PROCEDURE VISIT (OUTPATIENT)
Dept: OBSTETRICS AND GYNECOLOGY | Facility: CLINIC | Age: 76
End: 2021-07-14

## 2021-07-14 ENCOUNTER — APPOINTMENT (OUTPATIENT)
Dept: WOMENS IMAGING | Facility: HOSPITAL | Age: 76
End: 2021-07-14

## 2021-07-14 DIAGNOSIS — Z12.31 VISIT FOR SCREENING MAMMOGRAM: Primary | ICD-10-CM

## 2021-07-14 PROCEDURE — 77063 BREAST TOMOSYNTHESIS BI: CPT | Performed by: RADIOLOGY

## 2021-07-14 PROCEDURE — 77067 SCR MAMMO BI INCL CAD: CPT | Performed by: RADIOLOGY

## 2021-07-14 PROCEDURE — 77067 SCR MAMMO BI INCL CAD: CPT | Performed by: OBSTETRICS & GYNECOLOGY

## 2021-07-14 PROCEDURE — 77063 BREAST TOMOSYNTHESIS BI: CPT | Performed by: OBSTETRICS & GYNECOLOGY

## 2021-07-23 DIAGNOSIS — M19.032 PRIMARY OSTEOARTHRITIS OF BOTH WRISTS: Chronic | ICD-10-CM

## 2021-07-23 DIAGNOSIS — M19.041 PRIMARY OSTEOARTHRITIS OF BOTH HANDS: Chronic | ICD-10-CM

## 2021-07-23 DIAGNOSIS — M19.042 PRIMARY OSTEOARTHRITIS OF BOTH HANDS: Chronic | ICD-10-CM

## 2021-07-23 DIAGNOSIS — M19.031 PRIMARY OSTEOARTHRITIS OF BOTH WRISTS: Chronic | ICD-10-CM

## 2021-07-27 RX ORDER — MELOXICAM 15 MG/1
TABLET ORAL
Qty: 90 TABLET | Refills: 0 | Status: SHIPPED | OUTPATIENT
Start: 2021-07-27 | End: 2021-10-20

## 2021-09-28 DIAGNOSIS — I10 BENIGN ESSENTIAL HYPERTENSION: Chronic | ICD-10-CM

## 2021-09-28 RX ORDER — LISINOPRIL AND HYDROCHLOROTHIAZIDE 12.5; 1 MG/1; MG/1
1 TABLET ORAL EVERY MORNING
Qty: 90 TABLET | Refills: 0 | Status: SHIPPED | OUTPATIENT
Start: 2021-09-28 | End: 2021-12-16

## 2021-10-09 DIAGNOSIS — E03.9 PRIMARY HYPOTHYROIDISM: ICD-10-CM

## 2021-10-12 RX ORDER — LEVOTHYROXINE SODIUM 0.05 MG/1
TABLET ORAL
Qty: 90 TABLET | Refills: 1 | Status: SHIPPED | OUTPATIENT
Start: 2021-10-12 | End: 2022-04-18

## 2021-10-18 DIAGNOSIS — M19.042 PRIMARY OSTEOARTHRITIS OF BOTH HANDS: Chronic | ICD-10-CM

## 2021-10-18 DIAGNOSIS — M19.031 PRIMARY OSTEOARTHRITIS OF BOTH WRISTS: Chronic | ICD-10-CM

## 2021-10-18 DIAGNOSIS — M19.032 PRIMARY OSTEOARTHRITIS OF BOTH WRISTS: Chronic | ICD-10-CM

## 2021-10-18 DIAGNOSIS — M19.041 PRIMARY OSTEOARTHRITIS OF BOTH HANDS: Chronic | ICD-10-CM

## 2021-10-19 ENCOUNTER — OFFICE VISIT (OUTPATIENT)
Dept: INTERNAL MEDICINE | Facility: CLINIC | Age: 76
End: 2021-10-19

## 2021-10-19 VITALS
HEIGHT: 64 IN | DIASTOLIC BLOOD PRESSURE: 70 MMHG | BODY MASS INDEX: 21 KG/M2 | OXYGEN SATURATION: 98 % | RESPIRATION RATE: 16 BRPM | HEART RATE: 89 BPM | SYSTOLIC BLOOD PRESSURE: 124 MMHG | WEIGHT: 123 LBS

## 2021-10-19 DIAGNOSIS — G89.29 CHRONIC TOE PAIN, RIGHT FOOT: ICD-10-CM

## 2021-10-19 DIAGNOSIS — Z86.16 HISTORY OF COVID-19: Primary | ICD-10-CM

## 2021-10-19 DIAGNOSIS — M79.674 CHRONIC TOE PAIN, RIGHT FOOT: ICD-10-CM

## 2021-10-19 DIAGNOSIS — M20.41 HAMMERTOE OF SECOND TOE OF RIGHT FOOT: ICD-10-CM

## 2021-10-19 DIAGNOSIS — N95.1 MENOPAUSAL STATE: Chronic | ICD-10-CM

## 2021-10-19 PROCEDURE — 99214 OFFICE O/P EST MOD 30 MIN: CPT | Performed by: INTERNAL MEDICINE

## 2021-10-19 NOTE — PROGRESS NOTES
10/19/2021    Patient Information  Ashley Mata                                                                                          [id number] [facility] [address]      1945  [unfilled]  There is no work phone number on file.    Chief Complaint:     Wants to discuss recent Covid infection and Covid immunization.  Complaining of pain right second toe.    History of Present Illness:    The history regarding recent COVID-19 infection:    October 19, 2021--patient seen in follow-up and reports her symptoms totally resolved.  She wants to know when she should get her Covid booster vaccine.  I have suggested that she receive it approximately 3 months after initially testing positive.  She has an appointment in November 6, 2021 for the booster injection and I think that will be fine.    August 12, 2021--patient tested positive for COVID-19.  She had a very mild case and her symptoms consisted of slight cough and back pain as well as marked fatigue but no fever.  No change in taste or smell.  Patient had already received 2 vaccinations prior to the onset of testing positive.    The history regarding right toe pain:    October 19, 2021--patient reports a 1 year history of progressively worsening right toe pain that particular involves the second toe.  On exam she has obvious hammertoe which is causing irritation.  There is some hammering of the third digit as well.  Patient referred to orthopedist who specializes in foot problems such as Dr. Vieira.  In the meantime patient should use over-the-counter toe pads to help protect the toe.    Review of Systems   Constitutional: Negative.   HENT: Negative.    Eyes: Negative.    Cardiovascular: Negative.    Respiratory: Negative.    Endocrine: Negative.    Hematologic/Lymphatic: Negative.    Skin: Negative.    Musculoskeletal: Negative.         Pain related to hammertoes on the right second and third toes.   Gastrointestinal: Negative.     Genitourinary: Negative.    Neurological: Negative.    Psychiatric/Behavioral: Negative.    Allergic/Immunologic: Negative.        Active Problems:    Patient Active Problem List   Diagnosis   • Allergic rhinitis   • Benign essential hypertension   • Diverticulosis of colon   • Gastroesophageal reflux disease without esophagitis   • Hyperlipidemia   • Multiple environmental allergies   • Primary osteoarthritis of both wrists   • Primary osteoarthritis of both hands   • Postmenopausal hormone replacement therapy   • Vitamin D deficiency   • Therapeutic drug monitoring   • Alopecia   • Lumbar scoliosis   • Lumbar disc herniation, L4-L5   • Primary hypothyroidism   • Family history of breast cancer in first degree relative   • Menopausal state   • History of COVID-19   • Hammertoe of second toe of right foot   • Chronic toe pain, right foot         Past Medical History:   Diagnosis Date   • Allergic rhinitis 1/30/2015 11/13/2015--patient was evaluated by ENT and was started on generic Flonase and patient reports this has improved her symptoms quite a bit.   01/30/2015--allergy testing revealed positive reactivity to cat, dust mite, cockroach, mold spores, and grass pollen. Environmental control measures.   • Alopecia 4/12/2017    Evaluated and treated by the dermatologist.   • Benign essential hypertension 4/18/2016   • Diverticulosis of colon 3/17/2009    03/10/2017--colonoscopy revealed many small and large mouth diverticula in the sigmoid colon and descending colon.  Nonthrombosed external hemorrhoids and internal hemorrhoids noted.  Otherwise, normal colonoscopy.  03/17/2009--colonoscopy revealed external hemorrhoids, torturous colon with a sigmoid stricture, sigmoid diverticulosis.   • Family history of breast cancer in first degree relative 7/13/2020   • Gastroesophageal reflux disease without esophagitis 3/17/2009    06/09/2015--EGD revealed Z line irregular, 35 cm from incisors. Biopsied. Small hiatal  hernia. Gastritis. Biopsied. Bilious gastric fluid. Fluid aspiration performed. Normal duodenal bulb and second part of duodenum. Biopsied. Pathology revealed the antral biopsy revealed small intestinal mucosa with no pathologic diagnosis. Good preservation of villous architecture. Duodenum biopsy revealed largely antral type gastric mucosa with mild patchy superficial chronic gastritis. Gastroesophageal junction revealed squamous and glandular mucosa with mild chronic inflammation. Negative for intestinal metaplasia or dysplasia. There appeared to be mislabeling of the antral biopsies and duodenal biopsies.   04/17/2012--EGD revealed irregular Z line, hiatal hernia, gastritis, duodenitis.   03/17/2009--EGD revealed grade B. reflux esophagitis, hiatal hernia, gastritis, a few gastric polyps, duodenitis.   • History of Hyperplastic polyps of stomach 06/09/2015 06/09/2015--EGD revealed Z line irregular, 35 cm from incisors. Biopsied. Small hiatal hernia. Gastritis. Biopsied. Bilious gastric fluid. Fluid aspiration performed. Normal duodenal bulb and second part of duodenum. Biopsied. Pathology revealed the antral biopsy revealed small intestinal mucosa with no pathologic diagnosis. Good preservation of villous architecture. Duodenum biopsy revealed large   • HTN (hypertension)    • Hyperlipidemia 7/17/2012 07/17/2012--treatment for hyperlipidemia begun.   • Lumbar disc herniation, L4-L5 8/7/2018 01/16/2019--patient seen in follow-up by Dr. Shukla.  She has seen the neurosurgeon who recommended conservative therapy with physical therapy.  Patient reports that has been extremely helpful.  Currently has no significant pain.  08/07/2018--patient seen in follow-up and reports her pain is much better after taking prednisone.  She is now down to half a pill per day and is almost off of it.  I rev   • Lumbar scoliosis 8/7/2018 01/16/2019--patient seen in follow-up by Dr. Shukla.  She has seen the neurosurgeon who  recommended conservative therapy with physical therapy.  Patient reports that has been extremely helpful.  Currently has no significant pain.  08/07/2018--patient seen in follow-up and reports her pain is much better after taking prednisone.  She is now down to half a pill per day and is almost off of it.  I rev   • Menopausal state 7/13/2020   • Multiple environmental allergies 1/30/2015 01/30/2015--allergy testing revealed positive reactivity to cat, dust mite, cockroach, mold spores, and grass pollen. Environmental control measures.   • Postmenopausal hormone replacement therapy 4/18/2016   • Primary hypothyroidism 1/22/2020 January 22, 2020--TSH is elevated at 5.0.  Levothyroxine 50 mcg/day initiated.  Patient will follow-up with nonfasting lab work in about 6 to 8 weeks to reassess.  07/09/2018--routine follow-up.  TSH elevated slightly at 4.31.  Free T4 normal at 1.22.  Free T3 normal at 3.3.  Continued observation.  10/11/2017--thyroid function tests are normal.  09/30/2016--thyroid ultrasound reveals a tiny 2 mm    • Primary osteoarthritis of both hands 12/30/2013 05/12/2014--patient seen in followup and reports that the Vimovo has helped. Uric acid levels are normal at 4.9. C. reactive protein mildly elevated at 2.3. X-rays of the hands reveals radiocarpal, first carpometacarpal, first metacarpophalangeal, and interphalangeal joint degeneration. This process is symmetric. The interphalangeal joint of the left is affected to the greatest degree. There is right sided scapholunate dissociation as well as deformity of the scaphoid suggesting prior traumatic injury with healing. Soft tissues are satisfactory. Overall appearance is most consistent with osteoarthritis.   05/05/2014--patient presents and reports that she is having worsening right wrist pain primarily at the base of the thumb. No new injury. Bilateral x-rays of the wrists and hands ordered. Uric acid level ordered as well as a CRP. Vimovo  500/20 one by mouth twice a day. Follow up in one week.   03/18/2014--patient reports that her wrist symptoms have improved.   12/30/2013--patient reports a several mon   • Primary osteoarthritis of both wrists 12/30/2013 05/12/2014--patient seen in followup and reports that the Vimovo has helped. Uric acid levels are normal at 4.9. C. reactive protein mildly elevated at 2.3. X-rays of the hands reveals radiocarpal, first carpometacarpal, first metacarpophalangeal, and interphalangeal joint degeneration. This process is symmetric. The interphalangeal joint of the left is affected to the greatest degree. There is right sided scapholunate dissociation as well as deformity of the scaphoid suggesting prior traumatic injury with healing. Soft tissues are satisfactory. Overall appearance is most consistent with osteoarthritis.   05/05/2014--patient presents and reports that she is having worsening right wrist pain primarily at the base of the thumb. No new injury. Bilateral x-rays of the wrists and hands ordered. Uric acid level ordered as well as a CRP. Vimovo 500/20 one by mouth twice a day. Follow up in one week.   03/18/2014--patient reports that her wrist symptoms have improved.   12/30/2013--patient reports a several mon   • Vitamin D deficiency 4/18/2016         Past Surgical History:   Procedure Laterality Date   • COLONOSCOPY  03/17/2009 03/17/2009--colonoscopy revealed external hemorrhoids, torturous colon with a sigmoid stricture, sigmoid diverticulosis.   • COLONOSCOPY N/A 3/10/2017    03/10/2017--colonoscopy revealed many small and large mouth diverticula in the sigmoid colon and descending colon.  Nonthrombosed external hemorrhoids and internal hemorrhoids noted.  Otherwise, normal colonoscopy.   • ERCP WITH SPHINCTEROTOMY/PAPILLOTOMY  08/13/2014 08/13/2014--ERCP, endoscopic sphincterotomy and removal of common bile duct stones. 08/12/2014--laparoscopic cholecystectomy. This was complicated by  retained common bile duct stones 2.   • ESOPHAGOSCOPY / EGD  06/09/2015 06/09/2015--EGD revealed Z line irregular, 35 cm from incisors. Biopsied. Small hiatal hernia. Gastritis. Biopsied. Bilious gastric fluid. Fluid aspiration performed. Normal duodenal bulb and second part of duodenum. Biopsied. Pathology revealed the antral biopsy revealed small intestinal mucosa with no pathologic diagnosis. Good preservation of villous architecture. Duodenum biopsy revealed large   • LAPAROSCOPIC CHOLECYSTECTOMY  08/12/2014 08/13/2014--ERCP, endoscopic sphincterotomy and removal of common bile duct stones. 08/12/2014--laparoscopic cholecystectomy. This was complicated by retained common bile duct stones 2.   • ROTATOR CUFF REPAIR Left 07/10/2014    07/10/2014--left rotator cuff repair. 03/18/2014--patient seen in followup and reports that her range of motion has improved and her pain is not debilitating. She feels that she is making progress with physical therapy. Surgery scheduled 07/10/2014. 01/10/2014--patient was seen in evaluation by the orthopedist and he recommended conservative treatment consisting of physical therapy/rehabilitation.   • ROTATOR CUFF REPAIR Right 08/03/2016 08/03/2016--arthroscopic right rotator cuff repair.  Debridement of degenerative anterior superior labral tear.   • SUBTOTAL HYSTERECTOMY  1978    Partial hysterectomy 1978.         No Known Allergies        Current Outpatient Medications:   •  atorvastatin (LIPITOR) 80 MG tablet, TAKE ONE TABLET BY MOUTH EVERY NIGHT AT BEDTIME, Disp: 90 tablet, Rfl: 0  •  Biotin 1000 MCG chewable tablet, Chew 3,000 mg., Disp: , Rfl:   •  Calcium Carbonate (CALCIUM 600 PO), Take 2 tablets by mouth daily., Disp: , Rfl:   •  Coenzyme Q10 (COQ10) 400 MG capsule, Take 1 capsule by mouth daily. Take with a meal., Disp: , Rfl:   •  estrogens, conjugated, (Premarin) 0.625 MG tablet, Take 1 by mouth daily as directed., Disp: 90 tablet, Rfl: 3  •   "fexofenadine-pseudoephedrine (ALLEGRA-D 24) 180-240 MG per 24 hr tablet, Take 1 tablet by mouth Daily., Disp: 30 tablet, Rfl: 5  •  levothyroxine (SYNTHROID, LEVOTHROID) 50 MCG tablet, TAKE ONE TABLET BY MOUTH DAILY FOR LOW THYROID, Disp: 90 tablet, Rfl: 1  •  lisinopril-hydrochlorothiazide (PRINZIDE,ZESTORETIC) 10-12.5 MG per tablet, Take 1 tablet by mouth Every Morning., Disp: 90 tablet, Rfl: 0  •  meloxicam (MOBIC) 15 MG tablet, TAKE ONE TABLET BY MOUTH DAILY FOR ARTHRITIS, Disp: 90 tablet, Rfl: 0  •  multivitamin (THERAGRAN) tablet tablet, Take 1 tablet by mouth daily., Disp: , Rfl:   •  omeprazole OTC (PRILOSEC OTC) 20 MG EC tablet, Take 1 tablet by mouth Daily., Disp: , Rfl:   •  vitamin C (ASCORBIC ACID) 250 MG tablet, Take 250 mg by mouth Daily., Disp: , Rfl:       Family History   Problem Relation Age of Onset   • Other Father         Hodgkin's Disease   • Cancer Father         Lung Cancer   • Breast cancer Daughter 52   • Alzheimer's disease Mother          Social History     Socioeconomic History   • Marital status:    • Number of children: 2   • Years of education: BA   • Highest education level: Associate degree: occupational, technical, or vocational program   Tobacco Use   • Smoking status: Never Smoker   • Smokeless tobacco: Never Used   Vaping Use   • Vaping Use: Never used   Substance and Sexual Activity   • Alcohol use: Yes     Comment: Socially   • Drug use: No   • Sexual activity: Yes     Partners: Male         Vitals:    10/19/21 1424   BP: 124/70   Pulse: 89   Resp: 16   SpO2: 98%   Weight: 55.8 kg (123 lb)   Height: 162.6 cm (64\")        Body mass index is 21.11 kg/m².      Physical Exam:    General: Alert and oriented x 3.  No acute distress.  Normal affect.  HEENT: Pupils equal, round, reactive to light; extraocular movements intact; sclerae nonicteric; pharynx, ear canals and TMs normal.  Neck: Without JVD, thyromegaly, bruit, or adenopathy.  Lungs: Clear to auscultation in all " fields.  Heart: Regular rate and rhythm without murmur, rub, gallop, or click.  Abdomen: Soft, nontender, without hepatosplenomegaly or hernia.  Bowel sounds normal.  : Deferred.  Rectal: Deferred.  Extremities: Without clubbing, cyanosis, edema, or pulse deficit.  Neurologic: Intact without focal deficit.  Normal station and gait observed during ingress and egress from the examination room.  Skin: Without significant lesion.  Musculoskeletal: Unremarkable.  Examination of right foot reveals hammertoe of the second third toes on the right.  There is a developing callus on top of the PIP joint of the second toe and the lesser 1 on the third toe.      Lab/other results:      Assessment/Plan:     Diagnosis Plan   1. History of COVID-19     2. Hammertoe of second toe of right foot  Ambulatory Referral to Orthopedic Surgery   3. Chronic toe pain, right foot  Ambulatory Referral to Orthopedic Surgery   4. Menopausal state  DEXA Bone Density Axial       October 19, 2021--patient reports a 1 year history of progressively worsening right toe pain that particular involves the second toe.  On exam she has obvious hammertoe which is causing irritation.  There is some hammering of the third digit as well.  Patient referred to orthopedist who specializes in foot problems such as Dr. Vieira.  In the meantime patient should use over-the-counter toe pads to help protect the toe.    October 19, 2021--patient seen in follow-up and reports her symptoms totally resolved.  She wants to know when she should get her Covid booster vaccine.  I have suggested that she receive it approximately 3 months after initially testing positive.  She has an appointment in November 6, 2021 for the booster injection and I think that will be fine.    August 12, 2021--patient tested positive for COVID-19.  She had a very mild case and her symptoms consisted of slight cough and back pain as well as marked fatigue but no fever.  No change in taste or smell.   Patient had already received 2 vaccinations prior to the onset of testing positive.    Addendum: At the end of the visit I realized that patient is due for a DEXA scan.  I ordered this today.      Procedures

## 2021-10-20 RX ORDER — MELOXICAM 15 MG/1
TABLET ORAL
Qty: 90 TABLET | Refills: 0 | Status: SHIPPED | OUTPATIENT
Start: 2021-10-20 | End: 2022-01-28

## 2021-11-09 ENCOUNTER — LAB (OUTPATIENT)
Dept: LAB | Facility: HOSPITAL | Age: 76
End: 2021-11-09

## 2021-11-09 DIAGNOSIS — E55.9 VITAMIN D DEFICIENCY: Chronic | ICD-10-CM

## 2021-11-09 DIAGNOSIS — Z51.81 THERAPEUTIC DRUG MONITORING: ICD-10-CM

## 2021-11-09 DIAGNOSIS — E78.2 MIXED HYPERLIPIDEMIA: Chronic | ICD-10-CM

## 2021-11-09 LAB
25(OH)D3 SERPL-MCNC: 69.8 NG/ML (ref 30–100)
ALBUMIN SERPL-MCNC: 4.1 G/DL (ref 3.5–5.2)
ALBUMIN/GLOB SERPL: 1.5 G/DL
ALP SERPL-CCNC: 72 U/L (ref 39–117)
ALT SERPL W P-5'-P-CCNC: 16 U/L (ref 1–33)
ANION GAP SERPL CALCULATED.3IONS-SCNC: 9.4 MMOL/L (ref 5–15)
AST SERPL-CCNC: 26 U/L (ref 1–32)
BILIRUB SERPL-MCNC: 0.3 MG/DL (ref 0–1.2)
BILIRUB UR QL STRIP: NEGATIVE
BUN SERPL-MCNC: 22 MG/DL (ref 8–23)
BUN/CREAT SERPL: 31.4 (ref 7–25)
CALCIUM SPEC-SCNC: 9.4 MG/DL (ref 8.6–10.5)
CHLORIDE SERPL-SCNC: 103 MMOL/L (ref 98–107)
CK SERPL-CCNC: 46 U/L (ref 20–180)
CLARITY UR: CLEAR
CO2 SERPL-SCNC: 28.6 MMOL/L (ref 22–29)
COLOR UR: YELLOW
CREAT SERPL-MCNC: 0.7 MG/DL (ref 0.57–1)
DEPRECATED RDW RBC AUTO: 43.5 FL (ref 37–54)
ERYTHROCYTE [DISTWIDTH] IN BLOOD BY AUTOMATED COUNT: 12.7 % (ref 12.3–15.4)
GFR SERPL CREATININE-BSD FRML MDRD: 81 ML/MIN/1.73
GLOBULIN UR ELPH-MCNC: 2.7 GM/DL
GLUCOSE SERPL-MCNC: 104 MG/DL (ref 65–99)
GLUCOSE UR STRIP-MCNC: NEGATIVE MG/DL
HCT VFR BLD AUTO: 38.9 % (ref 34–46.6)
HGB BLD-MCNC: 12.5 G/DL (ref 12–15.9)
HGB UR QL STRIP.AUTO: NEGATIVE
KETONES UR QL STRIP: NEGATIVE
LEUKOCYTE ESTERASE UR QL STRIP.AUTO: NEGATIVE
MCH RBC QN AUTO: 30 PG (ref 26.6–33)
MCHC RBC AUTO-ENTMCNC: 32.1 G/DL (ref 31.5–35.7)
MCV RBC AUTO: 93.5 FL (ref 79–97)
NITRITE UR QL STRIP: NEGATIVE
PH UR STRIP.AUTO: 5.5 [PH] (ref 5–8)
PLATELET # BLD AUTO: 321 10*3/MM3 (ref 140–450)
PMV BLD AUTO: 10.4 FL (ref 6–12)
POTASSIUM SERPL-SCNC: 4.1 MMOL/L (ref 3.5–5.2)
PROT SERPL-MCNC: 6.8 G/DL (ref 6–8.5)
PROT UR QL STRIP: NEGATIVE
RBC # BLD AUTO: 4.16 10*6/MM3 (ref 3.77–5.28)
SODIUM SERPL-SCNC: 141 MMOL/L (ref 136–145)
SP GR UR STRIP: 1.02 (ref 1–1.03)
T3FREE SERPL-MCNC: 2.9 PG/ML (ref 2–4.4)
T4 FREE SERPL-MCNC: 1.54 NG/DL (ref 0.93–1.7)
TSH SERPL DL<=0.05 MIU/L-ACNC: 1.27 UIU/ML (ref 0.27–4.2)
UROBILINOGEN UR QL STRIP: NORMAL
WBC # BLD AUTO: 6.34 10*3/MM3 (ref 3.4–10.8)

## 2021-11-09 PROCEDURE — 36415 COLL VENOUS BLD VENIPUNCTURE: CPT

## 2021-11-09 PROCEDURE — 82306 VITAMIN D 25 HYDROXY: CPT

## 2021-11-09 PROCEDURE — 83704 LIPOPROTEIN BLD QUAN PART: CPT

## 2021-11-09 PROCEDURE — 84443 ASSAY THYROID STIM HORMONE: CPT

## 2021-11-09 PROCEDURE — 81003 URINALYSIS AUTO W/O SCOPE: CPT

## 2021-11-09 PROCEDURE — 85027 COMPLETE CBC AUTOMATED: CPT

## 2021-11-09 PROCEDURE — 80061 LIPID PANEL: CPT

## 2021-11-09 PROCEDURE — 80053 COMPREHEN METABOLIC PANEL: CPT

## 2021-11-09 PROCEDURE — 84481 FREE ASSAY (FT-3): CPT

## 2021-11-09 PROCEDURE — 84439 ASSAY OF FREE THYROXINE: CPT

## 2021-11-09 PROCEDURE — 82550 ASSAY OF CK (CPK): CPT

## 2021-11-12 LAB
CHOLEST SERPL-MCNC: 169 MG/DL (ref 100–199)
HDL SERPL-SCNC: 48.6 UMOL/L
HDLC SERPL-MCNC: 83 MG/DL
LDL SERPL QN: 20.6 NM
LDL SERPL-SCNC: 787 NMOL/L
LDL SMALL SERPL-SCNC: 366 NMOL/L
LDLC SERPL CALC-MCNC: 60 MG/DL (ref 0–99)
TRIGL SERPL-MCNC: 159 MG/DL (ref 0–149)

## 2021-11-16 ENCOUNTER — OFFICE VISIT (OUTPATIENT)
Dept: INTERNAL MEDICINE | Facility: CLINIC | Age: 76
End: 2021-11-16

## 2021-11-16 VITALS
RESPIRATION RATE: 17 BRPM | DIASTOLIC BLOOD PRESSURE: 60 MMHG | HEART RATE: 85 BPM | BODY MASS INDEX: 21.21 KG/M2 | WEIGHT: 124.2 LBS | OXYGEN SATURATION: 98 % | SYSTOLIC BLOOD PRESSURE: 120 MMHG | HEIGHT: 64 IN

## 2021-11-16 DIAGNOSIS — Z00.00 ENCOUNTER FOR SUBSEQUENT ANNUAL WELLNESS VISIT (AWV) IN MEDICARE PATIENT: Primary | ICD-10-CM

## 2021-11-16 DIAGNOSIS — M19.041 PRIMARY OSTEOARTHRITIS OF BOTH HANDS: Chronic | ICD-10-CM

## 2021-11-16 DIAGNOSIS — E55.9 VITAMIN D DEFICIENCY: Chronic | ICD-10-CM

## 2021-11-16 DIAGNOSIS — Z80.3 FAMILY HISTORY OF BREAST CANCER IN FIRST DEGREE RELATIVE: Chronic | ICD-10-CM

## 2021-11-16 DIAGNOSIS — I10 BENIGN ESSENTIAL HYPERTENSION: Chronic | ICD-10-CM

## 2021-11-16 DIAGNOSIS — Z79.890 POSTMENOPAUSAL HORMONE REPLACEMENT THERAPY: Chronic | ICD-10-CM

## 2021-11-16 DIAGNOSIS — N95.1 MENOPAUSAL STATE: Chronic | ICD-10-CM

## 2021-11-16 DIAGNOSIS — M19.032 PRIMARY OSTEOARTHRITIS OF BOTH WRISTS: Chronic | ICD-10-CM

## 2021-11-16 DIAGNOSIS — E78.2 MIXED HYPERLIPIDEMIA: Chronic | ICD-10-CM

## 2021-11-16 DIAGNOSIS — M19.031 PRIMARY OSTEOARTHRITIS OF BOTH WRISTS: Chronic | ICD-10-CM

## 2021-11-16 DIAGNOSIS — Z91.09 MULTIPLE ENVIRONMENTAL ALLERGIES: Chronic | ICD-10-CM

## 2021-11-16 DIAGNOSIS — M19.042 PRIMARY OSTEOARTHRITIS OF BOTH HANDS: Chronic | ICD-10-CM

## 2021-11-16 DIAGNOSIS — K21.9 GASTROESOPHAGEAL REFLUX DISEASE WITHOUT ESOPHAGITIS: Chronic | ICD-10-CM

## 2021-11-16 DIAGNOSIS — Z51.81 THERAPEUTIC DRUG MONITORING: ICD-10-CM

## 2021-11-16 DIAGNOSIS — E03.9 PRIMARY HYPOTHYROIDISM: Chronic | ICD-10-CM

## 2021-11-16 DIAGNOSIS — Z86.16 HISTORY OF COVID-19: ICD-10-CM

## 2021-11-16 DIAGNOSIS — M51.26 LUMBAR DISC HERNIATION: Chronic | ICD-10-CM

## 2021-11-16 PROCEDURE — 99214 OFFICE O/P EST MOD 30 MIN: CPT | Performed by: INTERNAL MEDICINE

## 2021-11-16 PROCEDURE — 1160F RVW MEDS BY RX/DR IN RCRD: CPT | Performed by: INTERNAL MEDICINE

## 2021-11-16 PROCEDURE — 1170F FXNL STATUS ASSESSED: CPT | Performed by: INTERNAL MEDICINE

## 2021-11-16 PROCEDURE — G0439 PPPS, SUBSEQ VISIT: HCPCS | Performed by: INTERNAL MEDICINE

## 2021-11-16 PROCEDURE — 1126F AMNT PAIN NOTED NONE PRSNT: CPT | Performed by: INTERNAL MEDICINE

## 2021-11-16 RX ORDER — PREDNISONE 10 MG/1
TABLET ORAL
Qty: 46 TABLET | Refills: 0 | Status: SHIPPED | OUTPATIENT
Start: 2021-11-16 | End: 2022-04-01

## 2021-11-16 NOTE — PROGRESS NOTES
The ABCs of the Annual Wellness Visit  Subsequent Medicare Wellness Visit    No chief complaint on file.     Subjective    History of Present Illness:  Ashley Mata is a 76 y.o. female who presents for a Subsequent Medicare Wellness Visit.    The following portions of the patient's history were reviewed and   updated as appropriate: allergies, current medications, past family history, past medical history, past social history, past surgical history and problem list.    Compared to one year ago, the patient feels her physical   health is the same.    Compared to one year ago, the patient feels her mental   health is the same.    Recent Hospitalizations:  She was not admitted to the hospital during the last year.       Current Medical Providers:  Patient Care Team:  Carloz Shukla MD as PCP - General (Internal Medicine)    Outpatient Medications Prior to Visit   Medication Sig Dispense Refill   • atorvastatin (LIPITOR) 80 MG tablet TAKE ONE TABLET BY MOUTH EVERY NIGHT AT BEDTIME 90 tablet 0   • Biotin 1000 MCG chewable tablet Chew 3,000 mg.     • Calcium Carbonate (CALCIUM 600 PO) Take 2 tablets by mouth daily.     • Coenzyme Q10 (COQ10) 400 MG capsule Take 1 capsule by mouth daily. Take with a meal.     • estrogens, conjugated, (Premarin) 0.625 MG tablet Take 1 by mouth daily as directed. 90 tablet 3   • fexofenadine-pseudoephedrine (ALLEGRA-D 24) 180-240 MG per 24 hr tablet Take 1 tablet by mouth Daily. 30 tablet 5   • levothyroxine (SYNTHROID, LEVOTHROID) 50 MCG tablet TAKE ONE TABLET BY MOUTH DAILY FOR LOW THYROID 90 tablet 1   • lisinopril-hydrochlorothiazide (PRINZIDE,ZESTORETIC) 10-12.5 MG per tablet Take 1 tablet by mouth Every Morning. 90 tablet 0   • meloxicam (MOBIC) 15 MG tablet TAKE ONE TABLET BY MOUTH DAILY FOR ARTHRITIS 90 tablet 0   • multivitamin (THERAGRAN) tablet tablet Take 1 tablet by mouth daily.     • omeprazole OTC (PRILOSEC OTC) 20 MG EC tablet Take 1 tablet by mouth Daily.     •  "vitamin C (ASCORBIC ACID) 250 MG tablet Take 250 mg by mouth Daily.       No facility-administered medications prior to visit.       No opioid medication identified on active medication list. I have reviewed chart for other potential  high risk medication/s and harmful drug interactions in the elderly.          Aspirin is not on active medication list.  Aspirin use is not indicated based on review of current medical condition/s. Risk of harm outweighs potential benefits.  .    Patient Active Problem List   Diagnosis   • Allergic rhinitis   • Benign essential hypertension   • Diverticulosis of colon   • Gastroesophageal reflux disease without esophagitis   • Hyperlipidemia   • Multiple environmental allergies   • Primary osteoarthritis of both wrists   • Primary osteoarthritis of both hands   • Postmenopausal hormone replacement therapy   • Vitamin D deficiency   • Therapeutic drug monitoring   • Alopecia   • Lumbar scoliosis   • Lumbar disc herniation, L4-L5   • Primary hypothyroidism   • Family history of breast cancer in first degree relative   • Menopausal state   • History of COVID-19   • Hammertoe of second toe of right foot   • Chronic toe pain, right foot     Advance Care Planning  Advance Directive is not on file.  ACP discussion was held with the patient during this visit. Patient has an advance directive (not in EMR), copy requested.    Review of Systems   Constitutional: Negative.    HENT: Negative.    Eyes: Negative.    Respiratory: Negative.    Cardiovascular: Negative.    Endocrine: Negative.    Genitourinary: Negative.    Musculoskeletal: Positive for arthralgias.   Skin: Negative.    Allergic/Immunologic: Positive for environmental allergies.   Neurological: Negative.    Hematological: Negative.    Psychiatric/Behavioral: Negative.         Objective    Vitals:    11/16/21 1029   BP: 120/60   Pulse: 85   Resp: 17   SpO2: 98%   Weight: 56.3 kg (124 lb 3.2 oz)   Height: 162.6 cm (64\")   PainSc: 0-No " pain     BMI Readings from Last 1 Encounters:   11/16/21 21.32 kg/m²   BMI is within normal parameters. No follow-up required.    Does the patient have evidence of cognitive impairment? No    Physical Exam     General: Alert and oriented x 3.  No acute distress.  Normal affect.  HEENT: Pupils equal, round, reactive to light; extraocular movements intact; sclerae nonicteric; pharynx, ear canals and TMs normal.  Neck: Without JVD, thyromegaly, bruit, or adenopathy.  Lungs: Clear to auscultation in all fields.  Heart: Regular rate and rhythm without murmur, rub, gallop, or click.  Abdomen: Soft, nontender, without hepatosplenomegaly or hernia.  Bowel sounds normal.  : Deferred.  Rectal: Deferred.  Extremities: Without clubbing, cyanosis, edema, or pulse deficit.  Neurologic: Intact without focal deficit.  Normal station and gait observed during ingress and egress from the examination room.  Skin: Without significant lesion.  Musculoskeletal: Changes consistent with degenerative arthritis, particularly about the hands and wrists.  I do not see any active synovitis.    Lab Results   Component Value Date    CHLPL 169 11/09/2021    TRIG 159 (H) 11/09/2021            HEALTH RISK ASSESSMENT    Smoking Status:  Social History     Tobacco Use   Smoking Status Never Smoker   Smokeless Tobacco Never Used     Alcohol Consumption:  Social History     Substance and Sexual Activity   Alcohol Use Yes    Comment: Socially     Fall Risk Screen:    RODADI Fall Risk Assessment was completed, and patient is at LOW risk for falls.Assessment completed on:10/19/2021    Depression Screening:  PHQ-2/PHQ-9 Depression Screening 7/7/2021   Little interest or pleasure in doing things 0   Feeling down, depressed, or hopeless 0   Trouble falling or staying asleep, or sleeping too much -   Feeling tired or having little energy -   Poor appetite or overeating -   Feeling bad about yourself - or that you are a failure or have let yourself or your  family down -   Trouble concentrating on things, such as reading the newspaper or watching television -   Moving or speaking so slowly that other people could have noticed. Or the opposite - being so fidgety or restless that you have been moving around a lot more than usual -   Thoughts that you would be better off dead, or of hurting yourself in some way -   Total Score 0   If you checked off any problems, how difficult have these problems made it for you to do your work, take care of things at home, or get along with other people? -       Health Habits and Functional and Cognitive Screening:  Functional & Cognitive Status 11/16/2021   Do you have difficulty preparing food and eating? No   Do you have difficulty bathing yourself, getting dressed or grooming yourself? No   Do you have difficulty using the toilet? No   Do you have difficulty moving around from place to place? No   Do you have trouble with steps or getting out of a bed or a chair? No   Current Diet Well Balanced Diet   Dental Exam Up to date   Eye Exam Up to date   Exercise (times per week) 2 times per week   Current Exercises Include Walking;House Cleaning   Current Exercise Activities Include -   Do you need help using the phone?  No   Are you deaf or do you have serious difficulty hearing?  No   Do you need help with transportation? No   Do you need help shopping? No   Do you need help preparing meals?  No   Do you need help with housework?  No   Do you need help with laundry? No   Do you need help taking your medications? No   Do you need help managing money? No   Do you ever drive or ride in a car without wearing a seat belt? No   Have you felt unusual stress, anger or loneliness in the last month? No   Who do you live with? Spouse   If you need help, do you have trouble finding someone available to you? No   Have you been bothered in the last four weeks by sexual problems? No   Do you have difficulty concentrating, remembering or making  decisions? No       Age-appropriate Screening Schedule:  Refer to the list below for future screening recommendations based on patient's age, sex and/or medical conditions. Orders for these recommended tests are listed in the plan section. The patient has been provided with a written plan.    Health Maintenance   Topic Date Due   • DXA SCAN  08/21/2021   • LIPID PANEL  11/09/2022   • MAMMOGRAM  07/14/2023   • TDAP/TD VACCINES (2 - Td or Tdap) 04/12/2027   • INFLUENZA VACCINE  Completed   • ZOSTER VACCINE  Discontinued              Assessment/Plan   CMS Preventative Services Quick Reference  Risk Factors Identified During Encounter  Cardiovascular Disease  The above risks/problems have been discussed with the patient.  Follow up actions/plans if indicated are seen below in the Assessment/Plan Section.  Pertinent information has been shared with the patient in the After Visit Summary.    Diagnoses and all orders for this visit:    1. Encounter for subsequent annual wellness visit (AWV) in Medicare patient (Primary)    2. Hyperlipidemia  -     CK; Future  -     Comprehensive Metabolic Panel; Future  -     NMR LipoProfile; Future    3. Benign essential hypertension    4. Gastroesophageal reflux disease without esophagitis    5. Multiple environmental allergies  -     predniSONE (DELTASONE) 10 MG tablet; 5 by mouth daily 5 days, then 4 daily 2 days, 3 daily 2 days, 2 daily 2 days, 1 daily 2 days, one half daily 2 days and then discontinue.  Dispense: 46 tablet; Refill: 0    6. Vitamin D deficiency  -     Vitamin D 25 Hydroxy; Future    7. Primary hypothyroidism  -     TSH; Future  -     T4, Free; Future  -     T3, Free; Future    8. Family history of breast cancer in first degree relative    9. Menopausal state    10. Postmenopausal hormone replacement therapy    11. Primary osteoarthritis of both hands    12. Primary osteoarthritis of both wrists    13. Lumbar disc herniation, L4-L5    14. History of COVID-19    15.  Therapeutic drug monitoring  -     CBC (No Diff); Future        Follow Up:   No follow-ups on file.     An After Visit Summary and PPPS were made available to the patient.

## 2021-11-16 NOTE — PROGRESS NOTES
11/16/2021    Patient Information  Ashley Mata                                                                                          [id number] [facility] [place] [address]      1945  [unfilled]  There is no work phone number on file.    Chief Complaint:     Medicare wellness visit.  Follow-up lab work in order to monitor chronic medical issues listed in history of present illness.  No new acute complaints.    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  Lumbar disc herniation.  History of COVID-19 infection recently.  She presents today for subsequent Medicare wellness visit.  She also had lab work in order to monitor chronic medical issues.  Past medical history reviewed and updated were necessary including health maintenance parameters of this reveals he will be up-to-date after today's visit.    Review of Systems   Constitutional: Negative.   HENT: Negative.    Eyes: Negative.    Cardiovascular: Negative.    Respiratory: Negative.    Endocrine: Negative.    Hematologic/Lymphatic: Negative.    Skin: Negative.    Musculoskeletal: Positive for arthritis and joint pain.   Gastrointestinal: Negative.    Genitourinary: Negative.    Neurological: Negative.    Psychiatric/Behavioral: Negative.    Allergic/Immunologic: Negative.        Active Problems:    Patient Active Problem List   Diagnosis   • Allergic rhinitis   • Benign essential hypertension   • Diverticulosis of colon   • Gastroesophageal reflux disease without esophagitis   • Hyperlipidemia   • Multiple environmental allergies   • Primary osteoarthritis of both wrists   • Primary osteoarthritis of both hands   • Postmenopausal hormone replacement  therapy   • Vitamin D deficiency   • Therapeutic drug monitoring   • Alopecia   • Lumbar scoliosis   • Lumbar disc herniation, L4-L5   • Primary hypothyroidism   • Family history of breast cancer in first degree relative   • Menopausal state   • History of COVID-19   • Hammertoe of second toe of right foot   • Chronic toe pain, right foot         Past Medical History:   Diagnosis Date   • Allergic rhinitis 1/30/2015 11/13/2015--patient was evaluated by ENT and was started on generic Flonase and patient reports this has improved her symptoms quite a bit.   01/30/2015--allergy testing revealed positive reactivity to cat, dust mite, cockroach, mold spores, and grass pollen. Environmental control measures.   • Alopecia 4/12/2017    Evaluated and treated by the dermatologist.   • Benign essential hypertension 4/18/2016   • Diverticulosis of colon 3/17/2009    03/10/2017--colonoscopy revealed many small and large mouth diverticula in the sigmoid colon and descending colon.  Nonthrombosed external hemorrhoids and internal hemorrhoids noted.  Otherwise, normal colonoscopy.  03/17/2009--colonoscopy revealed external hemorrhoids, torturous colon with a sigmoid stricture, sigmoid diverticulosis.   • Family history of breast cancer in first degree relative 7/13/2020   • Gastroesophageal reflux disease without esophagitis 3/17/2009    06/09/2015--EGD revealed Z line irregular, 35 cm from incisors. Biopsied. Small hiatal hernia. Gastritis. Biopsied. Bilious gastric fluid. Fluid aspiration performed. Normal duodenal bulb and second part of duodenum. Biopsied. Pathology revealed the antral biopsy revealed small intestinal mucosa with no pathologic diagnosis. Good preservation of villous architecture. Duodenum biopsy revealed largely antral type gastric mucosa with mild patchy superficial chronic gastritis. Gastroesophageal junction revealed squamous and glandular mucosa with mild chronic inflammation. Negative for intestinal  metaplasia or dysplasia. There appeared to be mislabeling of the antral biopsies and duodenal biopsies.   04/17/2012--EGD revealed irregular Z line, hiatal hernia, gastritis, duodenitis.   03/17/2009--EGD revealed grade B. reflux esophagitis, hiatal hernia, gastritis, a few gastric polyps, duodenitis.   • History of Hyperplastic polyps of stomach 06/09/2015 06/09/2015--EGD revealed Z line irregular, 35 cm from incisors. Biopsied. Small hiatal hernia. Gastritis. Biopsied. Bilious gastric fluid. Fluid aspiration performed. Normal duodenal bulb and second part of duodenum. Biopsied. Pathology revealed the antral biopsy revealed small intestinal mucosa with no pathologic diagnosis. Good preservation of villous architecture. Duodenum biopsy revealed large   • HTN (hypertension)    • Hyperlipidemia 7/17/2012 07/17/2012--treatment for hyperlipidemia begun.   • Lumbar disc herniation, L4-L5 8/7/2018 01/16/2019--patient seen in follow-up by Dr. Shukla.  She has seen the neurosurgeon who recommended conservative therapy with physical therapy.  Patient reports that has been extremely helpful.  Currently has no significant pain.  08/07/2018--patient seen in follow-up and reports her pain is much better after taking prednisone.  She is now down to half a pill per day and is almost off of it.  I rev   • Lumbar scoliosis 8/7/2018 01/16/2019--patient seen in follow-up by Dr. Shukla.  She has seen the neurosurgeon who recommended conservative therapy with physical therapy.  Patient reports that has been extremely helpful.  Currently has no significant pain.  08/07/2018--patient seen in follow-up and reports her pain is much better after taking prednisone.  She is now down to half a pill per day and is almost off of it.  I rev   • Menopausal state 7/13/2020   • Multiple environmental allergies 1/30/2015 01/30/2015--allergy testing revealed positive reactivity to cat, dust mite, cockroach, mold spores, and grass pollen.  Environmental control measures.   • Postmenopausal hormone replacement therapy 4/18/2016   • Primary hypothyroidism 1/22/2020 January 22, 2020--TSH is elevated at 5.0.  Levothyroxine 50 mcg/day initiated.  Patient will follow-up with nonfasting lab work in about 6 to 8 weeks to reassess.  07/09/2018--routine follow-up.  TSH elevated slightly at 4.31.  Free T4 normal at 1.22.  Free T3 normal at 3.3.  Continued observation.  10/11/2017--thyroid function tests are normal.  09/30/2016--thyroid ultrasound reveals a tiny 2 mm    • Primary osteoarthritis of both hands 12/30/2013 05/12/2014--patient seen in followup and reports that the Vimovo has helped. Uric acid levels are normal at 4.9. C. reactive protein mildly elevated at 2.3. X-rays of the hands reveals radiocarpal, first carpometacarpal, first metacarpophalangeal, and interphalangeal joint degeneration. This process is symmetric. The interphalangeal joint of the left is affected to the greatest degree. There is right sided scapholunate dissociation as well as deformity of the scaphoid suggesting prior traumatic injury with healing. Soft tissues are satisfactory. Overall appearance is most consistent with osteoarthritis.   05/05/2014--patient presents and reports that she is having worsening right wrist pain primarily at the base of the thumb. No new injury. Bilateral x-rays of the wrists and hands ordered. Uric acid level ordered as well as a CRP. Vimovo 500/20 one by mouth twice a day. Follow up in one week.   03/18/2014--patient reports that her wrist symptoms have improved.   12/30/2013--patient reports a several mon   • Primary osteoarthritis of both wrists 12/30/2013 05/12/2014--patient seen in followup and reports that the Vimovo has helped. Uric acid levels are normal at 4.9. C. reactive protein mildly elevated at 2.3. X-rays of the hands reveals radiocarpal, first carpometacarpal, first metacarpophalangeal, and interphalangeal joint degeneration.  This process is symmetric. The interphalangeal joint of the left is affected to the greatest degree. There is right sided scapholunate dissociation as well as deformity of the scaphoid suggesting prior traumatic injury with healing. Soft tissues are satisfactory. Overall appearance is most consistent with osteoarthritis.   05/05/2014--patient presents and reports that she is having worsening right wrist pain primarily at the base of the thumb. No new injury. Bilateral x-rays of the wrists and hands ordered. Uric acid level ordered as well as a CRP. Vimovo 500/20 one by mouth twice a day. Follow up in one week.   03/18/2014--patient reports that her wrist symptoms have improved.   12/30/2013--patient reports a several mon   • Vitamin D deficiency 4/18/2016         Past Surgical History:   Procedure Laterality Date   • COLONOSCOPY  03/17/2009 03/17/2009--colonoscopy revealed external hemorrhoids, torturous colon with a sigmoid stricture, sigmoid diverticulosis.   • COLONOSCOPY N/A 3/10/2017    03/10/2017--colonoscopy revealed many small and large mouth diverticula in the sigmoid colon and descending colon.  Nonthrombosed external hemorrhoids and internal hemorrhoids noted.  Otherwise, normal colonoscopy.   • ERCP WITH SPHINCTEROTOMY/PAPILLOTOMY  08/13/2014 08/13/2014--ERCP, endoscopic sphincterotomy and removal of common bile duct stones. 08/12/2014--laparoscopic cholecystectomy. This was complicated by retained common bile duct stones 2.   • ESOPHAGOSCOPY / EGD  06/09/2015 06/09/2015--EGD revealed Z line irregular, 35 cm from incisors. Biopsied. Small hiatal hernia. Gastritis. Biopsied. Bilious gastric fluid. Fluid aspiration performed. Normal duodenal bulb and second part of duodenum. Biopsied. Pathology revealed the antral biopsy revealed small intestinal mucosa with no pathologic diagnosis. Good preservation of villous architecture. Duodenum biopsy revealed large   • LAPAROSCOPIC CHOLECYSTECTOMY   08/12/2014 08/13/2014--ERCP, endoscopic sphincterotomy and removal of common bile duct stones. 08/12/2014--laparoscopic cholecystectomy. This was complicated by retained common bile duct stones 2.   • ROTATOR CUFF REPAIR Left 07/10/2014    07/10/2014--left rotator cuff repair. 03/18/2014--patient seen in followup and reports that her range of motion has improved and her pain is not debilitating. She feels that she is making progress with physical therapy. Surgery scheduled 07/10/2014. 01/10/2014--patient was seen in evaluation by the orthopedist and he recommended conservative treatment consisting of physical therapy/rehabilitation.   • ROTATOR CUFF REPAIR Right 08/03/2016 08/03/2016--arthroscopic right rotator cuff repair.  Debridement of degenerative anterior superior labral tear.   • SUBTOTAL HYSTERECTOMY  1978    Partial hysterectomy 1978.         No Known Allergies        Current Outpatient Medications:   •  atorvastatin (LIPITOR) 80 MG tablet, TAKE ONE TABLET BY MOUTH EVERY NIGHT AT BEDTIME, Disp: 90 tablet, Rfl: 0  •  Biotin 1000 MCG chewable tablet, Chew 3,000 mg., Disp: , Rfl:   •  Calcium Carbonate (CALCIUM 600 PO), Take 2 tablets by mouth daily., Disp: , Rfl:   •  Coenzyme Q10 (COQ10) 400 MG capsule, Take 1 capsule by mouth daily. Take with a meal., Disp: , Rfl:   •  estrogens, conjugated, (Premarin) 0.625 MG tablet, Take 1 by mouth daily as directed., Disp: 90 tablet, Rfl: 3  •  fexofenadine-pseudoephedrine (ALLEGRA-D 24) 180-240 MG per 24 hr tablet, Take 1 tablet by mouth Daily., Disp: 30 tablet, Rfl: 5  •  levothyroxine (SYNTHROID, LEVOTHROID) 50 MCG tablet, TAKE ONE TABLET BY MOUTH DAILY FOR LOW THYROID, Disp: 90 tablet, Rfl: 1  •  lisinopril-hydrochlorothiazide (PRINZIDE,ZESTORETIC) 10-12.5 MG per tablet, Take 1 tablet by mouth Every Morning., Disp: 90 tablet, Rfl: 0  •  meloxicam (MOBIC) 15 MG tablet, TAKE ONE TABLET BY MOUTH DAILY FOR ARTHRITIS, Disp: 90 tablet, Rfl: 0  •  multivitamin  "(THERAGRAN) tablet tablet, Take 1 tablet by mouth daily., Disp: , Rfl:   •  omeprazole OTC (PRILOSEC OTC) 20 MG EC tablet, Take 1 tablet by mouth Daily., Disp: , Rfl:   •  vitamin C (ASCORBIC ACID) 250 MG tablet, Take 250 mg by mouth Daily., Disp: , Rfl:   •  predniSONE (DELTASONE) 10 MG tablet, 5 by mouth daily 5 days, then 4 daily 2 days, 3 daily 2 days, 2 daily 2 days, 1 daily 2 days, one half daily 2 days and then discontinue., Disp: 46 tablet, Rfl: 0      Family History   Problem Relation Age of Onset   • Other Father         Hodgkin's Disease   • Cancer Father         Lung Cancer   • Breast cancer Daughter 52   • Alzheimer's disease Mother          Social History     Socioeconomic History   • Marital status:    • Number of children: 2   • Years of education: BA   • Highest education level: Associate degree: occupational, technical, or vocational program   Tobacco Use   • Smoking status: Never Smoker   • Smokeless tobacco: Never Used   Vaping Use   • Vaping Use: Never used   Substance and Sexual Activity   • Alcohol use: Yes     Comment: Socially   • Drug use: No   • Sexual activity: Yes     Partners: Male         Vitals:    11/16/21 1029   BP: 120/60   Pulse: 85   Resp: 17   SpO2: 98%   Weight: 56.3 kg (124 lb 3.2 oz)   Height: 162.6 cm (64\")        Body mass index is 21.32 kg/m².      Physical Exam:    General: Alert and oriented x 3.  No acute distress.  Normal affect.  HEENT: Pupils equal, round, reactive to light; extraocular movements intact; sclerae nonicteric; pharynx, ear canals and TMs normal.  Neck: Without JVD, thyromegaly, bruit, or adenopathy.  Lungs: Clear to auscultation in all fields.  Heart: Regular rate and rhythm without murmur, rub, gallop, or click.  Abdomen: Soft, nontender, without hepatosplenomegaly or hernia.  Bowel sounds normal.  : Deferred.  Rectal: Deferred.  Extremities: Without clubbing, cyanosis, edema, or pulse deficit.  Neurologic: Intact without focal deficit.  " Normal station and gait observed during ingress and egress from the examination room.  Skin: Without significant lesion.  Musculoskeletal: Changes consistent with degenerative arthritis, particularly about the hands and wrists.  I do not see any active synovitis.    Lab/other results:    CBC normal.  CPK normal.  CMP normal except glucose 104.  NMR reveals total cholesterol 169.  Triglycerides slightly elevated 159.  LDL particle number excellent 787.  HDL particle number excellent at 48.6.  Vitamin D normal at 69.8.  Urinalysis normal.  Thyroid function test normal.    Assessment/Plan:     Diagnosis Plan   1. Encounter for subsequent annual wellness visit (AWV) in Medicare patient     2. Hyperlipidemia  CK    Comprehensive Metabolic Panel    NMR LipoProfile   3. Benign essential hypertension     4. Gastroesophageal reflux disease without esophagitis     5. Multiple environmental allergies  predniSONE (DELTASONE) 10 MG tablet   6. Vitamin D deficiency  Vitamin D 25 Hydroxy   7. Primary hypothyroidism  TSH    T4, Free    T3, Free   8. Family history of breast cancer in first degree relative     9. Menopausal state     10. Postmenopausal hormone replacement therapy     11. Primary osteoarthritis of both hands     12. Primary osteoarthritis of both wrists     13. Lumbar disc herniation, L4-L5     14. History of COVID-19     15. Therapeutic drug monitoring  CBC (No Diff)     The subsequent Medicare wellness visit is documented on separate note.    Patient has hyperlipidemia which is under excellent control.  Her blood pressure is also under excellent control.  Esophageal reflux is controlled with omeprazole.  Her environmental allergies are treated with Allegra-D and this is helpful.  However, she continues to have a persistent dry cough which she thinks is allergy related and not related to the Covid infection.  This has been going on for weeks and she is ready to get on with things.  See below for plan.  Her vitamin D  is in the normal range which is important given her postmenopausal state.  Her thyroid is in the therapeutic range.  Patient has a family history of breast cancer and is up-to-date on her mammogram which was done recently and negative.  Symptomatically she has primary osteoarthritis of the hands as well as chronic foot and toe pain.  She has been referred to orthopedist.  She has a history of COVID-19 infection a few months ago and her symptoms have resolved.  Patient's blood sugar was a little elevated but she was not fasting and this would also explain the mild elevation of triglycerides.    Plan is as follows: Prednisone 50 mg p.o. daily x5 days, taper and discontinue.  Patient needs to contact me if her persistent cough continues.  No changes in current medical regimen.  I will have patient follow-up in 1 year with lab prior and this will also be subsequent Medicare wellness visit.  Otherwise she will follow-up as needed.    Procedures

## 2021-12-16 DIAGNOSIS — I10 BENIGN ESSENTIAL HYPERTENSION: Chronic | ICD-10-CM

## 2021-12-16 RX ORDER — LISINOPRIL AND HYDROCHLOROTHIAZIDE 12.5; 1 MG/1; MG/1
TABLET ORAL
Qty: 90 TABLET | Refills: 2 | Status: SHIPPED | OUTPATIENT
Start: 2021-12-16 | End: 2022-04-30 | Stop reason: HOSPADM

## 2022-01-28 DIAGNOSIS — M19.032 PRIMARY OSTEOARTHRITIS OF BOTH WRISTS: Chronic | ICD-10-CM

## 2022-01-28 DIAGNOSIS — M19.041 PRIMARY OSTEOARTHRITIS OF BOTH HANDS: Chronic | ICD-10-CM

## 2022-01-28 DIAGNOSIS — M19.042 PRIMARY OSTEOARTHRITIS OF BOTH HANDS: Chronic | ICD-10-CM

## 2022-01-28 DIAGNOSIS — M19.031 PRIMARY OSTEOARTHRITIS OF BOTH WRISTS: Chronic | ICD-10-CM

## 2022-01-28 RX ORDER — MELOXICAM 15 MG/1
TABLET ORAL
Qty: 90 TABLET | Refills: 0 | Status: SHIPPED | OUTPATIENT
Start: 2022-01-28 | End: 2022-04-30 | Stop reason: HOSPADM

## 2022-02-24 ENCOUNTER — HOSPITAL ENCOUNTER (OUTPATIENT)
Dept: BONE DENSITY | Facility: HOSPITAL | Age: 77
Discharge: HOME | End: 2022-02-24
Admitting: INTERNAL MEDICINE

## 2022-02-24 PROCEDURE — 77080 DXA BONE DENSITY AXIAL: CPT

## 2022-03-18 DIAGNOSIS — Z79.890 POSTMENOPAUSAL HORMONE REPLACEMENT THERAPY: Chronic | ICD-10-CM

## 2022-04-01 ENCOUNTER — OFFICE VISIT (OUTPATIENT)
Dept: INTERNAL MEDICINE | Facility: CLINIC | Age: 77
End: 2022-04-01

## 2022-04-01 VITALS
HEIGHT: 64 IN | RESPIRATION RATE: 18 BRPM | BODY MASS INDEX: 20.76 KG/M2 | SYSTOLIC BLOOD PRESSURE: 118 MMHG | DIASTOLIC BLOOD PRESSURE: 70 MMHG | HEART RATE: 84 BPM | WEIGHT: 121.6 LBS | OXYGEN SATURATION: 96 %

## 2022-04-01 DIAGNOSIS — E55.9 VITAMIN D DEFICIENCY: ICD-10-CM

## 2022-04-01 DIAGNOSIS — Z83.71 FAMILY HISTORY OF COLONIC POLYPS: ICD-10-CM

## 2022-04-01 DIAGNOSIS — M85.89 OSTEOPENIA OF MULTIPLE SITES: Chronic | ICD-10-CM

## 2022-04-01 DIAGNOSIS — Z91.09 MULTIPLE ENVIRONMENTAL ALLERGIES: Chronic | ICD-10-CM

## 2022-04-01 DIAGNOSIS — J20.9 ACUTE BRONCHITIS WITH BRONCHOSPASM: Primary | ICD-10-CM

## 2022-04-01 DIAGNOSIS — J30.1 CHRONIC SEASONAL ALLERGIC RHINITIS DUE TO POLLEN: Chronic | ICD-10-CM

## 2022-04-01 PROBLEM — Z86.16 HISTORY OF COVID-19: Chronic | Status: ACTIVE | Noted: 2021-10-19

## 2022-04-01 PROBLEM — M79.674 CHRONIC TOE PAIN, RIGHT FOOT: Chronic | Status: ACTIVE | Noted: 2021-10-19

## 2022-04-01 PROBLEM — G89.29 CHRONIC TOE PAIN, RIGHT FOOT: Chronic | Status: ACTIVE | Noted: 2021-10-19

## 2022-04-01 PROBLEM — Z83.719 FAMILY HISTORY OF COLONIC POLYPS: Status: ACTIVE | Noted: 2022-04-01

## 2022-04-01 PROBLEM — M20.41 HAMMERTOE OF SECOND TOE OF RIGHT FOOT: Chronic | Status: ACTIVE | Noted: 2021-10-19

## 2022-04-01 PROCEDURE — 99214 OFFICE O/P EST MOD 30 MIN: CPT | Performed by: INTERNAL MEDICINE

## 2022-04-01 RX ORDER — CONJUGATED ESTROGENS 0.62 MG/G
CREAM VAGINAL
COMMUNITY
End: 2022-04-05

## 2022-04-01 RX ORDER — PREDNISONE 10 MG/1
TABLET ORAL
Qty: 46 TABLET | Refills: 0 | Status: SHIPPED | OUTPATIENT
Start: 2022-04-01 | End: 2022-04-30 | Stop reason: HOSPADM

## 2022-04-01 RX ORDER — PHENOL 1.4 %
AEROSOL, SPRAY (ML) MUCOUS MEMBRANE
Qty: 30 TABLET
Start: 2022-04-01

## 2022-04-01 RX ORDER — CEFDINIR 300 MG/1
CAPSULE ORAL
Qty: 20 CAPSULE | Refills: 0 | Status: SHIPPED | OUTPATIENT
Start: 2022-04-01 | End: 2022-04-05

## 2022-04-01 NOTE — PROGRESS NOTES
04/01/2022    Patient Information  Ashley Mata                                                                                          [id number] [facility] [id number]      1945  [unfilled]  There is no work phone number on file.    Chief Complaint:     Complaining of head congestion, drainage, cough, allergy symptoms.  Follow-up recent DEXA scan.  Needs colonoscopy.    History of Present Illness:    Patient with a history of environmental allergies/allergic rhinitis, hypertension, esophageal reflux, hyperlipidemia, osteoarthritis, recent DEXA scan and osteopenia, primary hypothyroidism, postmenopausal state, vitamin D deficiency, history of COVID-19 infection in October of last year.  She presents today with complaints of a 5-day history of head congestion, posterior nasal drainage, cough productive of unknown colored phlegm and associated with worsening allergy symptoms.  She has had no fever, chills, or systemic signs or symptoms although she just feels somewhat fatigued.  No change in sense of taste or smell.  No significant shortness of breath.    Patient also had a DEXA scan that we need to review.  She also has a family history of colon polyps and needs to have a colonoscopy ordered.    Past medical history reviewed and updated were necessary including health maintenance parameters.  This reveals she is up-to-date with the exception of the colonoscopy.    Review of Systems   Constitutional: Negative. Negative for chills, fever and night sweats.   HENT: Positive for congestion.    Eyes: Negative.    Cardiovascular: Negative.  Negative for dyspnea on exertion.   Respiratory: Positive for cough, sputum production and wheezing. Negative for shortness of breath.    Endocrine: Negative.    Hematologic/Lymphatic: Negative.    Skin: Negative.    Musculoskeletal: Negative.    Gastrointestinal: Negative.    Genitourinary: Negative.    Neurological: Negative.     Psychiatric/Behavioral: Negative.    Allergic/Immunologic: Negative.        Active Problems:    Patient Active Problem List   Diagnosis   • Allergic rhinitis   • Benign essential hypertension   • Diverticulosis of colon   • Gastroesophageal reflux disease without esophagitis   • Hyperlipidemia   • Multiple environmental allergies   • Primary osteoarthritis of both wrists   • Primary osteoarthritis of both hands   • Postmenopausal hormone replacement therapy   • Vitamin D deficiency   • Therapeutic drug monitoring   • Alopecia   • Lumbar scoliosis   • Lumbar disc herniation, L4-L5   • Primary hypothyroidism   • Family history of breast cancer in first degree relative   • Menopausal state   • History of COVID-19   • Hammertoe of second toe of right foot   • Chronic toe pain, right foot   • Family history of colonic polyps   • Osteopenia of multiple sites, 2/25/2022--lumbar spine 1.1.  Left femoral neck -1.0.  Right femoral neck -0.3.   • Acute bronchitis with bronchospasm         Past Medical History:   Diagnosis Date   • Allergic rhinitis 01/30/2015 11/13/2015--patient was evaluated by ENT and was started on generic Flonase and patient reports this has improved her symptoms quite a bit.   01/30/2015--allergy testing revealed positive reactivity to cat, dust mite, cockroach, mold spores, and grass pollen. Environmental control measures.   • Alopecia 04/12/2017    Evaluated and treated by the dermatologist.   • Benign essential hypertension 04/18/2016   • Chronic toe pain, right foot 10/19/2021    October 19, 2021--patient reports a 1 year history of progressively worsening right toe pain that particular involves the second toe.  On exam she has obvious hammertoe which is causing irritation.  There is some hammering of the third digit as well.  Patient referred to orthopedist who specializes in foot problems such as Dr. Vieira.  In the meantime patient should use over-the-counter toe pads to   • Diverticulosis of  colon 03/17/2009    03/10/2017--colonoscopy revealed many small and large mouth diverticula in the sigmoid colon and descending colon.  Nonthrombosed external hemorrhoids and internal hemorrhoids noted.  Otherwise, normal colonoscopy.  03/17/2009--colonoscopy revealed external hemorrhoids, torturous colon with a sigmoid stricture, sigmoid diverticulosis.   • Family history of breast cancer in first degree relative 07/13/2020   • Gastroesophageal reflux disease without esophagitis 03/17/2009 06/09/2015--EGD revealed Z line irregular, 35 cm from incisors. Biopsied. Small hiatal hernia. Gastritis. Biopsied. Bilious gastric fluid. Fluid aspiration performed. Normal duodenal bulb and second part of duodenum. Biopsied. Pathology revealed the antral biopsy revealed small intestinal mucosa with no pathologic diagnosis. Good preservation of villous architecture. Duodenum biopsy revealed largely antral type gastric mucosa with mild patchy superficial chronic gastritis. Gastroesophageal junction revealed squamous and glandular mucosa with mild chronic inflammation. Negative for intestinal metaplasia or dysplasia. There appeared to be mislabeling of the antral biopsies and duodenal biopsies.   04/17/2012--EGD revealed irregular Z line, hiatal hernia, gastritis, duodenitis.   03/17/2009--EGD revealed grade B. reflux esophagitis, hiatal hernia, gastritis, a few gastric polyps, duodenitis.   • Hammertoe of second toe of right foot 10/19/2021    October 19, 2021--patient reports a 1 year history of progressively worsening right toe pain that particular involves the second toe.  On exam she has obvious hammertoe which is causing irritation.  There is some hammering of the third digit as well.  Patient referred to orthopedist who specializes in foot problems such as Dr. Vieira.  In the meantime patient should use over-the-counter toe pads to   • History of COVID-19 10/19/2021    October 19, 2021--patient seen in follow-up and  reports her symptoms totally resolved.  She wants to know when she should get her Covid booster vaccine.  I have suggested that she receive it approximately 3 months after initially testing positive.  She has an appointment in November 6, 2021 for the booster injection and I think that will be fine.  August 12, 2021--patient tested positive for COVID   • History of Hyperplastic polyps of stomach 06/09/2015 06/09/2015--EGD revealed Z line irregular, 35 cm from incisors. Biopsied. Small hiatal hernia. Gastritis. Biopsied. Bilious gastric fluid. Fluid aspiration performed. Normal duodenal bulb and second part of duodenum. Biopsied. Pathology revealed the antral biopsy revealed small intestinal mucosa with no pathologic diagnosis. Good preservation of villous architecture. Duodenum biopsy revealed large   • Hyperlipidemia 07/17/2012 07/17/2012--treatment for hyperlipidemia begun.   • Lumbar disc herniation, L4-L5 08/07/2018 01/16/2019--patient seen in follow-up by Dr. Shukla.  She has seen the neurosurgeon who recommended conservative therapy with physical therapy.  Patient reports that has been extremely helpful.  Currently has no significant pain.  08/07/2018--patient seen in follow-up and reports her pain is much better after taking prednisone.  She is now down to half a pill per day and is almost off of it.  I rev   • Lumbar scoliosis 08/07/2018 01/16/2019--patient seen in follow-up by Dr. Shukla.  She has seen the neurosurgeon who recommended conservative therapy with physical therapy.  Patient reports that has been extremely helpful.  Currently has no significant pain.  08/07/2018--patient seen in follow-up and reports her pain is much better after taking prednisone.  She is now down to half a pill per day and is almost off of it.  I rev   • Menopausal state 07/13/2020   • Multiple environmental allergies 01/30/2015 01/30/2015--allergy testing revealed positive reactivity to cat, dust mite, cockroach,  mold spores, and grass pollen. Environmental control measures.   • Osteopenia of multiple sites, 2/25/2022--lumbar spine 1.1.  Left femoral neck -1.0.  Right femoral neck -0.3. 04/01/2022 February 25, 2022--DEXA scan reveals lumbar spine T score 1.1.  Left femoral neck T score -1.0.  Right femoral neck T score -0.3.  Mild osteopenia.   • Postmenopausal hormone replacement therapy 04/18/2016   • Primary hypothyroidism 01/22/2020 January 22, 2020--TSH is elevated at 5.0.  Levothyroxine 50 mcg/day initiated.  Patient will follow-up with nonfasting lab work in about 6 to 8 weeks to reassess.  07/09/2018--routine follow-up.  TSH elevated slightly at 4.31.  Free T4 normal at 1.22.  Free T3 normal at 3.3.  Continued observation.  10/11/2017--thyroid function tests are normal.  09/30/2016--thyroid ultrasound reveals a tiny 2 mm    • Primary osteoarthritis of both hands 12/30/2013 05/12/2014--patient seen in followup and reports that the Vimovo has helped. Uric acid levels are normal at 4.9. C. reactive protein mildly elevated at 2.3. X-rays of the hands reveals radiocarpal, first carpometacarpal, first metacarpophalangeal, and interphalangeal joint degeneration. This process is symmetric. The interphalangeal joint of the left is affected to the greatest degree. There is right sided scapholunate dissociation as well as deformity of the scaphoid suggesting prior traumatic injury with healing. Soft tissues are satisfactory. Overall appearance is most consistent with osteoarthritis.   05/05/2014--patient presents and reports that she is having worsening right wrist pain primarily at the base of the thumb. No new injury. Bilateral x-rays of the wrists and hands ordered. Uric acid level ordered as well as a CRP. Vimovo 500/20 one by mouth twice a day. Follow up in one week.   03/18/2014--patient reports that her wrist symptoms have improved.   12/30/2013--patient reports a several mon   • Primary osteoarthritis of both  wrists 12/30/2013 05/12/2014--patient seen in followup and reports that the Vimovo has helped. Uric acid levels are normal at 4.9. C. reactive protein mildly elevated at 2.3. X-rays of the hands reveals radiocarpal, first carpometacarpal, first metacarpophalangeal, and interphalangeal joint degeneration. This process is symmetric. The interphalangeal joint of the left is affected to the greatest degree. There is right sided scapholunate dissociation as well as deformity of the scaphoid suggesting prior traumatic injury with healing. Soft tissues are satisfactory. Overall appearance is most consistent with osteoarthritis.   05/05/2014--patient presents and reports that she is having worsening right wrist pain primarily at the base of the thumb. No new injury. Bilateral x-rays of the wrists and hands ordered. Uric acid level ordered as well as a CRP. Vimovo 500/20 one by mouth twice a day. Follow up in one week.   03/18/2014--patient reports that her wrist symptoms have improved.   12/30/2013--patient reports a several mon   • Vitamin D deficiency 04/18/2016         Past Surgical History:   Procedure Laterality Date   • COLONOSCOPY  03/17/2009 03/17/2009--colonoscopy revealed external hemorrhoids, torturous colon with a sigmoid stricture, sigmoid diverticulosis.   • COLONOSCOPY N/A 3/10/2017    03/10/2017--colonoscopy revealed many small and large mouth diverticula in the sigmoid colon and descending colon.  Nonthrombosed external hemorrhoids and internal hemorrhoids noted.  Otherwise, normal colonoscopy.   • ERCP WITH SPHINCTEROTOMY/PAPILLOTOMY  08/13/2014 08/13/2014--ERCP, endoscopic sphincterotomy and removal of common bile duct stones. 08/12/2014--laparoscopic cholecystectomy. This was complicated by retained common bile duct stones 2.   • ESOPHAGOSCOPY / EGD  06/09/2015 06/09/2015--EGD revealed Z line irregular, 35 cm from incisors. Biopsied. Small hiatal hernia. Gastritis. Biopsied. Bilious gastric  fluid. Fluid aspiration performed. Normal duodenal bulb and second part of duodenum. Biopsied. Pathology revealed the antral biopsy revealed small intestinal mucosa with no pathologic diagnosis. Good preservation of villous architecture. Duodenum biopsy revealed large   • LAPAROSCOPIC CHOLECYSTECTOMY  08/12/2014 08/13/2014--ERCP, endoscopic sphincterotomy and removal of common bile duct stones. 08/12/2014--laparoscopic cholecystectomy. This was complicated by retained common bile duct stones 2.   • ROTATOR CUFF REPAIR Left 07/10/2014    07/10/2014--left rotator cuff repair. 03/18/2014--patient seen in followup and reports that her range of motion has improved and her pain is not debilitating. She feels that she is making progress with physical therapy. Surgery scheduled 07/10/2014. 01/10/2014--patient was seen in evaluation by the orthopedist and he recommended conservative treatment consisting of physical therapy/rehabilitation.   • ROTATOR CUFF REPAIR Right 08/03/2016 08/03/2016--arthroscopic right rotator cuff repair.  Debridement of degenerative anterior superior labral tear.   • SUBTOTAL HYSTERECTOMY  1978    Partial hysterectomy 1978.         No Known Allergies        Current Outpatient Medications:   •  atorvastatin (LIPITOR) 80 MG tablet, TAKE ONE TABLET BY MOUTH EVERY NIGHT AT BEDTIME, Disp: 90 tablet, Rfl: 0  •  Biotin 1000 MCG chewable tablet, Chew 3,000 mg., Disp: , Rfl:   •  Coenzyme Q10 (COQ10) 400 MG capsule, Take 1 capsule by mouth daily. Take with a meal., Disp: , Rfl:   •  conjugated estrogens (Premarin) 0.625 MG/GM vaginal cream, , Disp: , Rfl:   •  fexofenadine-pseudoephedrine (ALLEGRA-D 24) 180-240 MG per 24 hr tablet, Take 1 tablet by mouth Daily., Disp: 30 tablet, Rfl: 5  •  levothyroxine (SYNTHROID, LEVOTHROID) 50 MCG tablet, TAKE ONE TABLET BY MOUTH DAILY FOR LOW THYROID, Disp: 90 tablet, Rfl: 1  •  lisinopril-hydrochlorothiazide (PRINZIDE,ZESTORETIC) 10-12.5 MG per tablet, TAKE ONE  "TABLET BY MOUTH EVERY MORNING, Disp: 90 tablet, Rfl: 2  •  meloxicam (MOBIC) 15 MG tablet, TAKE ONE TABLET BY MOUTH DAILY FOR ARTHRITIS, Disp: 90 tablet, Rfl: 0  •  multivitamin (THERAGRAN) tablet tablet, Take 1 tablet by mouth daily., Disp: , Rfl:   •  omeprazole OTC (PRILOSEC OTC) 20 MG EC tablet, Take 1 tablet by mouth Daily., Disp: , Rfl:   •  vitamin C (ASCORBIC ACID) 250 MG tablet, Take 250 mg by mouth Daily., Disp: , Rfl:   •  calcium carbonate (Calcium 600) 600 MG tablet, Take calcium plus vitamin D twice daily for low vitamin D and weak bones, Disp: 30 tablet, Rfl:   •  cefdinir (OMNICEF) 300 MG capsule, Take 1 p.o. twice daily until gone for bronchitis, Disp: 20 capsule, Rfl: 0  •  predniSONE (DELTASONE) 10 MG tablet, 5 by mouth daily 5 days, then 4 daily 2 days, 3 daily 2 days, 2 daily 2 days, 1 daily 2 days, one half daily 2 days and then discontinue., Disp: 46 tablet, Rfl: 0      Family History   Problem Relation Age of Onset   • Other Father         Hodgkin's Disease   • Cancer Father         Lung Cancer   • Breast cancer Daughter 52   • Alzheimer's disease Mother          Social History     Socioeconomic History   • Marital status:    • Number of children: 2   • Years of education: BA   • Highest education level: Associate degree: occupational, technical, or vocational program   Tobacco Use   • Smoking status: Never Smoker   • Smokeless tobacco: Never Used   Vaping Use   • Vaping Use: Never used   Substance and Sexual Activity   • Alcohol use: Yes     Comment: Socially   • Drug use: No   • Sexual activity: Yes     Partners: Male         Vitals:    04/01/22 1056   BP: 118/70   Pulse: 84   Resp: 18   SpO2: 96%   Weight: 55.2 kg (121 lb 9.6 oz)   Height: 162.6 cm (64\")        Body mass index is 20.87 kg/m².      Physical Exam:    General: Alert and oriented x 3.  No acute distress.  Normal affect.  HEENT: Pupils equal, round, reactive to light; extraocular movements intact; sclerae nonicteric; " pharynx, ear canals and TMs normal.  Neck: Without JVD, thyromegaly, bruit, or adenopathy.  Lungs: Lungs are basically clear except there are very fine end expiratory wheezes bilaterally.  Heart: Regular rate and rhythm without murmur, rub, gallop, or click.  Abdomen: Soft, nontender, without hepatosplenomegaly or hernia.  Bowel sounds normal.  : Deferred.  Rectal: Deferred.  Extremities: Without clubbing, cyanosis, edema, or pulse deficit.  Neurologic: Intact without focal deficit.  Normal station and gait observed during ingress and egress from the examination room.  Skin: Without significant lesion.  Musculoskeletal: Unremarkable.    Lab/other results:    February 25, 2022--DEXA scan reveals lumbar spine T score 1.1.  Left femoral neck T score -1.0.  Right femoral neck T score -0.3.  Mild osteopenia.    Assessment/Plan:     Diagnosis Plan   1. Acute bronchitis with bronchospasm  cefdinir (OMNICEF) 300 MG capsule    predniSONE (DELTASONE) 10 MG tablet   2. Allergic rhinitis  predniSONE (DELTASONE) 10 MG tablet   3. Multiple environmental allergies  predniSONE (DELTASONE) 10 MG tablet   4. Osteopenia of multiple sites     5. Family history of colonic polyps  Ambulatory Referral For Screening Colonoscopy   6. Osteopenia of multiple sites, 2/25/2022--lumbar spine 1.1.  Left femoral neck -1.0.  Right femoral neck -0.3.  calcium carbonate (Calcium 600) 600 MG tablet   7. Vitamin D deficiency  calcium carbonate (Calcium 600) 600 MG tablet     Patient presents with a history and exam consistent with acute bronchitis with bronchospasm.  I suspect that allergy is playing a role at this time of year and this needs to be addressed as well.  She has very mild osteopenia and is on vitamin D and calcium supplementation.  No additional medication is needed at this time but we will continue to monitor.  She also has a family history of colon polyps and needs to have a colonoscopy.    Plan is as follows: Cefdinir 300 mg p.o.  twice daily x10 days.  Prednisone 50 mg p.o. daily x5 days, taper and discontinue.  Colonoscopy referral placed.  Patient instructed to contact me if her symptoms do not resolve and certainly if they worsen.  No changes in her chronic medications.  She is to keep previously scheduled lab and follow-up at the end of the year.  Otherwise follow-up as needed.      Procedures

## 2022-04-05 ENCOUNTER — TELEPHONE (OUTPATIENT)
Dept: GASTROENTEROLOGY | Facility: CLINIC | Age: 77
End: 2022-04-05

## 2022-04-05 ENCOUNTER — OFFICE VISIT (OUTPATIENT)
Dept: GASTROENTEROLOGY | Facility: CLINIC | Age: 77
End: 2022-04-05

## 2022-04-05 VITALS — TEMPERATURE: 96.4 F | HEIGHT: 64 IN | BODY MASS INDEX: 21.72 KG/M2 | WEIGHT: 127.2 LBS

## 2022-04-05 DIAGNOSIS — Z83.71 FAMILY HISTORY OF POLYPS IN THE COLON: ICD-10-CM

## 2022-04-05 DIAGNOSIS — K21.9 GASTROESOPHAGEAL REFLUX DISEASE, UNSPECIFIED WHETHER ESOPHAGITIS PRESENT: Primary | ICD-10-CM

## 2022-04-05 PROCEDURE — 99203 OFFICE O/P NEW LOW 30 MIN: CPT | Performed by: INTERNAL MEDICINE

## 2022-04-05 RX ORDER — CEFDINIR 300 MG/1
300 CAPSULE ORAL 2 TIMES DAILY
COMMUNITY
End: 2022-04-30 | Stop reason: HOSPADM

## 2022-04-05 NOTE — TELEPHONE ENCOUNTER
RORY patient in office for EGD/CLN. Scheduled 06/01/2022 with arrival time 1:30pm. Prep packet handed to patient. Also advised arrival time may vary based on [facility] guidelines. RORY Ba--Elizabeth

## 2022-04-05 NOTE — PROGRESS NOTES
Chief Complaint   Patient presents with   • Heartburn        Ashley Mata is a  76 y.o. female here for a follow up visit for GERD, family history of polyps    HPI this 76-year-old white female patient of Dr. Carloz Shukla presents since last seen in 2017.  Colonoscopy was performed at that time because of a family history of polyps.  She had a very tortuous and fixed colon with diverticulosis but otherwise normal study throughout.  She would be due for follow-up colonoscopy at this time.  She also has history of reflux and did undergo upper endoscopic evaluation in June 2015.  We talked about performing both upper and lower endoscopy in 1 setting and she is amenable to this.  I did review the procedures and potential risks including bleeding, perforation, and adverse anesthetic effect.    Past Medical History:   Diagnosis Date   • Allergic rhinitis 01/30/2015 11/13/2015--patient was evaluated by ENT and was started on generic Flonase and patient reports this has improved her symptoms quite a bit.   01/30/2015--allergy testing revealed positive reactivity to cat, dust mite, cockroach, mold spores, and grass pollen. Environmental control measures.   • Alopecia 04/12/2017    Evaluated and treated by the dermatologist.   • Benign essential hypertension 04/18/2016   • Chronic toe pain, right foot 10/19/2021    October 19, 2021--patient reports a 1 year history of progressively worsening right toe pain that particular involves the second toe.  On exam she has obvious hammertoe which is causing irritation.  There is some hammering of the third digit as well.  Patient referred to orthopedist who specializes in foot problems such as Dr. Vieira.  In the meantime patient should use over-the-counter toe pads to   • Diverticulosis of colon 03/17/2009    03/10/2017--colonoscopy revealed many small and large mouth diverticula in the sigmoid colon and descending colon.  Nonthrombosed external hemorrhoids and internal  hemorrhoids noted.  Otherwise, normal colonoscopy.  03/17/2009--colonoscopy revealed external hemorrhoids, torturous colon with a sigmoid stricture, sigmoid diverticulosis.   • Family history of breast cancer in first degree relative 07/13/2020   • Gastroesophageal reflux disease without esophagitis 03/17/2009 06/09/2015--EGD revealed Z line irregular, 35 cm from incisors. Biopsied. Small hiatal hernia. Gastritis. Biopsied. Bilious gastric fluid. Fluid aspiration performed. Normal duodenal bulb and second part of duodenum. Biopsied. Pathology revealed the antral biopsy revealed small intestinal mucosa with no pathologic diagnosis. Good preservation of villous architecture. Duodenum biopsy revealed largely antral type gastric mucosa with mild patchy superficial chronic gastritis. Gastroesophageal junction revealed squamous and glandular mucosa with mild chronic inflammation. Negative for intestinal metaplasia or dysplasia. There appeared to be mislabeling of the antral biopsies and duodenal biopsies.   04/17/2012--EGD revealed irregular Z line, hiatal hernia, gastritis, duodenitis.   03/17/2009--EGD revealed grade B. reflux esophagitis, hiatal hernia, gastritis, a few gastric polyps, duodenitis.   • Hammertoe of second toe of right foot 10/19/2021    October 19, 2021--patient reports a 1 year history of progressively worsening right toe pain that particular involves the second toe.  On exam she has obvious hammertoe which is causing irritation.  There is some hammering of the third digit as well.  Patient referred to orthopedist who specializes in foot problems such as Dr. Vieira.  In the meantime patient should use over-the-counter toe pads to   • History of COVID-19 10/19/2021    October 19, 2021--patient seen in follow-up and reports her symptoms totally resolved.  She wants to know when she should get her Covid booster vaccine.  I have suggested that she receive it approximately 3 months after initially testing  positive.  She has an appointment in November 6, 2021 for the booster injection and I think that will be fine.  August 12, 2021--patient tested positive for COVID   • History of Hyperplastic polyps of stomach 06/09/2015 06/09/2015--EGD revealed Z line irregular, 35 cm from incisors. Biopsied. Small hiatal hernia. Gastritis. Biopsied. Bilious gastric fluid. Fluid aspiration performed. Normal duodenal bulb and second part of duodenum. Biopsied. Pathology revealed the antral biopsy revealed small intestinal mucosa with no pathologic diagnosis. Good preservation of villous architecture. Duodenum biopsy revealed large   • Hyperlipidemia 07/17/2012 07/17/2012--treatment for hyperlipidemia begun.   • Lumbar disc herniation, L4-L5 08/07/2018 01/16/2019--patient seen in follow-up by Dr. Shukla.  She has seen the neurosurgeon who recommended conservative therapy with physical therapy.  Patient reports that has been extremely helpful.  Currently has no significant pain.  08/07/2018--patient seen in follow-up and reports her pain is much better after taking prednisone.  She is now down to half a pill per day and is almost off of it.  I rev   • Lumbar scoliosis 08/07/2018 01/16/2019--patient seen in follow-up by Dr. Shukla.  She has seen the neurosurgeon who recommended conservative therapy with physical therapy.  Patient reports that has been extremely helpful.  Currently has no significant pain.  08/07/2018--patient seen in follow-up and reports her pain is much better after taking prednisone.  She is now down to half a pill per day and is almost off of it.  I rev   • Menopausal state 07/13/2020   • Multiple environmental allergies 01/30/2015 01/30/2015--allergy testing revealed positive reactivity to cat, dust mite, cockroach, mold spores, and grass pollen. Environmental control measures.   • Osteopenia of multiple sites, 2/25/2022--lumbar spine 1.1.  Left femoral neck -1.0.  Right femoral neck -0.3. 04/01/2022     February 25, 2022--DEXA scan reveals lumbar spine T score 1.1.  Left femoral neck T score -1.0.  Right femoral neck T score -0.3.  Mild osteopenia.   • Postmenopausal hormone replacement therapy 04/18/2016   • Primary hypothyroidism 01/22/2020 January 22, 2020--TSH is elevated at 5.0.  Levothyroxine 50 mcg/day initiated.  Patient will follow-up with nonfasting lab work in about 6 to 8 weeks to reassess.  07/09/2018--routine follow-up.  TSH elevated slightly at 4.31.  Free T4 normal at 1.22.  Free T3 normal at 3.3.  Continued observation.  10/11/2017--thyroid function tests are normal.  09/30/2016--thyroid ultrasound reveals a tiny 2 mm    • Primary osteoarthritis of both hands 12/30/2013 05/12/2014--patient seen in followup and reports that the Vimovo has helped. Uric acid levels are normal at 4.9. C. reactive protein mildly elevated at 2.3. X-rays of the hands reveals radiocarpal, first carpometacarpal, first metacarpophalangeal, and interphalangeal joint degeneration. This process is symmetric. The interphalangeal joint of the left is affected to the greatest degree. There is right sided scapholunate dissociation as well as deformity of the scaphoid suggesting prior traumatic injury with healing. Soft tissues are satisfactory. Overall appearance is most consistent with osteoarthritis.   05/05/2014--patient presents and reports that she is having worsening right wrist pain primarily at the base of the thumb. No new injury. Bilateral x-rays of the wrists and hands ordered. Uric acid level ordered as well as a CRP. Vimovo 500/20 one by mouth twice a day. Follow up in one week.   03/18/2014--patient reports that her wrist symptoms have improved.   12/30/2013--patient reports a several mon   • Primary osteoarthritis of both wrists 12/30/2013 05/12/2014--patient seen in followup and reports that the Vimovo has helped. Uric acid levels are normal at 4.9. C. reactive protein mildly elevated at 2.3. X-rays of  the hands reveals radiocarpal, first carpometacarpal, first metacarpophalangeal, and interphalangeal joint degeneration. This process is symmetric. The interphalangeal joint of the left is affected to the greatest degree. There is right sided scapholunate dissociation as well as deformity of the scaphoid suggesting prior traumatic injury with healing. Soft tissues are satisfactory. Overall appearance is most consistent with osteoarthritis.   05/05/2014--patient presents and reports that she is having worsening right wrist pain primarily at the base of the thumb. No new injury. Bilateral x-rays of the wrists and hands ordered. Uric acid level ordered as well as a CRP. Vimovo 500/20 one by mouth twice a day. Follow up in one week.   03/18/2014--patient reports that her wrist symptoms have improved.   12/30/2013--patient reports a several mon   • Vitamin D deficiency 04/18/2016       Current Outpatient Medications   Medication Sig Dispense Refill   • atorvastatin (LIPITOR) 80 MG tablet TAKE ONE TABLET BY MOUTH EVERY NIGHT AT BEDTIME 90 tablet 0   • Biotin 1000 MCG chewable tablet Chew 3,000 mg.     • calcium carbonate (Calcium 600) 600 MG tablet Take calcium plus vitamin D twice daily for low vitamin D and weak bones 30 tablet    • cefdinir (OMNICEF) 300 MG capsule Take 300 mg by mouth 2 (Two) Times a Day.     • Coenzyme Q10 (COQ10) 400 MG capsule Take 1 capsule by mouth daily. Take with a meal.     • fexofenadine-pseudoephedrine (ALLEGRA-D 24) 180-240 MG per 24 hr tablet Take 1 tablet by mouth Daily. 30 tablet 5   • levothyroxine (SYNTHROID, LEVOTHROID) 50 MCG tablet TAKE ONE TABLET BY MOUTH DAILY FOR LOW THYROID 90 tablet 1   • lisinopril-hydrochlorothiazide (PRINZIDE,ZESTORETIC) 10-12.5 MG per tablet TAKE ONE TABLET BY MOUTH EVERY MORNING 90 tablet 2   • meloxicam (MOBIC) 15 MG tablet TAKE ONE TABLET BY MOUTH DAILY FOR ARTHRITIS 90 tablet 0   • multivitamin (THERAGRAN) tablet tablet Take 1 tablet by mouth daily.      • omeprazole OTC (PRILOSEC OTC) 20 MG EC tablet Take 1 tablet by mouth Daily. (Patient taking differently: Take 20 mg by mouth As Needed.)     • predniSONE (DELTASONE) 10 MG tablet 5 by mouth daily 5 days, then 4 daily 2 days, 3 daily 2 days, 2 daily 2 days, 1 daily 2 days, one half daily 2 days and then discontinue. 46 tablet 0   • vitamin C (ASCORBIC ACID) 250 MG tablet Take 250 mg by mouth Daily.       No current facility-administered medications for this visit.       PRN Meds:.    No Known Allergies    Social History     Socioeconomic History   • Marital status:    • Number of children: 2   • Years of education: BA   • Highest education level: Associate degree: occupational, technical, or vocational program   Tobacco Use   • Smoking status: Never Smoker   • Smokeless tobacco: Never Used   Vaping Use   • Vaping Use: Never used   Substance and Sexual Activity   • Alcohol use: Yes     Comment: Socially   • Drug use: No   • Sexual activity: Yes     Partners: Male       Family History   Problem Relation Age of Onset   • Colon polyps Mother    • Alzheimer's disease Mother    • Other Father         Hodgkin's Disease   • Cancer Father         Lung Cancer   • Breast cancer Daughter 52       Review of Systems   Constitutional: Negative for activity change, appetite change, fatigue and unexpected weight change.   HENT: Negative for congestion, facial swelling, sore throat, trouble swallowing and voice change.    Eyes: Negative for photophobia and visual disturbance.   Respiratory: Negative for cough and choking.    Cardiovascular: Negative for chest pain.   Gastrointestinal: Negative for abdominal distention, abdominal pain, anal bleeding, blood in stool, constipation, diarrhea, nausea, rectal pain and vomiting.   Endocrine: Negative for polyphagia.   Musculoskeletal: Negative for arthralgias, gait problem and joint swelling.   Skin: Negative for color change, pallor and rash.   Allergic/Immunologic: Negative for  food allergies.   Neurological: Negative for speech difficulty and headaches.   Hematological: Does not bruise/bleed easily.   Psychiatric/Behavioral: Negative for agitation, confusion and sleep disturbance.       Vitals:    04/05/22 1325   Temp: 96.4 °F (35.8 °C)       Physical Exam  Vitals reviewed.   Constitutional:       General: She is not in acute distress.     Appearance: She is well-developed. She is not diaphoretic.   HENT:      Head: Normocephalic.      Mouth/Throat:      Pharynx: No oropharyngeal exudate.   Eyes:      General: No scleral icterus.     Conjunctiva/sclera: Conjunctivae normal.   Neck:      Thyroid: No thyromegaly.   Cardiovascular:      Rate and Rhythm: Normal rate and regular rhythm.      Heart sounds: No murmur heard.  Pulmonary:      Effort: No respiratory distress.      Breath sounds: Normal breath sounds. No wheezing or rales.   Abdominal:      General: Bowel sounds are normal. There is no distension.      Palpations: Abdomen is soft. There is no mass.      Tenderness: There is no abdominal tenderness.   Musculoskeletal:         General: No tenderness. Normal range of motion.      Cervical back: Normal range of motion.   Lymphadenopathy:      Cervical: No cervical adenopathy.   Skin:     General: Skin is warm and dry.      Findings: No erythema or rash.   Neurological:      Mental Status: She is alert and oriented to person, place, and time.   Psychiatric:         Behavior: Behavior normal.         ASSESSMENT  #1 GERD: On PPI  #2 family history of colon polyps      PLAN  Schedule EGD and colonoscopy      ICD-10-CM ICD-9-CM   1. Gastroesophageal reflux disease, unspecified whether esophagitis present  K21.9 530.81   2. Family history of polyps in the colon  Z83.71 V18.51

## 2022-04-08 ENCOUNTER — PRE-ADMISSION TESTING (OUTPATIENT)
Dept: PREADMISSION TESTING | Facility: HOSPITAL | Age: 77
End: 2022-04-08

## 2022-04-08 VITALS
HEART RATE: 75 BPM | OXYGEN SATURATION: 98 % | RESPIRATION RATE: 16 BRPM | HEIGHT: 64 IN | WEIGHT: 126.4 LBS | BODY MASS INDEX: 21.58 KG/M2 | TEMPERATURE: 98 F | SYSTOLIC BLOOD PRESSURE: 165 MMHG | DIASTOLIC BLOOD PRESSURE: 82 MMHG

## 2022-04-08 LAB
ANION GAP SERPL CALCULATED.3IONS-SCNC: 9.2 MMOL/L (ref 5–15)
BUN SERPL-MCNC: 29 MG/DL (ref 8–23)
BUN/CREAT SERPL: 35.4 (ref 7–25)
CALCIUM SPEC-SCNC: 9.8 MG/DL (ref 8.6–10.5)
CHLORIDE SERPL-SCNC: 102 MMOL/L (ref 98–107)
CO2 SERPL-SCNC: 29.8 MMOL/L (ref 22–29)
CREAT SERPL-MCNC: 0.82 MG/DL (ref 0.57–1)
DEPRECATED RDW RBC AUTO: 43.4 FL (ref 37–54)
EGFRCR SERPLBLD CKD-EPI 2021: 73.3 ML/MIN/1.73
ERYTHROCYTE [DISTWIDTH] IN BLOOD BY AUTOMATED COUNT: 13 % (ref 12.3–15.4)
GLUCOSE SERPL-MCNC: 88 MG/DL (ref 65–99)
HCT VFR BLD AUTO: 37.7 % (ref 34–46.6)
HGB BLD-MCNC: 12.5 G/DL (ref 12–15.9)
MCH RBC QN AUTO: 30.3 PG (ref 26.6–33)
MCHC RBC AUTO-ENTMCNC: 33.2 G/DL (ref 31.5–35.7)
MCV RBC AUTO: 91.5 FL (ref 79–97)
PLATELET # BLD AUTO: 334 10*3/MM3 (ref 140–450)
PMV BLD AUTO: 9.4 FL (ref 6–12)
POTASSIUM SERPL-SCNC: 4.1 MMOL/L (ref 3.5–5.2)
QT INTERVAL: 396 MS
RBC # BLD AUTO: 4.12 10*6/MM3 (ref 3.77–5.28)
SARS-COV-2 ORF1AB RESP QL NAA+PROBE: NOT DETECTED
SODIUM SERPL-SCNC: 141 MMOL/L (ref 136–145)
WBC NRBC COR # BLD: 14.32 10*3/MM3 (ref 3.4–10.8)

## 2022-04-08 PROCEDURE — 93010 ELECTROCARDIOGRAM REPORT: CPT | Performed by: INTERNAL MEDICINE

## 2022-04-08 PROCEDURE — U0004 COV-19 TEST NON-CDC HGH THRU: HCPCS

## 2022-04-08 PROCEDURE — 36415 COLL VENOUS BLD VENIPUNCTURE: CPT

## 2022-04-08 PROCEDURE — 80048 BASIC METABOLIC PNL TOTAL CA: CPT

## 2022-04-08 PROCEDURE — U0005 INFEC AGEN DETEC AMPLI PROBE: HCPCS

## 2022-04-08 PROCEDURE — C9803 HOPD COVID-19 SPEC COLLECT: HCPCS

## 2022-04-08 PROCEDURE — 85027 COMPLETE CBC AUTOMATED: CPT

## 2022-04-08 PROCEDURE — 93005 ELECTROCARDIOGRAM TRACING: CPT

## 2022-04-08 NOTE — DISCHARGE INSTRUCTIONS
Take the following medications the morning of surgery: CEFDINIR, LEVOTHYROXINE, PREDNISONE    OUTPATIENT SURGERY WILL CALL CALL YOU TODAY WITH YOUR ARRIVAL TIME FOR 4/11/22 SURGERY      If you are on prescription narcotic pain medication to control your pain you may also take that medication the morning of surgery.    General Instructions:  Do not eat solid food after midnight the night before surgery.  You may drink clear liquids day of surgery but must stop at least one hour before your hospital arrival time.  It is beneficial for you to have a clear drink that contains carbohydrates the day of surgery.  We suggest a 12 to 20 ounce bottle of Gatorade or Powerade for non-diabetic patients or a 12 to 20 ounce bottle of G2 or Powerade Zero for diabetic patients. (Pediatric patients, are not advised to drink a 12 to 20 ounce carbohydrate drink)    Clear liquids are liquids you can see through.  Nothing red in color.     Plain water                               Sports drinks  Sodas                                   Gelatin (Jell-O)  Fruit juices without pulp such as white grape juice and apple juice  Popsicles that contain no fruit or yogurt  Tea or coffee (no cream or milk added)  Gatorade / Powerade  G2 / Powerade Zero  Patients who avoid smoking, chewing tobacco and alcohol for 4 weeks prior to surgery have a reduced risk of post-operative complications.  Quit smoking as many days before surgery as you can.  Do not smoke, use chewing tobacco or drink alcohol the day of surgery.   If applicable bring your C-PAP/ BI-PAP machine.  Bring any papers given to you in the doctor’s office.  Wear clean comfortable clothes.  Do not wear contact lenses, false eyelashes or make-up.  Bring a case for your glasses.   Bring crutches or walker if applicable.  Remove all piercings.  Leave jewelry and any other valuables at home.  Hair extensions with metal clips must be removed prior to surgery.  The Pre-Admission Testing nurse will  instruct you to bring medications if unable to obtain an accurate list in Pre-Admission Testing.        I  Preventing a Surgical Site Infection:  For 2 to 3 days before surgery, avoid shaving with a razor because the razor can irritate skin and make it easier to develop an infection.    Any areas of open skin can increase the risk of a post-operative wound infection by allowing bacteria to enter and travel throughout the body.  Notify your surgeon if you have any skin wounds / rashes even if it is not near the expected surgical site.  The area will need assessed to determine if surgery should be delayed until it is healed.  The night prior to surgery shower using a fresh bar of anti-bacterial soap (such as Dial) and clean washcloth.  Sleep in a clean bed with clean clothing.  Do not allow pets to sleep with you.  Shower on the morning of surgery using a fresh bar of anti-bacterial soap (such as Dial) and clean washcloth.  Dry with a clean towel and dress in clean clothing.  Ask your surgeon if you will be receiving antibiotics prior to surgery.  Make sure you, your family, and all healthcare providers clean their hands with soap and water or an alcohol based hand  before caring for you or your wound.    Day of surgery:  Your arrival time is approximately two hours before your scheduled surgery time.  Upon arrival, a Pre-op nurse and Anesthesiologist will review your health history, obtain vital signs, and answer questions you may have.  The only belongings needed at this time will be a list of your home medications and if applicable your C-PAP/BI-PAP machine.  A Pre-op nurse will start an IV and you may receive medication in preparation for surgery, including something to help you relax.     Please be aware that surgery does come with discomfort.  We want to make every effort to control your discomfort so please discuss any uncontrolled symptoms with your nurse.   Your doctor will most likely have  prescribed pain medications.      If you are going home after surgery you will receive individualized written care instructions before being discharged.  A responsible adult must drive you to and from the hospital on the day of your surgery and stay with you for 24 hours.  Discharge prescriptions can be filled by the hospital pharmacy during regular pharmacy hours.  If you are having surgery late in the day/evening your prescription may be e-prescribed to your pharmacy.  Please verify your pharmacy hours or chose a 24 hour pharmacy to avoid not having access to your prescription because your pharmacy has closed for the day.    If you are staying overnight following surgery, you will be transported to your hospital room following the recovery period.  [facility] has all private rooms.    If you have any questions please call Pre-Admission Testing at [phone].  Deductibles and co-payments are collected on the day of service. Please be prepared to pay the required co-pay, deductible or deposit on the day of service as defined by your plan.    Patient Education for Self-Quarantine Process    Following your COVID testing, we strongly recommend that you wear a mask when you are with other people and practice social distancing.   Limit your activities to only required outings.  Wash your hands with soap and water frequently for at least 20 seconds.   Avoid touching your eyes, nose and mouth with unwashed hands.  Do not share anything - utensils, drinking glasses, food from the same bowl.   Sanitize household surfaces daily. Include all high touch areas (door handles, light switches, phones, countertops, etc.)    Call your surgeon immediately if you experience any of the following symptoms:  Sore Throat  Shortness of Breath or difficulty breathing  Cough  Chills  Body soreness or muscle pain  Headache  Fever  New loss of taste or smell  Do not arrive for your surgery ill.  Your procedure will need to be  rescheduled to another time.  You will need to call your physician before the day of surgery to avoid any unnecessary exposure to hospital staff as well as other patients.

## 2022-04-11 ENCOUNTER — ANESTHESIA EVENT (OUTPATIENT)
Dept: PERIOP | Facility: HOSPITAL | Age: 77
End: 2022-04-11

## 2022-04-11 ENCOUNTER — ANESTHESIA (OUTPATIENT)
Dept: PERIOP | Facility: HOSPITAL | Age: 77
End: 2022-04-11

## 2022-04-11 ENCOUNTER — HOSPITAL ENCOUNTER (OUTPATIENT)
Facility: HOSPITAL | Age: 77
Setting detail: HOSPITAL OUTPATIENT SURGERY
Discharge: HOME | End: 2022-04-11
Attending: ORTHOPAEDIC SURGERY | Admitting: ORTHOPAEDIC SURGERY

## 2022-04-11 VITALS
OXYGEN SATURATION: 99 % | SYSTOLIC BLOOD PRESSURE: 143 MMHG | TEMPERATURE: 98.4 F | DIASTOLIC BLOOD PRESSURE: 72 MMHG | RESPIRATION RATE: 16 BRPM | HEART RATE: 73 BPM

## 2022-04-11 DIAGNOSIS — M20.41 HAMMERTOE OF SECOND TOE OF RIGHT FOOT: Primary | Chronic | ICD-10-CM

## 2022-04-11 PROCEDURE — 25010000002 CEFAZOLIN IN DEXTROSE 2-4 GM/100ML-% SOLUTION: Performed by: ORTHOPAEDIC SURGERY

## 2022-04-11 PROCEDURE — 25010000002 FENTANYL CITRATE (PF) 50 MCG/ML SOLUTION: Performed by: NURSE ANESTHETIST, CERTIFIED REGISTERED

## 2022-04-11 PROCEDURE — C1713 ANCHOR/SCREW BN/BN,TIS/BN: HCPCS | Performed by: ORTHOPAEDIC SURGERY

## 2022-04-11 PROCEDURE — 25010000002 MIDAZOLAM PER 1 MG: Performed by: ANESTHESIOLOGY

## 2022-04-11 PROCEDURE — 25010000002 PROPOFOL 10 MG/ML EMULSION: Performed by: NURSE ANESTHETIST, CERTIFIED REGISTERED

## 2022-04-11 PROCEDURE — 25010000002 ONDANSETRON PER 1 MG: Performed by: NURSE ANESTHETIST, CERTIFIED REGISTERED

## 2022-04-11 DEVICE — KWIRE SMOTH DBL/TROC .035X4IN: Type: IMPLANTABLE DEVICE | Site: TOES | Status: FUNCTIONAL

## 2022-04-11 RX ORDER — HYDROCODONE BITARTRATE AND ACETAMINOPHEN 5; 325 MG/1; MG/1
1 TABLET ORAL ONCE AS NEEDED
Status: CANCELLED | OUTPATIENT
Start: 2022-04-11

## 2022-04-11 RX ORDER — PROPOFOL 10 MG/ML
VIAL (ML) INTRAVENOUS CONTINUOUS PRN
Status: DISCONTINUED | OUTPATIENT
Start: 2022-04-11 | End: 2022-04-11 | Stop reason: SURG

## 2022-04-11 RX ORDER — FENTANYL CITRATE 50 UG/ML
50 INJECTION, SOLUTION INTRAMUSCULAR; INTRAVENOUS
Status: CANCELLED | OUTPATIENT
Start: 2022-04-11

## 2022-04-11 RX ORDER — PROPOFOL 10 MG/ML
VIAL (ML) INTRAVENOUS AS NEEDED
Status: DISCONTINUED | OUTPATIENT
Start: 2022-04-11 | End: 2022-04-11 | Stop reason: SURG

## 2022-04-11 RX ORDER — FENTANYL CITRATE 50 UG/ML
INJECTION, SOLUTION INTRAMUSCULAR; INTRAVENOUS AS NEEDED
Status: DISCONTINUED | OUTPATIENT
Start: 2022-04-11 | End: 2022-04-11 | Stop reason: SURG

## 2022-04-11 RX ORDER — FLUMAZENIL 0.1 MG/ML
0.2 INJECTION INTRAVENOUS AS NEEDED
Status: CANCELLED | OUTPATIENT
Start: 2022-04-11

## 2022-04-11 RX ORDER — MIDAZOLAM HYDROCHLORIDE 1 MG/ML
0.5 INJECTION INTRAMUSCULAR; INTRAVENOUS
Status: DISCONTINUED | OUTPATIENT
Start: 2022-04-11 | End: 2022-04-11 | Stop reason: HOSPADM

## 2022-04-11 RX ORDER — HYDROMORPHONE HYDROCHLORIDE 1 MG/ML
0.5 INJECTION, SOLUTION INTRAMUSCULAR; INTRAVENOUS; SUBCUTANEOUS
Status: CANCELLED | OUTPATIENT
Start: 2022-04-11

## 2022-04-11 RX ORDER — EPHEDRINE SULFATE 50 MG/ML
5 INJECTION, SOLUTION INTRAVENOUS ONCE AS NEEDED
Status: CANCELLED | OUTPATIENT
Start: 2022-04-11

## 2022-04-11 RX ORDER — SODIUM CHLORIDE 0.9 % (FLUSH) 0.9 %
10 SYRINGE (ML) INJECTION AS NEEDED
Status: DISCONTINUED | OUTPATIENT
Start: 2022-04-11 | End: 2022-04-11 | Stop reason: HOSPADM

## 2022-04-11 RX ORDER — MAGNESIUM HYDROXIDE 1200 MG/15ML
LIQUID ORAL AS NEEDED
Status: DISCONTINUED | OUTPATIENT
Start: 2022-04-11 | End: 2022-04-11 | Stop reason: HOSPADM

## 2022-04-11 RX ORDER — HYDROCODONE BITARTRATE AND ACETAMINOPHEN 7.5; 325 MG/1; MG/1
1 TABLET ORAL EVERY 4 HOURS PRN
Status: CANCELLED | OUTPATIENT
Start: 2022-04-11 | End: 2022-04-18

## 2022-04-11 RX ORDER — HYDROCODONE BITARTRATE AND ACETAMINOPHEN 7.5; 325 MG/1; MG/1
1 TABLET ORAL EVERY 6 HOURS PRN
Qty: 30 TABLET | Refills: 0 | Status: SHIPPED | OUTPATIENT
Start: 2022-04-11 | End: 2022-04-30 | Stop reason: HOSPADM

## 2022-04-11 RX ORDER — BUPIVACAINE HYDROCHLORIDE 5 MG/ML
INJECTION, SOLUTION EPIDURAL; INTRACAUDAL AS NEEDED
Status: DISCONTINUED | OUTPATIENT
Start: 2022-04-11 | End: 2022-04-11 | Stop reason: HOSPADM

## 2022-04-11 RX ORDER — ONDANSETRON 2 MG/ML
INJECTION INTRAMUSCULAR; INTRAVENOUS AS NEEDED
Status: DISCONTINUED | OUTPATIENT
Start: 2022-04-11 | End: 2022-04-11 | Stop reason: SURG

## 2022-04-11 RX ORDER — LIDOCAINE HYDROCHLORIDE 10 MG/ML
0.5 INJECTION, SOLUTION EPIDURAL; INFILTRATION; INTRACAUDAL; PERINEURAL ONCE AS NEEDED
Status: DISCONTINUED | OUTPATIENT
Start: 2022-04-11 | End: 2022-04-11 | Stop reason: HOSPADM

## 2022-04-11 RX ORDER — ONDANSETRON 2 MG/ML
4 INJECTION INTRAMUSCULAR; INTRAVENOUS ONCE AS NEEDED
Status: CANCELLED | OUTPATIENT
Start: 2022-04-11

## 2022-04-11 RX ORDER — SODIUM CHLORIDE, SODIUM LACTATE, POTASSIUM CHLORIDE, CALCIUM CHLORIDE 600; 310; 30; 20 MG/100ML; MG/100ML; MG/100ML; MG/100ML
9 INJECTION, SOLUTION INTRAVENOUS CONTINUOUS PRN
Status: DISCONTINUED | OUTPATIENT
Start: 2022-04-11 | End: 2022-04-11 | Stop reason: HOSPADM

## 2022-04-11 RX ORDER — SODIUM CHLORIDE 0.9 % (FLUSH) 0.9 %
10 SYRINGE (ML) INJECTION EVERY 12 HOURS SCHEDULED
Status: DISCONTINUED | OUTPATIENT
Start: 2022-04-11 | End: 2022-04-11 | Stop reason: HOSPADM

## 2022-04-11 RX ORDER — CEFAZOLIN SODIUM 2 G/100ML
2 INJECTION, SOLUTION INTRAVENOUS ONCE
Status: COMPLETED | OUTPATIENT
Start: 2022-04-11 | End: 2022-04-11

## 2022-04-11 RX ADMIN — FENTANYL CITRATE 25 MCG: 50 INJECTION INTRAMUSCULAR; INTRAVENOUS at 14:20

## 2022-04-11 RX ADMIN — PROPOFOL 40 MG: 10 INJECTION, EMULSION INTRAVENOUS at 14:02

## 2022-04-11 RX ADMIN — SODIUM CHLORIDE, POTASSIUM CHLORIDE, SODIUM LACTATE AND CALCIUM CHLORIDE 9 ML/HR: 600; 310; 30; 20 INJECTION, SOLUTION INTRAVENOUS at 12:53

## 2022-04-11 RX ADMIN — PROPOFOL 40 MG: 10 INJECTION, EMULSION INTRAVENOUS at 14:00

## 2022-04-11 RX ADMIN — MIDAZOLAM 0.5 MG: 1 INJECTION INTRAMUSCULAR; INTRAVENOUS at 13:38

## 2022-04-11 RX ADMIN — Medication 100 MCG/KG/MIN: at 14:00

## 2022-04-11 RX ADMIN — PROPOFOL 50 MG: 10 INJECTION, EMULSION INTRAVENOUS at 14:20

## 2022-04-11 RX ADMIN — CEFAZOLIN SODIUM 2 G: 2 INJECTION, SOLUTION INTRAVENOUS at 13:37

## 2022-04-11 RX ADMIN — FENTANYL CITRATE 50 MCG: 50 INJECTION INTRAMUSCULAR; INTRAVENOUS at 14:00

## 2022-04-11 RX ADMIN — FENTANYL CITRATE 25 MCG: 50 INJECTION INTRAMUSCULAR; INTRAVENOUS at 14:29

## 2022-04-11 RX ADMIN — ONDANSETRON 4 MG: 2 INJECTION INTRAMUSCULAR; INTRAVENOUS at 14:06

## 2022-04-11 NOTE — ANESTHESIA POSTPROCEDURE EVALUATION
Patient: Ashley Mata    Procedure Summary     Date: 04/11/22 Room / Location:  [facility]  [facility]    Anesthesia Start: 1355 Anesthesia Stop: 1452    Procedure: RIGHT SECOND AND THIRD HAMMERTOE REPAIRS (Right Toes) Diagnosis:     Surgeons: Brandt Vieira MD Provider: Pedro Luis Maldonado MD    Anesthesia Type: MAC ASA Status: 3          Anesthesia Type: MAC    Vitals  Vitals Value Taken Time   /48 04/11/22 1450   Temp 36.9 °C (98.4 °F) 04/11/22 1450   Pulse 70 04/11/22 1450   Resp 16 04/11/22 1450   SpO2 98 % 04/11/22 1450           Post Anesthesia Care and Evaluation    Patient location during evaluation: bedside  Patient participation: complete - patient participated  Level of consciousness: awake  Pain management: adequate  Airway patency: patent  Anesthetic complications: No anesthetic complications  PONV Status: controlled  Cardiovascular status: acceptable  Respiratory status: acceptable  Hydration status: acceptable    Comments: ---------------------------               04/11/22                      1450         ---------------------------   BP:          102/48         Pulse:         70           Resp:          16           Temp:   36.9 °C (98.4 °F)   SpO2:          98%         ---------------------------

## 2022-04-11 NOTE — ANESTHESIA PREPROCEDURE EVALUATION
Anesthesia Evaluation     Patient summary reviewed and Nursing notes reviewed   no history of anesthetic complications:  NPO Solid Status: > 8 hours             Airway   Mallampati: II  TM distance: >3 FB  Neck ROM: full  no difficulty expected  Dental - normal exam     Pulmonary - normal exam    breath sounds clear to auscultation  (+) recent URI resolved,   (-) rhonchi, decreased breath sounds, wheezes, rales, stridor  Cardiovascular - normal exam    Rhythm: regular  Rate: normal    (+) hypertension,   (-) murmur, weak pulses, friction rub, systolic click, carotid bruits, JVD, peripheral edema      Neuro/Psych- negative ROS  GI/Hepatic/Renal/Endo    (+)  GERD,  thyroid problem     Musculoskeletal     Abdominal  - normal exam    Abdomen: soft.   Substance History - negative use     OB/GYN negative ob/gyn ROS         Other   arthritis,                        Anesthesia Plan    ASA 3     MAC     intravenous induction     Anesthetic plan, all risks, benefits, and alternatives have been provided, discussed and informed consent has been obtained with: patient.

## 2022-04-11 NOTE — OP NOTE
Preoperative diagnosis: Contracted right second and third hammertoes    Postoperative diagnosis: Same    Procedure: Right second and third hammertoe repair    Monitored anesthesia care with ankle block by surgeon    Surgeon: Brandt Vieira MD    No assistant complication specimen or drain    Technique: After appropriate preoperative consultation, the patient is brought to the operating room and made comfortable on the operating room table.  Parenteral antibiotics were given and a surgical pause was undertaken to identify appropriate patient,  surgical site, and surgeon.  After IV sedation, 25 cc half percent Marcaine plain were used established right foot and ankle block regional anesthesia by surgeon.  The foot is gently exsanguinated and a well-padded right supramalleolar ankle tourniquet inflated to 250 mmHg.  Prep and drape are done in the usual sterile free fashion.    The procedure was basically the same with minor anatomic variation for the second and the third toes and is as follows.    A longitudinally oriented elliptical excision of clavus skin and dermis was done over the dorsum of the proximal interphalangeal joint.  The extensor stewart was dissected and a transverse capsulectomy done.  The appropriate amount of bone and cartilage were removed from the distal end of the proximal phalanx to decompress the contracture.  In a congruent fashion, a rongeur was used to remove cartilage from the base of the middle phalanx without destabilizing the toe.  This procedure was done for both the right second and third toes at the PIP level.  Plantar contracture was released with a freer elevator.  Neurovascular structures were protected.    Proximally, a longitudinal incision was made over the metatarsal phalangeal joint through skin and dermis only.  Blunt dissection was taken deep to the dermis protecting cuticular nerves.  Cautery was used sparingly as needed for hemostasis.    Soft tissue balancing in all planes is  done doing a dorsal and dorsal medial MTP capsulotomy.  Judicious lengthening of the extensor tendon was done without overcorrection or creating a drop toe.  These wounds were then copiously irrigated.    2 separate 0.045 K wires were used for fixation.  These were inserted in proximal to distal direction of the end of the toe and then retrograded to the appropriate level for both the second and the third toes, leaving the distal end of the wire out the end of the toe.  Each wire was then bent over cut short and covered with a pin cap.  Circulation returned probably probably some tourniquet.    The remainder the repairs was completed with interrupted horizontal mattress sutures of 4-0 Prolene.  A sterile dressing was applied.  Patient left the operating room in good condition after having tolerated the procedure well.  She was comfortable with her ankle block anesthesia in effect.

## 2022-04-11 NOTE — DISCHARGE INSTRUCTIONS
WHAT YOU CAN EXPECT  1. Swelling:  This is to be expected. It is difficult to specify what constitutes as an abnormal amount of swelling. You can minimize this by staying off your feet, keeping the foot elevated and applying ice.  2. Pain:  Everyone experiences pain, unfortunately, some worse than others.  You will be given a prescription for pain medication before you leave the hospital, either by Dr. Vieira or one of his assistants.  Please feel free to check with us if you are allergic to any pain medication. If you have had local anesthesia, start taking the medication when the anesthesia begins to wear off.  3. Bleeding:  This always occurs.  You will notice slight oozing occasionally through the bandages.  If bleeding continues and soaks through the dressing please call us.    WHAT TO DO AFTER SURGERY  1. When you return home you must rest.  You may either sit in a chair or in bed, with the foot elevated.  The position of the foot should be above the level of the heart.  Propping the foot up on a few pillows always helps.  2. Do not do any excessive or unnecessary walking during the first few days after surgery.  May get up during the first 48 hours only to eat and use the restroom.  Each operation is slightly different, and you may be specifically told that no walking is allowed at all for a period of time.  Under these circumstances, you will be best off using a crutch or walker.  3. If you are given a special shoe to use after surgery, you may not walk without it.  You do not, however, have to wear the shoe at night.  You will find it easier to commence walking on your heel and put more weight on the flat foot over the course of the next few days.  4. Use ice over the foot for about 24 hours after surgery.  The small commercially available ice packs are useless.  Fill a large garbage bag with ice and prop the bag over the foot, not on the ankle.  Alternatively, place the ice bag on the bed and turn on your  side with the foot lying directly on the ice pack.  5. If you are taking pain medication, there are common side effects such as dizziness, drowsiness, and nausea.  If these or an allergic reaction occur, stop taking the medication and call us.  To prevent nausea, eat prior to taking the medication.    DO NOT DO ANY OF THE FOLLOWING  1. Do not get the bandages wet.  When bathing, either sponge bathe or hang the foot over the side of the bath.  The safest is to get into the empty tub with the shoe on, elevate the foot, and then fill the tub.  Preferably, empty the tub prior to getting out.  Showering is possible with commercial plastic protectors.  2. Don't remove your dressing unless specifically instructed to do so. You may loosen the elastic wrap if needed for tightness.    *Please understand that the postoperative course varies from patient to patient, and these are only meant as guidelines to a smoother recovery.  These instructions do not cover all aspects of your post-operative care and recovery and if you have any questions which you feel are unanswered by this instruction sheet, please call us.    Please call us if you develop a fever associated with redness or drainage around the wound.  We are interested in your prompt and healthy recovery.  Please cooperate with the above instructions. Please call the office for a follow-up appt at [phone].

## 2022-04-13 ENCOUNTER — TELEPHONE (OUTPATIENT)
Dept: GASTROENTEROLOGY | Facility: CLINIC | Age: 77
End: 2022-04-13

## 2022-04-13 NOTE — TELEPHONE ENCOUNTER
Pt needs to cancel EGD for June 1,2022 with Dr. Boyle,  Wants to reschedule. [phone]  Thanks-

## 2022-04-14 NOTE — TELEPHONE ENCOUNTER
GENI Higginbotham for Colonoscopy/EGD on  06/01/2022 to 08/26/2022 arrive at 12pm  . Mailed Prep instructions to Mailing address on-file. ---Miralax

## 2022-04-16 DIAGNOSIS — E03.9 PRIMARY HYPOTHYROIDISM: ICD-10-CM

## 2022-04-18 DIAGNOSIS — E78.2 MIXED HYPERLIPIDEMIA: Primary | ICD-10-CM

## 2022-04-18 RX ORDER — LEVOTHYROXINE SODIUM 0.05 MG/1
TABLET ORAL
Qty: 90 TABLET | Refills: 3 | Status: SHIPPED | OUTPATIENT
Start: 2022-04-18 | End: 2023-01-25 | Stop reason: SDUPTHER

## 2022-04-18 RX ORDER — ATORVASTATIN CALCIUM 80 MG/1
80 TABLET, FILM COATED ORAL
Qty: 90 TABLET | Refills: 3 | Status: SHIPPED | OUTPATIENT
Start: 2022-04-18 | End: 2022-04-30 | Stop reason: HOSPADM

## 2022-04-18 NOTE — TELEPHONE ENCOUNTER
Caller: Ashley Mata    Relationship: Self    Best call back number: [phone]      Requested Prescriptions:   Requested Prescriptions     Pending Prescriptions Disp Refills   • atorvastatin (LIPITOR) 80 MG tablet 90 tablet 0     Sig: Take 1 tablet by mouth every night at bedtime.        Pharmacy where request should be sent: [address] [address] [facility] AT [facility] & FA[facility]ORY [facility] [phone] [facility] [phone]      Additional details provided by patient: IS TOTALLY OF THIS MEDICATION.    Does the patient have less than a 3 day supply:  [x] Yes  [] No    Kristine Perez, PCT   04/18/22 12:58 EDT

## 2022-04-24 ENCOUNTER — HOSPITAL ENCOUNTER (INPATIENT)
Facility: HOSPITAL | Age: 77
LOS: 6 days | Discharge: HOME | End: 2022-04-30
Attending: EMERGENCY MEDICINE | Admitting: HOSPITALIST

## 2022-04-24 ENCOUNTER — APPOINTMENT (OUTPATIENT)
Dept: CT IMAGING | Facility: HOSPITAL | Age: 77
End: 2022-04-24

## 2022-04-24 DIAGNOSIS — G89.29 CHRONIC TOE PAIN, RIGHT FOOT: Chronic | ICD-10-CM

## 2022-04-24 DIAGNOSIS — B17.9 ACUTE HEPATITIS: Primary | ICD-10-CM

## 2022-04-24 DIAGNOSIS — M79.674 CHRONIC TOE PAIN, RIGHT FOOT: Chronic | ICD-10-CM

## 2022-04-24 DIAGNOSIS — R10.30 LOWER ABDOMINAL PAIN: ICD-10-CM

## 2022-04-24 PROBLEM — R17 JAUNDICE: Status: ACTIVE | Noted: 2022-04-24

## 2022-04-24 PROBLEM — N17.9 AKI (ACUTE KIDNEY INJURY) (HCC): Status: ACTIVE | Noted: 2022-04-24

## 2022-04-24 PROBLEM — K75.9 HEPATITIS: Status: ACTIVE | Noted: 2022-04-24

## 2022-04-24 PROBLEM — E87.20 ACIDOSIS: Status: ACTIVE | Noted: 2022-04-24

## 2022-04-24 PROBLEM — E87.6 HYPOKALEMIA: Status: ACTIVE | Noted: 2022-04-24

## 2022-04-24 LAB
ALBUMIN SERPL-MCNC: 4.2 G/DL (ref 3.5–5.2)
ALBUMIN/GLOB SERPL: 1.3 G/DL
ALP SERPL-CCNC: 1683 U/L (ref 39–117)
ALT SERPL W P-5'-P-CCNC: 434 U/L (ref 1–33)
ANION GAP SERPL CALCULATED.3IONS-SCNC: 16 MMOL/L (ref 5–15)
AST SERPL-CCNC: 492 U/L (ref 1–32)
BACTERIA UR QL AUTO: ABNORMAL /HPF
BASOPHILS # BLD AUTO: 0.05 10*3/MM3 (ref 0–0.2)
BASOPHILS NFR BLD AUTO: 0.7 % (ref 0–1.5)
BILIRUB SERPL-MCNC: 10.2 MG/DL (ref 0–1.2)
BILIRUB UR QL STRIP: ABNORMAL
BUN SERPL-MCNC: 28 MG/DL (ref 8–23)
BUN/CREAT SERPL: 20.1 (ref 7–25)
CALCIUM SPEC-SCNC: 10.4 MG/DL (ref 8.6–10.5)
CHLORIDE SERPL-SCNC: 90 MMOL/L (ref 98–107)
CLARITY UR: ABNORMAL
CO2 SERPL-SCNC: 28 MMOL/L (ref 22–29)
COLOR UR: ABNORMAL
CREAT SERPL-MCNC: 1.39 MG/DL (ref 0.57–1)
DEPRECATED RDW RBC AUTO: 49 FL (ref 37–54)
EGFRCR SERPLBLD CKD-EPI 2021: 38.9 ML/MIN/1.73
EOSINOPHIL # BLD AUTO: 0.11 10*3/MM3 (ref 0–0.4)
EOSINOPHIL NFR BLD AUTO: 1.4 % (ref 0.3–6.2)
ERYTHROCYTE [DISTWIDTH] IN BLOOD BY AUTOMATED COUNT: 14.6 % (ref 12.3–15.4)
GGT SERPL-CCNC: 1199 U/L (ref 5–36)
GLOBULIN UR ELPH-MCNC: 3.3 GM/DL
GLUCOSE SERPL-MCNC: 116 MG/DL (ref 65–99)
GLUCOSE UR STRIP-MCNC: NEGATIVE MG/DL
HAV IGM SERPL QL IA: NORMAL
HBV CORE IGM SERPL QL IA: NORMAL
HBV SURFACE AG SERPL QL IA: NORMAL
HCT VFR BLD AUTO: 47.8 % (ref 34–46.6)
HCV AB SER DONR QL: NORMAL
HGB BLD-MCNC: 15.8 G/DL (ref 12–15.9)
HGB UR QL STRIP.AUTO: NEGATIVE
HOLD SPECIMEN: NORMAL
HOLD SPECIMEN: NORMAL
HYALINE CASTS UR QL AUTO: ABNORMAL /LPF
IMM GRANULOCYTES # BLD AUTO: 0.01 10*3/MM3 (ref 0–0.05)
IMM GRANULOCYTES NFR BLD AUTO: 0.1 % (ref 0–0.5)
INR PPP: 0.9 (ref 0.9–1.1)
INR PPP: 0.92 (ref 0.9–1.1)
KETONES UR QL STRIP: ABNORMAL
LEUKOCYTE ESTERASE UR QL STRIP.AUTO: ABNORMAL
LIPASE SERPL-CCNC: 89 U/L (ref 13–60)
LYMPHOCYTES # BLD AUTO: 1.95 10*3/MM3 (ref 0.7–3.1)
LYMPHOCYTES NFR BLD AUTO: 25.7 % (ref 19.6–45.3)
MCH RBC QN AUTO: 30.1 PG (ref 26.6–33)
MCHC RBC AUTO-ENTMCNC: 33.1 G/DL (ref 31.5–35.7)
MCV RBC AUTO: 91 FL (ref 79–97)
MONOCYTES # BLD AUTO: 0.62 10*3/MM3 (ref 0.1–0.9)
MONOCYTES NFR BLD AUTO: 8.2 % (ref 5–12)
NEUTROPHILS NFR BLD AUTO: 4.85 10*3/MM3 (ref 1.7–7)
NEUTROPHILS NFR BLD AUTO: 63.9 % (ref 42.7–76)
NITRITE UR QL STRIP: NEGATIVE
NRBC BLD AUTO-RTO: 0 /100 WBC (ref 0–0.2)
PH UR STRIP.AUTO: <=5 [PH] (ref 5–8)
PLATELET # BLD AUTO: 289 10*3/MM3 (ref 140–450)
PMV BLD AUTO: 10.3 FL (ref 6–12)
POTASSIUM SERPL-SCNC: 3.2 MMOL/L (ref 3.5–5.2)
PROT SERPL-MCNC: 7.5 G/DL (ref 6–8.5)
PROT UR QL STRIP: ABNORMAL
PROTHROMBIN TIME: 12.1 SECONDS (ref 11.7–14.2)
PROTHROMBIN TIME: 12.2 SECONDS (ref 11.7–14.2)
RBC # BLD AUTO: 5.25 10*6/MM3 (ref 3.77–5.28)
RBC # UR STRIP: ABNORMAL /HPF
REF LAB TEST METHOD: ABNORMAL
SARS-COV-2 RNA RESP QL NAA+PROBE: NOT DETECTED
SODIUM SERPL-SCNC: 134 MMOL/L (ref 136–145)
SP GR UR STRIP: 1.02 (ref 1–1.03)
SQUAMOUS #/AREA URNS HPF: ABNORMAL /HPF
UROBILINOGEN UR QL STRIP: ABNORMAL
WBC # UR STRIP: ABNORMAL /HPF
WBC NRBC COR # BLD: 7.59 10*3/MM3 (ref 3.4–10.8)
WHOLE BLOOD HOLD SPECIMEN: NORMAL

## 2022-04-24 PROCEDURE — 83690 ASSAY OF LIPASE: CPT | Performed by: EMERGENCY MEDICINE

## 2022-04-24 PROCEDURE — 99284 EMERGENCY DEPT VISIT MOD MDM: CPT

## 2022-04-24 PROCEDURE — 85610 PROTHROMBIN TIME: CPT | Performed by: EMERGENCY MEDICINE

## 2022-04-24 PROCEDURE — 85025 COMPLETE CBC W/AUTO DIFF WBC: CPT | Performed by: EMERGENCY MEDICINE

## 2022-04-24 PROCEDURE — U0003 INFECTIOUS AGENT DETECTION BY NUCLEIC ACID (DNA OR RNA); SEVERE ACUTE RESPIRATORY SYNDROME CORONAVIRUS 2 (SARS-COV-2) (CORONAVIRUS DISEASE [COVID-19]), AMPLIFIED PROBE TECHNIQUE, MAKING USE OF HIGH THROUGHPUT TECHNOLOGIES AS DESCRIBED BY CMS-2020-01-R: HCPCS | Performed by: EMERGENCY MEDICINE

## 2022-04-24 PROCEDURE — 74176 CT ABD & PELVIS W/O CONTRAST: CPT

## 2022-04-24 PROCEDURE — 81001 URINALYSIS AUTO W/SCOPE: CPT | Performed by: EMERGENCY MEDICINE

## 2022-04-24 PROCEDURE — 82977 ASSAY OF GGT: CPT | Performed by: HOSPITALIST

## 2022-04-24 PROCEDURE — 80053 COMPREHEN METABOLIC PANEL: CPT | Performed by: EMERGENCY MEDICINE

## 2022-04-24 PROCEDURE — U0005 INFEC AGEN DETEC AMPLI PROBE: HCPCS | Performed by: EMERGENCY MEDICINE

## 2022-04-24 PROCEDURE — 85610 PROTHROMBIN TIME: CPT | Performed by: HOSPITALIST

## 2022-04-24 PROCEDURE — 80074 ACUTE HEPATITIS PANEL: CPT | Performed by: EMERGENCY MEDICINE

## 2022-04-24 RX ORDER — SODIUM CHLORIDE 9 MG/ML
100 INJECTION, SOLUTION INTRAVENOUS CONTINUOUS
Status: DISCONTINUED | OUTPATIENT
Start: 2022-04-24 | End: 2022-04-26

## 2022-04-24 RX ORDER — HYDROCODONE BITARTRATE AND ACETAMINOPHEN 7.5; 325 MG/1; MG/1
1 TABLET ORAL EVERY 6 HOURS PRN
Status: DISCONTINUED | OUTPATIENT
Start: 2022-04-24 | End: 2022-04-25

## 2022-04-24 RX ORDER — ACETAMINOPHEN 160 MG/5ML
650 SOLUTION ORAL EVERY 4 HOURS PRN
Status: DISCONTINUED | OUTPATIENT
Start: 2022-04-24 | End: 2022-04-25

## 2022-04-24 RX ORDER — SODIUM CHLORIDE 0.9 % (FLUSH) 0.9 %
10 SYRINGE (ML) INJECTION AS NEEDED
Status: DISCONTINUED | OUTPATIENT
Start: 2022-04-24 | End: 2022-04-30 | Stop reason: HOSPADM

## 2022-04-24 RX ORDER — POTASSIUM CHLORIDE 750 MG/1
40 TABLET, FILM COATED, EXTENDED RELEASE ORAL ONCE
Status: COMPLETED | OUTPATIENT
Start: 2022-04-24 | End: 2022-04-24

## 2022-04-24 RX ORDER — ONDANSETRON 2 MG/ML
4 INJECTION INTRAMUSCULAR; INTRAVENOUS EVERY 6 HOURS PRN
Status: DISCONTINUED | OUTPATIENT
Start: 2022-04-24 | End: 2022-04-30 | Stop reason: HOSPADM

## 2022-04-24 RX ORDER — ACETAMINOPHEN 325 MG/1
650 TABLET ORAL EVERY 4 HOURS PRN
Status: DISCONTINUED | OUTPATIENT
Start: 2022-04-24 | End: 2022-04-25

## 2022-04-24 RX ORDER — DIPHENOXYLATE HYDROCHLORIDE AND ATROPINE SULFATE 2.5; .025 MG/1; MG/1
1 TABLET ORAL DAILY
Status: DISCONTINUED | OUTPATIENT
Start: 2022-04-25 | End: 2022-04-28

## 2022-04-24 RX ORDER — SODIUM CHLORIDE 0.9 % (FLUSH) 0.9 %
10 SYRINGE (ML) INJECTION EVERY 12 HOURS SCHEDULED
Status: DISCONTINUED | OUTPATIENT
Start: 2022-04-24 | End: 2022-04-30 | Stop reason: HOSPADM

## 2022-04-24 RX ORDER — LEVOTHYROXINE SODIUM 0.05 MG/1
50 TABLET ORAL
Status: DISCONTINUED | OUTPATIENT
Start: 2022-04-25 | End: 2022-04-28

## 2022-04-24 RX ORDER — PANTOPRAZOLE SODIUM 40 MG/1
40 TABLET, DELAYED RELEASE ORAL EVERY MORNING
Status: DISCONTINUED | OUTPATIENT
Start: 2022-04-25 | End: 2022-04-28

## 2022-04-24 RX ORDER — ACETAMINOPHEN 650 MG/1
650 SUPPOSITORY RECTAL EVERY 4 HOURS PRN
Status: DISCONTINUED | OUTPATIENT
Start: 2022-04-24 | End: 2022-04-25

## 2022-04-24 RX ORDER — ASCORBIC ACID 500 MG
250 TABLET ORAL DAILY
Status: DISCONTINUED | OUTPATIENT
Start: 2022-04-25 | End: 2022-04-28

## 2022-04-24 RX ORDER — ONDANSETRON 4 MG/1
4 TABLET, FILM COATED ORAL EVERY 6 HOURS PRN
Status: DISCONTINUED | OUTPATIENT
Start: 2022-04-24 | End: 2022-04-30 | Stop reason: HOSPADM

## 2022-04-24 RX ADMIN — SODIUM CHLORIDE 100 ML/HR: 9 INJECTION, SOLUTION INTRAVENOUS at 22:31

## 2022-04-24 RX ADMIN — Medication 10 ML: at 22:29

## 2022-04-24 RX ADMIN — POTASSIUM CHLORIDE 40 MEQ: 750 TABLET, EXTENDED RELEASE ORAL at 20:46

## 2022-04-25 ENCOUNTER — APPOINTMENT (OUTPATIENT)
Dept: CT IMAGING | Facility: HOSPITAL | Age: 77
End: 2022-04-25

## 2022-04-25 ENCOUNTER — APPOINTMENT (OUTPATIENT)
Dept: MRI IMAGING | Facility: HOSPITAL | Age: 77
End: 2022-04-25

## 2022-04-25 LAB
ALBUMIN SERPL-MCNC: 3.2 G/DL (ref 3.5–5.2)
ALBUMIN/GLOB SERPL: 1.2 G/DL
ALP SERPL-CCNC: 1425 U/L (ref 39–117)
ALT SERPL W P-5'-P-CCNC: 352 U/L (ref 1–33)
ANION GAP SERPL CALCULATED.3IONS-SCNC: 12 MMOL/L (ref 5–15)
APAP SERPL-MCNC: <5 MCG/ML (ref 0–30)
AST SERPL-CCNC: 424 U/L (ref 1–32)
BASOPHILS # BLD AUTO: 0.06 10*3/MM3 (ref 0–0.2)
BASOPHILS NFR BLD AUTO: 0.7 % (ref 0–1.5)
BILIRUB CONJ SERPL-MCNC: 6.8 MG/DL (ref 0–0.3)
BILIRUB SERPL-MCNC: 8 MG/DL (ref 0–1.2)
BUN SERPL-MCNC: 25 MG/DL (ref 8–23)
BUN/CREAT SERPL: 22.9 (ref 7–25)
CALCIUM SPEC-SCNC: 8.9 MG/DL (ref 8.6–10.5)
CHLORIDE SERPL-SCNC: 98 MMOL/L (ref 98–107)
CO2 SERPL-SCNC: 23 MMOL/L (ref 22–29)
CREAT SERPL-MCNC: 1.09 MG/DL (ref 0.57–1)
DEPRECATED RDW RBC AUTO: 47.7 FL (ref 37–54)
EGFRCR SERPLBLD CKD-EPI 2021: 52.1 ML/MIN/1.73
EOSINOPHIL # BLD AUTO: 0.42 10*3/MM3 (ref 0–0.4)
EOSINOPHIL NFR BLD AUTO: 5.2 % (ref 0.3–6.2)
ERYTHROCYTE [DISTWIDTH] IN BLOOD BY AUTOMATED COUNT: 14 % (ref 12.3–15.4)
ETHANOL BLD-MCNC: <10 MG/DL (ref 0–10)
ETHANOL UR QL: <0.01 %
GLOBULIN UR ELPH-MCNC: 2.7 GM/DL
GLUCOSE SERPL-MCNC: 98 MG/DL (ref 65–99)
HCT VFR BLD AUTO: 41.5 % (ref 34–46.6)
HGB BLD-MCNC: 13.5 G/DL (ref 12–15.9)
IMM GRANULOCYTES # BLD AUTO: 0.02 10*3/MM3 (ref 0–0.05)
IMM GRANULOCYTES NFR BLD AUTO: 0.2 % (ref 0–0.5)
INR PPP: 0.95 (ref 0.9–1.1)
LYMPHOCYTES # BLD AUTO: 2.52 10*3/MM3 (ref 0.7–3.1)
LYMPHOCYTES NFR BLD AUTO: 31.3 % (ref 19.6–45.3)
MCH RBC QN AUTO: 29.9 PG (ref 26.6–33)
MCHC RBC AUTO-ENTMCNC: 32.5 G/DL (ref 31.5–35.7)
MCV RBC AUTO: 92 FL (ref 79–97)
MONOCYTES # BLD AUTO: 0.83 10*3/MM3 (ref 0.1–0.9)
MONOCYTES NFR BLD AUTO: 10.3 % (ref 5–12)
NEUTROPHILS NFR BLD AUTO: 4.2 10*3/MM3 (ref 1.7–7)
NEUTROPHILS NFR BLD AUTO: 52.3 % (ref 42.7–76)
NRBC BLD AUTO-RTO: 0 /100 WBC (ref 0–0.2)
PLATELET # BLD AUTO: 245 10*3/MM3 (ref 140–450)
PMV BLD AUTO: 10.4 FL (ref 6–12)
POTASSIUM SERPL-SCNC: 3.6 MMOL/L (ref 3.5–5.2)
PROT SERPL-MCNC: 5.9 G/DL (ref 6–8.5)
PROTHROMBIN TIME: 12.6 SECONDS (ref 11.7–14.2)
RBC # BLD AUTO: 4.51 10*6/MM3 (ref 3.77–5.28)
SODIUM SERPL-SCNC: 133 MMOL/L (ref 136–145)
WBC NRBC COR # BLD: 8.05 10*3/MM3 (ref 3.4–10.8)

## 2022-04-25 PROCEDURE — 82710 FATS/LIPIDS FECES QUANT: CPT | Performed by: INTERNAL MEDICINE

## 2022-04-25 PROCEDURE — 80053 COMPREHEN METABOLIC PANEL: CPT | Performed by: HOSPITALIST

## 2022-04-25 PROCEDURE — 82077 ASSAY SPEC XCP UR&BREATH IA: CPT | Performed by: INTERNAL MEDICINE

## 2022-04-25 PROCEDURE — A9577 INJ MULTIHANCE: HCPCS | Performed by: INTERNAL MEDICINE

## 2022-04-25 PROCEDURE — 99222 1ST HOSP IP/OBS MODERATE 55: CPT | Performed by: PHYSICIAN ASSISTANT

## 2022-04-25 PROCEDURE — 80143 DRUG ASSAY ACETAMINOPHEN: CPT | Performed by: INTERNAL MEDICINE

## 2022-04-25 PROCEDURE — 85610 PROTHROMBIN TIME: CPT | Performed by: HOSPITALIST

## 2022-04-25 PROCEDURE — 82705 FATS/LIPIDS FECES QUAL: CPT | Performed by: INTERNAL MEDICINE

## 2022-04-25 PROCEDURE — 74183 MRI ABD W/O CNTR FLWD CNTR: CPT

## 2022-04-25 PROCEDURE — 82248 BILIRUBIN DIRECT: CPT | Performed by: HOSPITALIST

## 2022-04-25 PROCEDURE — 0 GADOBENATE DIMEGLUMINE 529 MG/ML SOLUTION: Performed by: INTERNAL MEDICINE

## 2022-04-25 PROCEDURE — 85025 COMPLETE CBC W/AUTO DIFF WBC: CPT | Performed by: NURSE PRACTITIONER

## 2022-04-25 RX ORDER — OXYCODONE HYDROCHLORIDE 5 MG/1
5 TABLET ORAL EVERY 4 HOURS PRN
Status: DISCONTINUED | OUTPATIENT
Start: 2022-04-25 | End: 2022-04-30 | Stop reason: HOSPADM

## 2022-04-25 RX ADMIN — SODIUM CHLORIDE 100 ML/HR: 9 INJECTION, SOLUTION INTRAVENOUS at 19:33

## 2022-04-25 RX ADMIN — GADOBENATE DIMEGLUMINE 11 ML: 529 INJECTION, SOLUTION INTRAVENOUS at 07:49

## 2022-04-25 RX ADMIN — LEVOTHYROXINE SODIUM 50 MCG: 0.05 TABLET ORAL at 06:36

## 2022-04-25 RX ADMIN — Medication 10 ML: at 08:24

## 2022-04-25 RX ADMIN — PANTOPRAZOLE SODIUM 40 MG: 40 TABLET, DELAYED RELEASE ORAL at 06:36

## 2022-04-25 RX ADMIN — SODIUM CHLORIDE 100 ML/HR: 9 INJECTION, SOLUTION INTRAVENOUS at 10:16

## 2022-04-25 RX ADMIN — Medication 10 ML: at 21:49

## 2022-04-26 ENCOUNTER — APPOINTMENT (OUTPATIENT)
Dept: CT IMAGING | Facility: HOSPITAL | Age: 77
End: 2022-04-26

## 2022-04-26 LAB
ALBUMIN SERPL-MCNC: 2.9 G/DL (ref 3.5–5.2)
ALBUMIN/GLOB SERPL: 1.2 G/DL
ALP SERPL-CCNC: 1420 U/L (ref 39–117)
ALT SERPL W P-5'-P-CCNC: 325 U/L (ref 1–33)
AMYLASE SERPL-CCNC: 86 U/L (ref 28–100)
ANION GAP SERPL CALCULATED.3IONS-SCNC: 13 MMOL/L (ref 5–15)
AST SERPL-CCNC: 420 U/L (ref 1–32)
BASOPHILS # BLD AUTO: 0.06 10*3/MM3 (ref 0–0.2)
BASOPHILS NFR BLD AUTO: 0.9 % (ref 0–1.5)
BILIRUB SERPL-MCNC: 8 MG/DL (ref 0–1.2)
BUN SERPL-MCNC: 16 MG/DL (ref 8–23)
BUN/CREAT SERPL: 18.4 (ref 7–25)
CALCIUM SPEC-SCNC: 8.1 MG/DL (ref 8.6–10.5)
CHLORIDE SERPL-SCNC: 103 MMOL/L (ref 98–107)
CO2 SERPL-SCNC: 22 MMOL/L (ref 22–29)
CREAT SERPL-MCNC: 0.87 MG/DL (ref 0.57–1)
DEPRECATED RDW RBC AUTO: 46.1 FL (ref 37–54)
EGFRCR SERPLBLD CKD-EPI 2021: 68.3 ML/MIN/1.73
EOSINOPHIL # BLD AUTO: 0.56 10*3/MM3 (ref 0–0.4)
EOSINOPHIL NFR BLD AUTO: 8.7 % (ref 0.3–6.2)
ERYTHROCYTE [DISTWIDTH] IN BLOOD BY AUTOMATED COUNT: 14.5 % (ref 12.3–15.4)
GLOBULIN UR ELPH-MCNC: 2.5 GM/DL
GLUCOSE SERPL-MCNC: 77 MG/DL (ref 65–99)
HCT VFR BLD AUTO: 35 % (ref 34–46.6)
HGB BLD-MCNC: 12 G/DL (ref 12–15.9)
IMM GRANULOCYTES # BLD AUTO: 0.02 10*3/MM3 (ref 0–0.05)
IMM GRANULOCYTES NFR BLD AUTO: 0.3 % (ref 0–0.5)
INR PPP: 0.95 (ref 0.9–1.1)
LIPASE SERPL-CCNC: 99 U/L (ref 13–60)
LYMPHOCYTES # BLD AUTO: 1.99 10*3/MM3 (ref 0.7–3.1)
LYMPHOCYTES NFR BLD AUTO: 30.8 % (ref 19.6–45.3)
MAGNESIUM SERPL-MCNC: 1.9 MG/DL (ref 1.6–2.4)
MCH RBC QN AUTO: 29.9 PG (ref 26.6–33)
MCHC RBC AUTO-ENTMCNC: 34.3 G/DL (ref 31.5–35.7)
MCV RBC AUTO: 87.3 FL (ref 79–97)
MONOCYTES # BLD AUTO: 0.56 10*3/MM3 (ref 0.1–0.9)
MONOCYTES NFR BLD AUTO: 8.7 % (ref 5–12)
NEUTROPHILS NFR BLD AUTO: 3.27 10*3/MM3 (ref 1.7–7)
NEUTROPHILS NFR BLD AUTO: 50.6 % (ref 42.7–76)
NRBC BLD AUTO-RTO: 0 /100 WBC (ref 0–0.2)
PLATELET # BLD AUTO: 230 10*3/MM3 (ref 140–450)
PMV BLD AUTO: 11 FL (ref 6–12)
POTASSIUM SERPL-SCNC: 3.3 MMOL/L (ref 3.5–5.2)
PROT SERPL-MCNC: 5.4 G/DL (ref 6–8.5)
PROTHROMBIN TIME: 12.6 SECONDS (ref 11.7–14.2)
RBC # BLD AUTO: 4.01 10*6/MM3 (ref 3.77–5.28)
SODIUM SERPL-SCNC: 138 MMOL/L (ref 136–145)
WBC NRBC COR # BLD: 6.46 10*3/MM3 (ref 3.4–10.8)

## 2022-04-26 PROCEDURE — 85025 COMPLETE CBC W/AUTO DIFF WBC: CPT | Performed by: INTERNAL MEDICINE

## 2022-04-26 PROCEDURE — 83690 ASSAY OF LIPASE: CPT | Performed by: INTERNAL MEDICINE

## 2022-04-26 PROCEDURE — 74160 CT ABDOMEN W/CONTRAST: CPT

## 2022-04-26 PROCEDURE — 99232 SBSQ HOSP IP/OBS MODERATE 35: CPT | Performed by: PHYSICIAN ASSISTANT

## 2022-04-26 PROCEDURE — 80053 COMPREHEN METABOLIC PANEL: CPT | Performed by: HOSPITALIST

## 2022-04-26 PROCEDURE — 25010000002 IOPAMIDOL 61 % SOLUTION: Performed by: HOSPITALIST

## 2022-04-26 PROCEDURE — 83735 ASSAY OF MAGNESIUM: CPT | Performed by: INTERNAL MEDICINE

## 2022-04-26 PROCEDURE — 82150 ASSAY OF AMYLASE: CPT | Performed by: INTERNAL MEDICINE

## 2022-04-26 PROCEDURE — 85610 PROTHROMBIN TIME: CPT | Performed by: HOSPITALIST

## 2022-04-26 RX ADMIN — IOPAMIDOL 85 ML: 612 INJECTION, SOLUTION INTRAVENOUS at 08:12

## 2022-04-26 RX ADMIN — SODIUM CHLORIDE 100 ML/HR: 9 INJECTION, SOLUTION INTRAVENOUS at 06:17

## 2022-04-26 RX ADMIN — PANTOPRAZOLE SODIUM 40 MG: 40 TABLET, DELAYED RELEASE ORAL at 06:17

## 2022-04-26 RX ADMIN — SODIUM CHLORIDE 100 ML/HR: 9 INJECTION, SOLUTION INTRAVENOUS at 19:14

## 2022-04-26 RX ADMIN — LEVOTHYROXINE SODIUM 50 MCG: 0.05 TABLET ORAL at 05:39

## 2022-04-27 LAB
ALBUMIN SERPL-MCNC: 3 G/DL (ref 3.5–5.2)
ALBUMIN/GLOB SERPL: 1.1 G/DL
ALP SERPL-CCNC: 1551 U/L (ref 39–117)
ALPHA1 GLOB MFR UR ELPH: 198 MG/DL (ref 90–200)
ALT SERPL W P-5'-P-CCNC: 353 U/L (ref 1–33)
ANION GAP SERPL CALCULATED.3IONS-SCNC: 13.9 MMOL/L (ref 5–15)
AST SERPL-CCNC: 484 U/L (ref 1–32)
BASOPHILS # BLD AUTO: 0.06 10*3/MM3 (ref 0–0.2)
BASOPHILS NFR BLD AUTO: 1 % (ref 0–1.5)
BILIRUB CONJ SERPL-MCNC: 7.5 MG/DL (ref 0–0.3)
BILIRUB SERPL-MCNC: 9.1 MG/DL (ref 0–1.2)
BUN SERPL-MCNC: 9 MG/DL (ref 8–23)
BUN/CREAT SERPL: 10.6 (ref 7–25)
CALCIUM SPEC-SCNC: 8.2 MG/DL (ref 8.6–10.5)
CERULOPLASMIN SERPL-MCNC: 39 MG/DL (ref 19–39)
CHLORIDE SERPL-SCNC: 104 MMOL/L (ref 98–107)
CO2 SERPL-SCNC: 22.1 MMOL/L (ref 22–29)
CREAT SERPL-MCNC: 0.85 MG/DL (ref 0.57–1)
DEPRECATED RDW RBC AUTO: 43.8 FL (ref 37–54)
EGFRCR SERPLBLD CKD-EPI 2021: 70.2 ML/MIN/1.73
EOSINOPHIL # BLD AUTO: 0.6 10*3/MM3 (ref 0–0.4)
EOSINOPHIL NFR BLD AUTO: 9.7 % (ref 0.3–6.2)
ERYTHROCYTE [DISTWIDTH] IN BLOOD BY AUTOMATED COUNT: 13.8 % (ref 12.3–15.4)
FATTY ACIDS: ABNORMAL
FERRITIN SERPL-MCNC: 775 NG/ML (ref 13–150)
GLOBULIN UR ELPH-MCNC: 2.7 GM/DL
GLUCOSE SERPL-MCNC: 100 MG/DL (ref 65–99)
HAV IGM SERPL QL IA: NORMAL
HBV CORE IGM SERPL QL IA: NORMAL
HBV SURFACE AG SERPL QL IA: NORMAL
HCT VFR BLD AUTO: 33.7 % (ref 34–46.6)
HCV AB SER DONR QL: NORMAL
HGB BLD-MCNC: 11.5 G/DL (ref 12–15.9)
IMM GRANULOCYTES # BLD AUTO: 0.02 10*3/MM3 (ref 0–0.05)
IMM GRANULOCYTES NFR BLD AUTO: 0.3 % (ref 0–0.5)
INR PPP: 0.93 (ref 0.9–1.1)
IRON 24H UR-MRATE: 175 MCG/DL (ref 37–145)
IRON SATN MFR SERPL: 55 % (ref 20–50)
LYMPHOCYTES # BLD AUTO: 2.08 10*3/MM3 (ref 0.7–3.1)
LYMPHOCYTES NFR BLD AUTO: 33.7 % (ref 19.6–45.3)
MAGNESIUM SERPL-MCNC: 1.6 MG/DL (ref 1.6–2.4)
MCH RBC QN AUTO: 29.9 PG (ref 26.6–33)
MCHC RBC AUTO-ENTMCNC: 34.1 G/DL (ref 31.5–35.7)
MCV RBC AUTO: 87.5 FL (ref 79–97)
MONOCYTES # BLD AUTO: 0.51 10*3/MM3 (ref 0.1–0.9)
MONOCYTES NFR BLD AUTO: 8.3 % (ref 5–12)
NEUTRAL FATS: ABNORMAL
NEUTROPHILS NFR BLD AUTO: 2.91 10*3/MM3 (ref 1.7–7)
NEUTROPHILS NFR BLD AUTO: 47 % (ref 42.7–76)
NRBC BLD AUTO-RTO: 0 /100 WBC (ref 0–0.2)
PHOSPHATE SERPL-MCNC: 1.6 MG/DL (ref 2.5–4.5)
PHOSPHATE SERPL-MCNC: 1.8 MG/DL (ref 2.5–4.5)
PLATELET # BLD AUTO: 232 10*3/MM3 (ref 140–450)
PMV BLD AUTO: 10.8 FL (ref 6–12)
POTASSIUM SERPL-SCNC: 3.2 MMOL/L (ref 3.5–5.2)
POTASSIUM SERPL-SCNC: 4.3 MMOL/L (ref 3.5–5.2)
PROT SERPL-MCNC: 5.7 G/DL (ref 6–8.5)
PROTHROMBIN TIME: 12.3 SECONDS (ref 11.7–14.2)
RBC # BLD AUTO: 3.85 10*6/MM3 (ref 3.77–5.28)
SODIUM SERPL-SCNC: 140 MMOL/L (ref 136–145)
TIBC SERPL-MCNC: 319 MCG/DL (ref 298–536)
TRANSFERRIN SERPL-MCNC: 214 MG/DL (ref 200–360)
WBC NRBC COR # BLD: 6.18 10*3/MM3 (ref 3.4–10.8)

## 2022-04-27 PROCEDURE — 82728 ASSAY OF FERRITIN: CPT | Performed by: INTERNAL MEDICINE

## 2022-04-27 PROCEDURE — 82784 ASSAY IGA/IGD/IGG/IGM EACH: CPT | Performed by: INTERNAL MEDICINE

## 2022-04-27 PROCEDURE — 82390 ASSAY OF CERULOPLASMIN: CPT | Performed by: INTERNAL MEDICINE

## 2022-04-27 PROCEDURE — 84100 ASSAY OF PHOSPHORUS: CPT | Performed by: HOSPITALIST

## 2022-04-27 PROCEDURE — 85025 COMPLETE CBC W/AUTO DIFF WBC: CPT | Performed by: PHYSICIAN ASSISTANT

## 2022-04-27 PROCEDURE — 82248 BILIRUBIN DIRECT: CPT | Performed by: HOSPITALIST

## 2022-04-27 PROCEDURE — 86664 EPSTEIN-BARR NUCLEAR ANTIGEN: CPT | Performed by: HOSPITALIST

## 2022-04-27 PROCEDURE — 86015 ACTIN ANTIBODY EACH: CPT | Performed by: INTERNAL MEDICINE

## 2022-04-27 PROCEDURE — 86258 DGP ANTIBODY EACH IG CLASS: CPT | Performed by: INTERNAL MEDICINE

## 2022-04-27 PROCEDURE — 86364 TISS TRNSGLTMNASE EA IG CLAS: CPT | Performed by: INTERNAL MEDICINE

## 2022-04-27 PROCEDURE — 84165 PROTEIN E-PHORESIS SERUM: CPT | Performed by: INTERNAL MEDICINE

## 2022-04-27 PROCEDURE — 86645 CMV ANTIBODY IGM: CPT | Performed by: HOSPITALIST

## 2022-04-27 PROCEDURE — 86231 EMA EACH IG CLASS: CPT | Performed by: INTERNAL MEDICINE

## 2022-04-27 PROCEDURE — 80053 COMPREHEN METABOLIC PANEL: CPT | Performed by: PHYSICIAN ASSISTANT

## 2022-04-27 PROCEDURE — 84132 ASSAY OF SERUM POTASSIUM: CPT | Performed by: HOSPITALIST

## 2022-04-27 PROCEDURE — 83735 ASSAY OF MAGNESIUM: CPT | Performed by: HOSPITALIST

## 2022-04-27 PROCEDURE — 99232 SBSQ HOSP IP/OBS MODERATE 35: CPT | Performed by: PHYSICIAN ASSISTANT

## 2022-04-27 PROCEDURE — 80074 ACUTE HEPATITIS PANEL: CPT | Performed by: INTERNAL MEDICINE

## 2022-04-27 PROCEDURE — 86790 VIRUS ANTIBODY NOS: CPT | Performed by: HOSPITALIST

## 2022-04-27 PROCEDURE — 86665 EPSTEIN-BARR CAPSID VCA: CPT | Performed by: HOSPITALIST

## 2022-04-27 PROCEDURE — 82103 ALPHA-1-ANTITRYPSIN TOTAL: CPT | Performed by: INTERNAL MEDICINE

## 2022-04-27 PROCEDURE — 84466 ASSAY OF TRANSFERRIN: CPT | Performed by: INTERNAL MEDICINE

## 2022-04-27 PROCEDURE — 86644 CMV ANTIBODY: CPT | Performed by: HOSPITALIST

## 2022-04-27 PROCEDURE — 85610 PROTHROMBIN TIME: CPT | Performed by: HOSPITALIST

## 2022-04-27 PROCEDURE — 86381 MITOCHONDRIAL ANTIBODY EACH: CPT | Performed by: INTERNAL MEDICINE

## 2022-04-27 PROCEDURE — 89125 SPECIMEN FAT STAIN: CPT | Performed by: INTERNAL MEDICINE

## 2022-04-27 PROCEDURE — 83540 ASSAY OF IRON: CPT | Performed by: INTERNAL MEDICINE

## 2022-04-27 RX ORDER — POTASSIUM CHLORIDE 750 MG/1
40 TABLET, FILM COATED, EXTENDED RELEASE ORAL AS NEEDED
Status: DISCONTINUED | OUTPATIENT
Start: 2022-04-27 | End: 2022-04-30 | Stop reason: HOSPADM

## 2022-04-27 RX ORDER — POTASSIUM CHLORIDE 1.5 G/1.77G
40 POWDER, FOR SOLUTION ORAL AS NEEDED
Status: DISCONTINUED | OUTPATIENT
Start: 2022-04-27 | End: 2022-04-30 | Stop reason: HOSPADM

## 2022-04-27 RX ORDER — POLYETHYLENE GLYCOL 3350 17 G/17G
17 POWDER, FOR SOLUTION ORAL DAILY
Status: DISCONTINUED | OUTPATIENT
Start: 2022-04-27 | End: 2022-04-28

## 2022-04-27 RX ADMIN — Medication 10 ML: at 08:45

## 2022-04-27 RX ADMIN — Medication 10 ML: at 20:18

## 2022-04-27 RX ADMIN — POLYETHYLENE GLYCOL 3350 17 G: 17 POWDER, FOR SOLUTION ORAL at 16:27

## 2022-04-27 RX ADMIN — Medication 1 TABLET: at 08:45

## 2022-04-27 RX ADMIN — LEVOTHYROXINE SODIUM 50 MCG: 0.05 TABLET ORAL at 05:14

## 2022-04-27 RX ADMIN — OXYCODONE HYDROCHLORIDE AND ACETAMINOPHEN 250 MG: 500 TABLET ORAL at 08:45

## 2022-04-27 RX ADMIN — POTASSIUM CHLORIDE 40 MEQ: 750 TABLET, EXTENDED RELEASE ORAL at 16:27

## 2022-04-27 RX ADMIN — Medication 2 PACKET: at 12:34

## 2022-04-27 RX ADMIN — Medication 2 PACKET: at 22:30

## 2022-04-27 RX ADMIN — PANTOPRAZOLE SODIUM 40 MG: 40 TABLET, DELAYED RELEASE ORAL at 06:22

## 2022-04-27 RX ADMIN — POTASSIUM CHLORIDE 40 MEQ: 750 TABLET, EXTENDED RELEASE ORAL at 12:36

## 2022-04-28 ENCOUNTER — APPOINTMENT (OUTPATIENT)
Dept: CARDIOLOGY | Facility: HOSPITAL | Age: 77
End: 2022-04-28

## 2022-04-28 LAB
ALBUMIN SERPL ELPH-MCNC: 2.5 G/DL (ref 2.9–4.4)
ALBUMIN SERPL-MCNC: 2.8 G/DL (ref 3.5–5.2)
ALBUMIN/GLOB SERPL: 0.9 {RATIO} (ref 0.7–1.7)
ALP SERPL-CCNC: 1567 U/L (ref 39–117)
ALPHA1 GLOB SERPL ELPH-MCNC: 0.4 G/DL (ref 0–0.4)
ALPHA2 GLOB SERPL ELPH-MCNC: 0.8 G/DL (ref 0.4–1)
ALT SERPL W P-5'-P-CCNC: 351 U/L (ref 1–33)
ANION GAP SERPL CALCULATED.3IONS-SCNC: 11.4 MMOL/L (ref 5–15)
AST SERPL-CCNC: 438 U/L (ref 1–32)
B-GLOBULIN SERPL ELPH-MCNC: 0.9 G/DL (ref 0.7–1.3)
BASOPHILS # BLD AUTO: 0.07 10*3/MM3 (ref 0–0.2)
BASOPHILS NFR BLD AUTO: 1 % (ref 0–1.5)
BH CV VAS HEPATIC PORTAL VEIN DIAMETER: 0.89 CM
BH CV VAS HEPATOPORTAL HEPATIC V LT DIRECTION: NORMAL
BH CV VAS HEPATOPORTAL HEPATIC V MID DIRECTION: NORMAL
BH CV VAS HEPATOPORTAL HEPATIC V RT DIRECTION: NORMAL
BH CV VAS HEPATOPORTAL IVC CONFLUENCE FLOW: NORMAL
BH CV VAS HEPATOPORTAL IVC FLOW: NORMAL
BH CV VAS HEPATOPORTAL PORTAL V EXTRAHEPATIC DIRECTION: NORMAL
BH CV VAS HEPATOPORTAL PORTAL V LT INTRA DIRECTION: NORMAL
BH CV VAS HEPATOPORTAL PORTAL V MAIN INTRA DIRECTION: NORMAL
BH CV VAS HEPATOPORTAL PORTAL V PSV: 37.5 CM/S
BH CV VAS HEPATOPORTAL PORTAL V RT INTRA DIRECTION: NORMAL
BH CV VAS HEPATOPORTAL SMV DIRECTION: NORMAL
BH CV VAS HEPATOPORTAL SMV PSV: 47.3 CM/S
BH CV VAS HEPATOPORTAL SPLENIC DIRECTION: NORMAL
BH CV VAS HEPATOPORTAL SPLENIC V PSV: 49 CM/S
BH CV VAS SMA HEPATIC EDV: 25.8 CM/S
BH CV VAS SMA HEPATIC PSV: 87.9 CM/S
BH CV VAS SMA SPLENIC EDV: 44 CM/S
BH CV VAS SMA SPLENIC PSV: 88.8 CM/S
BILIRUB CONJ SERPL-MCNC: 7.5 MG/DL (ref 0–0.3)
BILIRUB INDIRECT SERPL-MCNC: 0.8 MG/DL
BILIRUB SERPL-MCNC: 8.3 MG/DL (ref 0–1.2)
BUN SERPL-MCNC: 11 MG/DL (ref 8–23)
BUN/CREAT SERPL: 14.9 (ref 7–25)
CALCIUM SPEC-SCNC: 8.2 MG/DL (ref 8.6–10.5)
CHLORIDE SERPL-SCNC: 105 MMOL/L (ref 98–107)
CMV IGG SERPL IA-ACNC: >10 U/ML (ref 0–0.59)
CMV IGM SERPL IA-ACNC: <30 AU/ML (ref 0–29.9)
CO2 SERPL-SCNC: 23.6 MMOL/L (ref 22–29)
CREAT SERPL-MCNC: 0.74 MG/DL (ref 0.57–1)
DEPRECATED RDW RBC AUTO: 49.9 FL (ref 37–54)
EBV NA IGG SER IA-ACNC: 33.2 U/ML (ref 0–17.9)
EBV VCA IGG SER IA-ACNC: 231 U/ML (ref 0–17.9)
EBV VCA IGM SER IA-ACNC: 83 U/ML (ref 0–35.9)
EGFRCR SERPLBLD CKD-EPI 2021: 82.9 ML/MIN/1.73
ENDOMYSIUM IGA SER QL: NEGATIVE
EOSINOPHIL # BLD AUTO: 0.88 10*3/MM3 (ref 0–0.4)
EOSINOPHIL NFR BLD AUTO: 12 % (ref 0.3–6.2)
ERYTHROCYTE [DISTWIDTH] IN BLOOD BY AUTOMATED COUNT: 14.6 % (ref 12.3–15.4)
FAT 24H STL-MRATE: NORMAL G/(24.H)
GAMMA GLOB SERPL ELPH-MCNC: 0.8 G/DL (ref 0.4–1.8)
GLIADIN PEPTIDE IGA SER-ACNC: 3 UNITS (ref 0–19)
GLIADIN PEPTIDE IGG SER-ACNC: 2 UNITS (ref 0–19)
GLOBULIN SER CALC-MCNC: 2.9 G/DL (ref 2.2–3.9)
GLUCOSE SERPL-MCNC: 93 MG/DL (ref 65–99)
HCT VFR BLD AUTO: 35.9 % (ref 34–46.6)
HGB BLD-MCNC: 11.8 G/DL (ref 12–15.9)
IGA SERPL-MCNC: 233 MG/DL (ref 64–422)
IMM GRANULOCYTES # BLD AUTO: 0.01 10*3/MM3 (ref 0–0.05)
IMM GRANULOCYTES NFR BLD AUTO: 0.1 % (ref 0–0.5)
INR PPP: 0.95 (ref 0.9–1.1)
LABORATORY COMMENT REPORT: ABNORMAL
LYMPHOCYTES # BLD AUTO: 2.86 10*3/MM3 (ref 0.7–3.1)
LYMPHOCYTES NFR BLD AUTO: 39 % (ref 19.6–45.3)
M PROTEIN SERPL ELPH-MCNC: ABNORMAL G/DL
MAGNESIUM SERPL-MCNC: 1.5 MG/DL (ref 1.6–2.4)
MAXIMAL PREDICTED HEART RATE: 142 BPM
MCH RBC QN AUTO: 30.5 PG (ref 26.6–33)
MCHC RBC AUTO-ENTMCNC: 32.9 G/DL (ref 31.5–35.7)
MCV RBC AUTO: 92.8 FL (ref 79–97)
MITOCHONDRIA M2 IGG SER-ACNC: <20 UNITS (ref 0–20)
MONOCYTES # BLD AUTO: 0.61 10*3/MM3 (ref 0.1–0.9)
MONOCYTES NFR BLD AUTO: 8.3 % (ref 5–12)
NEUTROPHILS NFR BLD AUTO: 2.9 10*3/MM3 (ref 1.7–7)
NEUTROPHILS NFR BLD AUTO: 39.6 % (ref 42.7–76)
NRBC BLD AUTO-RTO: 0 /100 WBC (ref 0–0.2)
PHOSPHATE SERPL-MCNC: 2.6 MG/DL (ref 2.5–4.5)
PLATELET # BLD AUTO: 255 10*3/MM3 (ref 140–450)
PMV BLD AUTO: 11.1 FL (ref 6–12)
POTASSIUM SERPL-SCNC: 4.2 MMOL/L (ref 3.5–5.2)
PROT SERPL-MCNC: 5.4 G/DL (ref 6–8.5)
PROT SERPL-MCNC: 5.7 G/DL (ref 6–8.5)
PROTHROMBIN TIME: 12.6 SECONDS (ref 11.7–14.2)
RBC # BLD AUTO: 3.87 10*6/MM3 (ref 3.77–5.28)
SERVICE CMNT-IMP: ABNORMAL
SMA IGG SER-ACNC: 4 UNITS (ref 0–19)
SODIUM SERPL-SCNC: 140 MMOL/L (ref 136–145)
SPECIMEN WT STL QN: NORMAL G
STRESS TARGET HR: 121 BPM
TTG IGA SER-ACNC: <2 U/ML (ref 0–3)
TTG IGG SER-ACNC: <2 U/ML (ref 0–5)
WBC NRBC COR # BLD: 7.33 10*3/MM3 (ref 3.4–10.8)

## 2022-04-28 PROCEDURE — 80048 BASIC METABOLIC PNL TOTAL CA: CPT | Performed by: HOSPITALIST

## 2022-04-28 PROCEDURE — 85025 COMPLETE CBC W/AUTO DIFF WBC: CPT | Performed by: HOSPITALIST

## 2022-04-28 PROCEDURE — 99232 SBSQ HOSP IP/OBS MODERATE 35: CPT | Performed by: PHYSICIAN ASSISTANT

## 2022-04-28 PROCEDURE — 25010000002 MAGNESIUM SULFATE 2 GM/50ML SOLUTION: Performed by: HOSPITALIST

## 2022-04-28 PROCEDURE — 84100 ASSAY OF PHOSPHORUS: CPT | Performed by: HOSPITALIST

## 2022-04-28 PROCEDURE — 83735 ASSAY OF MAGNESIUM: CPT | Performed by: HOSPITALIST

## 2022-04-28 PROCEDURE — 93975 VASCULAR STUDY: CPT

## 2022-04-28 PROCEDURE — 80076 HEPATIC FUNCTION PANEL: CPT | Performed by: HOSPITALIST

## 2022-04-28 PROCEDURE — 85610 PROTHROMBIN TIME: CPT | Performed by: HOSPITALIST

## 2022-04-28 RX ORDER — MAGNESIUM SULFATE HEPTAHYDRATE 40 MG/ML
2 INJECTION, SOLUTION INTRAVENOUS AS NEEDED
Status: DISCONTINUED | OUTPATIENT
Start: 2022-04-28 | End: 2022-04-30 | Stop reason: HOSPADM

## 2022-04-28 RX ORDER — ASCORBIC ACID 500 MG
250 TABLET ORAL DAILY
Status: DISCONTINUED | OUTPATIENT
Start: 2022-04-29 | End: 2022-04-30 | Stop reason: HOSPADM

## 2022-04-28 RX ORDER — DIPHENOXYLATE HYDROCHLORIDE AND ATROPINE SULFATE 2.5; .025 MG/1; MG/1
1 TABLET ORAL DAILY
Status: DISCONTINUED | OUTPATIENT
Start: 2022-04-29 | End: 2022-04-30 | Stop reason: HOSPADM

## 2022-04-28 RX ORDER — PANTOPRAZOLE SODIUM 40 MG/1
40 TABLET, DELAYED RELEASE ORAL EVERY MORNING
Status: DISCONTINUED | OUTPATIENT
Start: 2022-04-29 | End: 2022-04-30 | Stop reason: HOSPADM

## 2022-04-28 RX ORDER — MAGNESIUM SULFATE HEPTAHYDRATE 40 MG/ML
4 INJECTION, SOLUTION INTRAVENOUS AS NEEDED
Status: DISCONTINUED | OUTPATIENT
Start: 2022-04-28 | End: 2022-04-30 | Stop reason: HOSPADM

## 2022-04-28 RX ORDER — LEVOTHYROXINE SODIUM 0.05 MG/1
50 TABLET ORAL
Status: DISCONTINUED | OUTPATIENT
Start: 2022-04-29 | End: 2022-04-30 | Stop reason: HOSPADM

## 2022-04-28 RX ORDER — POLYETHYLENE GLYCOL 3350 17 G/17G
17 POWDER, FOR SOLUTION ORAL DAILY
Status: DISCONTINUED | OUTPATIENT
Start: 2022-04-29 | End: 2022-04-30 | Stop reason: HOSPADM

## 2022-04-28 RX ADMIN — MAGNESIUM SULFATE HEPTAHYDRATE 2 G: 2 INJECTION, SOLUTION INTRAVENOUS at 18:06

## 2022-04-28 RX ADMIN — LEVOTHYROXINE SODIUM 50 MCG: 0.05 TABLET ORAL at 13:18

## 2022-04-28 RX ADMIN — MAGNESIUM SULFATE HEPTAHYDRATE 2 G: 2 INJECTION, SOLUTION INTRAVENOUS at 14:38

## 2022-04-28 RX ADMIN — Medication 10 ML: at 20:56

## 2022-04-28 RX ADMIN — POLYETHYLENE GLYCOL 3350 17 G: 17 POWDER, FOR SOLUTION ORAL at 14:15

## 2022-04-28 RX ADMIN — MAGNESIUM SULFATE HEPTAHYDRATE 2 G: 2 INJECTION, SOLUTION INTRAVENOUS at 11:04

## 2022-04-28 RX ADMIN — OXYCODONE HYDROCHLORIDE AND ACETAMINOPHEN 250 MG: 500 TABLET ORAL at 13:18

## 2022-04-28 RX ADMIN — Medication 10 ML: at 14:15

## 2022-04-28 RX ADMIN — Medication 1 TABLET: at 13:17

## 2022-04-29 ENCOUNTER — APPOINTMENT (OUTPATIENT)
Dept: CT IMAGING | Facility: HOSPITAL | Age: 77
End: 2022-04-29

## 2022-04-29 LAB
ALBUMIN SERPL-MCNC: 2.9 G/DL (ref 3.5–5.2)
ALBUMIN/GLOB SERPL: 0.9 G/DL
ALP SERPL-CCNC: 1716 U/L (ref 39–117)
ALT SERPL W P-5'-P-CCNC: 316 U/L (ref 1–33)
ANION GAP SERPL CALCULATED.3IONS-SCNC: 10.8 MMOL/L (ref 5–15)
AST SERPL-CCNC: 342 U/L (ref 1–32)
BILIRUB SERPL-MCNC: 7.1 MG/DL (ref 0–1.2)
BUN SERPL-MCNC: 13 MG/DL (ref 8–23)
BUN/CREAT SERPL: 21.7 (ref 7–25)
CALCIUM SPEC-SCNC: 8.4 MG/DL (ref 8.6–10.5)
CHLORIDE SERPL-SCNC: 104 MMOL/L (ref 98–107)
CO2 SERPL-SCNC: 21.2 MMOL/L (ref 22–29)
CREAT SERPL-MCNC: 0.6 MG/DL (ref 0.57–1)
EGFRCR SERPLBLD CKD-EPI 2021: 92 ML/MIN/1.73
FA STL QL: NORMAL
GLOBULIN UR ELPH-MCNC: 3.3 GM/DL
GLUCOSE SERPL-MCNC: 101 MG/DL (ref 65–99)
MAGNESIUM SERPL-MCNC: 2.3 MG/DL (ref 1.6–2.4)
NEUTRAL FAT STL QL: NORMAL
PHOSPHATE SERPL-MCNC: 2.5 MG/DL (ref 2.5–4.5)
POTASSIUM SERPL-SCNC: 3.9 MMOL/L (ref 3.5–5.2)
PROT SERPL-MCNC: 6.2 G/DL (ref 6–8.5)
SODIUM SERPL-SCNC: 136 MMOL/L (ref 136–145)

## 2022-04-29 PROCEDURE — 88313 SPECIAL STAINS GROUP 2: CPT | Performed by: PHYSICIAN ASSISTANT

## 2022-04-29 PROCEDURE — 25010000002 FENTANYL CITRATE (PF) 50 MCG/ML SOLUTION: Performed by: RADIOLOGY

## 2022-04-29 PROCEDURE — 25010000002 MIDAZOLAM PER 1 MG: Performed by: RADIOLOGY

## 2022-04-29 PROCEDURE — 88307 TISSUE EXAM BY PATHOLOGIST: CPT | Performed by: PHYSICIAN ASSISTANT

## 2022-04-29 PROCEDURE — 80053 COMPREHEN METABOLIC PANEL: CPT | Performed by: HOSPITALIST

## 2022-04-29 PROCEDURE — 99232 SBSQ HOSP IP/OBS MODERATE 35: CPT | Performed by: PHYSICIAN ASSISTANT

## 2022-04-29 PROCEDURE — 0FD13ZX EXTRACTION OF RIGHT LOBE LIVER, PERCUTANEOUS APPROACH, DIAGNOSTIC: ICD-10-PCS | Performed by: RADIOLOGY

## 2022-04-29 PROCEDURE — 84100 ASSAY OF PHOSPHORUS: CPT | Performed by: HOSPITALIST

## 2022-04-29 PROCEDURE — 77012 CT SCAN FOR NEEDLE BIOPSY: CPT

## 2022-04-29 PROCEDURE — 0 LIDOCAINE 1 % SOLUTION: Performed by: HOSPITALIST

## 2022-04-29 PROCEDURE — 83735 ASSAY OF MAGNESIUM: CPT | Performed by: HOSPITALIST

## 2022-04-29 RX ORDER — FENTANYL CITRATE 50 UG/ML
INJECTION, SOLUTION INTRAMUSCULAR; INTRAVENOUS
Status: COMPLETED | OUTPATIENT
Start: 2022-04-29 | End: 2022-04-29

## 2022-04-29 RX ORDER — MIDAZOLAM HYDROCHLORIDE 1 MG/ML
INJECTION INTRAMUSCULAR; INTRAVENOUS
Status: COMPLETED | OUTPATIENT
Start: 2022-04-29 | End: 2022-04-29

## 2022-04-29 RX ORDER — LIDOCAINE HYDROCHLORIDE 10 MG/ML
20 INJECTION, SOLUTION INFILTRATION; PERINEURAL ONCE
Status: COMPLETED | OUTPATIENT
Start: 2022-04-29 | End: 2022-04-29

## 2022-04-29 RX ORDER — SODIUM CHLORIDE 9 MG/ML
INJECTION, SOLUTION INTRAVENOUS
Status: COMPLETED | OUTPATIENT
Start: 2022-04-29 | End: 2022-04-29

## 2022-04-29 RX ADMIN — OXYCODONE 5 MG: 5 TABLET ORAL at 21:48

## 2022-04-29 RX ADMIN — PANTOPRAZOLE SODIUM 40 MG: 40 TABLET, DELAYED RELEASE ORAL at 06:51

## 2022-04-29 RX ADMIN — Medication 10 ML: at 21:48

## 2022-04-29 RX ADMIN — LIDOCAINE HYDROCHLORIDE 20 ML: 10 INJECTION, SOLUTION INFILTRATION; PERINEURAL at 10:39

## 2022-04-29 RX ADMIN — FENTANYL CITRATE 50 MCG: 50 INJECTION INTRAMUSCULAR; INTRAVENOUS at 10:39

## 2022-04-29 RX ADMIN — SODIUM CHLORIDE 35 ML/HR: 9 INJECTION, SOLUTION INTRAVENOUS at 10:33

## 2022-04-29 RX ADMIN — LEVOTHYROXINE SODIUM 50 MCG: 0.05 TABLET ORAL at 06:51

## 2022-04-29 RX ADMIN — Medication 10 ML: at 10:17

## 2022-04-29 RX ADMIN — MIDAZOLAM 1 MG: 1 INJECTION INTRAMUSCULAR; INTRAVENOUS at 10:39

## 2022-04-30 ENCOUNTER — READMISSION MANAGEMENT (OUTPATIENT)
Dept: CALL CENTER | Facility: HOSPITAL | Age: 77
End: 2022-04-30

## 2022-04-30 ENCOUNTER — NURSE TRIAGE (OUTPATIENT)
Dept: CALL CENTER | Facility: HOSPITAL | Age: 77
End: 2022-04-30

## 2022-04-30 VITALS
HEIGHT: 64 IN | BODY MASS INDEX: 20.49 KG/M2 | WEIGHT: 120 LBS | OXYGEN SATURATION: 100 % | HEART RATE: 90 BPM | DIASTOLIC BLOOD PRESSURE: 69 MMHG | RESPIRATION RATE: 18 BRPM | SYSTOLIC BLOOD PRESSURE: 111 MMHG | TEMPERATURE: 96.6 F

## 2022-04-30 LAB
ALBUMIN SERPL-MCNC: 2.9 G/DL (ref 3.5–5.2)
ALBUMIN/GLOB SERPL: 0.8 G/DL
ALP SERPL-CCNC: 1704 U/L (ref 39–117)
ALT SERPL W P-5'-P-CCNC: 271 U/L (ref 1–33)
ANION GAP SERPL CALCULATED.3IONS-SCNC: 10 MMOL/L (ref 5–15)
AST SERPL-CCNC: 236 U/L (ref 1–32)
BASOPHILS # BLD AUTO: 0.07 10*3/MM3 (ref 0–0.2)
BASOPHILS NFR BLD AUTO: 0.8 % (ref 0–1.5)
BILIRUB SERPL-MCNC: 6.3 MG/DL (ref 0–1.2)
BUN SERPL-MCNC: 14 MG/DL (ref 8–23)
BUN/CREAT SERPL: 19.2 (ref 7–25)
CALCIUM SPEC-SCNC: 8.9 MG/DL (ref 8.6–10.5)
CHLORIDE SERPL-SCNC: 101 MMOL/L (ref 98–107)
CO2 SERPL-SCNC: 25 MMOL/L (ref 22–29)
CREAT SERPL-MCNC: 0.73 MG/DL (ref 0.57–1)
DEPRECATED RDW RBC AUTO: 45.9 FL (ref 37–54)
EGFRCR SERPLBLD CKD-EPI 2021: 84.3 ML/MIN/1.73
EOSINOPHIL # BLD AUTO: 0.87 10*3/MM3 (ref 0–0.4)
EOSINOPHIL NFR BLD AUTO: 9.9 % (ref 0.3–6.2)
ERYTHROCYTE [DISTWIDTH] IN BLOOD BY AUTOMATED COUNT: 13.7 % (ref 12.3–15.4)
GLOBULIN UR ELPH-MCNC: 3.5 GM/DL
GLUCOSE SERPL-MCNC: 103 MG/DL (ref 65–99)
HCT VFR BLD AUTO: 35.8 % (ref 34–46.6)
HGB BLD-MCNC: 11.6 G/DL (ref 12–15.9)
IMM GRANULOCYTES # BLD AUTO: 0.02 10*3/MM3 (ref 0–0.05)
IMM GRANULOCYTES NFR BLD AUTO: 0.2 % (ref 0–0.5)
LYMPHOCYTES # BLD AUTO: 3.51 10*3/MM3 (ref 0.7–3.1)
LYMPHOCYTES NFR BLD AUTO: 39.9 % (ref 19.6–45.3)
MCH RBC QN AUTO: 29.4 PG (ref 26.6–33)
MCHC RBC AUTO-ENTMCNC: 32.4 G/DL (ref 31.5–35.7)
MCV RBC AUTO: 90.9 FL (ref 79–97)
MONOCYTES # BLD AUTO: 0.69 10*3/MM3 (ref 0.1–0.9)
MONOCYTES NFR BLD AUTO: 7.8 % (ref 5–12)
NEUTROPHILS NFR BLD AUTO: 3.63 10*3/MM3 (ref 1.7–7)
NEUTROPHILS NFR BLD AUTO: 41.4 % (ref 42.7–76)
NRBC BLD AUTO-RTO: 0 /100 WBC (ref 0–0.2)
PLATELET # BLD AUTO: 346 10*3/MM3 (ref 140–450)
PMV BLD AUTO: 10.4 FL (ref 6–12)
POTASSIUM SERPL-SCNC: 4.1 MMOL/L (ref 3.5–5.2)
PROT SERPL-MCNC: 6.4 G/DL (ref 6–8.5)
RBC # BLD AUTO: 3.94 10*6/MM3 (ref 3.77–5.28)
SODIUM SERPL-SCNC: 136 MMOL/L (ref 136–145)
WBC NRBC COR # BLD: 8.79 10*3/MM3 (ref 3.4–10.8)

## 2022-04-30 PROCEDURE — 99223 1ST HOSP IP/OBS HIGH 75: CPT | Performed by: INTERNAL MEDICINE

## 2022-04-30 PROCEDURE — 99232 SBSQ HOSP IP/OBS MODERATE 35: CPT | Performed by: INTERNAL MEDICINE

## 2022-04-30 PROCEDURE — 85025 COMPLETE CBC W/AUTO DIFF WBC: CPT | Performed by: HOSPITALIST

## 2022-04-30 PROCEDURE — 80053 COMPREHEN METABOLIC PANEL: CPT | Performed by: HOSPITALIST

## 2022-04-30 RX ORDER — POLYETHYLENE GLYCOL 3350 17 G/17G
17 POWDER, FOR SOLUTION ORAL DAILY
Qty: 14 PACKET | Refills: 0 | Status: SHIPPED | OUTPATIENT
Start: 2022-05-01 | End: 2022-06-08

## 2022-04-30 RX ORDER — OXYCODONE HYDROCHLORIDE 5 MG/1
5 TABLET ORAL EVERY 6 HOURS PRN
Qty: 12 TABLET | Refills: 0 | Status: SHIPPED | OUTPATIENT
Start: 2022-04-30 | End: 2022-05-02

## 2022-04-30 RX ADMIN — OXYCODONE 5 MG: 5 TABLET ORAL at 01:57

## 2022-04-30 RX ADMIN — OXYCODONE HYDROCHLORIDE AND ACETAMINOPHEN 250 MG: 500 TABLET ORAL at 08:58

## 2022-04-30 RX ADMIN — LEVOTHYROXINE SODIUM 50 MCG: 0.05 TABLET ORAL at 05:46

## 2022-04-30 RX ADMIN — Medication 10 ML: at 09:00

## 2022-04-30 RX ADMIN — POLYETHYLENE GLYCOL 3350 17 G: 17 POWDER, FOR SOLUTION ORAL at 08:58

## 2022-04-30 RX ADMIN — Medication 1 TABLET: at 08:58

## 2022-04-30 RX ADMIN — OXYCODONE 5 MG: 5 TABLET ORAL at 06:40

## 2022-04-30 RX ADMIN — PANTOPRAZOLE SODIUM 40 MG: 40 TABLET, DELAYED RELEASE ORAL at 06:41

## 2022-04-30 RX ADMIN — OXYCODONE 5 MG: 5 TABLET ORAL at 11:53

## 2022-04-30 NOTE — TELEPHONE ENCOUNTER
"She was discharged on 04/30/22- for Hepatitis form [facility]- It took\" 45 minutes to get her from the car to the bedroom. She did fall no injury. \"Her knees are hurting. He does not know if this is a symptoms of liver problems. He reports her numbers are down. She had a liver biopsy. He is worried about knee pain. She was in the bed while in the hospital. Explained unsure the reason for the knee pain.He did get her a walker.  Her balance is way off and she is very tired. He states this is not a new finding.  She also recently had hammer toe surgery and had the pins removed while in the hospital. He is by himself, explained not to injure himself.He plans to call the PCP on Monday.     Reason for Disposition  • [1] MODERATE pain (e.g., interferes with normal activities, limping) AND [2] present > 3 days    Additional Information  • Negative: Sounds like a life-threatening emergency to the triager  • Negative: Followed a knee injury  • Negative: Leg pain is main symptom  • Negative: Knee swelling is main symptom  • Negative: [1] Swollen joint AND [2] fever  • Negative: [1] Red area or streak AND [2] fever  • Negative: Patient sounds very sick or weak to the triager  • Negative: [1] SEVERE pain (e.g., excruciating, unable to walk) AND [2] not improved after 2 hours of pain medicine  • Negative: [1] Can't move swollen joint at all AND [2] no fever  • Negative: [1] Thigh or calf pain AND [2] only 1 side AND [3] present > 1 hour  • Negative: [1] Thigh, calf, or ankle swelling AND [2] only 1 side  • Negative: [1] Looks infected (spreading redness, pus) AND [2] large red area (> 2 in. or 5 cm)  • Negative: [1] Very swollen joint AND [2] no fever  • Negative: Blistering rash in area of pain  (i.e., dermatomal distribution or \"band\" or \"stripe\")  • Negative: Looks like a boil, infected sore, or deep ulcer  • Negative: [1] Redness of the skin AND [2] no fever    Answer Assessment - Initial Assessment Questions  1. LOCATION and " "RADIATION: \"Where is the pain located?\"       Under the knee and around the knee cap area.   2. QUALITY: \"What does the pain feel like?\"  (e.g., sharp, dull, aching, burning)      Hard to for her to describe.   3. SEVERITY: \"How bad is the pain?\" \"What does it keep you from doing?\"   (Scale 1-10; or mild, moderate, severe)    -  MILD (1-3): doesn't interfere with normal activities     -  MODERATE (4-7): interferes with normal activities (e.g., work or school) or awakens from sleep, limping     -  SEVERE (8-10): excruciating pain, unable to do any normal activities, unable to walk      7   4. ONSET: \"When did the pain start?\" \"Does it come and go, or is it there all the time?\"      3 days ago   5. RECURRENT: \"Have you had this pain before?\" If Yes, ask: \"When, and what happened then?\"      No   6. SETTING: \"Has there been any recent work, exercise or other activity that involved that part of the body?\"       no  7. AGGRAVATING FACTORS: \"What makes the knee pain worse?\" (e.g., walking, climbing stairs, running)      Walking   8. ASSOCIATED SYMPTOMS: \"Is there any swelling or redness of the knee?\"      no  9. OTHER SYMPTOMS: \"Do you have any other symptoms?\" (e.g., chest pain, difficulty breathing, fever, calf pain)      none  10. PREGNANCY: \"Is there any chance you are pregnant?\" \"When was your last menstrual period?\"        n    Protocols used: KNEE PAIN-ADULT-AH      "

## 2022-04-30 NOTE — OUTREACH NOTE
Prep Survey    Flowsheet Row Responses   [facility] patient discharged from? [facility]   Is LACE score < 7 ? No   Emergency Room discharge w/ pulse ox? No   Eligibility [facility]   Date of Admission 04/24/22   Date of Discharge 04/30/22   Discharge Disposition Home or Self Care   Discharge diagnosis Acute hepatitis   Does the patient have one of the following disease processes/diagnoses(primary or secondary)? Other   Does the patient have Home health ordered? No   Is there a DME ordered? No   Prep survey completed? Yes          LENARD BENAVIDES - Registered Nurse

## 2022-05-01 LAB
CMV DNA SERPL NAA+PROBE-ACNC: NEGATIVE IU/ML
CMV DNA SERPL NAA+PROBE-LOG IU: NORMAL {LOG_IU}/ML

## 2022-05-02 ENCOUNTER — TRANSITIONAL CARE MANAGEMENT TELEPHONE ENCOUNTER (OUTPATIENT)
Dept: CALL CENTER | Facility: HOSPITAL | Age: 77
End: 2022-05-02

## 2022-05-02 LAB
LAB AP CASE REPORT: NORMAL
LAB AP CLINICAL INFORMATION: NORMAL
LAB AP DIAGNOSIS COMMENT: NORMAL
PATH REPORT.FINAL DX SPEC: NORMAL
PATH REPORT.GROSS SPEC: NORMAL

## 2022-05-02 NOTE — PROGRESS NOTES
Case Management Discharge Note      Final Note: Discharged home. Nathalia Yao RN    Provided Post Acute Provider List?: N/A  N/A Provider List Comment: Denies needs. Plan is home    Transportation Services  Private: Car    Final Discharge Disposition Code: 01 - home or self-care

## 2022-05-02 NOTE — OUTREACH NOTE
Call Center TCM Note    Flowsheet Row Responses   [facility] [place] patient discharged from? [facility]   Does the patient have one of the following disease processes/diagnoses(primary or secondary)? Other   TCM attempt successful? Yes   Call start time 1432   Call end time 1437   Discharge diagnosis Acute hepatitis   Meds reviewed with patient/caregiver? Yes   Is the patient having any side effects they believe may be caused by any medication additions or changes? No   Does the patient have all medications ordered at discharge? Yes   Is the patient taking all medications as directed (includes completed medication regime)? Yes   Does the patient have a primary care provider?  Yes   Does the patient have an appointment with their PCP within 7 days of discharge? Greater than 7 days   Comments regarding PCP [facility] on 5/12/22 with PCP    What is preventing the patient from scheduling follow up appointments within 7 days of discharge? Earlier appointment not available   Nursing Interventions Verified appointment date/time/provider   Has the patient kept scheduled appointments due by today? N/A   Psychosocial issues? No   Did the patient receive a copy of their discharge instructions? Yes   Nursing interventions Reviewed instructions with patient   What is the patient's perception of their health status since discharge? New symptoms unrelated to diagnosis  [both knees are swollen and achy - encouraged pt to contact PCP ]   Is the patient/caregiver able to teach back signs and symptoms related to disease process for when to call PCP? Yes   Is the patient/caregiver able to teach back signs and symptoms related to disease process for when to call 911? Yes   Is the patient/caregiver able to teach back the hierarchy of who to call/visit for symptoms/problems? PCP, Specialist, Home health nurse, Urgent Care, ED, 911 Yes   If the patient is a current smoker, are they able to teach back resources for cessation? Not a  smoker   TCM call completed? Yes          Eliana Cunningham, RN    5/2/2022, 14:38 EDT

## 2022-05-04 ENCOUNTER — TELEPHONE (OUTPATIENT)
Dept: GASTROENTEROLOGY | Facility: CLINIC | Age: 77
End: 2022-05-04

## 2022-05-04 NOTE — TELEPHONE ENCOUNTER
Pts  called and got biopsy at [facility] - he has results and just needs help understanding the medical terms- please advise

## 2022-05-05 ENCOUNTER — LAB (OUTPATIENT)
Dept: LAB | Facility: HOSPITAL | Age: 77
End: 2022-05-05

## 2022-05-05 DIAGNOSIS — M25.50 ARTHRALGIA OF MULTIPLE JOINTS: ICD-10-CM

## 2022-05-05 DIAGNOSIS — N17.9 AKI (ACUTE KIDNEY INJURY): ICD-10-CM

## 2022-05-05 DIAGNOSIS — R17 JAUNDICE: ICD-10-CM

## 2022-05-05 DIAGNOSIS — K75.9 HEPATITIS: ICD-10-CM

## 2022-05-05 DIAGNOSIS — M25.50 ARTHRALGIA OF MULTIPLE JOINTS: Primary | ICD-10-CM

## 2022-05-05 LAB
ALBUMIN SERPL-MCNC: 3.4 G/DL (ref 3.5–5.2)
ALBUMIN/GLOB SERPL: 0.9 G/DL
ALP SERPL-CCNC: 1146 U/L (ref 39–117)
ALT SERPL W P-5'-P-CCNC: 96 U/L (ref 1–33)
ANION GAP SERPL CALCULATED.3IONS-SCNC: 12 MMOL/L (ref 5–15)
AST SERPL-CCNC: 70 U/L (ref 1–32)
BASOPHILS # BLD AUTO: 0.08 10*3/MM3 (ref 0–0.2)
BASOPHILS NFR BLD AUTO: 0.7 % (ref 0–1.5)
BILIRUB SERPL-MCNC: 2.7 MG/DL (ref 0–1.2)
BUN SERPL-MCNC: 11 MG/DL (ref 8–23)
BUN/CREAT SERPL: 14.9 (ref 7–25)
CALCIUM SPEC-SCNC: 9.9 MG/DL (ref 8.6–10.5)
CHLORIDE SERPL-SCNC: 99 MMOL/L (ref 98–107)
CO2 SERPL-SCNC: 25 MMOL/L (ref 22–29)
CREAT SERPL-MCNC: 0.74 MG/DL (ref 0.57–1)
CRP SERPL-MCNC: 13.79 MG/DL (ref 0–0.5)
DEPRECATED RDW RBC AUTO: 43.1 FL (ref 37–54)
EGFRCR SERPLBLD CKD-EPI 2021: 82.9 ML/MIN/1.73
EOSINOPHIL # BLD AUTO: 0.16 10*3/MM3 (ref 0–0.4)
EOSINOPHIL NFR BLD AUTO: 1.4 % (ref 0.3–6.2)
ERYTHROCYTE [DISTWIDTH] IN BLOOD BY AUTOMATED COUNT: 13.1 % (ref 12.3–15.4)
ERYTHROCYTE [SEDIMENTATION RATE] IN BLOOD: 64 MM/HR (ref 0–30)
GLOBULIN UR ELPH-MCNC: 4 GM/DL
GLUCOSE SERPL-MCNC: 111 MG/DL (ref 65–99)
HCT VFR BLD AUTO: 32.8 % (ref 34–46.6)
HEV IGG SER QL IA: NEGATIVE
HEV IGM SER QL: NEGATIVE
HGB BLD-MCNC: 10.9 G/DL (ref 12–15.9)
IMM GRANULOCYTES # BLD AUTO: 0.07 10*3/MM3 (ref 0–0.05)
IMM GRANULOCYTES NFR BLD AUTO: 0.6 % (ref 0–0.5)
LIPASE SERPL-CCNC: 33 U/L (ref 13–60)
LYMPHOCYTES # BLD AUTO: 2.24 10*3/MM3 (ref 0.7–3.1)
LYMPHOCYTES NFR BLD AUTO: 19.6 % (ref 19.6–45.3)
MCH RBC QN AUTO: 29.9 PG (ref 26.6–33)
MCHC RBC AUTO-ENTMCNC: 33.2 G/DL (ref 31.5–35.7)
MCV RBC AUTO: 90.1 FL (ref 79–97)
MONOCYTES # BLD AUTO: 1.05 10*3/MM3 (ref 0.1–0.9)
MONOCYTES NFR BLD AUTO: 9.2 % (ref 5–12)
NEUTROPHILS NFR BLD AUTO: 68.5 % (ref 42.7–76)
NEUTROPHILS NFR BLD AUTO: 7.83 10*3/MM3 (ref 1.7–7)
NRBC BLD AUTO-RTO: 0 /100 WBC (ref 0–0.2)
PLATELET # BLD AUTO: 661 10*3/MM3 (ref 140–450)
PMV BLD AUTO: 10.2 FL (ref 6–12)
POTASSIUM SERPL-SCNC: 4.4 MMOL/L (ref 3.5–5.2)
PROT SERPL-MCNC: 7.4 G/DL (ref 6–8.5)
RBC # BLD AUTO: 3.64 10*6/MM3 (ref 3.77–5.28)
SODIUM SERPL-SCNC: 136 MMOL/L (ref 136–145)
WBC NRBC COR # BLD: 11.43 10*3/MM3 (ref 3.4–10.8)

## 2022-05-05 PROCEDURE — 85025 COMPLETE CBC W/AUTO DIFF WBC: CPT

## 2022-05-05 PROCEDURE — 86140 C-REACTIVE PROTEIN: CPT

## 2022-05-05 PROCEDURE — 85652 RBC SED RATE AUTOMATED: CPT

## 2022-05-05 PROCEDURE — 80053 COMPREHEN METABOLIC PANEL: CPT

## 2022-05-05 PROCEDURE — 86747 PARVOVIRUS ANTIBODY: CPT

## 2022-05-05 PROCEDURE — 36415 COLL VENOUS BLD VENIPUNCTURE: CPT

## 2022-05-05 PROCEDURE — 83690 ASSAY OF LIPASE: CPT

## 2022-05-05 NOTE — TELEPHONE ENCOUNTER
Breanna Forrest, [facility]  to Me    CG      9:43 AM  Spoke with Linda Dan and she states patient can see Agata.  Called and spoke with patient.  Scheduled for 05/10/2022     **Message noted.  Eufemia Nazario RN.

## 2022-05-06 ENCOUNTER — APPOINTMENT (OUTPATIENT)
Dept: GENERAL RADIOLOGY | Facility: HOSPITAL | Age: 77
End: 2022-05-06

## 2022-05-06 ENCOUNTER — HOSPITAL ENCOUNTER (EMERGENCY)
Facility: HOSPITAL | Age: 77
Discharge: HOME | End: 2022-05-06
Attending: EMERGENCY MEDICINE | Admitting: EMERGENCY MEDICINE

## 2022-05-06 VITALS
DIASTOLIC BLOOD PRESSURE: 74 MMHG | OXYGEN SATURATION: 94 % | HEART RATE: 79 BPM | TEMPERATURE: 98.4 F | HEIGHT: 64 IN | SYSTOLIC BLOOD PRESSURE: 132 MMHG | BODY MASS INDEX: 20.49 KG/M2 | RESPIRATION RATE: 16 BRPM | WEIGHT: 120 LBS

## 2022-05-06 DIAGNOSIS — S16.1XXA STRAIN OF NECK MUSCLE, INITIAL ENCOUNTER: Primary | ICD-10-CM

## 2022-05-06 LAB
HOLD SPECIMEN: NORMAL
WHOLE BLOOD HOLD SPECIMEN: NORMAL
WHOLE BLOOD HOLD SPECIMEN: NORMAL

## 2022-05-06 PROCEDURE — 72050 X-RAY EXAM NECK SPINE 4/5VWS: CPT

## 2022-05-06 PROCEDURE — 96375 TX/PRO/DX INJ NEW DRUG ADDON: CPT

## 2022-05-06 PROCEDURE — 25010000002 MORPHINE PER 10 MG: Performed by: EMERGENCY MEDICINE

## 2022-05-06 PROCEDURE — 36415 COLL VENOUS BLD VENIPUNCTURE: CPT

## 2022-05-06 PROCEDURE — 99283 EMERGENCY DEPT VISIT LOW MDM: CPT

## 2022-05-06 PROCEDURE — 25010000002 ONDANSETRON PER 1 MG: Performed by: EMERGENCY MEDICINE

## 2022-05-06 PROCEDURE — 96374 THER/PROPH/DIAG INJ IV PUSH: CPT

## 2022-05-06 RX ORDER — ONDANSETRON 2 MG/ML
4 INJECTION INTRAMUSCULAR; INTRAVENOUS ONCE
Status: COMPLETED | OUTPATIENT
Start: 2022-05-06 | End: 2022-05-06

## 2022-05-06 RX ORDER — CYCLOBENZAPRINE HCL 5 MG
5 TABLET ORAL 3 TIMES DAILY PRN
Qty: 21 TABLET | Refills: 0 | Status: SHIPPED | OUTPATIENT
Start: 2022-05-06 | End: 2022-05-09

## 2022-05-06 RX ORDER — MORPHINE SULFATE 2 MG/ML
4 INJECTION, SOLUTION INTRAMUSCULAR; INTRAVENOUS ONCE
Status: COMPLETED | OUTPATIENT
Start: 2022-05-06 | End: 2022-05-06

## 2022-05-06 RX ADMIN — ONDANSETRON 4 MG: 2 INJECTION INTRAMUSCULAR; INTRAVENOUS at 06:39

## 2022-05-06 RX ADMIN — MORPHINE SULFATE 4 MG: 2 INJECTION, SOLUTION INTRAMUSCULAR; INTRAVENOUS at 06:39

## 2022-05-06 NOTE — ED NOTES
Pt reports knee and neck pain starting after her liver biopsy from last weekend. Pt reports it is a constant pain that is more painful turning head from side to side vs up and down.  is at bedside.

## 2022-05-06 NOTE — ED NOTES
"Pt to ER via EMS from home, pt in mask, nurse in PPE. Pt called EMS tonight for neck pain s/p liver bx \"a few days ago\". Pt states that pain started in her knee right after the biopsy, and has gone up to her neck.  "

## 2022-05-06 NOTE — ED PROVIDER NOTES
EMERGENCY DEPARTMENT ENCOUNTER    Room Number:  02/02  Date of encounter:  5/6/2022  PCP: Carloz Shukla MD  Historian: Patient, spouse      I used full protective equipment while examining this patient.  This includes face mask, gloves and protective eyewear.  I washed my hands before entering the room and immediately upon leaving the room      HPI:  Chief Complaint: Neck pain  A complete HPI/ROS/PMH/PSH/SH/FH are unobtainable due to: Nothing    Context: Ashley Mata is a 76 y.o. female who presents to the ED c/o several day history of atraumatic neck pain.  Patient had recent hospitalization for jaundice and cholangitis.  Patient states that several days ago her neck began to hurt.  The pain is described as achy, constant, moderate.  The pain is worse with movement and improves with rest.  She denies any pain in her arms.  She denies any weakness, numbness, tingling of her arms.  She denies any fever.  She states she does have a mild, throbbing global headache.    Review of Medical Records  I reviewed admission from 4/24/2022 through 4/30/2022.  Patient admitted for hepatitis.    PAST MEDICAL HISTORY  Active Ambulatory Problems     Diagnosis Date Noted   • Allergic rhinitis 01/30/2015   • Benign essential hypertension 04/18/2016   • Diverticulosis of colon 03/17/2009   • Gastroesophageal reflux disease without esophagitis 03/17/2009   • Hyperlipidemia 07/17/2012   • Multiple environmental allergies 01/30/2015   • Primary osteoarthritis of both wrists 12/30/2013   • Primary osteoarthritis of both hands 12/30/2013   • Postmenopausal hormone replacement therapy 04/18/2016   • Vitamin D deficiency 04/18/2016   • Therapeutic drug monitoring 04/11/2017   • Alopecia 04/12/2017   • Lumbar scoliosis 08/07/2018   • Lumbar disc herniation, L4-L5 08/07/2018   • Primary hypothyroidism 01/22/2020   • Family history of breast cancer in first degree relative 07/13/2020   • Menopausal state 07/13/2020   • History of  COVID-19 10/19/2021   • Hammertoe of second toe of right foot 10/19/2021   • Chronic toe pain, right foot 10/19/2021   • Family history of colonic polyps 04/01/2022   • Osteopenia of multiple sites, 2/25/2022--lumbar spine 1.1.  Left femoral neck -1.0.  Right femoral neck -0.3. 04/01/2022   • Acute bronchitis with bronchospasm 04/01/2022   • AUBREY (acute kidney injury) (HCC) 04/24/2022   • Hypokalemia 04/24/2022   • Acidosis 04/24/2022   • Jaundice 04/24/2022   • Hepatitis 04/24/2022   • Hepatitis 04/24/2022     Resolved Ambulatory Problems     Diagnosis Date Noted   • History of Hyperplastic polyps of stomach 04/18/2016   • Chronic insomnia 04/18/2016   • Subclinical hypothyroidism 10/03/2014   • History of abdominal pain 04/18/2016   • History of URI (upper respiratory infection) 04/18/2016   • History of Bicipital tendinitis of right shoulder 03/17/2015   • History of Change in bowel habits 04/18/2016   • History of Degeneration of cervical intervertebral disc 04/18/2016   • History of bone density study 04/18/2016   • History of diarrhea 04/18/2016   • History of Doppler ultrasound of Artery: Carotid 04/18/2016   • History of Zostavax administration 11/20/2014   • History of chest x-ray 04/18/2016   • History of mammogram 04/18/2016   • History of Intramuscular hematoma, left 04/18/2016   • History of Left rotator cuff tear 04/18/2016   • History of Muscular aches 04/18/2016   • History of pneumococcal vaccination 04/18/2016   • History of Subdeltoid bursitis of right shoulder joint 04/20/2016   • Right shoulder pain 04/20/2016   • History of rotator cuff tear, right 07/01/2016   • Diffuse arthralgia 04/09/2018   • Lumbar back pain with radiculopathy affecting right lower extremity 07/09/2018   • Persistent cough 03/18/2019   • Acute bronchitis with bronchospasm 03/18/2019   • Plantar wart of left foot 01/22/2020   • Chronic foot pain, left 03/04/2020     Past Medical History:   Diagnosis Date   • Hammertoe of  right foot    • Hyperlipidemia 07/17/2012   • URI (upper respiratory infection)          PAST SURGICAL HISTORY  Past Surgical History:   Procedure Laterality Date   • COLONOSCOPY  03/17/2009 03/17/2009--colonoscopy revealed external hemorrhoids, torturous colon with a sigmoid stricture, sigmoid diverticulosis.   • COLONOSCOPY N/A 3/10/2017    03/10/2017--colonoscopy revealed many small and large mouth diverticula in the sigmoid colon and descending colon.  Nonthrombosed external hemorrhoids and internal hemorrhoids noted.  Otherwise, normal colonoscopy.   • ERCP WITH SPHINCTEROTOMY/PAPILLOTOMY  08/13/2014 08/13/2014--ERCP, endoscopic sphincterotomy and removal of common bile duct stones. 08/12/2014--laparoscopic cholecystectomy. This was complicated by retained common bile duct stones 2.   • ESOPHAGOSCOPY / EGD  06/09/2015 06/09/2015--EGD revealed Z line irregular, 35 cm from incisors. Biopsied. Small hiatal hernia. Gastritis. Biopsied. Bilious gastric fluid. Fluid aspiration performed. Normal duodenal bulb and second part of duodenum. Biopsied. Pathology revealed the antral biopsy revealed small intestinal mucosa with no pathologic diagnosis. Good preservation of villous architecture. Duodenum biopsy revealed large   • HAMMER TOE REPAIR Right 4/11/2022    Procedure: RIGHT SECOND AND THIRD HAMMERTOE REPAIRS;  Surgeon: Brandt Vieira MD;  [place] [facility] [place] [facility] [place]  Service: [place]opedics;  Laterality: Right;   • LAPAROSCOPIC CHOLECYSTECTOMY  08/12/2014 08/13/2014--ERCP, endoscopic sphincterotomy and removal of common bile duct stones. 08/12/2014--laparoscopic cholecystectomy. This was complicated by retained common bile duct stones 2.   • ROTATOR CUFF REPAIR Left 07/10/2014    07/10/2014--left rotator cuff repair. 03/18/2014--patient seen in followup and reports that her range of motion has improved and her pain is not debilitating. She feels that she is making progress with physical therapy. Surgery  scheduled 07/10/2014. 01/10/2014--patient was seen in evaluation by the orthopedist and he recommended conservative treatment consisting of physical therapy/rehabilitation.   • ROTATOR CUFF REPAIR Right 08/03/2016 08/03/2016--arthroscopic right rotator cuff repair.  Debridement of degenerative anterior superior labral tear.   • SUBTOTAL HYSTERECTOMY  1978    Partial hysterectomy 1978.         FAMILY HISTORY  Family History   Problem Relation Age of Onset   • Colon polyps Mother    • Alzheimer's disease Mother    • Other Father         Hodgkin's Disease   • Cancer Father         Lung Cancer   • Breast cancer Daughter 52   • Malig Hyperthermia Neg Hx          SOCIAL HISTORY  Social History     Socioeconomic History   • Marital status:    • Number of children: 2   • Years of education: BA   • Highest education level: Associate degree: occupational, technical, or vocational program   Tobacco Use   • Smoking status: Never Smoker   • Smokeless tobacco: Never Used   Vaping Use   • Vaping Use: Never used   Substance and Sexual Activity   • Alcohol use: Yes     Comment: Socially   • Drug use: No   • Sexual activity: Yes     Partners: Male         ALLERGIES  Patient has no known allergies.        REVIEW OF SYSTEMS  All systems reviewed and negative except for those discussed in HPI.       PHYSICAL EXAM    I have reviewed the triage vital signs and nursing notes.    ED Triage Vitals [05/06/22 0521]   Temp Heart Rate Resp BP SpO2   98.9 °F (37.2 °C) 101 20 140/82 96 %      Temp src Heart Rate Source Patient Position BP Location FiO2 (%)   Tympanic Monitor Standing Right arm --       Physical Exam  GENERAL: Alert, oriented, not distressed  HENT: Diffuse moderate tenderness to bilateral trapezius and paraspinal muscles of the cervical spine.  Decreased range of motion of neck secondary to pain.  EYES: no scleral icterus, EOMI  CV: regular rhythm, regular rate, no murmur  RESPIRATORY: normal effort, CTA  ABDOMEN: soft,  nontender  MUSCULOSKELETAL: no deformity, FROM, no calf swelling or tenderness  NEURO: alert,/5 strength in upper extremities   SKIN: warm, dry        LAB RESULTS  Recent Results (from the past 24 hour(s))   Green Top (Gel)    Collection Time: 05/06/22  5:43 AM   Result Value Ref Range    Extra Tube Hold for add-ons.    Lavender Top    Collection Time: 05/06/22  5:43 AM   Result Value Ref Range    Extra Tube hold for add-on    Light Blue Top    Collection Time: 05/06/22  5:43 AM   Result Value Ref Range    Extra Tube hold for add-on        Ordered the above labs and independently reviewed the results.        RADIOLOGY  XR Spine Cervical Complete 4 or 5 View    Result Date: 5/6/2022  CERVICAL SPINE X-RAYS  HISTORY: Neck pain, no injury.  TECHNIQUE: Four x-rays of the cervical spine are provided.  FINDINGS: There is advanced degenerative disc change at C5-6 with complete loss of disc height and endplate sclerosis. Multilevel facet arthropathy is observed. 1-2 mm anterolisthesis of C2 on C3 and C6 on C7 is observed on the lateral images, consistent with degenerative change. The RPO oblique view shows hypertrophic facet change at the left C6-7 facet joint which appears to create some narrowing of adjacent foramen. Visualized lung apices are clear. There is no bone lesion.      Advanced degenerative change in the cervical spine.  This report was finalized on 5/6/2022 6:46 AM by Dr. Pedro Luis Blackman M.D.        I ordered the above noted radiological studies. Reviewed by me and discussed with radiologist.  See dictation for official radiology interpretation.      MEDICATIONS GIVEN IN ER    Medications   morphine injection 4 mg (4 mg Intravenous Given 5/6/22 0639)   ondansetron (ZOFRAN) injection 4 mg (4 mg Intravenous Given 5/6/22 0639)         PROGRESS, DATA ANALYSIS, CONSULTS, AND MEDICAL DECISION MAKING    All labs have been independently reviewed by me.  All radiology studies have been reviewed by me and discussed with  radiologist dictating the report.   EKG's independently viewed and interpreted by me.  Discussion below represents my analysis of pertinent findings related to patient's condition, differential diagnosis, treatment plan and final disposition.    I have discussed case with Dr. Shukla, emergency room physician.  He has performed his own bedside examination and agrees with treatment plan.    ED Course as of 05/06/22 1539   Fri May 06, 2022   0624 Patient presents with several day history of atraumatic neck pain.  No radicular symptoms.  No neurodeficits.  No evidence of meningitis.  Suspect muscular etiology.  Plan for pain control and x-rays. [EE]   0703 Cervical spine films interpreted myself show moderate degenerative changes without evidence of acute fracture. [EE]   0721 Patient's symptoms are improved after pain medication.  No suspicion of meningitis, epidural abscess.  Plan to treat with muscle relaxers and close follow-up. [EE]      ED Course User Index  [EE] Khris Holley PA       AS OF 15:39 EDT VITALS:    BP - 132/74  HR - 79  TEMP - 98.4 °F (36.9 °C) (Oral)  O2 SATS - 94%        DIAGNOSIS  Final diagnoses:   Strain of neck muscle, initial encounter         DISPOSITION  Discharged      Dictated utilizing Dragon dictation     Khris Holley PA  05/06/22 5043

## 2022-05-06 NOTE — ED PROVIDER NOTES
MD ATTESTATION NOTE    The CELESTINE and I have discussed this patient's history, physical exam, and treatment plan.    I provided a substantive portion of the care of this patient. I personally performed the physical exam, in its entirety. The attached note describes my personal findings.      Ashley Mata is a 76 y.o. female who presents to the ED c/o neck pain.  Onset 3 to 4 days ago.  It is soreness that is constant.  Worse with neck movement.  She suspects that she eventually slept on it awkwardly and woke up with the pain.  She denies any weakness or numbness in her extremities.  No change in bowel or bladder.  No difficulty walking.      On exam:  GENERAL: not distressed  HENT: nares patent  EYES: no scleral icterus  CV: regular rhythm, regular rate  RESPIRATORY: normal effort  MUSCULOSKELETAL: no deformity  NEURO:   5/5 strength to biceps, triceps, interosseus muscles, and FCR.  Sensation intact to light touch.  SKIN: warm, dry    Labs  Recent Results (from the past 24 hour(s))   Comprehensive Metabolic Panel    Collection Time: 05/05/22  9:44 AM    Specimen: Blood   Result Value Ref Range    Glucose 111 (H) 65 - 99 mg/dL    BUN 11 8 - 23 mg/dL    Creatinine 0.74 0.57 - 1.00 mg/dL    Sodium 136 136 - 145 mmol/L    Potassium 4.4 3.5 - 5.2 mmol/L    Chloride 99 98 - 107 mmol/L    CO2 25.0 22.0 - 29.0 mmol/L    Calcium 9.9 8.6 - 10.5 mg/dL    Total Protein 7.4 6.0 - 8.5 g/dL    Albumin 3.40 (L) 3.50 - 5.20 g/dL    ALT (SGPT) 96 (H) 1 - 33 U/L    AST (SGOT) 70 (H) 1 - 32 U/L    Alkaline Phosphatase 1,146 (H) 39 - 117 U/L    Total Bilirubin 2.7 (H) 0.0 - 1.2 mg/dL    Globulin 4.0 gm/dL    A/G Ratio 0.9 g/dL    BUN/Creatinine Ratio 14.9 7.0 - 25.0    Anion Gap 12.0 5.0 - 15.0 mmol/L    eGFR 82.9 >60.0 mL/min/1.73   Lipase    Collection Time: 05/05/22  9:44 AM    Specimen: Blood   Result Value Ref Range    Lipase 33 13 - 60 U/L   C-reactive Protein    Collection Time: 05/05/22  9:44 AM    Specimen: Blood   Result  Value Ref Range    C-Reactive Protein 13.79 (H) 0.00 - 0.50 mg/dL   Sedimentation Rate    Collection Time: 05/05/22  9:44 AM    Specimen: Blood   Result Value Ref Range    Sed Rate 64 (H) 0 - 30 mm/hr   CBC Auto Differential    Collection Time: 05/05/22  9:44 AM    Specimen: Blood   Result Value Ref Range    WBC 11.43 (H) 3.40 - 10.80 10*3/mm3    RBC 3.64 (L) 3.77 - 5.28 10*6/mm3    Hemoglobin 10.9 (L) 12.0 - 15.9 g/dL    Hematocrit 32.8 (L) 34.0 - 46.6 %    MCV 90.1 79.0 - 97.0 fL    MCH 29.9 26.6 - 33.0 pg    MCHC 33.2 31.5 - 35.7 g/dL    RDW 13.1 12.3 - 15.4 %    RDW-SD 43.1 37.0 - 54.0 fl    MPV 10.2 6.0 - 12.0 fL    Platelets 661 (H) 140 - 450 10*3/mm3    Neutrophil % 68.5 42.7 - 76.0 %    Lymphocyte % 19.6 19.6 - 45.3 %    Monocyte % 9.2 5.0 - 12.0 %    Eosinophil % 1.4 0.3 - 6.2 %    Basophil % 0.7 0.0 - 1.5 %    Immature Grans % 0.6 (H) 0.0 - 0.5 %    Neutrophils, Absolute 7.83 (H) 1.70 - 7.00 10*3/mm3    Lymphocytes, Absolute 2.24 0.70 - 3.10 10*3/mm3    Monocytes, Absolute 1.05 (H) 0.10 - 0.90 10*3/mm3    Eosinophils, Absolute 0.16 0.00 - 0.40 10*3/mm3    Basophils, Absolute 0.08 0.00 - 0.20 10*3/mm3    Immature Grans, Absolute 0.07 (H) 0.00 - 0.05 10*3/mm3    nRBC 0.0 0.0 - 0.2 /100 WBC   Green Top (Gel)    Collection Time: 05/06/22  5:43 AM   Result Value Ref Range    Extra Tube Hold for add-ons.    Lavender Top    Collection Time: 05/06/22  5:43 AM   Result Value Ref Range    Extra Tube hold for add-on    Light Blue Top    Collection Time: 05/06/22  5:43 AM   Result Value Ref Range    Extra Tube hold for add-on        Radiology  XR Spine Cervical Complete 4 or 5 View    Result Date: 5/6/2022  CERVICAL SPINE X-RAYS  HISTORY: Neck pain, no injury.  TECHNIQUE: Four x-rays of the cervical spine are provided.  FINDINGS: There is advanced degenerative disc change at C5-6 with complete loss of disc height and endplate sclerosis. Multilevel facet arthropathy is observed. 1-2 mm anterolisthesis of C2 on C3 and  C6 on C7 is observed on the lateral images, consistent with degenerative change. The RPO oblique view shows hypertrophic facet change at the left C6-7 facet joint which appears to create some narrowing of adjacent foramen. Visualized lung apices are clear. There is no bone lesion.      Advanced degenerative change in the cervical spine.  This report was finalized on 5/6/2022 6:46 AM by Dr. Pedro Luis Blackman M.D.        Medical Decision Making:  ED Course as of 05/06/22 0729   Fri May 06, 2022   0624 Patient presents with several day history of atraumatic neck pain.  No radicular symptoms.  No neurodeficits.  No evidence of meningitis.  Suspect muscular etiology.  Plan for pain control and x-rays. [EE]   0703 Cervical spine films interpreted myself show moderate degenerative changes without evidence of acute fracture. [EE]   0721 Patient's symptoms are improved after pain medication.  No suspicion of meningitis, epidural abscess.  Plan to treat with muscle relaxers and close follow-up. [EE]      ED Course User Index  [EE] Khris Holley, PA       PPE: Both the patient and I wore a surgical mask throughout the entire patient encounter. I wore protective goggles.     Diagnosis  Final diagnoses:   Strain of neck muscle, initial encounter       DISCHARGE    FOLLOW-UP  Carloz Shukla MD  [id number] [id number] [phone]    In 2 days  if no improvement         Medication List      New Prescriptions    cyclobenzaprine 5 MG tablet  Commonly known as: FLEXERIL  Take 1 tablet by mouth 3 (Three) Times a Day As Needed for Muscle Spasms.        Changed    omeprazole OTC 20 MG EC tablet  Commonly known as: PrilOSEC OTC  Take 1 tablet by mouth Daily.  What changed:   · when to take this  · reasons to take this           Where to Get Your Medications      These medications were sent to [address] [address] [facility] [place] - [phone]  -  [phone] FX  [address] [address]    Phone: [phone]   · cyclobenzaprine 5 MG tablet            Aravind Shukla II, MD  05/06/22 4473

## 2022-05-09 ENCOUNTER — OFFICE VISIT (OUTPATIENT)
Dept: INTERNAL MEDICINE | Facility: CLINIC | Age: 77
End: 2022-05-09

## 2022-05-09 VITALS
SYSTOLIC BLOOD PRESSURE: 118 MMHG | HEART RATE: 87 BPM | BODY MASS INDEX: 18.85 KG/M2 | OXYGEN SATURATION: 98 % | RESPIRATION RATE: 18 BRPM | HEIGHT: 64 IN | WEIGHT: 110.4 LBS | DIASTOLIC BLOOD PRESSURE: 66 MMHG

## 2022-05-09 DIAGNOSIS — R17 JAUNDICE: ICD-10-CM

## 2022-05-09 DIAGNOSIS — E87.6 HYPOKALEMIA: ICD-10-CM

## 2022-05-09 DIAGNOSIS — N17.9 AKI (ACUTE KIDNEY INJURY): ICD-10-CM

## 2022-05-09 DIAGNOSIS — B17.9 ACUTE HEPATITIS: ICD-10-CM

## 2022-05-09 DIAGNOSIS — K21.9 GASTROESOPHAGEAL REFLUX DISEASE WITHOUT ESOPHAGITIS: Chronic | ICD-10-CM

## 2022-05-09 DIAGNOSIS — Z09 HOSPITAL DISCHARGE FOLLOW-UP: Primary | ICD-10-CM

## 2022-05-09 DIAGNOSIS — M25.50 ARTHRALGIA OF MULTIPLE JOINTS: ICD-10-CM

## 2022-05-09 PROBLEM — Z83.71 FAMILY HISTORY OF COLONIC POLYPS: Chronic | Status: ACTIVE | Noted: 2022-04-01

## 2022-05-09 PROBLEM — J20.9 ACUTE BRONCHITIS WITH BRONCHOSPASM: Status: RESOLVED | Noted: 2022-04-01 | Resolved: 2022-05-09

## 2022-05-09 PROBLEM — Z83.719 FAMILY HISTORY OF COLONIC POLYPS: Chronic | Status: ACTIVE | Noted: 2022-04-01

## 2022-05-09 PROBLEM — E87.20 ACIDOSIS: Status: RESOLVED | Noted: 2022-04-24 | Resolved: 2022-05-09

## 2022-05-09 LAB
B19V IGG SER IA-ACNC: 5.2 INDEX (ref 0–0.8)
B19V IGM SER IA-ACNC: 0.1 INDEX (ref 0–0.8)

## 2022-05-09 PROCEDURE — 99495 TRANSJ CARE MGMT MOD F2F 14D: CPT | Performed by: INTERNAL MEDICINE

## 2022-05-09 PROCEDURE — 1111F DSCHRG MED/CURRENT MED MERGE: CPT | Performed by: INTERNAL MEDICINE

## 2022-05-09 RX ORDER — OMEPRAZOLE 20 MG/1
20 TABLET, DELAYED RELEASE ORAL DAILY
Start: 2022-05-09

## 2022-05-09 NOTE — PROGRESS NOTES
05/09/2022    Patient Information  Ashley Mata                                                                                          [id number] [facility] [address]      1945  [unfilled]  There is no work phone number on file.    Chief Complaint:     Hospital discharge follow-up/transition of care.    History of Present Illness:    Patient with a recent history of chronic right toe pain that required orthopedic surgery on the right foot/toe, osteopenia, history of COVID infection, hypothyroidism, osteoarthritis, recent admission to the hospital for acute hepatitis of unknown etiology.  She presents today for hospital discharge follow-up/transition of care.  The history regarding the acute hepatitis and recent hospital admission is documented today by Dr. Shukla under the diagnosis of acute hepatitis and hospital discharge follow-up.  Is accompanied by information from the discharge summary.  The history was transferred to this note including the history that I documented today and is as follows:    May 9, 2022--patient seen in hospital discharge follow-up/transition of care.  I reviewed the documentation including the admission history and physical, hospital course, laboratory and radiographic studies, GI consultation, discharge summary and discharge medications.  I specifically reviewed the tissue pathology report which revealed moderate steatosis with moderate chronic portal inflammation and focal lobular inflammation.  Focal active cholangitis with minimal cholestasis noted.  Subcapsular biopsy without significant periportal fibrosis, pericellular fibrosis, bridging fibrosis nor cirrhosis identified by routine and/or trichrome staining.  No malignancy identified.  Focal minimal iron deposition with sinusoidal Kupffer cells noted.  The quantitative Phani-Vora is still pending.  I ordered parvovirus titers and they are pending as well.  Patient reports she is feeling  some better.  I explained to her that there is a good chance that we may never know exactly what caused her hepatitis.  I ordered some lab work prior to this visit and the results are noted.  The transaminases seem to be getting better and bilirubin is also better.  Alkaline phosphatase remains elevated but somewhat better.  Her albumin is a little bit on the low side.  Patient developed diffuse arthralgias just prior to discharge and she continues to have them although they seem to be improving.  She had bilateral knee pain, some wrist and hand pain, significant neck pain and very little shoulder pain.  The arthralgias are about 20% better than they were at the peak.  Plan is as follows: No specific treatment as the etiology of her hepatitis and jaundice are not clear at the present time.  Close follow-up is indicated and I will schedule patient for an appointment in about 10 days.  I will draw lab work after that visit.    DISCHARGE SUMMARY                                                                        [facility]     Patient Identification:  Name: Ashley Mata  Age: 76 y.o.  Sex: female  :  1945  MRN: [id number]  Primary Care Physician: Carloz Shukla MD     Admit date: 2022  Discharge date and time: 2022     Discharged Condition: good     Discharge Diagnoses:  Acute hepatitis:   AUBREY:   Hypokalemia:     Bronchitis:         Hospital Course:  Pleasant 76-year-old female presents with complaints of abdominal pains for the last week or so.  On presentation she was jaundiced.  Work-up revealed marked increases in her LFTs including both transaminase as well as alkaline phosphatase and bilirubin.  Imaging did not show any evidence of biliary obstruction.  She is on pain medication due to recent surgical procedure but the acetaminophen levels were essentially negative though drawn 12 hours after presentation.  There is initial concern that the anesthesia may be playing a  role but this does not appear to be the case.  Routine hepatitis viral panels were negative.  Studies for CMV and EBV were both requested.  She does have a very high titer for CMV IgG antibodies but negative IgM.  EBV was significantly elevated in both the IgG and IgM.  I did request second opinion from infectious disease.  They are not convinced that this is the culprit but did order PCR studies and we will follow-up on these as an outpatient.  She was also followed by GI service during hospitalization and did undergo liver biopsy the results are also still pending.  There is been a very gradual improvement of her LFTs during hospitalization but the the rate of improvement is more noticeable the last 2 days.  ALT peaked at 434 and today is 271.  AST peaked at 492 and today is 236.  Bilirubin peaked at 10.2 and today is 6.3.  INR remained normal throughout the hospitalization.  Albumin level is low.  Alkaline phosphatase remains high at this point.  The patient is feeling well and eating full meals without difficulty and eager to return home which I think would be very reasonable.  PCR and biopsy studies can be followed up as an outpatient.  A number of medications will continue to be kept on hold on discharge.  She is on Norco on presentation and I will give her a few days of Roxicet to avoid any exposure to acetaminophen over the next few days.  Lipitor is also on hold.  If this was a recently new medication it would certainly be considered a culprit however she indicated no changes in her medical regime at the time of presentation. In addition above she did appear to have bronchitis on presentation also had dehydration with AUBREY.  Both of these resolved very quickly.    Review of Systems   Constitutional: Positive for malaise/fatigue. Negative for chills and fever.   HENT: Negative.    Eyes: Negative.    Cardiovascular: Negative.    Respiratory: Negative.    Endocrine: Negative.    Hematologic/Lymphatic: Negative.     Skin: Negative.    Musculoskeletal: Positive for arthritis, joint pain, neck pain and stiffness. Negative for joint swelling.   Gastrointestinal: Negative.    Genitourinary: Negative.    Neurological: Negative.    Psychiatric/Behavioral: Negative.    Allergic/Immunologic: Negative.        Active Problems:    Patient Active Problem List   Diagnosis   • Allergic rhinitis   • Benign essential hypertension   • Diverticulosis of colon   • Gastroesophageal reflux disease without esophagitis   • Hyperlipidemia   • Multiple environmental allergies   • Primary osteoarthritis of both wrists   • Primary osteoarthritis of both hands   • Postmenopausal hormone replacement therapy   • Vitamin D deficiency   • Therapeutic drug monitoring   • Alopecia   • Lumbar scoliosis   • Lumbar disc herniation, L4-L5   • Primary hypothyroidism   • Family history of breast cancer in first degree relative   • Menopausal state   • History of COVID-19   • Hammertoe of second toe of right foot   • Chronic toe pain, right foot   • Family history of colonic polyps   • Osteopenia of multiple sites, 2/25/2022--lumbar spine 1.1.  Left femoral neck -1.0.  Right femoral neck -0.3.   • AUBREY (acute kidney injury) (HCC)   • Jaundice   • Acute hepatitis   • Hospital discharge follow-up         Past Medical History:   Diagnosis Date   • Allergic rhinitis 01/30/2015 11/13/2015--patient was evaluated by ENT and was started on generic Flonase and patient reports this has improved her symptoms quite a bit.   01/30/2015--allergy testing revealed positive reactivity to cat, dust mite, cockroach, mold spores, and grass pollen. Environmental control measures.   • Alopecia 04/12/2017    Evaluated and treated by the dermatologist.   • Benign essential hypertension 04/18/2016   • Chronic toe pain, right foot 10/19/2021    October 19, 2021--patient reports a 1 year history of progressively worsening right toe pain that particular involves the second toe.  On exam she  has obvious hammertoe which is causing irritation.  There is some hammering of the third digit as well.  Patient referred to orthopedist who specializes in foot problems such as Dr. Vieira.  In the meantime patient should use over-the-counter toe pads to   • Diverticulosis of colon 03/17/2009    03/10/2017--colonoscopy revealed many small and large mouth diverticula in the sigmoid colon and descending colon.  Nonthrombosed external hemorrhoids and internal hemorrhoids noted.  Otherwise, normal colonoscopy.  03/17/2009--colonoscopy revealed external hemorrhoids, torturous colon with a sigmoid stricture, sigmoid diverticulosis.   • Family history of breast cancer in first degree relative 07/13/2020   • Family history of colonic polyps 04/01/2022   • Gastroesophageal reflux disease without esophagitis 03/17/2009 06/09/2015--EGD revealed Z line irregular, 35 cm from incisors. Biopsied. Small hiatal hernia. Gastritis. Biopsied. Bilious gastric fluid. Fluid aspiration performed. Normal duodenal bulb and second part of duodenum. Biopsied. Pathology revealed the antral biopsy revealed small intestinal mucosa with no pathologic diagnosis. Good preservation of villous architecture. Duodenum biopsy revealed largely antral type gastric mucosa with mild patchy superficial chronic gastritis. Gastroesophageal junction revealed squamous and glandular mucosa with mild chronic inflammation. Negative for intestinal metaplasia or dysplasia. There appeared to be mislabeling of the antral biopsies and duodenal biopsies.   04/17/2012--EGD revealed irregular Z line, hiatal hernia, gastritis, duodenitis.   03/17/2009--EGD revealed grade B. reflux esophagitis, hiatal hernia, gastritis, a few gastric polyps, duodenitis.   • Hammertoe of right foot    • Hammertoe of second toe of right foot 10/19/2021    October 19, 2021--patient reports a 1 year history of progressively worsening right toe pain that particular involves the second toe.  On  exam she has obvious hammertoe which is causing irritation.  There is some hammering of the third digit as well.  Patient referred to orthopedist who specializes in foot problems such as Dr. Vieira.  In the meantime patient should use over-the-counter toe pads to   • History of COVID-19 10/19/2021    October 19, 2021--patient seen in follow-up and reports her symptoms totally resolved.  She wants to know when she should get her Covid booster vaccine.  I have suggested that she receive it approximately 3 months after initially testing positive.  She has an appointment in November 6, 2021 for the booster injection and I think that will be fine.  August 12, 2021--patient tested positive for COVID   • History of Hyperplastic polyps of stomach 06/09/2015 06/09/2015--EGD revealed Z line irregular, 35 cm from incisors. Biopsied. Small hiatal hernia. Gastritis. Biopsied. Bilious gastric fluid. Fluid aspiration performed. Normal duodenal bulb and second part of duodenum. Biopsied. Pathology revealed the antral biopsy revealed small intestinal mucosa with no pathologic diagnosis. Good preservation of villous architecture. Duodenum biopsy revealed large   • Hyperlipidemia 07/17/2012 07/17/2012--treatment for hyperlipidemia begun.   • Lumbar disc herniation, L4-L5 08/07/2018 01/16/2019--patient seen in follow-up by Dr. Shukla.  She has seen the neurosurgeon who recommended conservative therapy with physical therapy.  Patient reports that has been extremely helpful.  Currently has no significant pain.  08/07/2018--patient seen in follow-up and reports her pain is much better after taking prednisone.  She is now down to half a pill per day and is almost off of it.  I rev   • Lumbar scoliosis 08/07/2018 01/16/2019--patient seen in follow-up by Dr. Shukla.  She has seen the neurosurgeon who recommended conservative therapy with physical therapy.  Patient reports that has been extremely helpful.  Currently has no  significant pain.  08/07/2018--patient seen in follow-up and reports her pain is much better after taking prednisone.  She is now down to half a pill per day and is almost off of it.  I rev   • Menopausal state 07/13/2020   • Multiple environmental allergies 01/30/2015 01/30/2015--allergy testing revealed positive reactivity to cat, dust mite, cockroach, mold spores, and grass pollen. Environmental control measures.   • Osteopenia of multiple sites, 2/25/2022--lumbar spine 1.1.  Left femoral neck -1.0.  Right femoral neck -0.3. 04/01/2022 February 25, 2022--DEXA scan reveals lumbar spine T score 1.1.  Left femoral neck T score -1.0.  Right femoral neck T score -0.3.  Mild osteopenia.   • Postmenopausal hormone replacement therapy 04/18/2016   • Primary hypothyroidism 01/22/2020 January 22, 2020--TSH is elevated at 5.0.  Levothyroxine 50 mcg/day initiated.  Patient will follow-up with nonfasting lab work in about 6 to 8 weeks to reassess.  07/09/2018--routine follow-up.  TSH elevated slightly at 4.31.  Free T4 normal at 1.22.  Free T3 normal at 3.3.  Continued observation.  10/11/2017--thyroid function tests are normal.  09/30/2016--thyroid ultrasound reveals a tiny 2 mm    • Primary osteoarthritis of both hands 12/30/2013 05/12/2014--patient seen in followup and reports that the Vimovo has helped. Uric acid levels are normal at 4.9. C. reactive protein mildly elevated at 2.3. X-rays of the hands reveals radiocarpal, first carpometacarpal, first metacarpophalangeal, and interphalangeal joint degeneration. This process is symmetric. The interphalangeal joint of the left is affected to the greatest degree. There is right sided scapholunate dissociation as well as deformity of the scaphoid suggesting prior traumatic injury with healing. Soft tissues are satisfactory. Overall appearance is most consistent with osteoarthritis.   05/05/2014--patient presents and reports that she is having worsening right wrist  pain primarily at the base of the thumb. No new injury. Bilateral x-rays of the wrists and hands ordered. Uric acid level ordered as well as a CRP. Vimovo 500/20 one by mouth twice a day. Follow up in one week.   03/18/2014--patient reports that her wrist symptoms have improved.   12/30/2013--patient reports a several mon   • Primary osteoarthritis of both wrists 12/30/2013 05/12/2014--patient seen in followup and reports that the Vimovo has helped. Uric acid levels are normal at 4.9. C. reactive protein mildly elevated at 2.3. X-rays of the hands reveals radiocarpal, first carpometacarpal, first metacarpophalangeal, and interphalangeal joint degeneration. This process is symmetric. The interphalangeal joint of the left is affected to the greatest degree. There is right sided scapholunate dissociation as well as deformity of the scaphoid suggesting prior traumatic injury with healing. Soft tissues are satisfactory. Overall appearance is most consistent with osteoarthritis.   05/05/2014--patient presents and reports that she is having worsening right wrist pain primarily at the base of the thumb. No new injury. Bilateral x-rays of the wrists and hands ordered. Uric acid level ordered as well as a CRP. Vimovo 500/20 one by mouth twice a day. Follow up in one week.   03/18/2014--patient reports that her wrist symptoms have improved.   12/30/2013--patient reports a several mon   • Vitamin D deficiency 04/18/2016         Past Surgical History:   Procedure Laterality Date   • COLONOSCOPY  03/17/2009 03/17/2009--colonoscopy revealed external hemorrhoids, torturous colon with a sigmoid stricture, sigmoid diverticulosis.   • COLONOSCOPY N/A 3/10/2017    03/10/2017--colonoscopy revealed many small and large mouth diverticula in the sigmoid colon and descending colon.  Nonthrombosed external hemorrhoids and internal hemorrhoids noted.  Otherwise, normal colonoscopy.   • ERCP WITH SPHINCTEROTOMY/PAPILLOTOMY  08/13/2014     08/13/2014--ERCP, endoscopic sphincterotomy and removal of common bile duct stones. 08/12/2014--laparoscopic cholecystectomy. This was complicated by retained common bile duct stones 2.   • ESOPHAGOSCOPY / EGD  06/09/2015 06/09/2015--EGD revealed Z line irregular, 35 cm from incisors. Biopsied. Small hiatal hernia. Gastritis. Biopsied. Bilious gastric fluid. Fluid aspiration performed. Normal duodenal bulb and second part of duodenum. Biopsied. Pathology revealed the antral biopsy revealed small intestinal mucosa with no pathologic diagnosis. Good preservation of villous architecture. Duodenum biopsy revealed large   • HAMMER TOE REPAIR Right 4/11/2022    Procedure: RIGHT SECOND AND THIRD HAMMERTOE REPAIRS;  Surgeon: Brandt Vieira MD;  Location: [facility] [place];  Service: Orthopedics;  Laterality: Right;   • LAPAROSCOPIC CHOLECYSTECTOMY  08/12/2014 08/13/2014--ERCP, endoscopic sphincterotomy and removal of common bile duct stones. 08/12/2014--laparoscopic cholecystectomy. This was complicated by retained common bile duct stones 2.   • ROTATOR CUFF REPAIR Left 07/10/2014    07/10/2014--left rotator cuff repair. 03/18/2014--patient seen in followup and reports that her range of motion has improved and her pain is not debilitating. She feels that she is making progress with physical therapy. Surgery scheduled 07/10/2014. 01/10/2014--patient was seen in evaluation by the orthopedist and he recommended conservative treatment consisting of physical therapy/rehabilitation.   • ROTATOR CUFF REPAIR Right 08/03/2016 08/03/2016--arthroscopic right rotator cuff repair.  Debridement of degenerative anterior superior labral tear.   • SUBTOTAL HYSTERECTOMY  1978    Partial hysterectomy 1978.         No Known Allergies        Current Outpatient Medications:   •  calcium carbonate (Calcium 600) 600 MG tablet, Take calcium plus vitamin D twice daily for low vitamin D and weak bones, Disp: 30 tablet, Rfl:   •  levothyroxine  "(SYNTHROID, LEVOTHROID) 50 MCG tablet, TAKE ONE TABLET BY MOUTH DAILY FOR LOW THYROID, Disp: 90 tablet, Rfl: 3  •  multivitamin (THERAGRAN) tablet tablet, Take 1 tablet by mouth Daily. HOLD FOR SURGERY, Disp: , Rfl:   •  omeprazole OTC (PrilOSEC OTC) 20 MG EC tablet, Take 1 tablet by mouth Daily., Disp: , Rfl:   •  polyethylene glycol (MIRALAX) 17 g packet, Take 17 g by mouth Daily. Discontinue the develop diarrhea., Disp: 14 packet, Rfl: 0  •  vitamin C (ASCORBIC ACID) 250 MG tablet, Take 250 mg by mouth Daily., Disp: , Rfl:       Family History   Problem Relation Age of Onset   • Colon polyps Mother    • Alzheimer's disease Mother    • Other Father         Hodgkin's Disease   • Cancer Father         Lung Cancer   • Breast cancer Daughter 52   • Malig Hyperthermia Neg Hx          Social History     Socioeconomic History   • Marital status:    • Number of children: 2   • Years of education: BA   • Highest education level: Associate degree: occupational, technical, or vocational program   Tobacco Use   • Smoking status: Never Smoker   • Smokeless tobacco: Never Used   Vaping Use   • Vaping Use: Never used   Substance and Sexual Activity   • Alcohol use: Yes     Comment: Socially   • Drug use: No   • Sexual activity: Yes     Partners: Male         Vitals:    05/09/22 1234   BP: 118/66   Pulse: 87   Resp: 18   SpO2: 98%   Weight: 50.1 kg (110 lb 6.4 oz)   Height: 162.6 cm (64\")        Body mass index is 18.95 kg/m².      Physical Exam:    General: Alert and oriented x 3.  No acute distress.  Normal affect.  HEENT: Pupils equal, round, reactive to light; extraocular movements intact; sclerae nonicteric; pharynx, ear canals and TMs normal.  Neck: Without JVD, thyromegaly, bruit, or adenopathy.  Lungs: Clear to auscultation in all fields.  Heart: Regular rate and rhythm without murmur, rub, gallop, or click.  Abdomen: Soft, nontender, without hepatosplenomegaly or hernia.  Bowel sounds normal.  : Deferred.  " Rectal: Deferred.  Extremities: Without clubbing, cyanosis, edema, or pulse deficit.  Neurologic: Intact without focal deficit.  Patient is in pain upon arising and with ambulation and her gait is somewhat slow but do not see any definite ataxia.  Skin: Without significant lesion.  Musculoskeletal: I do not see any definite active synovitis.    Lab/other results:    Sed rate is elevated at 64.  C-reactive protein 13.79.  Lipase is now normal at 33.  CMP reveals a glucose of 111, albumin is better at 3.4.  ALT improved at 96, AST improved at 70, alkaline phosphatase is improved but still significantly elevated 1146.  Total bilirubin is better at 2.7.  CBC reveals a white count of 11.43.  Hemoglobin is low at 10.9 and hematocrit low at 32.8.  Platelets are now elevated at 661.  Quantitative Phani-Vora is still pending.  I also reviewed the pathology report which is noted both above and below.    Assessment/Plan:     Diagnosis Plan   1. Hospital discharge follow-up     2. Acute hepatitis     3. Jaundice     4. AUBREY (acute kidney injury) (HCC)     5. Hypokalemia     6. Gastroesophageal reflux disease without esophagitis  omeprazole OTC (PrilOSEC OTC) 20 MG EC tablet     Current outpatient and discharge medications have been reconciled for the patient.  Reviewed by: Carloz Shukla MD    Patient presents in hospital follow-up/transition of care and seems to be improving with the exception of development of diffuse arthralgia of multiple joints that began just prior to discharge and seems to have reached its peak.  Patient feels these arthralgias are improving but not anywhere nearly resolved.  Acute kidney injury also has resolved.    Plan is as follows: Await parvovirus titers as well as the quantitative Phani-Vora titers.  I will contact patient with the results and possible further instructions.  No specific medication for the ongoing inflammation at the present time.  Patient can take aspirin if needed but her  joint pain seems to be getting better and hopefully she will need to take any additional medication.  I explained to her while the liver is healing it is best that we use as few medicines as possible.  I will have patient follow-up in about 10 days.  I will send her to the lab after that visit.    Procedures

## 2022-05-10 ENCOUNTER — READMISSION MANAGEMENT (OUTPATIENT)
Dept: CALL CENTER | Facility: HOSPITAL | Age: 77
End: 2022-05-10

## 2022-05-10 ENCOUNTER — OFFICE VISIT (OUTPATIENT)
Dept: GASTROENTEROLOGY | Facility: CLINIC | Age: 77
End: 2022-05-10

## 2022-05-10 VITALS
DIASTOLIC BLOOD PRESSURE: 74 MMHG | HEIGHT: 65 IN | SYSTOLIC BLOOD PRESSURE: 114 MMHG | TEMPERATURE: 97.1 F | BODY MASS INDEX: 19.39 KG/M2 | WEIGHT: 116.4 LBS

## 2022-05-10 DIAGNOSIS — K72.00: ICD-10-CM

## 2022-05-10 DIAGNOSIS — R79.89 ELEVATED LIVER FUNCTION TESTS: Primary | ICD-10-CM

## 2022-05-10 DIAGNOSIS — R93.2 ABNORMAL CT SCAN, LIVER: ICD-10-CM

## 2022-05-10 DIAGNOSIS — Z87.898 HISTORY OF JAUNDICE: ICD-10-CM

## 2022-05-10 PROCEDURE — 99214 OFFICE O/P EST MOD 30 MIN: CPT | Performed by: NURSE PRACTITIONER

## 2022-05-10 NOTE — PROGRESS NOTES
Chief Complaint   Patient presents with   • hepatitis       Ashley Mata is a  76 y.o. female here for a hospital follow up visit for hepatitis.    HPI  76-year-old female presents today accompanied by her  for follow-up visit for acute hepatitis.  She is a patient of Dr. Boyle.  She is new to me today.  She was seen by our service at [facility] from 4/25 through 4/30/2022.  Initial consult was for elevated LFTs.  Patient went to the ER after having worsening lower abdominal pain with nausea and vomiting.  Liver enzymes done in her primary care office 1 month prior to this visit were completely normal.  LFTs on admission were :Alkaline phosphatase was 1683, ALT was 434, AST was 492 and total bilirubin was 10.2.    She had a CT scan of the abdomen and pelvis done that showed:    IMPRESSION:  Sigmoid diverticulosis without evidence for diverticulitis.  Moderate stool throughout the colon may represent mild constipation. No  further evidence for acute abnormality in the abdomen or pelvis.  Previous cholecystectomy. No evidence for biliary ductal dilatation.    She then had an MRCP done that showed:    IMPRESSION:  The exam is limited as the patient could not cooperate with  breath holds. The postcontrast imaging is nondiagnostic for evaluation.  Status post cholecystectomy. No MR evidence of biliary obstruction. The  pancreatic duct demonstrates normal caliber.    SHe underwent a liver biopsy and the pathology report showed:  Final Diagnosis   1. Liver Biopsy (H&E, Iron and Trichrome): Hepatic tissue with               A. Moderate macrovesicular steatosis with moderate chronic portal inflammation and focal lobular                     inflammation.               B. Focal active cholangitis with minimal cholestasis noted.               C. Subcapsular biopsy without significant periportal fibrosis, pericellular fibrosis, bridging fibrosis                     nor cirrhosis identified  by routine and/or trichrome staining.               D. No malignancy identified.               E. Focal minimal iron deposition within sinusoidal Kupffer cell noted.     jat/jse    Electronically signed by Jonathan Martin MD on 5/2/2022 at 0806   Comment    Core biopsy of liver shows moderate degree of macrovesicular steatosis with some lobular inflammation (grade 2 of 3 steatosis).  Moderate mixed acute and chronic portal inflammation is seen with lymphocytes, neutrophils and occasional eosinophils noted.  No significant numbers of plasma cells are identified.  Focal active cholangitis with mild cholestasis is observed.  These morphologic features can be seen in a primary biliary process (PBC, PSC or overlap syndrome) or could represent ascending infectious cholangitis.  Findings must be correlated with the results of autoimmune serology and with viral serology to exclude chronic hepatitis. Portal eosinophils also suggest co-existing drug effect.  Rare acidophil body is noted.  This case was shared with Dr. Meadows in consultation on May 2, 2022, who concurred with the above diagnosis.       On discharge home the patient admits she was having a lot of of joint pain specifically her knees.  Since being home her knee pain has greatly improved but now she is having terrible neck pain.  So much so that she is been back to the ER and diagnosed with a neck strain.  Liver serology labs so far have been normal.  There are still a few viral testing results still out.  Patient admits she is slowly feeling better.  Still quite tired.  She does have a history of constipation and admits she does well on MiraLAX.  She also has a history of GERD and admits she does well as long she takes omeprazole.  She denies any dysphagia, reflux, abdominal pain, nausea and vomiting, diarrhea, constipation, rectal bleeding or melena.  She admits her appetite is okay and her weight has dropped 4 pounds since hospital stay.    Past Medical  History:   Diagnosis Date   • Allergic rhinitis 01/30/2015 11/13/2015--patient was evaluated by ENT and was started on generic Flonase and patient reports this has improved her symptoms quite a bit.   01/30/2015--allergy testing revealed positive reactivity to cat, dust mite, cockroach, mold spores, and grass pollen. Environmental control measures.   • Alopecia 04/12/2017    Evaluated and treated by the dermatologist.   • Benign essential hypertension 04/18/2016   • Chronic toe pain, right foot 10/19/2021    October 19, 2021--patient reports a 1 year history of progressively worsening right toe pain that particular involves the second toe.  On exam she has obvious hammertoe which is causing irritation.  There is some hammering of the third digit as well.  Patient referred to orthopedist who specializes in foot problems such as Dr. Vieira.  In the meantime patient should use over-the-counter toe pads to   • Diverticulosis of colon 03/17/2009    03/10/2017--colonoscopy revealed many small and large mouth diverticula in the sigmoid colon and descending colon.  Nonthrombosed external hemorrhoids and internal hemorrhoids noted.  Otherwise, normal colonoscopy.  03/17/2009--colonoscopy revealed external hemorrhoids, torturous colon with a sigmoid stricture, sigmoid diverticulosis.   • Family history of breast cancer in first degree relative 07/13/2020   • Family history of colonic polyps 04/01/2022   • Gastroesophageal reflux disease without esophagitis 03/17/2009 06/09/2015--EGD revealed Z line irregular, 35 cm from incisors. Biopsied. Small hiatal hernia. Gastritis. Biopsied. Bilious gastric fluid. Fluid aspiration performed. Normal duodenal bulb and second part of duodenum. Biopsied. Pathology revealed the antral biopsy revealed small intestinal mucosa with no pathologic diagnosis. Good preservation of villous architecture. Duodenum biopsy revealed largely antral type gastric mucosa with mild patchy superficial  chronic gastritis. Gastroesophageal junction revealed squamous and glandular mucosa with mild chronic inflammation. Negative for intestinal metaplasia or dysplasia. There appeared to be mislabeling of the antral biopsies and duodenal biopsies.   04/17/2012--EGD revealed irregular Z line, hiatal hernia, gastritis, duodenitis.   03/17/2009--EGD revealed grade B. reflux esophagitis, hiatal hernia, gastritis, a few gastric polyps, duodenitis.   • Hammertoe of right foot    • Hammertoe of second toe of right foot 10/19/2021    October 19, 2021--patient reports a 1 year history of progressively worsening right toe pain that particular involves the second toe.  On exam she has obvious hammertoe which is causing irritation.  There is some hammering of the third digit as well.  Patient referred to orthopedist who specializes in foot problems such as Dr. Vieira.  In the meantime patient should use over-the-counter toe pads to   • History of COVID-19 10/19/2021    October 19, 2021--patient seen in follow-up and reports her symptoms totally resolved.  She wants to know when she should get her Covid booster vaccine.  I have suggested that she receive it approximately 3 months after initially testing positive.  She has an appointment in November 6, 2021 for the booster injection and I think that will be fine.  August 12, 2021--patient tested positive for COVID   • History of Hyperplastic polyps of stomach 06/09/2015 06/09/2015--EGD revealed Z line irregular, 35 cm from incisors. Biopsied. Small hiatal hernia. Gastritis. Biopsied. Bilious gastric fluid. Fluid aspiration performed. Normal duodenal bulb and second part of duodenum. Biopsied. Pathology revealed the antral biopsy revealed small intestinal mucosa with no pathologic diagnosis. Good preservation of villous architecture. Duodenum biopsy revealed large   • Hyperlipidemia 07/17/2012 07/17/2012--treatment for hyperlipidemia begun.   • Lumbar disc herniation, L4-L5  08/07/2018 01/16/2019--patient seen in follow-up by Dr. Shukla.  She has seen the neurosurgeon who recommended conservative therapy with physical therapy.  Patient reports that has been extremely helpful.  Currently has no significant pain.  08/07/2018--patient seen in follow-up and reports her pain is much better after taking prednisone.  She is now down to half a pill per day and is almost off of it.  I rev   • Lumbar scoliosis 08/07/2018 01/16/2019--patient seen in follow-up by Dr. Shukla.  She has seen the neurosurgeon who recommended conservative therapy with physical therapy.  Patient reports that has been extremely helpful.  Currently has no significant pain.  08/07/2018--patient seen in follow-up and reports her pain is much better after taking prednisone.  She is now down to half a pill per day and is almost off of it.  I rev   • Menopausal state 07/13/2020   • Multiple environmental allergies 01/30/2015 01/30/2015--allergy testing revealed positive reactivity to cat, dust mite, cockroach, mold spores, and grass pollen. Environmental control measures.   • Osteopenia of multiple sites, 2/25/2022--lumbar spine 1.1.  Left femoral neck -1.0.  Right femoral neck -0.3. 04/01/2022 February 25, 2022--DEXA scan reveals lumbar spine T score 1.1.  Left femoral neck T score -1.0.  Right femoral neck T score -0.3.  Mild osteopenia.   • Postmenopausal hormone replacement therapy 04/18/2016   • Primary hypothyroidism 01/22/2020 January 22, 2020--TSH is elevated at 5.0.  Levothyroxine 50 mcg/day initiated.  Patient will follow-up with nonfasting lab work in about 6 to 8 weeks to reassess.  07/09/2018--routine follow-up.  TSH elevated slightly at 4.31.  Free T4 normal at 1.22.  Free T3 normal at 3.3.  Continued observation.  10/11/2017--thyroid function tests are normal.  09/30/2016--thyroid ultrasound reveals a tiny 2 mm    • Primary osteoarthritis of both hands 12/30/2013 05/12/2014--patient seen in  followup and reports that the Vimovo has helped. Uric acid levels are normal at 4.9. C. reactive protein mildly elevated at 2.3. X-rays of the hands reveals radiocarpal, first carpometacarpal, first metacarpophalangeal, and interphalangeal joint degeneration. This process is symmetric. The interphalangeal joint of the left is affected to the greatest degree. There is right sided scapholunate dissociation as well as deformity of the scaphoid suggesting prior traumatic injury with healing. Soft tissues are satisfactory. Overall appearance is most consistent with osteoarthritis.   05/05/2014--patient presents and reports that she is having worsening right wrist pain primarily at the base of the thumb. No new injury. Bilateral x-rays of the wrists and hands ordered. Uric acid level ordered as well as a CRP. Vimovo 500/20 one by mouth twice a day. Follow up in one week.   03/18/2014--patient reports that her wrist symptoms have improved.   12/30/2013--patient reports a several mon   • Primary osteoarthritis of both wrists 12/30/2013 05/12/2014--patient seen in followup and reports that the Vimovo has helped. Uric acid levels are normal at 4.9. C. reactive protein mildly elevated at 2.3. X-rays of the hands reveals radiocarpal, first carpometacarpal, first metacarpophalangeal, and interphalangeal joint degeneration. This process is symmetric. The interphalangeal joint of the left is affected to the greatest degree. There is right sided scapholunate dissociation as well as deformity of the scaphoid suggesting prior traumatic injury with healing. Soft tissues are satisfactory. Overall appearance is most consistent with osteoarthritis.   05/05/2014--patient presents and reports that she is having worsening right wrist pain primarily at the base of the thumb. No new injury. Bilateral x-rays of the wrists and hands ordered. Uric acid level ordered as well as a CRP. Vimovo 500/20 one by mouth twice a day. Follow up in one  week.   03/18/2014--patient reports that her wrist symptoms have improved.   12/30/2013--patient reports a several mon   • Vitamin D deficiency 04/18/2016       Past Surgical History:   Procedure Laterality Date   • COLONOSCOPY  03/17/2009 03/17/2009--colonoscopy revealed external hemorrhoids, torturous colon with a sigmoid stricture, sigmoid diverticulosis.   • COLONOSCOPY N/A 3/10/2017    03/10/2017--colonoscopy revealed many small and large mouth diverticula in the sigmoid colon and descending colon.  Nonthrombosed external hemorrhoids and internal hemorrhoids noted.  Otherwise, normal colonoscopy.   • ERCP WITH SPHINCTEROTOMY/PAPILLOTOMY  08/13/2014 08/13/2014--ERCP, endoscopic sphincterotomy and removal of common bile duct stones. 08/12/2014--laparoscopic cholecystectomy. This was complicated by retained common bile duct stones 2.   • ESOPHAGOSCOPY / EGD  06/09/2015 06/09/2015--EGD revealed Z line irregular, 35 cm from incisors. Biopsied. Small hiatal hernia. Gastritis. Biopsied. Bilious gastric fluid. Fluid aspiration performed. Normal duodenal bulb and second part of duodenum. Biopsied. Pathology revealed the antral biopsy revealed small intestinal mucosa with no pathologic diagnosis. Good preservation of villous architecture. Duodenum biopsy revealed large   • HAMMER TOE REPAIR Right 4/11/2022    Procedure: RIGHT SECOND AND THIRD HAMMERTOE REPAIRS;  Surgeon: Brandt Vieira MD;  Location: [facility];  Service: Orthopedics;  Laterality: Right;   • LAPAROSCOPIC CHOLECYSTECTOMY  08/12/2014 08/13/2014--ERCP, endoscopic sphincterotomy and removal of common bile duct stones. 08/12/2014--laparoscopic cholecystectomy. This was complicated by retained common bile duct stones 2.   • ROTATOR CUFF REPAIR Left 07/10/2014    07/10/2014--left rotator cuff repair. 03/18/2014--patient seen in followup and reports that her range of motion has improved and her pain is not debilitating. She feels that she is  making progress with physical therapy. Surgery scheduled 07/10/2014. 01/10/2014--patient was seen in evaluation by the orthopedist and he recommended conservative treatment consisting of physical therapy/rehabilitation.   • ROTATOR CUFF REPAIR Right 08/03/2016 08/03/2016--arthroscopic right rotator cuff repair.  Debridement of degenerative anterior superior labral tear.   • SUBTOTAL HYSTERECTOMY  1978    Partial hysterectomy 1978.       Scheduled Meds:    Continuous Infusions:No current facility-administered medications for this visit.      PRN Meds:.    No Known Allergies    Social History     Socioeconomic History   • Marital status:    • Number of children: 2   • Years of education: BA   • Highest education level: Associate degree: occupational, technical, or vocational program   Tobacco Use   • Smoking status: Never Smoker   • Smokeless tobacco: Never Used   Vaping Use   • Vaping Use: Never used   Substance and Sexual Activity   • Alcohol use: Yes     Comment: Socially   • Drug use: No   • Sexual activity: Yes     Partners: Male       Family History   Problem Relation Age of Onset   • Colon polyps Mother    • Alzheimer's disease Mother    • Other Father         Hodgkin's Disease   • Cancer Father         Lung Cancer   • Breast cancer Daughter 52   • Malig Hyperthermia Neg Hx        Review of Systems   Constitutional: Positive for fatigue. Negative for appetite change, chills, diaphoresis, fever and unexpected weight change.   HENT: Negative for nosebleeds, postnasal drip, sore throat, trouble swallowing and voice change.    Respiratory: Negative for cough, choking, chest tightness, shortness of breath, wheezing and stridor.    Cardiovascular: Negative for chest pain, palpitations and leg swelling.   Gastrointestinal: Negative for abdominal distention, abdominal pain, anal bleeding, blood in stool, constipation, diarrhea, nausea, rectal pain and vomiting.   Endocrine: Negative for polydipsia, polyphagia  and polyuria.   Musculoskeletal: Positive for arthralgias and neck pain. Negative for gait problem.   Skin: Negative for rash and wound.   Allergic/Immunologic: Negative for food allergies.   Neurological: Negative for dizziness, speech difficulty and light-headedness.   Psychiatric/Behavioral: Negative for confusion, self-injury, sleep disturbance and suicidal ideas.       Vitals:    05/10/22 1029   BP: 114/74   Temp: 97.1 °F (36.2 °C)       Physical Exam  Constitutional:       General: She is not in acute distress.     Appearance: She is well-developed. She is not ill-appearing.   HENT:      Head: Normocephalic.   Eyes:      Pupils: Pupils are equal, round, and reactive to light.   Cardiovascular:      Rate and Rhythm: Normal rate and regular rhythm.      Heart sounds: Normal heart sounds.   Pulmonary:      Effort: Pulmonary effort is normal.      Breath sounds: Normal breath sounds.   Abdominal:      General: Bowel sounds are normal. There is no distension.      Palpations: Abdomen is soft. There is no mass.      Tenderness: There is no abdominal tenderness. There is no guarding or rebound.      Hernia: No hernia is present.   Musculoskeletal:         General: Normal range of motion.   Skin:     General: Skin is warm and dry.   Neurological:      Mental Status: She is alert and oriented to person, place, and time.   Psychiatric:         Speech: Speech normal.         Behavior: Behavior normal.         Judgment: Judgment normal.         XR Spine Cervical Complete 4 or 5 View    Result Date: 5/6/2022  Advanced degenerative change in the cervical spine.  This report was finalized on 5/6/2022 6:46 AM by Dr. Pedro Luis Blackman M.D.         Diagnoses and all orders for this visit:    1. Elevated liver function tests (Primary)  -     Comprehensive Metabolic Panel    2. History of jaundice    3. Abnormal CT scan, liver    4. Acute hepatitis, noninfective       Reviewed hospital records including the liver pathology report  with her and her  today.  I also reviewed the path report with Dr. Boyle prior to the visit today.  He was in agreement with the plan at this time we do not know the definitive cause for the hepatitis.  Liver serology work-up so far has been unrevealing.  It is felt to be drug related at this time.  Still waiting on a few viral tests to come back.  At this point we will repeat CMP today and hope to see that her liver enzymes continue to trend down.  She does seem to be feeling better.  Still having some neck pain and fatigue.  Patient to call the office with any issues.  Patient to follow-up with us in 2 weeks.  Patient is agreeable to the plan.

## 2022-05-10 NOTE — OUTREACH NOTE
Medical Week 2 Survey    Flowsheet Row Responses   [facility] patient discharged from? [place]   Does the patient have one of the following disease processes/diagnoses(primary or secondary)? Other   Week 2 attempt successful? Yes   Call start time 1611   Discharge diagnosis Acute hepatitis   Call end time 1616   Meds reviewed with patient/caregiver? Yes   Is the patient having any side effects they believe may be caused by any medication additions or changes? No   Does the patient have all medications ordered at discharge? Yes   Is the patient taking all medications as directed (includes completed medication regime)? Yes   Does the patient have a primary care provider?  Yes   Does the patient have an appointment with their PCP within 7 days of discharge? Yes   Has the patient kept scheduled appointments due by today? Yes   Psychosocial issues? No   Did the patient receive a copy of their discharge instructions? Yes   Nursing interventions Reviewed instructions with patient   What is the patient's perception of their health status since discharge? Improving   Is the patient/caregiver able to teach back signs and symptoms related to disease process for when to call PCP? Yes   Is the patient/caregiver able to teach back signs and symptoms related to disease process for when to call 911? Yes   Is the patient/caregiver able to teach back the hierarchy of who to call/visit for symptoms/problems? PCP, Specialist, Home health nurse, Urgent Care, ED, 911 Yes   If the patient is a current smoker, are they able to teach back resources for cessation? Not a smoker   Week 2 Call Completed? Yes   Wrap up additional comments Labs are improving. She is doing better.           YON ANDREWS - Registered Nurse

## 2022-05-11 LAB
ALBUMIN SERPL-MCNC: 3.6 G/DL (ref 3.7–4.7)
ALBUMIN/GLOB SERPL: 1.1 {RATIO} (ref 1.2–2.2)
ALP SERPL-CCNC: 749 IU/L (ref 44–121)
ALT SERPL-CCNC: 63 IU/L (ref 0–32)
AST SERPL-CCNC: 72 IU/L (ref 0–40)
BILIRUB SERPL-MCNC: 1.7 MG/DL (ref 0–1.2)
BUN SERPL-MCNC: 14 MG/DL (ref 8–27)
BUN/CREAT SERPL: 20 (ref 12–28)
CALCIUM SERPL-MCNC: 9.7 MG/DL (ref 8.7–10.3)
CHLORIDE SERPL-SCNC: 103 MMOL/L (ref 96–106)
CO2 SERPL-SCNC: 19 MMOL/L (ref 20–29)
CREAT SERPL-MCNC: 0.71 MG/DL (ref 0.57–1)
EGFRCR SERPLBLD CKD-EPI 2021: 87 ML/MIN/1.73
GLOBULIN SER CALC-MCNC: 3.3 G/DL (ref 1.5–4.5)
GLUCOSE SERPL-MCNC: 92 MG/DL (ref 65–99)
POTASSIUM SERPL-SCNC: 4.2 MMOL/L (ref 3.5–5.2)
PROT SERPL-MCNC: 6.9 G/DL (ref 6–8.5)
SODIUM SERPL-SCNC: 142 MMOL/L (ref 134–144)

## 2022-05-12 ENCOUNTER — TELEPHONE (OUTPATIENT)
Dept: GASTROENTEROLOGY | Facility: CLINIC | Age: 77
End: 2022-05-12

## 2022-05-12 NOTE — TELEPHONE ENCOUNTER
----- Message from ZACHERY Rubalcava sent at 5/12/2022  2:51 PM EDT -----  Please call the patient and let her know her liver enzymes are still elevated but significantly lower than previous.  Almost back to normal.  Definitely moving in the right direction.  Thanks

## 2022-05-18 ENCOUNTER — TELEPHONE (OUTPATIENT)
Dept: INTERNAL MEDICINE | Facility: CLINIC | Age: 77
End: 2022-05-18

## 2022-05-18 NOTE — TELEPHONE ENCOUNTER
Called and spoke with  to give info that we would need to reschedule. I will have someone call him tomorrow to do so. Pt voiced understanding.

## 2022-05-18 NOTE — TELEPHONE ENCOUNTER
Caller: Darrius Mata    Relationship to patient: Emergency Contact    Best call back number: [phone]    Patient is needing: PATIENT TESTED POSITIVE FOR COVID AT HOME TEST ON 5-17. WANTS TO KNOW IF IT IS OK FOR HER TO COME TO HER APPOINTMENT ON 5-19. PATIENT HAS NO SYMPTOMS. A PCR COVID TEST IS PENDING RESULTS AT [facility].  PLEASE ADVISE

## 2022-06-01 ENCOUNTER — OFFICE VISIT (OUTPATIENT)
Dept: INTERNAL MEDICINE | Facility: CLINIC | Age: 77
End: 2022-06-01

## 2022-06-01 ENCOUNTER — LAB (OUTPATIENT)
Dept: LAB | Facility: HOSPITAL | Age: 77
End: 2022-06-01

## 2022-06-01 VITALS
OXYGEN SATURATION: 98 % | DIASTOLIC BLOOD PRESSURE: 72 MMHG | BODY MASS INDEX: 19.09 KG/M2 | HEIGHT: 65 IN | WEIGHT: 114.6 LBS | SYSTOLIC BLOOD PRESSURE: 118 MMHG | HEART RATE: 78 BPM | RESPIRATION RATE: 18 BRPM

## 2022-06-01 DIAGNOSIS — I10 BENIGN ESSENTIAL HYPERTENSION: Chronic | ICD-10-CM

## 2022-06-01 DIAGNOSIS — N17.9 AKI (ACUTE KIDNEY INJURY): ICD-10-CM

## 2022-06-01 DIAGNOSIS — R17 JAUNDICE: ICD-10-CM

## 2022-06-01 DIAGNOSIS — B17.9 ACUTE HEPATITIS: Primary | ICD-10-CM

## 2022-06-01 DIAGNOSIS — M25.50 ARTHRALGIA OF MULTIPLE JOINTS: ICD-10-CM

## 2022-06-01 DIAGNOSIS — Z86.16 HISTORY OF COVID-19: ICD-10-CM

## 2022-06-01 LAB
ALBUMIN SERPL-MCNC: 4.1 G/DL (ref 3.5–5.2)
ALBUMIN/GLOB SERPL: 1.5 G/DL
ALP SERPL-CCNC: 147 U/L (ref 39–117)
ALT SERPL W P-5'-P-CCNC: 22 U/L (ref 1–33)
ANION GAP SERPL CALCULATED.3IONS-SCNC: 11.1 MMOL/L (ref 5–15)
AST SERPL-CCNC: 27 U/L (ref 1–32)
BILIRUB SERPL-MCNC: 0.7 MG/DL (ref 0–1.2)
BUN SERPL-MCNC: 14 MG/DL (ref 8–23)
BUN/CREAT SERPL: 19.7 (ref 7–25)
CALCIUM SPEC-SCNC: 9.3 MG/DL (ref 8.6–10.5)
CHLORIDE SERPL-SCNC: 104 MMOL/L (ref 98–107)
CO2 SERPL-SCNC: 24.9 MMOL/L (ref 22–29)
CREAT SERPL-MCNC: 0.71 MG/DL (ref 0.57–1)
EGFRCR SERPLBLD CKD-EPI 2021: 87.2 ML/MIN/1.73
GLOBULIN UR ELPH-MCNC: 2.8 GM/DL
GLUCOSE SERPL-MCNC: 93 MG/DL (ref 65–99)
POTASSIUM SERPL-SCNC: 4.3 MMOL/L (ref 3.5–5.2)
PROT SERPL-MCNC: 6.9 G/DL (ref 6–8.5)
SODIUM SERPL-SCNC: 140 MMOL/L (ref 136–145)

## 2022-06-01 PROCEDURE — 80053 COMPREHEN METABOLIC PANEL: CPT | Performed by: INTERNAL MEDICINE

## 2022-06-01 PROCEDURE — 99214 OFFICE O/P EST MOD 30 MIN: CPT | Performed by: INTERNAL MEDICINE

## 2022-06-01 PROCEDURE — 86769 SARS-COV-2 COVID-19 ANTIBODY: CPT | Performed by: INTERNAL MEDICINE

## 2022-06-01 PROCEDURE — 36415 COLL VENOUS BLD VENIPUNCTURE: CPT | Performed by: INTERNAL MEDICINE

## 2022-06-01 NOTE — PROGRESS NOTES
06/01/2022    Patient Information  Ashley Mata                                                                                          [id number] [facility] [address]      1945  [unfilled]  There is no work phone number on file.    Chief Complaint:     Follow-up acute hepatitis and elevated liver enzymes, jaundice, acute kidney injury, hypertension, recent COVID-19 infection.    History of Present Illness:    Patient with hypertension, esophageal reflux, hyperlipidemia, environmental allergies, osteoarthritis of multiple joints, vitamin D deficiency, lumbar disc disease, hypothyroidism, family history of breast cancer, family history of colon polyps, osteopenia, recent admission to the hospital with jaundice and acute hepatitis of uncertain etiology, recent positive test for COVID.  She presents today to follow-up primarily regarding the jaundice/hepatitis.  Patient reports she is feeling better.  She went to the gastroenterology medical assistant recently and for some reason a COVID test was performed which returned positive.  See below.  Past medical history reviewed and updated were necessary including health maintenance parameters.  This reveals she is up-to-date with the exception of colon cancer screening which will be performed as directed by the cardiology service.    Review of Systems   Constitutional: Negative. Negative for chills and fever.   HENT: Negative.    Eyes: Negative.    Cardiovascular: Negative.    Respiratory: Negative.    Endocrine: Negative.    Hematologic/Lymphatic: Negative.    Skin: Negative.    Musculoskeletal: Positive for arthritis, back pain and joint pain.   Gastrointestinal: Negative.    Genitourinary: Negative.    Neurological: Negative.    Psychiatric/Behavioral: Negative.    Allergic/Immunologic: Negative.        Active Problems:    Patient Active Problem List   Diagnosis   • Allergic rhinitis   • Benign essential hypertension   •  Diverticulosis of colon   • Gastroesophageal reflux disease without esophagitis   • Hyperlipidemia   • Multiple environmental allergies   • Primary osteoarthritis of both wrists   • Primary osteoarthritis of both hands   • Postmenopausal hormone replacement therapy   • Vitamin D deficiency   • Therapeutic drug monitoring   • Alopecia   • Lumbar scoliosis   • Lumbar disc herniation, L4-L5   • Primary hypothyroidism   • Family history of breast cancer in first degree relative   • Menopausal state   • History of COVID-19, August 12, 2021; May 17, 2022.   • Hammertoe of second toe of right foot   • Chronic toe pain, right foot   • Family history of colonic polyps   • Osteopenia of multiple sites, 2/25/2022--lumbar spine 1.1.  Left femoral neck -1.0.  Right femoral neck -0.3.   • AUBREY (acute kidney injury) (HCC)   • Jaundice   • Acute hepatitis   • Hospital discharge follow-up         Past Medical History:   Diagnosis Date   • Allergic rhinitis 01/30/2015 11/13/2015--patient was evaluated by ENT and was started on generic Flonase and patient reports this has improved her symptoms quite a bit.   01/30/2015--allergy testing revealed positive reactivity to cat, dust mite, cockroach, mold spores, and grass pollen. Environmental control measures.   • Alopecia 04/12/2017    Evaluated and treated by the dermatologist.   • Benign essential hypertension 04/18/2016   • Chronic toe pain, right foot 10/19/2021    October 19, 2021--patient reports a 1 year history of progressively worsening right toe pain that particular involves the second toe.  On exam she has obvious hammertoe which is causing irritation.  There is some hammering of the third digit as well.  Patient referred to orthopedist who specializes in foot problems such as Dr. Vieira.  In the meantime patient should use over-the-counter toe pads to   • Diverticulosis of colon 03/17/2009    03/10/2017--colonoscopy revealed many small and large mouth diverticula in the  sigmoid colon and descending colon.  Nonthrombosed external hemorrhoids and internal hemorrhoids noted.  Otherwise, normal colonoscopy.  03/17/2009--colonoscopy revealed external hemorrhoids, torturous colon with a sigmoid stricture, sigmoid diverticulosis.   • Family history of breast cancer in first degree relative 07/13/2020   • Family history of colonic polyps 04/01/2022   • Gastroesophageal reflux disease without esophagitis 03/17/2009 06/09/2015--EGD revealed Z line irregular, 35 cm from incisors. Biopsied. Small hiatal hernia. Gastritis. Biopsied. Bilious gastric fluid. Fluid aspiration performed. Normal duodenal bulb and second part of duodenum. Biopsied. Pathology revealed the antral biopsy revealed small intestinal mucosa with no pathologic diagnosis. Good preservation of villous architecture. Duodenum biopsy revealed largely antral type gastric mucosa with mild patchy superficial chronic gastritis. Gastroesophageal junction revealed squamous and glandular mucosa with mild chronic inflammation. Negative for intestinal metaplasia or dysplasia. There appeared to be mislabeling of the antral biopsies and duodenal biopsies.   04/17/2012--EGD revealed irregular Z line, hiatal hernia, gastritis, duodenitis.   03/17/2009--EGD revealed grade B. reflux esophagitis, hiatal hernia, gastritis, a few gastric polyps, duodenitis.   • Hammertoe of right foot    • Hammertoe of second toe of right foot 10/19/2021    October 19, 2021--patient reports a 1 year history of progressively worsening right toe pain that particular involves the second toe.  On exam she has obvious hammertoe which is causing irritation.  There is some hammering of the third digit as well.  Patient referred to orthopedist who specializes in foot problems such as Dr. Vieira.  In the meantime patient should use over-the-counter toe pads to   • History of COVID-19, August 12, 2021; May 17, 2022. 10/19/2021    May 17, 2022--patient again tested positive  for COVID.  Ordered by gastroenterology nurse practitioner.  October 19, 2021--patient seen in follow-up and reports her symptoms totally resolved.  She wants to know when she should get her Covid booster vaccine.  I have suggested that she receive it approximately 3 months after initially testing positive.  She has an appointment in November 6, 2021 for   • History of Hyperplastic polyps of stomach 06/09/2015 06/09/2015--EGD revealed Z line irregular, 35 cm from incisors. Biopsied. Small hiatal hernia. Gastritis. Biopsied. Bilious gastric fluid. Fluid aspiration performed. Normal duodenal bulb and second part of duodenum. Biopsied. Pathology revealed the antral biopsy revealed small intestinal mucosa with no pathologic diagnosis. Good preservation of villous architecture. Duodenum biopsy revealed large   • Hyperlipidemia 07/17/2012 07/17/2012--treatment for hyperlipidemia begun.   • Lumbar disc herniation, L4-L5 08/07/2018 01/16/2019--patient seen in follow-up by Dr. Shukla.  She has seen the neurosurgeon who recommended conservative therapy with physical therapy.  Patient reports that has been extremely helpful.  Currently has no significant pain.  08/07/2018--patient seen in follow-up and reports her pain is much better after taking prednisone.  She is now down to half a pill per day and is almost off of it.  I rev   • Lumbar scoliosis 08/07/2018 01/16/2019--patient seen in follow-up by Dr. Shukla.  She has seen the neurosurgeon who recommended conservative therapy with physical therapy.  Patient reports that has been extremely helpful.  Currently has no significant pain.  08/07/2018--patient seen in follow-up and reports her pain is much better after taking prednisone.  She is now down to half a pill per day and is almost off of it.  I rev   • Menopausal state 07/13/2020   • Multiple environmental allergies 01/30/2015 01/30/2015--allergy testing revealed positive reactivity to cat, dust mite,  cockroach, mold spores, and grass pollen. Environmental control measures.   • Osteopenia of multiple sites, 2/25/2022--lumbar spine 1.1.  Left femoral neck -1.0.  Right femoral neck -0.3. 04/01/2022 February 25, 2022--DEXA scan reveals lumbar spine T score 1.1.  Left femoral neck T score -1.0.  Right femoral neck T score -0.3.  Mild osteopenia.   • Postmenopausal hormone replacement therapy 04/18/2016   • Primary hypothyroidism 01/22/2020 January 22, 2020--TSH is elevated at 5.0.  Levothyroxine 50 mcg/day initiated.  Patient will follow-up with nonfasting lab work in about 6 to 8 weeks to reassess.  07/09/2018--routine follow-up.  TSH elevated slightly at 4.31.  Free T4 normal at 1.22.  Free T3 normal at 3.3.  Continued observation.  10/11/2017--thyroid function tests are normal.  09/30/2016--thyroid ultrasound reveals a tiny 2 mm    • Primary osteoarthritis of both hands 12/30/2013 05/12/2014--patient seen in followup and reports that the Vimovo has helped. Uric acid levels are normal at 4.9. C. reactive protein mildly elevated at 2.3. X-rays of the hands reveals radiocarpal, first carpometacarpal, first metacarpophalangeal, and interphalangeal joint degeneration. This process is symmetric. The interphalangeal joint of the left is affected to the greatest degree. There is right sided scapholunate dissociation as well as deformity of the scaphoid suggesting prior traumatic injury with healing. Soft tissues are satisfactory. Overall appearance is most consistent with osteoarthritis.   05/05/2014--patient presents and reports that she is having worsening right wrist pain primarily at the base of the thumb. No new injury. Bilateral x-rays of the wrists and hands ordered. Uric acid level ordered as well as a CRP. Vimovo 500/20 one by mouth twice a day. Follow up in one week.   03/18/2014--patient reports that her wrist symptoms have improved.   12/30/2013--patient reports a several mon   • Primary  osteoarthritis of both wrists 12/30/2013 05/12/2014--patient seen in followup and reports that the Vimovo has helped. Uric acid levels are normal at 4.9. C. reactive protein mildly elevated at 2.3. X-rays of the hands reveals radiocarpal, first carpometacarpal, first metacarpophalangeal, and interphalangeal joint degeneration. This process is symmetric. The interphalangeal joint of the left is affected to the greatest degree. There is right sided scapholunate dissociation as well as deformity of the scaphoid suggesting prior traumatic injury with healing. Soft tissues are satisfactory. Overall appearance is most consistent with osteoarthritis.   05/05/2014--patient presents and reports that she is having worsening right wrist pain primarily at the base of the thumb. No new injury. Bilateral x-rays of the wrists and hands ordered. Uric acid level ordered as well as a CRP. Vimovo 500/20 one by mouth twice a day. Follow up in one week.   03/18/2014--patient reports that her wrist symptoms have improved.   12/30/2013--patient reports a several mon   • Vitamin D deficiency 04/18/2016         Past Surgical History:   Procedure Laterality Date   • COLONOSCOPY  03/17/2009 03/17/2009--colonoscopy revealed external hemorrhoids, torturous colon with a sigmoid stricture, sigmoid diverticulosis.   • COLONOSCOPY N/A 3/10/2017    03/10/2017--colonoscopy revealed many small and large mouth diverticula in the sigmoid colon and descending colon.  Nonthrombosed external hemorrhoids and internal hemorrhoids noted.  Otherwise, normal colonoscopy.   • ERCP WITH SPHINCTEROTOMY/PAPILLOTOMY  08/13/2014 08/13/2014--ERCP, endoscopic sphincterotomy and removal of common bile duct stones. 08/12/2014--laparoscopic cholecystectomy. This was complicated by retained common bile duct stones 2.   • ESOPHAGOSCOPY / EGD  06/09/2015 06/09/2015--EGD revealed Z line irregular, 35 cm from incisors. Biopsied. Small hiatal hernia. Gastritis.  Biopsied. Bilious gastric fluid. Fluid aspiration performed. Normal duodenal bulb and second part of duodenum. Biopsied. Pathology revealed the antral biopsy revealed small intestinal mucosa with no pathologic diagnosis. Good preservation of villous architecture. Duodenum biopsy revealed large   • HAMMER TOE REPAIR Right 4/11/2022    Procedure: RIGHT SECOND AND THIRD HAMMERTOE REPAIRS;  Surgeon: Brandt Vieira MD;  Location: [facility] OR [facility] [place];  Service: Orthopedics;  Laterality: Right;   • LAPAROSCOPIC CHOLECYSTECTOMY  08/12/2014 08/13/2014--ERCP, endoscopic sphincterotomy and removal of common bile duct stones. 08/12/2014--laparoscopic cholecystectomy. This was complicated by retained common bile duct stones 2.   • ROTATOR CUFF REPAIR Left 07/10/2014    07/10/2014--left rotator cuff repair. 03/18/2014--patient seen in followup and reports that her range of motion has improved and her pain is not debilitating. She feels that she is making progress with physical therapy. Surgery scheduled 07/10/2014. 01/10/2014--patient was seen in evaluation by the orthopedist and he recommended conservative treatment consisting of physical therapy/rehabilitation.   • ROTATOR CUFF REPAIR Right 08/03/2016 08/03/2016--arthroscopic right rotator cuff repair.  Debridement of degenerative anterior superior labral tear.   • SUBTOTAL HYSTERECTOMY  1978    Partial hysterectomy 1978.         Allergies   Allergen Reactions   • Bupivacaine Other (See Comments)     Questionable allergy.  Patient had toe surgery and subsequently developed acute hepatitis.  Rare case reports of hepatitis induced by bupivacaine which was used during her surgery.           Current Outpatient Medications:   •  calcium carbonate (Calcium 600) 600 MG tablet, Take calcium plus vitamin D twice daily for low vitamin D and weak bones, Disp: 30 tablet, Rfl:   •  levothyroxine (SYNTHROID, LEVOTHROID) 50 MCG tablet, TAKE ONE TABLET BY MOUTH DAILY FOR LOW THYROID, Disp:  "90 tablet, Rfl: 3  •  multivitamin (THERAGRAN) tablet tablet, Take 1 tablet by mouth Daily. HOLD FOR SURGERY, Disp: , Rfl:   •  omeprazole OTC (PrilOSEC OTC) 20 MG EC tablet, Take 1 tablet by mouth Daily., Disp: , Rfl:   •  polyethylene glycol (MIRALAX) 17 g packet, Take 17 g by mouth Daily. Discontinue the develop diarrhea., Disp: 14 packet, Rfl: 0  •  vitamin C (ASCORBIC ACID) 250 MG tablet, Take 250 mg by mouth Daily., Disp: , Rfl:       Family History   Problem Relation Age of Onset   • Colon polyps Mother    • Alzheimer's disease Mother    • Other Father         Hodgkin's Disease   • Cancer Father         Lung Cancer   • Breast cancer Daughter 52   • Malig Hyperthermia Neg Hx          Social History     Socioeconomic History   • Marital status:    • Number of children: 2   • Years of education: BA   • Highest education level: Associate degree: occupational, technical, or vocational program   Tobacco Use   • Smoking status: Never Smoker   • Smokeless tobacco: Never Used   Vaping Use   • Vaping Use: Never used   Substance and Sexual Activity   • Alcohol use: Yes     Comment: Socially   • Drug use: No   • Sexual activity: Yes     Partners: Male         Vitals:    06/01/22 1058   BP: 118/72   Pulse: 78   Resp: 18   SpO2: 98%   Weight: 52 kg (114 lb 9.6 oz)   Height: 165.1 cm (65\")        Body mass index is 19.07 kg/m².      Physical Exam:    General: Alert and oriented x 3.  No acute distress.  Normal affect.  HEENT: Pupils equal, round, reactive to light; extraocular movements intact; sclerae nonicteric; pharynx, ear canals and TMs normal.  Neck: Without JVD, thyromegaly, bruit, or adenopathy.  Lungs: Clear to auscultation in all fields.  Heart: Regular rate and rhythm without murmur, rub, gallop, or click.  Abdomen: Soft, nontender, without hepatosplenomegaly or hernia.  Bowel sounds normal.  : Deferred.  Rectal: Deferred.  Extremities: Without clubbing, cyanosis, edema, or pulse deficit.  Neurologic: " Intact without focal deficit.  Normal station and gait observed during ingress and egress from the examination room.  Skin: Without significant lesion.  Musculoskeletal: Unremarkable.    Lab/other results:    I reviewed the most recent labs: COVID test was positive on May 17, 2022.  CMP obtained May 10, 2022 was normal with the exception of alkaline phosphatase elevated at 749, total bilirubin 1.7, AST 72, ALT 63.  Albumin was a little low at 3.6.  Parvo B19 IgG was positive at 5.2 but IgM was negative at 0.1.    Assessment/Plan:     Diagnosis Plan   1. Acute hepatitis  Parvovirus B19 Antibody, IgG & IgM    Comprehensive Metabolic Panel    SARS-CoV-2 Antibodies (Roche)   2. Jaundice     3. AUBREY (acute kidney injury) ([facility])     4. History of COVID-19  SARS-CoV-2 Antibodies (Roche)    SARS-CoV-2 Antibodies (Roche)   5. Benign essential hypertension     6. Arthralgia of multiple joints  Parvovirus B19 Antibody, IgG & IgM     Patient with acute hepatitis of uncertain etiology.  It is possible that patient actually had COVID at the time of admission to the hospital.  There is approximately 20% false-negative rate.  However, recent COVID testing was positive and patient had no symptoms.  Her acute kidney injury has essentially resolved.  Her liver enzymes are better including transaminases and bilirubin.  Alkaline phosphatase remains a little elevated.  This needs to be reassessed.  Her blood pressure appears to be in good control presently.  She is not on any medication.    Plan is as follows: Check CMP and COVID antibodies today.  Patient will follow up on phone for the results and possible further instructions.  She has an appointment scheduled for November of this year with lab prior but may need to see her before that depending upon the results of the studies or if she develops any other symptoms.      Procedures

## 2022-06-03 ENCOUNTER — OFFICE VISIT (OUTPATIENT)
Dept: GASTROENTEROLOGY | Facility: CLINIC | Age: 77
End: 2022-06-03

## 2022-06-03 VITALS
BODY MASS INDEX: 19.49 KG/M2 | DIASTOLIC BLOOD PRESSURE: 75 MMHG | SYSTOLIC BLOOD PRESSURE: 128 MMHG | HEART RATE: 77 BPM | HEIGHT: 65 IN | TEMPERATURE: 95.9 F | WEIGHT: 117 LBS

## 2022-06-03 DIAGNOSIS — R79.89 ELEVATED LIVER FUNCTION TESTS: Primary | ICD-10-CM

## 2022-06-03 DIAGNOSIS — Z86.19 HISTORY OF HEPATITIS: ICD-10-CM

## 2022-06-03 DIAGNOSIS — Z86.16 HISTORY OF 2019 NOVEL CORONAVIRUS DISEASE (COVID-19): ICD-10-CM

## 2022-06-03 DIAGNOSIS — Z87.898 HISTORY OF JAUNDICE: ICD-10-CM

## 2022-06-03 LAB — SARS-COV-2 AB SERPL QL IA: POSITIVE

## 2022-06-03 PROCEDURE — 99214 OFFICE O/P EST MOD 30 MIN: CPT | Performed by: NURSE PRACTITIONER

## 2022-06-03 NOTE — PROGRESS NOTES
Chief Complaint   Patient presents with   • Elevated Hepatic Enzymes       Ashley Mata is a  76 y.o. female here for a follow up visit for elevated liver enzymes.    HPI  76-year-old female presents today accompanied by her  for follow-up visit for elevated liver enzymes.  She is a patient of Dr. Boyle.  She was last seen in the office by me on 5/10/2022.  She has a history of acute hepatitis.  She had extensive work-up in the hospital for this.  LFTs are trending down.  She had a liver biopsy that showed possible viral or drug etiology.  Since coming home from the hospital patient ended up testing positive for COVID.  She is feeling better now.  She tested positive for COVID on 5/17/2022.  She tells me she is not sure how she got it.  She is scheduled for an EGD and colonoscopy with Dr. Boyle on 8/26/2022.  Coming home from the hospital initially after her hepatitis diagnosis she had horrible neck pain and joint pain.  That has definitely improved.  She is still having a little bit of joint issues but nothing like it was before.  She is slowly feeling better.  She does have a history of a sigmoid stricture and diverticulosis.  She also has a family history of colon polyps.  She denies any dysphagia, reflux, abdominal pain, nausea and vomiting, diarrhea, constipation, rectal bleeding or melena.  She admits her appetite is okay.  Weight is stable.  Past Medical History:   Diagnosis Date   • Allergic rhinitis 01/30/2015 11/13/2015--patient was evaluated by ENT and was started on generic Flonase and patient reports this has improved her symptoms quite a bit.   01/30/2015--allergy testing revealed positive reactivity to cat, dust mite, cockroach, mold spores, and grass pollen. Environmental control measures.   • Alopecia 04/12/2017    Evaluated and treated by the dermatologist.   • Benign essential hypertension 04/18/2016   • Chronic toe pain, right foot 10/19/2021    October 19, 2021--patient  reports a 1 year history of progressively worsening right toe pain that particular involves the second toe.  On exam she has obvious hammertoe which is causing irritation.  There is some hammering of the third digit as well.  Patient referred to orthopedist who specializes in foot problems such as Dr. Vieira.  In the meantime patient should use over-the-counter toe pads to   • Diverticulosis of colon 03/17/2009    03/10/2017--colonoscopy revealed many small and large mouth diverticula in the sigmoid colon and descending colon.  Nonthrombosed external hemorrhoids and internal hemorrhoids noted.  Otherwise, normal colonoscopy.  03/17/2009--colonoscopy revealed external hemorrhoids, torturous colon with a sigmoid stricture, sigmoid diverticulosis.   • Family history of breast cancer in first degree relative 07/13/2020   • Family history of colonic polyps 04/01/2022   • Gastroesophageal reflux disease without esophagitis 03/17/2009 06/09/2015--EGD revealed Z line irregular, 35 cm from incisors. Biopsied. Small hiatal hernia. Gastritis. Biopsied. Bilious gastric fluid. Fluid aspiration performed. Normal duodenal bulb and second part of duodenum. Biopsied. Pathology revealed the antral biopsy revealed small intestinal mucosa with no pathologic diagnosis. Good preservation of villous architecture. Duodenum biopsy revealed largely antral type gastric mucosa with mild patchy superficial chronic gastritis. Gastroesophageal junction revealed squamous and glandular mucosa with mild chronic inflammation. Negative for intestinal metaplasia or dysplasia. There appeared to be mislabeling of the antral biopsies and duodenal biopsies.   04/17/2012--EGD revealed irregular Z line, hiatal hernia, gastritis, duodenitis.   03/17/2009--EGD revealed grade B. reflux esophagitis, hiatal hernia, gastritis, a few gastric polyps, duodenitis.   • Hammertoe of right foot    • Hammertoe of second toe of right foot 10/19/2021    October 19,  2021--patient reports a 1 year history of progressively worsening right toe pain that particular involves the second toe.  On exam she has obvious hammertoe which is causing irritation.  There is some hammering of the third digit as well.  Patient referred to orthopedist who specializes in foot problems such as Dr. Vieira.  In the meantime patient should use over-the-counter toe pads to   • History of COVID-19, August 12, 2021; May 17, 2022. 10/19/2021    May 17, 2022--patient again tested positive for COVID.  Ordered by gastroenterology nurse practitioner.  October 19, 2021--patient seen in follow-up and reports her symptoms totally resolved.  She wants to know when she should get her Covid booster vaccine.  I have suggested that she receive it approximately 3 months after initially testing positive.  She has an appointment in November 6, 2021 for   • History of Hyperplastic polyps of stomach 06/09/2015 06/09/2015--EGD revealed Z line irregular, 35 cm from incisors. Biopsied. Small hiatal hernia. Gastritis. Biopsied. Bilious gastric fluid. Fluid aspiration performed. Normal duodenal bulb and second part of duodenum. Biopsied. Pathology revealed the antral biopsy revealed small intestinal mucosa with no pathologic diagnosis. Good preservation of villous architecture. Duodenum biopsy revealed large   • Hyperlipidemia 07/17/2012 07/17/2012--treatment for hyperlipidemia begun.   • Lumbar disc herniation, L4-L5 08/07/2018 01/16/2019--patient seen in follow-up by Dr. Shukla.  She has seen the neurosurgeon who recommended conservative therapy with physical therapy.  Patient reports that has been extremely helpful.  Currently has no significant pain.  08/07/2018--patient seen in follow-up and reports her pain is much better after taking prednisone.  She is now down to half a pill per day and is almost off of it.  I rev   • Lumbar scoliosis 08/07/2018 01/16/2019--patient seen in follow-up by Dr. Shukla.  She has  seen the neurosurgeon who recommended conservative therapy with physical therapy.  Patient reports that has been extremely helpful.  Currently has no significant pain.  08/07/2018--patient seen in follow-up and reports her pain is much better after taking prednisone.  She is now down to half a pill per day and is almost off of it.  I rev   • Menopausal state 07/13/2020   • Multiple environmental allergies 01/30/2015 01/30/2015--allergy testing revealed positive reactivity to cat, dust mite, cockroach, mold spores, and grass pollen. Environmental control measures.   • Osteopenia of multiple sites, 2/25/2022--lumbar spine 1.1.  Left femoral neck -1.0.  Right femoral neck -0.3. 04/01/2022 February 25, 2022--DEXA scan reveals lumbar spine T score 1.1.  Left femoral neck T score -1.0.  Right femoral neck T score -0.3.  Mild osteopenia.   • Postmenopausal hormone replacement therapy 04/18/2016   • Primary hypothyroidism 01/22/2020 January 22, 2020--TSH is elevated at 5.0.  Levothyroxine 50 mcg/day initiated.  Patient will follow-up with nonfasting lab work in about 6 to 8 weeks to reassess.  07/09/2018--routine follow-up.  TSH elevated slightly at 4.31.  Free T4 normal at 1.22.  Free T3 normal at 3.3.  Continued observation.  10/11/2017--thyroid function tests are normal.  09/30/2016--thyroid ultrasound reveals a tiny 2 mm    • Primary osteoarthritis of both hands 12/30/2013 05/12/2014--patient seen in followup and reports that the Vimovo has helped. Uric acid levels are normal at 4.9. C. reactive protein mildly elevated at 2.3. X-rays of the hands reveals radiocarpal, first carpometacarpal, first metacarpophalangeal, and interphalangeal joint degeneration. This process is symmetric. The interphalangeal joint of the left is affected to the greatest degree. There is right sided scapholunate dissociation as well as deformity of the scaphoid suggesting prior traumatic injury with healing. Soft tissues are  satisfactory. Overall appearance is most consistent with osteoarthritis.   05/05/2014--patient presents and reports that she is having worsening right wrist pain primarily at the base of the thumb. No new injury. Bilateral x-rays of the wrists and hands ordered. Uric acid level ordered as well as a CRP. Vimovo 500/20 one by mouth twice a day. Follow up in one week.   03/18/2014--patient reports that her wrist symptoms have improved.   12/30/2013--patient reports a several mon   • Primary osteoarthritis of both wrists 12/30/2013 05/12/2014--patient seen in followup and reports that the Vimovo has helped. Uric acid levels are normal at 4.9. C. reactive protein mildly elevated at 2.3. X-rays of the hands reveals radiocarpal, first carpometacarpal, first metacarpophalangeal, and interphalangeal joint degeneration. This process is symmetric. The interphalangeal joint of the left is affected to the greatest degree. There is right sided scapholunate dissociation as well as deformity of the scaphoid suggesting prior traumatic injury with healing. Soft tissues are satisfactory. Overall appearance is most consistent with osteoarthritis.   05/05/2014--patient presents and reports that she is having worsening right wrist pain primarily at the base of the thumb. No new injury. Bilateral x-rays of the wrists and hands ordered. Uric acid level ordered as well as a CRP. Vimovo 500/20 one by mouth twice a day. Follow up in one week.   03/18/2014--patient reports that her wrist symptoms have improved.   12/30/2013--patient reports a several mon   • Vitamin D deficiency 04/18/2016       Past Surgical History:   Procedure Laterality Date   • COLONOSCOPY  03/17/2009 03/17/2009--colonoscopy revealed external hemorrhoids, torturous colon with a sigmoid stricture, sigmoid diverticulosis.   • COLONOSCOPY N/A 3/10/2017    03/10/2017--colonoscopy revealed many small and large mouth diverticula in the sigmoid colon and descending colon.   Nonthrombosed external hemorrhoids and internal hemorrhoids noted.  Otherwise, normal colonoscopy.   • ERCP WITH SPHINCTEROTOMY/PAPILLOTOMY  08/13/2014 08/13/2014--ERCP, endoscopic sphincterotomy and removal of common bile duct stones. 08/12/2014--laparoscopic cholecystectomy. This was complicated by retained common bile duct stones 2.   • ESOPHAGOSCOPY / EGD  06/09/2015 06/09/2015--EGD revealed Z line irregular, 35 cm from incisors. Biopsied. Small hiatal hernia. Gastritis. Biopsied. Bilious gastric fluid. Fluid aspiration performed. Normal duodenal bulb and second part of duodenum. Biopsied. Pathology revealed the antral biopsy revealed small intestinal mucosa with no pathologic diagnosis. Good preservation of villous architecture. Duodenum biopsy revealed large   • HAMMER TOE REPAIR Right 4/11/2022    Procedure: RIGHT SECOND AND THIRD HAMMERTOE REPAIRS;  Surgeon: Brandt Vieira MD;  Location[facility];  Service: Orthopedics;  Laterality: Right;   • LAPAROSCOPIC CHOLECYSTECTOMY  08/12/2014 08/13/2014--ERCP, endoscopic sphincterotomy and removal of common bile duct stones. 08/12/2014--laparoscopic cholecystectomy. This was complicated by retained common bile duct stones 2.   • ROTATOR CUFF REPAIR Left 07/10/2014    07/10/2014--left rotator cuff repair. 03/18/2014--patient seen in followup and reports that her range of motion has improved and her pain is not debilitating. She feels that she is making progress with physical therapy. Surgery scheduled 07/10/2014. 01/10/2014--patient was seen in evaluation by the orthopedist and he recommended conservative treatment consisting of physical therapy/rehabilitation.   • ROTATOR CUFF REPAIR Right 08/03/2016 08/03/2016--arthroscopic right rotator cuff repair.  Debridement of degenerative anterior superior labral tear.   • SUBTOTAL HYSTERECTOMY  1978    Partial hysterectomy 1978.       Scheduled Meds:    Continuous Infusions:No current facility-administered  medications for this visit.      PRN Meds:.    Allergies   Allergen Reactions   • Bupivacaine Other (See Comments)     Questionable allergy.  Patient had toe surgery and subsequently developed acute hepatitis.  Rare case reports of hepatitis induced by bupivacaine which was used during her surgery.       Social History     Socioeconomic History   • Marital status:    • Number of children: 2   • Years of education: BA   • Highest education level: Associate degree: occupational, technical, or vocational program   Tobacco Use   • Smoking status: Never Smoker   • Smokeless tobacco: Never Used   Vaping Use   • Vaping Use: Never used   Substance and Sexual Activity   • Alcohol use: Yes     Comment: Socially   • Drug use: No   • Sexual activity: Yes     Partners: Male       Family History   Problem Relation Age of Onset   • Colon polyps Mother    • Alzheimer's disease Mother    • Other Father         Hodgkin's Disease   • Cancer Father         Lung Cancer   • Breast cancer Daughter 52   • Malig Hyperthermia Neg Hx        Review of Systems   Constitutional: Negative for appetite change, chills, diaphoresis, fatigue, fever and unexpected weight change.   HENT: Negative for nosebleeds, postnasal drip, sore throat, trouble swallowing and voice change.    Respiratory: Negative for cough, choking, chest tightness, shortness of breath, wheezing and stridor.    Cardiovascular: Negative for chest pain, palpitations and leg swelling.   Gastrointestinal: Negative for abdominal distention, abdominal pain, anal bleeding, blood in stool, constipation, diarrhea, nausea, rectal pain and vomiting.   Endocrine: Negative for polydipsia, polyphagia and polyuria.   Musculoskeletal: Negative for gait problem.   Skin: Negative for rash and wound.   Allergic/Immunologic: Negative for food allergies.   Neurological: Negative for dizziness, speech difficulty and light-headedness.   Psychiatric/Behavioral: Negative for confusion, self-injury,  sleep disturbance and suicidal ideas.       Vitals:    06/03/22 1055   BP: 128/75   Pulse: 77   Temp: 95.9 °F (35.5 °C)       Physical Exam  Constitutional:       General: She is not in acute distress.     Appearance: She is well-developed. She is not ill-appearing.   HENT:      Head: Normocephalic.   Eyes:      Pupils: Pupils are equal, round, and reactive to light.   Cardiovascular:      Rate and Rhythm: Normal rate and regular rhythm.      Heart sounds: Normal heart sounds.   Pulmonary:      Effort: Pulmonary effort is normal.      Breath sounds: Normal breath sounds.   Abdominal:      General: Bowel sounds are normal. There is no distension.      Palpations: Abdomen is soft. There is no mass.      Tenderness: There is no abdominal tenderness. There is no guarding or rebound.      Hernia: No hernia is present.   Musculoskeletal:         General: Normal range of motion.   Skin:     General: Skin is warm and dry.   Neurological:      Mental Status: She is alert and oriented to person, place, and time.   Psychiatric:         Speech: Speech normal.         Behavior: Behavior normal.         Judgment: Judgment normal.         No radiology results for the last 7 days     Diagnoses and all orders for this visit:    1. Elevated liver function tests (Primary)    2. History of 2019 novel coronavirus disease (COVID-19)    3. History of jaundice    4. History of hepatitis      Reviewed most recent lab work with her today.  Liver function test have greatly improved since last visit.  Alkaline phosphatase has trended back down almost to normal at 147.  All other liver enzymes are back to normal.  Patient seems to be slowly improving.  Patient is quite concerned about her upcoming EGD and colonoscopy with Dr. Boyle this August.  Her and her  have read an article in some gastroenterology journal talking about a case where a patient was put under anesthesia and then ended up having acute hepatitis and was sick for 2  months thereafter.  Patient is really concerned that that could happen to her again.  So she wants to cancel her scopes and discuss it further with Dr. Boyle.  I told her I would be happy to have her set up a follow-up appointment with Tamar to discuss further.  Patient is agreeable to the plan.

## 2022-06-08 ENCOUNTER — OFFICE VISIT (OUTPATIENT)
Dept: INTERNAL MEDICINE | Facility: CLINIC | Age: 77
End: 2022-06-08

## 2022-06-08 ENCOUNTER — LAB (OUTPATIENT)
Dept: LAB | Facility: HOSPITAL | Age: 77
End: 2022-06-08

## 2022-06-08 VITALS
BODY MASS INDEX: 19.97 KG/M2 | HEIGHT: 64 IN | HEART RATE: 78 BPM | OXYGEN SATURATION: 99 % | WEIGHT: 117 LBS | SYSTOLIC BLOOD PRESSURE: 110 MMHG | DIASTOLIC BLOOD PRESSURE: 80 MMHG

## 2022-06-08 DIAGNOSIS — M19.042 PRIMARY OSTEOARTHRITIS OF BOTH HANDS: Chronic | ICD-10-CM

## 2022-06-08 DIAGNOSIS — M25.50 ARTHRALGIA OF MULTIPLE JOINTS: Primary | ICD-10-CM

## 2022-06-08 DIAGNOSIS — M15.9 PRIMARY OSTEOARTHRITIS INVOLVING MULTIPLE JOINTS: ICD-10-CM

## 2022-06-08 DIAGNOSIS — Z86.19 HISTORY OF ACUTE HEPATITIS: ICD-10-CM

## 2022-06-08 DIAGNOSIS — M19.032 PRIMARY OSTEOARTHRITIS OF BOTH WRISTS: Chronic | ICD-10-CM

## 2022-06-08 DIAGNOSIS — Z86.16 HISTORY OF COVID-19: Chronic | ICD-10-CM

## 2022-06-08 DIAGNOSIS — M19.031 PRIMARY OSTEOARTHRITIS OF BOTH WRISTS: Chronic | ICD-10-CM

## 2022-06-08 DIAGNOSIS — M19.041 PRIMARY OSTEOARTHRITIS OF BOTH HANDS: Chronic | ICD-10-CM

## 2022-06-08 PROBLEM — N17.9 AKI (ACUTE KIDNEY INJURY): Status: RESOLVED | Noted: 2022-04-24 | Resolved: 2022-06-08

## 2022-06-08 PROBLEM — M15.0 PRIMARY OSTEOARTHRITIS INVOLVING MULTIPLE JOINTS: Chronic | Status: ACTIVE | Noted: 2022-06-08

## 2022-06-08 PROBLEM — M15.0 PRIMARY OSTEOARTHRITIS INVOLVING MULTIPLE JOINTS: Status: ACTIVE | Noted: 2022-06-08

## 2022-06-08 PROBLEM — Z09 HOSPITAL DISCHARGE FOLLOW-UP: Status: RESOLVED | Noted: 2022-05-09 | Resolved: 2022-06-08

## 2022-06-08 PROBLEM — R17 JAUNDICE: Status: RESOLVED | Noted: 2022-04-24 | Resolved: 2022-06-08

## 2022-06-08 LAB
ALBUMIN SERPL-MCNC: 4.3 G/DL (ref 3.5–5.2)
ALBUMIN/GLOB SERPL: 1.5 G/DL
ALP SERPL-CCNC: 123 U/L (ref 39–117)
ALT SERPL W P-5'-P-CCNC: 20 U/L (ref 1–33)
ANION GAP SERPL CALCULATED.3IONS-SCNC: 12 MMOL/L (ref 5–15)
AST SERPL-CCNC: 25 U/L (ref 1–32)
BILIRUB SERPL-MCNC: 0.5 MG/DL (ref 0–1.2)
BUN SERPL-MCNC: 13 MG/DL (ref 8–23)
BUN/CREAT SERPL: 18.3 (ref 7–25)
CALCIUM SPEC-SCNC: 9.7 MG/DL (ref 8.6–10.5)
CHLORIDE SERPL-SCNC: 103 MMOL/L (ref 98–107)
CHROMATIN AB SERPL-ACNC: 10 IU/ML (ref 0–14)
CO2 SERPL-SCNC: 25 MMOL/L (ref 22–29)
CREAT SERPL-MCNC: 0.71 MG/DL (ref 0.57–1)
CRP SERPL-MCNC: <0.3 MG/DL (ref 0–0.5)
EGFRCR SERPLBLD CKD-EPI 2021: 87.2 ML/MIN/1.73
ERYTHROCYTE [SEDIMENTATION RATE] IN BLOOD: 9 MM/HR (ref 0–30)
GLOBULIN UR ELPH-MCNC: 2.8 GM/DL
GLUCOSE SERPL-MCNC: 98 MG/DL (ref 65–99)
POTASSIUM SERPL-SCNC: 4.4 MMOL/L (ref 3.5–5.2)
PROT SERPL-MCNC: 7.1 G/DL (ref 6–8.5)
SODIUM SERPL-SCNC: 140 MMOL/L (ref 136–145)
URATE SERPL-MCNC: 4.2 MG/DL (ref 2.4–5.7)

## 2022-06-08 PROCEDURE — 84550 ASSAY OF BLOOD/URIC ACID: CPT | Performed by: INTERNAL MEDICINE

## 2022-06-08 PROCEDURE — 80053 COMPREHEN METABOLIC PANEL: CPT | Performed by: INTERNAL MEDICINE

## 2022-06-08 PROCEDURE — 86592 SYPHILIS TEST NON-TREP QUAL: CPT | Performed by: INTERNAL MEDICINE

## 2022-06-08 PROCEDURE — 86431 RHEUMATOID FACTOR QUANT: CPT | Performed by: INTERNAL MEDICINE

## 2022-06-08 PROCEDURE — 86140 C-REACTIVE PROTEIN: CPT | Performed by: INTERNAL MEDICINE

## 2022-06-08 PROCEDURE — 86038 ANTINUCLEAR ANTIBODIES: CPT | Performed by: INTERNAL MEDICINE

## 2022-06-08 PROCEDURE — 36415 COLL VENOUS BLD VENIPUNCTURE: CPT | Performed by: INTERNAL MEDICINE

## 2022-06-08 PROCEDURE — 85652 RBC SED RATE AUTOMATED: CPT | Performed by: INTERNAL MEDICINE

## 2022-06-08 PROCEDURE — 99214 OFFICE O/P EST MOD 30 MIN: CPT | Performed by: INTERNAL MEDICINE

## 2022-06-08 PROCEDURE — 86200 CCP ANTIBODY: CPT | Performed by: INTERNAL MEDICINE

## 2022-06-08 RX ORDER — MELOXICAM 15 MG/1
15 TABLET ORAL DAILY
Qty: 30 TABLET | Refills: 6
Start: 2022-06-08 | End: 2022-06-20 | Stop reason: SDUPTHER

## 2022-06-08 NOTE — PROGRESS NOTES
06/08/2022    Patient Information  Ashley Mata                                                                                          [id number] [facility] [id number]      1945  [unfilled]  There is no work phone number on file.    Chief Complaint:     Here to discuss arthritic complaints and follow-up on acute hepatitis.    History of Present Illness:    Patient with history of hypertension, esophageal reflux, hyperlipidemia, environmental allergies, primary osteoarthritis of multiple joints and arthralgia of multiple joints, primary hypothyroidism, history of COVID-19 x2.  She also was recently admitted for acute hepatitis with jaundice which has resolved.  She is here today to discuss her arthralgias and history regarding this will be described below.  Her past medical history reviewed and updated were necessary including health maintenance parameters and this reveals she is up-to-date or else accounted for with the exception of endoscopy which patient wants to defer.  She wants to discuss this with her gastroenterologist particularly given her concern over anesthesia.    The history regarding arthralgia of multiple joints is documented today under the problem list under the appropriate diagnoses by Dr. Shukla and subsequently transferred to this note as follows:    June 8, 2022--patient seen today to discuss options for treatment for arthralgia of multiple joints due to diffuse primary osteoarthritis.  Patient was recently admitted for acute hepatitis of uncertain etiology.  Possibly due to bupivacaine but also possibly due to a corresponding COVID-19 infection.  At any rate, patient has dramatically improved and her liver enzymes including bilirubin and transaminases have normalized.  Alkaline phosphatase remains a little elevated.  She was previously on meloxicam which was very helpful for her arthritis.  She is here today because of the email sent 2 days ago as noted  below.  I wanted to discuss options with her face-to-face.  I explained to her that it is not highly likely that the meloxicam caused her acute hepatitis and now that her liver has recovered I think it would be reasonable to consider restarting it as long as we monitored extremely closely for any return of elevated liver enzymes.  Other options such as narcotic pain medication or tramadol have their risk as well including risk of dependency/addiction as well as some element of liver toxicity.  I counseled patient at length today and spent well over 50% of the time today with the counseling.  After this discussion we have elected to restart meloxicam 15 mg/day.  However, I would like her to start out at a half a pill per day and if after a week or so her symptoms have not improved then she can go ahead and increase it up to a full pill.  I will have her follow-up with me in about 12 days to reassess her symptoms and also we can send her to check her lab work that day and then follow-up on the following within 24 hours.  Also I think she needs to have a repeat rheumatologic laboratory work-up to rule out inflammatory arthritis.    June 6, 2022--email message from patient:  I am having  joint pain in my knees, neck, and especially in the morning, my back.  I'm having a little trouble in getting up and down  from a chair. I'm not sure if this is due to a medication taken away  during my liver problem but was wondering if you could recommend something I can take. Thanks, Ashley Mata.       Review of Systems   Constitutional: Negative.   HENT: Negative.    Eyes: Negative.    Cardiovascular: Negative.    Respiratory: Negative.    Endocrine: Negative.    Hematologic/Lymphatic: Negative.    Skin: Negative.    Musculoskeletal: Positive for arthritis, joint pain and joint swelling.   Gastrointestinal: Negative.    Genitourinary: Negative.    Neurological: Negative.    Psychiatric/Behavioral: Negative.     Allergic/Immunologic: Negative.        Active Problems:    Patient Active Problem List   Diagnosis   • Allergic rhinitis   • Benign essential hypertension   • Diverticulosis of colon   • Gastroesophageal reflux disease without esophagitis   • Hyperlipidemia   • Multiple environmental allergies   • Primary osteoarthritis of both wrists   • Primary osteoarthritis of both hands   • Postmenopausal hormone replacement therapy   • Vitamin D deficiency   • Therapeutic drug monitoring   • Alopecia   • Lumbar scoliosis   • Lumbar disc herniation, L4-L5   • Primary hypothyroidism   • Family history of breast cancer in first degree relative   • Menopausal state   • History of COVID-19, August 12, 2021; May 17, 2022.   • Hammertoe of second toe of right foot   • Chronic toe pain, right foot   • Family history of colonic polyps   • Osteopenia of multiple sites, 2/25/2022--lumbar spine 1.1.  Left femoral neck -1.0.  Right femoral neck -0.3.   • History of acute hepatitis   • Arthralgia of multiple joints   • Primary osteoarthritis involving multiple joints         Past Medical History:   Diagnosis Date   • Allergic rhinitis 01/30/2015 11/13/2015--patient was evaluated by ENT and was started on generic Flonase and patient reports this has improved her symptoms quite a bit.   01/30/2015--allergy testing revealed positive reactivity to cat, dust mite, cockroach, mold spores, and grass pollen. Environmental control measures.   • Alopecia 04/12/2017    Evaluated and treated by the dermatologist.   • Arthralgia of multiple joints 5/9/2022   • Benign essential hypertension 04/18/2016   • Chronic toe pain, right foot 10/19/2021    October 19, 2021--patient reports a 1 year history of progressively worsening right toe pain that particular involves the second toe.  On exam she has obvious hammertoe which is causing irritation.  There is some hammering of the third digit as well.  Patient referred to orthopedist who specializes in foot  problems such as Dr. Vieira.  In the meantime patient should use over-the-counter toe pads to   • Diverticulosis of colon 03/17/2009    03/10/2017--colonoscopy revealed many small and large mouth diverticula in the sigmoid colon and descending colon.  Nonthrombosed external hemorrhoids and internal hemorrhoids noted.  Otherwise, normal colonoscopy.  03/17/2009--colonoscopy revealed external hemorrhoids, torturous colon with a sigmoid stricture, sigmoid diverticulosis.   • Family history of breast cancer in first degree relative 07/13/2020   • Family history of colonic polyps 04/01/2022   • Gastroesophageal reflux disease without esophagitis 03/17/2009 06/09/2015--EGD revealed Z line irregular, 35 cm from incisors. Biopsied. Small hiatal hernia. Gastritis. Biopsied. Bilious gastric fluid. Fluid aspiration performed. Normal duodenal bulb and second part of duodenum. Biopsied. Pathology revealed the antral biopsy revealed small intestinal mucosa with no pathologic diagnosis. Good preservation of villous architecture. Duodenum biopsy revealed largely antral type gastric mucosa with mild patchy superficial chronic gastritis. Gastroesophageal junction revealed squamous and glandular mucosa with mild chronic inflammation. Negative for intestinal metaplasia or dysplasia. There appeared to be mislabeling of the antral biopsies and duodenal biopsies.   04/17/2012--EGD revealed irregular Z line, hiatal hernia, gastritis, duodenitis.   03/17/2009--EGD revealed grade B. reflux esophagitis, hiatal hernia, gastritis, a few gastric polyps, duodenitis.   • Hammertoe of right foot    • Hammertoe of second toe of right foot 10/19/2021    October 19, 2021--patient reports a 1 year history of progressively worsening right toe pain that particular involves the second toe.  On exam she has obvious hammertoe which is causing irritation.  There is some hammering of the third digit as well.  Patient referred to orthopedist who specializes  in foot problems such as Dr. Vieira.  In the meantime patient should use over-the-counter toe pads to   • History of COVID-19, August 12, 2021; May 17, 2022. 10/19/2021    May 17, 2022--patient again tested positive for COVID.  Ordered by gastroenterology nurse practitioner.  October 19, 2021--patient seen in follow-up and reports her symptoms totally resolved.  She wants to know when she should get her Covid booster vaccine.  I have suggested that she receive it approximately 3 months after initially testing positive.  She has an appointment in November 6, 2021 for   • History of Hyperplastic polyps of stomach 06/09/2015 06/09/2015--EGD revealed Z line irregular, 35 cm from incisors. Biopsied. Small hiatal hernia. Gastritis. Biopsied. Bilious gastric fluid. Fluid aspiration performed. Normal duodenal bulb and second part of duodenum. Biopsied. Pathology revealed the antral biopsy revealed small intestinal mucosa with no pathologic diagnosis. Good preservation of villous architecture. Duodenum biopsy revealed large   • Hyperlipidemia 07/17/2012 07/17/2012--treatment for hyperlipidemia begun.   • Lumbar disc herniation, L4-L5 08/07/2018 01/16/2019--patient seen in follow-up by Dr. Shukla.  She has seen the neurosurgeon who recommended conservative therapy with physical therapy.  Patient reports that has been extremely helpful.  Currently has no significant pain.  08/07/2018--patient seen in follow-up and reports her pain is much better after taking prednisone.  She is now down to half a pill per day and is almost off of it.  I rev   • Lumbar scoliosis 08/07/2018 01/16/2019--patient seen in follow-up by Dr. Shukla.  She has seen the neurosurgeon who recommended conservative therapy with physical therapy.  Patient reports that has been extremely helpful.  Currently has no significant pain.  08/07/2018--patient seen in follow-up and reports her pain is much better after taking prednisone.  She is now down to  half a pill per day and is almost off of it.  I rev   • Menopausal state 07/13/2020   • Multiple environmental allergies 01/30/2015 01/30/2015--allergy testing revealed positive reactivity to cat, dust mite, cockroach, mold spores, and grass pollen. Environmental control measures.   • Osteopenia of multiple sites, 2/25/2022--lumbar spine 1.1.  Left femoral neck -1.0.  Right femoral neck -0.3. 04/01/2022 February 25, 2022--DEXA scan reveals lumbar spine T score 1.1.  Left femoral neck T score -1.0.  Right femoral neck T score -0.3.  Mild osteopenia.   • Postmenopausal hormone replacement therapy 04/18/2016   • Primary hypothyroidism 01/22/2020 January 22, 2020--TSH is elevated at 5.0.  Levothyroxine 50 mcg/day initiated.  Patient will follow-up with nonfasting lab work in about 6 to 8 weeks to reassess.  07/09/2018--routine follow-up.  TSH elevated slightly at 4.31.  Free T4 normal at 1.22.  Free T3 normal at 3.3.  Continued observation.  10/11/2017--thyroid function tests are normal.  09/30/2016--thyroid ultrasound reveals a tiny 2 mm    • Primary osteoarthritis involving multiple joints 6/8/2022   • Primary osteoarthritis of both hands 12/30/2013 05/12/2014--patient seen in followup and reports that the Vimovo has helped. Uric acid levels are normal at 4.9. C. reactive protein mildly elevated at 2.3. X-rays of the hands reveals radiocarpal, first carpometacarpal, first metacarpophalangeal, and interphalangeal joint degeneration. This process is symmetric. The interphalangeal joint of the left is affected to the greatest degree. There is right sided scapholunate dissociation as well as deformity of the scaphoid suggesting prior traumatic injury with healing. Soft tissues are satisfactory. Overall appearance is most consistent with osteoarthritis.   05/05/2014--patient presents and reports that she is having worsening right wrist pain primarily at the base of the thumb. No new injury. Bilateral x-rays of  the wrists and hands ordered. Uric acid level ordered as well as a CRP. Vimovo 500/20 one by mouth twice a day. Follow up in one week.   03/18/2014--patient reports that her wrist symptoms have improved.   12/30/2013--patient reports a several mon   • Primary osteoarthritis of both wrists 12/30/2013 05/12/2014--patient seen in followup and reports that the Vimovo has helped. Uric acid levels are normal at 4.9. C. reactive protein mildly elevated at 2.3. X-rays of the hands reveals radiocarpal, first carpometacarpal, first metacarpophalangeal, and interphalangeal joint degeneration. This process is symmetric. The interphalangeal joint of the left is affected to the greatest degree. There is right sided scapholunate dissociation as well as deformity of the scaphoid suggesting prior traumatic injury with healing. Soft tissues are satisfactory. Overall appearance is most consistent with osteoarthritis.   05/05/2014--patient presents and reports that she is having worsening right wrist pain primarily at the base of the thumb. No new injury. Bilateral x-rays of the wrists and hands ordered. Uric acid level ordered as well as a CRP. Vimovo 500/20 one by mouth twice a day. Follow up in one week.   03/18/2014--patient reports that her wrist symptoms have improved.   12/30/2013--patient reports a several mon   • Vitamin D deficiency 04/18/2016         Past Surgical History:   Procedure Laterality Date   • COLONOSCOPY  03/17/2009 03/17/2009--colonoscopy revealed external hemorrhoids, torturous colon with a sigmoid stricture, sigmoid diverticulosis.   • COLONOSCOPY N/A 3/10/2017    03/10/2017--colonoscopy revealed many small and large mouth diverticula in the sigmoid colon and descending colon.  Nonthrombosed external hemorrhoids and internal hemorrhoids noted.  Otherwise, normal colonoscopy.   • ERCP WITH SPHINCTEROTOMY/PAPILLOTOMY  08/13/2014 08/13/2014--ERCP, endoscopic sphincterotomy and removal of common bile duct  stones. 08/12/2014--laparoscopic cholecystectomy. This was complicated by retained common bile duct stones 2.   • ESOPHAGOSCOPY / EGD  06/09/2015 06/09/2015--EGD revealed Z line irregular, 35 cm from incisors. Biopsied. Small hiatal hernia. Gastritis. Biopsied. Bilious gastric fluid. Fluid aspiration performed. Normal duodenal bulb and second part of duodenum. Biopsied. Pathology revealed the antral biopsy revealed small intestinal mucosa with no pathologic diagnosis. Good preservation of villous architecture. Duodenum biopsy revealed large   • HAMMER TOE REPAIR Right 4/11/2022    Procedure: RIGHT SECOND AND THIRD HAMMERTOE REPAIRS;  Surgeon: Brandt Vieira MD;  [place] [facility] [place] [facility] [place]  Service: [place]opedics;  Laterality: Right;   • LAPAROSCOPIC CHOLECYSTECTOMY  08/12/2014 08/13/2014--ERCP, endoscopic sphincterotomy and removal of common bile duct stones. 08/12/2014--laparoscopic cholecystectomy. This was complicated by retained common bile duct stones 2.   • ROTATOR CUFF REPAIR Left 07/10/2014    07/10/2014--left rotator cuff repair. 03/18/2014--patient seen in followup and reports that her range of motion has improved and her pain is not debilitating. She feels that she is making progress with physical therapy. Surgery scheduled 07/10/2014. 01/10/2014--patient was seen in evaluation by the orthopedist and he recommended conservative treatment consisting of physical therapy/rehabilitation.   • ROTATOR CUFF REPAIR Right 08/03/2016 08/03/2016--arthroscopic right rotator cuff repair.  Debridement of degenerative anterior superior labral tear.   • SUBTOTAL HYSTERECTOMY  1978    Partial hysterectomy 1978.         Allergies   Allergen Reactions   • Bupivacaine Other (See Comments)     Questionable allergy.  Patient had toe surgery and subsequently developed acute hepatitis.  Rare case reports of hepatitis induced by bupivacaine which was used during her surgery.           Current Outpatient Medications:   •   "calcium carbonate (Calcium 600) 600 MG tablet, Take calcium plus vitamin D twice daily for low vitamin D and weak bones, Disp: 30 tablet, Rfl:   •  levothyroxine (SYNTHROID, LEVOTHROID) 50 MCG tablet, TAKE ONE TABLET BY MOUTH DAILY FOR LOW THYROID, Disp: 90 tablet, Rfl: 3  •  multivitamin (THERAGRAN) tablet tablet, Take 1 tablet by mouth Daily. HOLD FOR SURGERY, Disp: , Rfl:   •  vitamin C (ASCORBIC ACID) 250 MG tablet, Take 250 mg by mouth Daily., Disp: , Rfl:   •  meloxicam (MOBIC) 15 MG tablet, Take 1 tablet by mouth Daily., Disp: 30 tablet, Rfl: 6  •  omeprazole OTC (PrilOSEC OTC) 20 MG EC tablet, Take 1 tablet by mouth Daily., Disp: , Rfl:       Family History   Problem Relation Age of Onset   • Colon polyps Mother    • Alzheimer's disease Mother    • Other Father         Hodgkin's Disease   • Cancer Father         Lung Cancer   • Breast cancer Daughter 52   • Malig Hyperthermia Neg Hx          Social History     Socioeconomic History   • Marital status:    • Number of children: 2   • Years of education: BA   • Highest education level: Associate degree: occupational, technical, or vocational program   Tobacco Use   • Smoking status: Never Smoker   • Smokeless tobacco: Never Used   Vaping Use   • Vaping Use: Never used   Substance and Sexual Activity   • Alcohol use: Yes     Comment: Socially   • Drug use: No   • Sexual activity: Yes     Partners: Male         Vitals:    06/08/22 1034   BP: 110/80   BP Location: Left arm   Patient Position: Sitting   Cuff Size: Adult   Pulse: 78   SpO2: 99%   Weight: 53.1 kg (117 lb)   Height: 162.6 cm (64\")        Body mass index is 20.08 kg/m².      Physical Exam:    General: Alert and oriented x 3.  No acute distress.  Normal affect.  HEENT: Pupils equal, round, reactive to light; extraocular movements intact; sclerae nonicteric; pharynx, ear canals and TMs normal.  Neck: Without JVD, thyromegaly, bruit, or adenopathy.  Lungs: Clear to auscultation in all fields.  " Heart: Regular rate and rhythm without murmur, rub, gallop, or click.  Abdomen: Soft, nontender, without hepatosplenomegaly or hernia.  Bowel sounds normal.  : Deferred.  Rectal: Deferred.  Extremities: Without clubbing, cyanosis, edema, or pulse deficit.  Neurologic: Intact without focal deficit.  Normal station and gait observed during ingress and egress from the examination room.  Skin: Without significant lesion.  Musculoskeletal: Patient has diffuse changes possibly consistent with degenerative arthritis particularly about the hands and fingers.  There may be some active synovitis involving the left PIP joint.    Lab/other results:    Most recent CMP revealed normal liver enzymes except for alkaline phosphatase just slightly elevated.    Assessment/Plan:     Diagnosis Plan   1. Arthralgia of multiple joints  MEG With / DsDNA, RNP, Sjogrens A / B, Smith    C-reactive Protein    Comprehensive Metabolic Panel    Cyclic Citrul Peptide Antibody, IgG / IgA    RPR    Rheumatoid Factor    Sedimentation Rate    Uric Acid    meloxicam (MOBIC) 15 MG tablet   2. Primary osteoarthritis involving multiple joints  meloxicam (MOBIC) 15 MG tablet   3. Primary osteoarthritis of both hands     4. Primary osteoarthritis of both wrists     5. History of acute hepatitis     6. History of COVID-19, August 12, 2021; May 17, 2022.       June 8, 2022--patient seen today to discuss options for treatment for arthralgia of multiple joints due to diffuse primary osteoarthritis.  Patient was recently admitted for acute hepatitis of uncertain etiology.  Possibly due to bupivacaine but also possibly due to a corresponding COVID-19 infection.  At any rate, patient has dramatically improved and her liver enzymes including bilirubin and transaminases have normalized.  Alkaline phosphatase remains a little elevated.  She was previously on meloxicam which was very helpful for her arthritis.  She is here today because of the email sent 2 days ago  as noted below.  I wanted to discuss options with her face-to-face.  I explained to her that it is not highly likely that the meloxicam caused her acute hepatitis and now that her liver has recovered I think it would be reasonable to consider restarting it as long as we monitored extremely closely for any return of elevated liver enzymes.  Other options such as narcotic pain medication or tramadol have their risk as well including risk of dependency/addiction as well as some element of liver toxicity.  I counseled patient at length today and spent well over 50% of the time today with the counseling.      Plan is as follows: After this discussion we have elected to restart meloxicam 15 mg/day.  However, I would like her to start out at a half a pill per day and if after a week or so her symptoms have not improved then she can go ahead and increase it up to a full pill.  I will have her follow-up with me in about 12 days to reassess her symptoms and also we can send her to check her lab work that day and then follow-up on the following within 24 hours.  Also I think she needs to have a repeat rheumatologic laboratory work-up to rule out inflammatory arthritis.    June 6, 2022--email message from patient:  I am having  joint pain in my knees, neck, and especially in the morning, my back.  I'm having a little trouble in getting up and down  from a chair. I'm not sure if this is due to a medication taken away  during my liver problem but was wondering if you could recommend something I can take. Thanks, Ashley Mata.       Procedures        Answers for HPI/ROS submitted by the patient on 6/8/2022  Please describe your symptoms.: Arthritis pain.  Have you had these symptoms before?: Yes  How long have you been having these symptoms?: Greater than 2 weeks  Please list any medications you are currently taking for this condition.: None  Please describe any probable cause for these symptoms. : Arthritis  What is the primary  reason for your visit?: Other

## 2022-06-09 LAB
ANA SER QL: NEGATIVE
RPR SER QL: NORMAL

## 2022-06-10 LAB — CCP IGA+IGG SERPL IA-ACNC: 12 UNITS (ref 0–19)

## 2022-06-20 ENCOUNTER — OFFICE VISIT (OUTPATIENT)
Dept: INTERNAL MEDICINE | Facility: CLINIC | Age: 77
End: 2022-06-20

## 2022-06-20 ENCOUNTER — LAB (OUTPATIENT)
Dept: LAB | Facility: HOSPITAL | Age: 77
End: 2022-06-20

## 2022-06-20 VITALS
BODY MASS INDEX: 20.25 KG/M2 | RESPIRATION RATE: 18 BRPM | SYSTOLIC BLOOD PRESSURE: 126 MMHG | HEIGHT: 64 IN | DIASTOLIC BLOOD PRESSURE: 70 MMHG | OXYGEN SATURATION: 95 % | WEIGHT: 118.6 LBS | HEART RATE: 74 BPM

## 2022-06-20 DIAGNOSIS — M15.9 PRIMARY OSTEOARTHRITIS INVOLVING MULTIPLE JOINTS: Chronic | ICD-10-CM

## 2022-06-20 DIAGNOSIS — Z51.81 THERAPEUTIC DRUG MONITORING: ICD-10-CM

## 2022-06-20 DIAGNOSIS — M25.50 ARTHRALGIA OF MULTIPLE JOINTS: Primary | Chronic | ICD-10-CM

## 2022-06-20 DIAGNOSIS — M25.50 ARTHRALGIA OF MULTIPLE JOINTS: Chronic | ICD-10-CM

## 2022-06-20 DIAGNOSIS — Z79.890 POSTMENOPAUSAL HORMONE REPLACEMENT THERAPY: Chronic | ICD-10-CM

## 2022-06-20 DIAGNOSIS — N95.1 MENOPAUSAL STATE: Chronic | ICD-10-CM

## 2022-06-20 DIAGNOSIS — Z86.19 HISTORY OF ACUTE HEPATITIS: Chronic | ICD-10-CM

## 2022-06-20 DIAGNOSIS — I10 BENIGN ESSENTIAL HYPERTENSION: Chronic | ICD-10-CM

## 2022-06-20 LAB
ALBUMIN SERPL-MCNC: 4.2 G/DL (ref 3.5–5.2)
ALBUMIN/GLOB SERPL: 1.5 G/DL
ALP SERPL-CCNC: 94 U/L (ref 39–117)
ALT SERPL W P-5'-P-CCNC: 25 U/L (ref 1–33)
ANION GAP SERPL CALCULATED.3IONS-SCNC: 10.5 MMOL/L (ref 5–15)
AST SERPL-CCNC: 29 U/L (ref 1–32)
BILIRUB SERPL-MCNC: 0.3 MG/DL (ref 0–1.2)
BUN SERPL-MCNC: 18 MG/DL (ref 8–23)
BUN/CREAT SERPL: 24.7 (ref 7–25)
CALCIUM SPEC-SCNC: 9.9 MG/DL (ref 8.6–10.5)
CHLORIDE SERPL-SCNC: 103 MMOL/L (ref 98–107)
CO2 SERPL-SCNC: 25.5 MMOL/L (ref 22–29)
CREAT SERPL-MCNC: 0.73 MG/DL (ref 0.57–1)
EGFRCR SERPLBLD CKD-EPI 2021: 84.3 ML/MIN/1.73
GLOBULIN UR ELPH-MCNC: 2.8 GM/DL
GLUCOSE SERPL-MCNC: 91 MG/DL (ref 65–99)
POTASSIUM SERPL-SCNC: 4.4 MMOL/L (ref 3.5–5.2)
PROT SERPL-MCNC: 7 G/DL (ref 6–8.5)
SODIUM SERPL-SCNC: 139 MMOL/L (ref 136–145)

## 2022-06-20 PROCEDURE — 80053 COMPREHEN METABOLIC PANEL: CPT

## 2022-06-20 PROCEDURE — 99214 OFFICE O/P EST MOD 30 MIN: CPT | Performed by: INTERNAL MEDICINE

## 2022-06-20 PROCEDURE — 36415 COLL VENOUS BLD VENIPUNCTURE: CPT

## 2022-06-20 RX ORDER — MELOXICAM 15 MG/1
15 TABLET ORAL DAILY
Qty: 30 TABLET | Refills: 6
Start: 2022-06-20 | End: 2022-06-28 | Stop reason: SDUPTHER

## 2022-06-20 NOTE — PROGRESS NOTES
06/20/2022    Patient Information  Ashley Mata                                                                                          [id number] [facility] [address]      1945  [unfilled]  There is no work phone number on file.    Chief Complaint:     Follow-up arthralgia of multiple joints/osteoarthritis of multiple joints, recent hepatitis requiring admission to the hospital, recent medication addition.  Complaining of hot flashes and night sweats.  Follow-up blood pressure.    History of Present Illness:    Patient with fairly recent history of hepatitis with significant elevation of liver enzymes.  She was admitted to the hospital and the work-up was not diagnostic.  She was taken off of all of her medications and potentially work hepatotoxic including anti-inflammatory medications and her Premarin.  Patient has been on postmenopausal hormone replacement since a young age when she had a hysterectomy.  She is never gone through menopause before but now is complaining of hot flashes and night sweats consistent with that diagnosis.  See below.  She reports that the meloxicam that we started back at half a pill per day is helping but she would like to be able to go to a whole pill if at all possible.  She is about 50% better and things are definitely tolerable particularly if it was not for the night sweats and hot flashes.  Also patient has hypertension and her blood pressure medication was discontinued as well.  We need to monitor this closely as well.  Past medical history reviewed and updated were necessary including health maintenance parameters.  This reveals she is up-to-date or else accounted for.    Review of Systems   Constitutional: Positive for night sweats.        Hot flashes   HENT: Negative.    Eyes: Negative.    Cardiovascular: Negative.    Respiratory: Negative.    Endocrine: Negative.    Hematologic/Lymphatic: Negative.    Skin: Negative.     Musculoskeletal: Positive for arthritis and joint pain.   Gastrointestinal: Negative.    Genitourinary: Negative.    Neurological: Negative.    Psychiatric/Behavioral: Negative.    Allergic/Immunologic: Negative.        Active Problems:    Patient Active Problem List   Diagnosis   • Allergic rhinitis   • Benign essential hypertension   • Diverticulosis of colon   • Gastroesophageal reflux disease without esophagitis   • Hyperlipidemia   • Multiple environmental allergies   • Primary osteoarthritis of both wrists   • Primary osteoarthritis of both hands   • Postmenopausal hormone replacement therapy   • Vitamin D deficiency   • Therapeutic drug monitoring   • Alopecia   • Lumbar scoliosis   • Lumbar disc herniation, L4-L5   • Primary hypothyroidism   • Family history of breast cancer in first degree relative   • Menopausal state   • History of COVID-19, August 12, 2021; May 17, 2022.   • Hammertoe of second toe of right foot   • Chronic toe pain, right foot   • Family history of colonic polyps   • Osteopenia of multiple sites, 2/25/2022--lumbar spine 1.1.  Left femoral neck -1.0.  Right femoral neck -0.3.   • History of acute hepatitis   • Arthralgia of multiple joints   • Primary osteoarthritis involving multiple joints         Past Medical History:   Diagnosis Date   • Allergic rhinitis 01/30/2015 11/13/2015--patient was evaluated by ENT and was started on generic Flonase and patient reports this has improved her symptoms quite a bit.   01/30/2015--allergy testing revealed positive reactivity to cat, dust mite, cockroach, mold spores, and grass pollen. Environmental control measures.   • Alopecia 04/12/2017    Evaluated and treated by the dermatologist.   • Arthralgia of multiple joints 5/9/2022   • Benign essential hypertension 04/18/2016   • Chronic toe pain, right foot 10/19/2021    October 19, 2021--patient reports a 1 year history of progressively worsening right toe pain that particular involves the  second toe.  On exam she has obvious hammertoe which is causing irritation.  There is some hammering of the third digit as well.  Patient referred to orthopedist who specializes in foot problems such as Dr. Vieira.  In the meantime patient should use over-the-counter toe pads to   • Diverticulosis of colon 03/17/2009    03/10/2017--colonoscopy revealed many small and large mouth diverticula in the sigmoid colon and descending colon.  Nonthrombosed external hemorrhoids and internal hemorrhoids noted.  Otherwise, normal colonoscopy.  03/17/2009--colonoscopy revealed external hemorrhoids, torturous colon with a sigmoid stricture, sigmoid diverticulosis.   • Family history of breast cancer in first degree relative 07/13/2020   • Family history of colonic polyps 04/01/2022   • Gastroesophageal reflux disease without esophagitis 03/17/2009 06/09/2015--EGD revealed Z line irregular, 35 cm from incisors. Biopsied. Small hiatal hernia. Gastritis. Biopsied. Bilious gastric fluid. Fluid aspiration performed. Normal duodenal bulb and second part of duodenum. Biopsied. Pathology revealed the antral biopsy revealed small intestinal mucosa with no pathologic diagnosis. Good preservation of villous architecture. Duodenum biopsy revealed largely antral type gastric mucosa with mild patchy superficial chronic gastritis. Gastroesophageal junction revealed squamous and glandular mucosa with mild chronic inflammation. Negative for intestinal metaplasia or dysplasia. There appeared to be mislabeling of the antral biopsies and duodenal biopsies.   04/17/2012--EGD revealed irregular Z line, hiatal hernia, gastritis, duodenitis.   03/17/2009--EGD revealed grade B. reflux esophagitis, hiatal hernia, gastritis, a few gastric polyps, duodenitis.   • Hammertoe of right foot    • Hammertoe of second toe of right foot 10/19/2021    October 19, 2021--patient reports a 1 year history of progressively worsening right toe pain that particular  involves the second toe.  On exam she has obvious hammertoe which is causing irritation.  There is some hammering of the third digit as well.  Patient referred to orthopedist who specializes in foot problems such as Dr. Vieira.  In the meantime patient should use over-the-counter toe pads to   • History of COVID-19, August 12, 2021; May 17, 2022. 10/19/2021    May 17, 2022--patient again tested positive for COVID.  Ordered by gastroenterology nurse practitioner.  October 19, 2021--patient seen in follow-up and reports her symptoms totally resolved.  She wants to know when she should get her Covid booster vaccine.  I have suggested that she receive it approximately 3 months after initially testing positive.  She has an appointment in November 6, 2021 for   • History of Hyperplastic polyps of stomach 06/09/2015 06/09/2015--EGD revealed Z line irregular, 35 cm from incisors. Biopsied. Small hiatal hernia. Gastritis. Biopsied. Bilious gastric fluid. Fluid aspiration performed. Normal duodenal bulb and second part of duodenum. Biopsied. Pathology revealed the antral biopsy revealed small intestinal mucosa with no pathologic diagnosis. Good preservation of villous architecture. Duodenum biopsy revealed large   • Hyperlipidemia 07/17/2012 07/17/2012--treatment for hyperlipidemia begun.   • Lumbar disc herniation, L4-L5 08/07/2018 01/16/2019--patient seen in follow-up by Dr. Shukla.  She has seen the neurosurgeon who recommended conservative therapy with physical therapy.  Patient reports that has been extremely helpful.  Currently has no significant pain.  08/07/2018--patient seen in follow-up and reports her pain is much better after taking prednisone.  She is now down to half a pill per day and is almost off of it.  I rev   • Lumbar scoliosis 08/07/2018 01/16/2019--patient seen in follow-up by Dr. Shukla.  She has seen the neurosurgeon who recommended conservative therapy with physical therapy.  Patient reports  that has been extremely helpful.  Currently has no significant pain.  08/07/2018--patient seen in follow-up and reports her pain is much better after taking prednisone.  She is now down to half a pill per day and is almost off of it.  I rev   • Menopausal state 07/13/2020   • Multiple environmental allergies 01/30/2015 01/30/2015--allergy testing revealed positive reactivity to cat, dust mite, cockroach, mold spores, and grass pollen. Environmental control measures.   • Osteopenia of multiple sites, 2/25/2022--lumbar spine 1.1.  Left femoral neck -1.0.  Right femoral neck -0.3. 04/01/2022 February 25, 2022--DEXA scan reveals lumbar spine T score 1.1.  Left femoral neck T score -1.0.  Right femoral neck T score -0.3.  Mild osteopenia.   • Postmenopausal hormone replacement therapy 04/18/2016   • Primary hypothyroidism 01/22/2020 January 22, 2020--TSH is elevated at 5.0.  Levothyroxine 50 mcg/day initiated.  Patient will follow-up with nonfasting lab work in about 6 to 8 weeks to reassess.  07/09/2018--routine follow-up.  TSH elevated slightly at 4.31.  Free T4 normal at 1.22.  Free T3 normal at 3.3.  Continued observation.  10/11/2017--thyroid function tests are normal.  09/30/2016--thyroid ultrasound reveals a tiny 2 mm    • Primary osteoarthritis involving multiple joints 6/8/2022   • Primary osteoarthritis of both hands 12/30/2013 05/12/2014--patient seen in followup and reports that the Vimovo has helped. Uric acid levels are normal at 4.9. C. reactive protein mildly elevated at 2.3. X-rays of the hands reveals radiocarpal, first carpometacarpal, first metacarpophalangeal, and interphalangeal joint degeneration. This process is symmetric. The interphalangeal joint of the left is affected to the greatest degree. There is right sided scapholunate dissociation as well as deformity of the scaphoid suggesting prior traumatic injury with healing. Soft tissues are satisfactory. Overall appearance is most  consistent with osteoarthritis.   05/05/2014--patient presents and reports that she is having worsening right wrist pain primarily at the base of the thumb. No new injury. Bilateral x-rays of the wrists and hands ordered. Uric acid level ordered as well as a CRP. Vimovo 500/20 one by mouth twice a day. Follow up in one week.   03/18/2014--patient reports that her wrist symptoms have improved.   12/30/2013--patient reports a several mon   • Primary osteoarthritis of both wrists 12/30/2013 05/12/2014--patient seen in followup and reports that the Vimovo has helped. Uric acid levels are normal at 4.9. C. reactive protein mildly elevated at 2.3. X-rays of the hands reveals radiocarpal, first carpometacarpal, first metacarpophalangeal, and interphalangeal joint degeneration. This process is symmetric. The interphalangeal joint of the left is affected to the greatest degree. There is right sided scapholunate dissociation as well as deformity of the scaphoid suggesting prior traumatic injury with healing. Soft tissues are satisfactory. Overall appearance is most consistent with osteoarthritis.   05/05/2014--patient presents and reports that she is having worsening right wrist pain primarily at the base of the thumb. No new injury. Bilateral x-rays of the wrists and hands ordered. Uric acid level ordered as well as a CRP. Vimovo 500/20 one by mouth twice a day. Follow up in one week.   03/18/2014--patient reports that her wrist symptoms have improved.   12/30/2013--patient reports a several mon   • Vitamin D deficiency 04/18/2016         Past Surgical History:   Procedure Laterality Date   • COLONOSCOPY  03/17/2009 03/17/2009--colonoscopy revealed external hemorrhoids, torturous colon with a sigmoid stricture, sigmoid diverticulosis.   • COLONOSCOPY N/A 3/10/2017    03/10/2017--colonoscopy revealed many small and large mouth diverticula in the sigmoid colon and descending colon.  Nonthrombosed external hemorrhoids and  internal hemorrhoids noted.  Otherwise, normal colonoscopy.   • ERCP WITH SPHINCTEROTOMY/PAPILLOTOMY  08/13/2014 08/13/2014--ERCP, endoscopic sphincterotomy and removal of common bile duct stones. 08/12/2014--laparoscopic cholecystectomy. This was complicated by retained common bile duct stones 2.   • ESOPHAGOSCOPY / EGD  06/09/2015 06/09/2015--EGD revealed Z line irregular, 35 cm from incisors. Biopsied. Small hiatal hernia. Gastritis. Biopsied. Bilious gastric fluid. Fluid aspiration performed. Normal duodenal bulb and second part of duodenum. Biopsied. Pathology revealed the antral biopsy revealed small intestinal mucosa with no pathologic diagnosis. Good preservation of villous architecture. Duodenum biopsy revealed large   • HAMMER TOE REPAIR Right 4/11/2022    Procedure: RIGHT SECOND AND THIRD HAMMERTOE REPAIRS;  Surgeon: Brandt Vieira MD;  Location: [facility];  Service: Orthopedics;  Laterality: Right;   • LAPAROSCOPIC CHOLECYSTECTOMY  08/12/2014 08/13/2014--ERCP, endoscopic sphincterotomy and removal of common bile duct stones. 08/12/2014--laparoscopic cholecystectomy. This was complicated by retained common bile duct stones 2.   • ROTATOR CUFF REPAIR Left 07/10/2014    07/10/2014--left rotator cuff repair. 03/18/2014--patient seen in followup and reports that her range of motion has improved and her pain is not debilitating. She feels that she is making progress with physical therapy. Surgery scheduled 07/10/2014. 01/10/2014--patient was seen in evaluation by the orthopedist and he recommended conservative treatment consisting of physical therapy/rehabilitation.   • ROTATOR CUFF REPAIR Right 08/03/2016 08/03/2016--arthroscopic right rotator cuff repair.  Debridement of degenerative anterior superior labral tear.   • SUBTOTAL HYSTERECTOMY  1978    Partial hysterectomy 1978.         Allergies   Allergen Reactions   • Bupivacaine Other (See Comments)     Questionable allergy.  Patient had  "toe surgery and subsequently developed acute hepatitis.  Rare case reports of hepatitis induced by bupivacaine which was used during her surgery.           Current Outpatient Medications:   •  calcium carbonate (Calcium 600) 600 MG tablet, Take calcium plus vitamin D twice daily for low vitamin D and weak bones, Disp: 30 tablet, Rfl:   •  estrogens, conjugated, (PREMARIN) 0.625 MG tablet, Take 1 p.o. daily for estrogen replacement therapy/menopausal symptoms, Disp: 90 tablet, Rfl: 3  •  levothyroxine (SYNTHROID, LEVOTHROID) 50 MCG tablet, TAKE ONE TABLET BY MOUTH DAILY FOR LOW THYROID, Disp: 90 tablet, Rfl: 3  •  meloxicam (MOBIC) 15 MG tablet, Take 1 tablet by mouth Daily., Disp: 30 tablet, Rfl: 6  •  multivitamin (THERAGRAN) tablet tablet, Take 1 tablet by mouth Daily. HOLD FOR SURGERY, Disp: , Rfl:   •  omeprazole OTC (PrilOSEC OTC) 20 MG EC tablet, Take 1 tablet by mouth Daily., Disp: , Rfl:   •  vitamin C (ASCORBIC ACID) 250 MG tablet, Take 250 mg by mouth Daily., Disp: , Rfl:       Family History   Problem Relation Age of Onset   • Colon polyps Mother    • Alzheimer's disease Mother    • Other Father         Hodgkin's Disease   • Cancer Father         Lung Cancer   • Breast cancer Daughter 52   • Malig Hyperthermia Neg Hx          Social History     Socioeconomic History   • Marital status:    • Number of children: 2   • Years of education: BA   • Highest education level: Associate degree: occupational, technical, or vocational program   Tobacco Use   • Smoking status: Never Smoker   • Smokeless tobacco: Never Used   Vaping Use   • Vaping Use: Never used   Substance and Sexual Activity   • Alcohol use: Yes     Comment: Socially   • Drug use: No   • Sexual activity: Yes     Partners: Male         Vitals:    06/20/22 1235   BP: 126/70   Pulse: 74   Resp: 18   SpO2: 95%   Weight: 53.8 kg (118 lb 9.6 oz)   Height: 162.6 cm (64\")        Body mass index is 20.36 kg/m².      Physical Exam:    General: Alert " and oriented x 3.  No acute distress.  Normal affect.  HEENT: Pupils equal, round, reactive to light; extraocular movements intact; sclerae nonicteric; pharynx, ear canals and TMs normal.  Neck: Without JVD, thyromegaly, bruit, or adenopathy.  Lungs: Clear to auscultation in all fields.  Heart: Regular rate and rhythm without murmur, rub, gallop, or click.  Abdomen: Soft, nontender, without hepatosplenomegaly or hernia.  Bowel sounds normal.  : Deferred.  Rectal: Deferred.  Extremities: Without clubbing, cyanosis, edema, or pulse deficit.  Neurologic: Intact without focal deficit.  Normal station and gait observed during ingress and egress from the examination room.  Skin: Without significant lesion.  Musculoskeletal: Unremarkable.    Lab/other results:      Assessment/Plan:     Diagnosis Plan   1. Arthralgia of multiple joints  Comprehensive Metabolic Panel    Comprehensive Metabolic Panel    meloxicam (MOBIC) 15 MG tablet   2. Primary osteoarthritis involving multiple joints  Comprehensive Metabolic Panel    Comprehensive Metabolic Panel    meloxicam (MOBIC) 15 MG tablet   3. History of acute hepatitis  Comprehensive Metabolic Panel   4. Menopausal state  estrogens, conjugated, (PREMARIN) 0.625 MG tablet   5. Therapeutic drug monitoring  Comprehensive Metabolic Panel   6. Postmenopausal hormone replacement therapy  estrogens, conjugated, (PREMARIN) 0.625 MG tablet   7. Benign essential hypertension       Patient with arthralgia multiple joints due to her primary osteoarthritis presents in follow-up and she is about 50% better after starting meloxicam 15 mg, one half p.o. daily.  She like to go to the old 15 mg and I think that will be fine as long as we monitor her liver enzymes closely.  I am concerned about reelevation of liver enzymes with nonsteroidal anti-inflammatory drugs although I really doubt that this was the etiology of her hepatitis.  However, he does require close monitoring.  Estrogen replacement  therapy also has been associated with elevated liver enzymes and I do not think that was the cause of her acute hepatitis either but we need to hold off and take things a step at the time when it comes to adding back medications.  Patient seems to understand this.  Her blood pressure seems to be controlled off of medications but also will need to be followed closely.    Plan is as follows: Check CMP today.  I will contact patient with results and if her liver enzymes look normal then we will go ahead and have her go to a full meloxicam 15 mg/day.  I will have her come to the lab July 1 to obtain CMP and then she can follow-up with me the following Tuesday after 4 July for the results and we will give consideration to restarting estrogen replacement therapy at that time.  We can also schedule future plans for follow-up at that time as well.        Procedures

## 2022-06-21 ENCOUNTER — OFFICE VISIT (OUTPATIENT)
Dept: GASTROENTEROLOGY | Facility: CLINIC | Age: 77
End: 2022-06-21

## 2022-06-21 VITALS
TEMPERATURE: 96.4 F | DIASTOLIC BLOOD PRESSURE: 72 MMHG | HEIGHT: 65 IN | HEART RATE: 68 BPM | BODY MASS INDEX: 19.99 KG/M2 | SYSTOLIC BLOOD PRESSURE: 142 MMHG | WEIGHT: 120 LBS

## 2022-06-21 DIAGNOSIS — R79.89 ELEVATED LIVER FUNCTION TESTS: Primary | ICD-10-CM

## 2022-06-21 DIAGNOSIS — Z83.71 FAMILY HISTORY OF POLYPS IN THE COLON: ICD-10-CM

## 2022-06-21 PROCEDURE — 99213 OFFICE O/P EST LOW 20 MIN: CPT | Performed by: INTERNAL MEDICINE

## 2022-06-21 NOTE — PROGRESS NOTES
No chief complaint on file.       Ashley Mata is a  76 y.o. female here for a follow up visit for elevated LFTs, discussion regarding upcoming endoscopy    HPI this 76-year-old white female patient of Dr. Carloz Shukla presents in follow-up since last seen in this office on Katty 3.  She has been monitored for elevated LFTs and had been showing gradual resolution.  The source of this elevation was never completely defined but was felt associated with either drug interaction or viral effect.  Her lab work from her primary care tender performed yesterday showed complete normalization of her LFTs.  She had concerns regarding a medication given when she underwent toe surgery and I advised she explain this to an anesthesiologist for future evaluations to minimize risk if it was drug related.  Her last colonoscopy performed in 2017 showed a grossly tortuous left colon with extensive diverticular disease.  I explained that this does put her at some risk for examination although precautions would be made to minimize that risk.  She wishes to consider possible deferral of her endoscopies until after she is completely recovered.  I advised her to contact this office when she feels she is ready to undergo evaluation or if she decides against further evaluation we will abide by that as well.    Past Medical History:   Diagnosis Date   • Allergic rhinitis 01/30/2015 11/13/2015--patient was evaluated by ENT and was started on generic Flonase and patient reports this has improved her symptoms quite a bit.   01/30/2015--allergy testing revealed positive reactivity to cat, dust mite, cockroach, mold spores, and grass pollen. Environmental control measures.   • Alopecia 04/12/2017    Evaluated and treated by the dermatologist.   • Arthralgia of multiple joints 5/9/2022   • Benign essential hypertension 04/18/2016   • Chronic toe pain, right foot 10/19/2021    October 19, 2021--patient reports a 1 year history of progressively  worsening right toe pain that particular involves the second toe.  On exam she has obvious hammertoe which is causing irritation.  There is some hammering of the third digit as well.  Patient referred to orthopedist who specializes in foot problems such as Dr. Vieira.  In the meantime patient should use over-the-counter toe pads to   • Diverticulosis of colon 03/17/2009    03/10/2017--colonoscopy revealed many small and large mouth diverticula in the sigmoid colon and descending colon.  Nonthrombosed external hemorrhoids and internal hemorrhoids noted.  Otherwise, normal colonoscopy.  03/17/2009--colonoscopy revealed external hemorrhoids, torturous colon with a sigmoid stricture, sigmoid diverticulosis.   • Family history of breast cancer in first degree relative 07/13/2020   • Family history of colonic polyps 04/01/2022   • Gastroesophageal reflux disease without esophagitis 03/17/2009 06/09/2015--EGD revealed Z line irregular, 35 cm from incisors. Biopsied. Small hiatal hernia. Gastritis. Biopsied. Bilious gastric fluid. Fluid aspiration performed. Normal duodenal bulb and second part of duodenum. Biopsied. Pathology revealed the antral biopsy revealed small intestinal mucosa with no pathologic diagnosis. Good preservation of villous architecture. Duodenum biopsy revealed largely antral type gastric mucosa with mild patchy superficial chronic gastritis. Gastroesophageal junction revealed squamous and glandular mucosa with mild chronic inflammation. Negative for intestinal metaplasia or dysplasia. There appeared to be mislabeling of the antral biopsies and duodenal biopsies.   04/17/2012--EGD revealed irregular Z line, hiatal hernia, gastritis, duodenitis.   03/17/2009--EGD revealed grade B. reflux esophagitis, hiatal hernia, gastritis, a few gastric polyps, duodenitis.   • Hammertoe of right foot    • Hammertoe of second toe of right foot 10/19/2021    October 19, 2021--patient reports a 1 year history of  progressively worsening right toe pain that particular involves the second toe.  On exam she has obvious hammertoe which is causing irritation.  There is some hammering of the third digit as well.  Patient referred to orthopedist who specializes in foot problems such as Dr. Vieira.  In the meantime patient should use over-the-counter toe pads to   • History of COVID-19, August 12, 2021; May 17, 2022. 10/19/2021    May 17, 2022--patient again tested positive for COVID.  Ordered by gastroenterology nurse practitioner.  October 19, 2021--patient seen in follow-up and reports her symptoms totally resolved.  She wants to know when she should get her Covid booster vaccine.  I have suggested that she receive it approximately 3 months after initially testing positive.  She has an appointment in November 6, 2021 for   • History of Hyperplastic polyps of stomach 06/09/2015 06/09/2015--EGD revealed Z line irregular, 35 cm from incisors. Biopsied. Small hiatal hernia. Gastritis. Biopsied. Bilious gastric fluid. Fluid aspiration performed. Normal duodenal bulb and second part of duodenum. Biopsied. Pathology revealed the antral biopsy revealed small intestinal mucosa with no pathologic diagnosis. Good preservation of villous architecture. Duodenum biopsy revealed large   • Hyperlipidemia 07/17/2012 07/17/2012--treatment for hyperlipidemia begun.   • Lumbar disc herniation, L4-L5 08/07/2018 01/16/2019--patient seen in follow-up by Dr. Shukla.  She has seen the neurosurgeon who recommended conservative therapy with physical therapy.  Patient reports that has been extremely helpful.  Currently has no significant pain.  08/07/2018--patient seen in follow-up and reports her pain is much better after taking prednisone.  She is now down to half a pill per day and is almost off of it.  I rev   • Lumbar scoliosis 08/07/2018 01/16/2019--patient seen in follow-up by Dr. Shukla.  She has seen the neurosurgeon who recommended  conservative therapy with physical therapy.  Patient reports that has been extremely helpful.  Currently has no significant pain.  08/07/2018--patient seen in follow-up and reports her pain is much better after taking prednisone.  She is now down to half a pill per day and is almost off of it.  I rev   • Menopausal state 07/13/2020   • Multiple environmental allergies 01/30/2015 01/30/2015--allergy testing revealed positive reactivity to cat, dust mite, cockroach, mold spores, and grass pollen. Environmental control measures.   • Osteopenia of multiple sites, 2/25/2022--lumbar spine 1.1.  Left femoral neck -1.0.  Right femoral neck -0.3. 04/01/2022 February 25, 2022--DEXA scan reveals lumbar spine T score 1.1.  Left femoral neck T score -1.0.  Right femoral neck T score -0.3.  Mild osteopenia.   • Postmenopausal hormone replacement therapy 04/18/2016   • Primary hypothyroidism 01/22/2020 January 22, 2020--TSH is elevated at 5.0.  Levothyroxine 50 mcg/day initiated.  Patient will follow-up with nonfasting lab work in about 6 to 8 weeks to reassess.  07/09/2018--routine follow-up.  TSH elevated slightly at 4.31.  Free T4 normal at 1.22.  Free T3 normal at 3.3.  Continued observation.  10/11/2017--thyroid function tests are normal.  09/30/2016--thyroid ultrasound reveals a tiny 2 mm    • Primary osteoarthritis involving multiple joints 6/8/2022   • Primary osteoarthritis of both hands 12/30/2013 05/12/2014--patient seen in followup and reports that the Vimovo has helped. Uric acid levels are normal at 4.9. C. reactive protein mildly elevated at 2.3. X-rays of the hands reveals radiocarpal, first carpometacarpal, first metacarpophalangeal, and interphalangeal joint degeneration. This process is symmetric. The interphalangeal joint of the left is affected to the greatest degree. There is right sided scapholunate dissociation as well as deformity of the scaphoid suggesting prior traumatic injury with healing.  Soft tissues are satisfactory. Overall appearance is most consistent with osteoarthritis.   05/05/2014--patient presents and reports that she is having worsening right wrist pain primarily at the base of the thumb. No new injury. Bilateral x-rays of the wrists and hands ordered. Uric acid level ordered as well as a CRP. Vimovo 500/20 one by mouth twice a day. Follow up in one week.   03/18/2014--patient reports that her wrist symptoms have improved.   12/30/2013--patient reports a several mon   • Primary osteoarthritis of both wrists 12/30/2013 05/12/2014--patient seen in followup and reports that the Vimovo has helped. Uric acid levels are normal at 4.9. C. reactive protein mildly elevated at 2.3. X-rays of the hands reveals radiocarpal, first carpometacarpal, first metacarpophalangeal, and interphalangeal joint degeneration. This process is symmetric. The interphalangeal joint of the left is affected to the greatest degree. There is right sided scapholunate dissociation as well as deformity of the scaphoid suggesting prior traumatic injury with healing. Soft tissues are satisfactory. Overall appearance is most consistent with osteoarthritis.   05/05/2014--patient presents and reports that she is having worsening right wrist pain primarily at the base of the thumb. No new injury. Bilateral x-rays of the wrists and hands ordered. Uric acid level ordered as well as a CRP. Vimovo 500/20 one by mouth twice a day. Follow up in one week.   03/18/2014--patient reports that her wrist symptoms have improved.   12/30/2013--patient reports a several mon   • Vitamin D deficiency 04/18/2016       Current Outpatient Medications   Medication Sig Dispense Refill   • calcium carbonate (Calcium 600) 600 MG tablet Take calcium plus vitamin D twice daily for low vitamin D and weak bones 30 tablet    • estrogens, conjugated, (PREMARIN) 0.625 MG tablet Take 1 p.o. daily for estrogen replacement therapy/menopausal symptoms 90 tablet 3    • levothyroxine (SYNTHROID, LEVOTHROID) 50 MCG tablet TAKE ONE TABLET BY MOUTH DAILY FOR LOW THYROID 90 tablet 3   • meloxicam (MOBIC) 15 MG tablet Take 1 tablet by mouth Daily. 30 tablet 6   • multivitamin (THERAGRAN) tablet tablet Take 1 tablet by mouth Daily. HOLD FOR SURGERY     • omeprazole OTC (PrilOSEC OTC) 20 MG EC tablet Take 1 tablet by mouth Daily.     • vitamin C (ASCORBIC ACID) 250 MG tablet Take 250 mg by mouth Daily.       No current facility-administered medications for this visit.       PRN Meds:.    Allergies   Allergen Reactions   • Bupivacaine Other (See Comments)     Questionable allergy.  Patient had toe surgery and subsequently developed acute hepatitis.  Rare case reports of hepatitis induced by bupivacaine which was used during her surgery.       Social History     Socioeconomic History   • Marital status:    • Number of children: 2   • Years of education: BA   • Highest education level: Associate degree: occupational, technical, or vocational program   Tobacco Use   • Smoking status: Never Smoker   • Smokeless tobacco: Never Used   Vaping Use   • Vaping Use: Never used   Substance and Sexual Activity   • Alcohol use: Yes     Comment: Socially   • Drug use: No   • Sexual activity: Yes     Partners: Male       Family History   Problem Relation Age of Onset   • Colon polyps Mother    • Alzheimer's disease Mother    • Other Father         Hodgkin's Disease   • Cancer Father         Lung Cancer   • Breast cancer Daughter 52   • Malig Hyperthermia Neg Hx        Review of Systems   Constitutional: Negative for activity change, appetite change, fatigue and unexpected weight change.   HENT: Negative for congestion, facial swelling, sore throat, trouble swallowing and voice change.    Eyes: Negative for photophobia and visual disturbance.   Respiratory: Negative for cough and choking.    Cardiovascular: Negative for chest pain.   Gastrointestinal: Negative for abdominal distention, abdominal  pain, anal bleeding, blood in stool, constipation, diarrhea, nausea, rectal pain and vomiting.   Endocrine: Negative for polyphagia.   Musculoskeletal: Negative for arthralgias, gait problem and joint swelling.   Skin: Negative for color change, pallor and rash.   Allergic/Immunologic: Negative for food allergies.   Neurological: Negative for speech difficulty and headaches.   Hematological: Does not bruise/bleed easily.   Psychiatric/Behavioral: Negative for agitation, confusion and sleep disturbance.       There were no vitals filed for this visit.    Physical Exam  Constitutional:       Appearance: She is well-developed.   HENT:      Head: Normocephalic.   Eyes:      Conjunctiva/sclera: Conjunctivae normal.   Cardiovascular:      Rate and Rhythm: Normal rate and regular rhythm.   Pulmonary:      Breath sounds: Normal breath sounds.   Abdominal:      General: Bowel sounds are normal.      Palpations: Abdomen is soft.   Musculoskeletal:         General: Normal range of motion.      Cervical back: Normal range of motion.   Skin:     General: Skin is warm and dry.   Neurological:      Mental Status: She is alert and oriented to person, place, and time.   Psychiatric:         Behavior: Behavior normal.         ASSESSMENT   #1 elevated LFTs: Resolved  #2 family history of colon polyps: Due for follow-up examination later this year  #3 history of extensive diverticulosis and tortuous colon      PLAN  Patient to consider options regarding further evaluation and the timing of this.  She will call his office if she decides to defer examination otherwise plan on assessment in August of this year.    No diagnosis found.

## 2022-06-28 ENCOUNTER — TELEPHONE (OUTPATIENT)
Dept: INTERNAL MEDICINE | Facility: CLINIC | Age: 77
End: 2022-06-28

## 2022-06-28 DIAGNOSIS — M15.9 PRIMARY OSTEOARTHRITIS INVOLVING MULTIPLE JOINTS: Chronic | ICD-10-CM

## 2022-06-28 DIAGNOSIS — M25.50 ARTHRALGIA OF MULTIPLE JOINTS: Chronic | ICD-10-CM

## 2022-06-28 RX ORDER — MELOXICAM 15 MG/1
15 TABLET ORAL DAILY
Qty: 30 TABLET | Refills: 6 | Status: SHIPPED | OUTPATIENT
Start: 2022-06-28 | End: 2022-07-20 | Stop reason: SDUPTHER

## 2022-06-28 NOTE — TELEPHONE ENCOUNTER
Caller: Ashley Mata    Relationship: Self    Best call back number: [phone]    Requested Prescriptions:   Requested Prescriptions     Pending Prescriptions Disp Refills   • meloxicam (MOBIC) 15 MG tablet 30 tablet 6     Sig: Take 1 tablet by mouth Daily.        Pharmacy where request should be sent: [facility] [address] [address] [facility] [phone] [facility] [phone]      Additional details provided by patient: PATIENT IS CALLING TO CHECK ON THE STATUS OF THE REQUEST FROM [facility] TO FILL A NEW PRESCRIPTION OF 1 FULL TABLET DAILY? PATIENT ONLY HAS 2 TABLETS LEFT.  Does the patient have less than a 3 day supply:  [x] Yes  [] No    Kami Miguel Rep   06/28/22 15:28 EDT

## 2022-07-01 ENCOUNTER — LAB (OUTPATIENT)
Dept: LAB | Facility: HOSPITAL | Age: 77
End: 2022-07-01

## 2022-07-01 LAB
ALBUMIN SERPL-MCNC: 4.2 G/DL (ref 3.5–5.2)
ALBUMIN/GLOB SERPL: 1.7 G/DL
ALP SERPL-CCNC: 85 U/L (ref 39–117)
ALT SERPL W P-5'-P-CCNC: 21 U/L (ref 1–33)
ANION GAP SERPL CALCULATED.3IONS-SCNC: 10 MMOL/L (ref 5–15)
AST SERPL-CCNC: 27 U/L (ref 1–32)
BILIRUB SERPL-MCNC: 0.4 MG/DL (ref 0–1.2)
BUN SERPL-MCNC: 17 MG/DL (ref 8–23)
BUN/CREAT SERPL: 22.4 (ref 7–25)
CALCIUM SPEC-SCNC: 9.8 MG/DL (ref 8.6–10.5)
CHLORIDE SERPL-SCNC: 104 MMOL/L (ref 98–107)
CO2 SERPL-SCNC: 25 MMOL/L (ref 22–29)
CREAT SERPL-MCNC: 0.76 MG/DL (ref 0.57–1)
EGFRCR SERPLBLD CKD-EPI 2021: 80.3 ML/MIN/1.73
GLOBULIN UR ELPH-MCNC: 2.5 GM/DL
GLUCOSE SERPL-MCNC: 108 MG/DL (ref 65–99)
POTASSIUM SERPL-SCNC: 4.4 MMOL/L (ref 3.5–5.2)
PROT SERPL-MCNC: 6.7 G/DL (ref 6–8.5)
SODIUM SERPL-SCNC: 139 MMOL/L (ref 136–145)

## 2022-07-01 PROCEDURE — 36415 COLL VENOUS BLD VENIPUNCTURE: CPT | Performed by: INTERNAL MEDICINE

## 2022-07-01 PROCEDURE — 80053 COMPREHEN METABOLIC PANEL: CPT | Performed by: INTERNAL MEDICINE

## 2022-07-18 ENCOUNTER — PROCEDURE VISIT (OUTPATIENT)
Dept: OBSTETRICS AND GYNECOLOGY | Facility: CLINIC | Age: 77
End: 2022-07-18

## 2022-07-18 ENCOUNTER — APPOINTMENT (OUTPATIENT)
Dept: WOMENS IMAGING | Facility: HOSPITAL | Age: 77
End: 2022-07-18

## 2022-07-18 DIAGNOSIS — Z12.31 VISIT FOR SCREENING MAMMOGRAM: Primary | ICD-10-CM

## 2022-07-18 PROCEDURE — 77063 BREAST TOMOSYNTHESIS BI: CPT | Performed by: RADIOLOGY

## 2022-07-18 PROCEDURE — 77067 SCR MAMMO BI INCL CAD: CPT | Performed by: RADIOLOGY

## 2022-07-18 PROCEDURE — 77063 BREAST TOMOSYNTHESIS BI: CPT | Performed by: OBSTETRICS & GYNECOLOGY

## 2022-07-18 PROCEDURE — 77067 SCR MAMMO BI INCL CAD: CPT | Performed by: OBSTETRICS & GYNECOLOGY

## 2022-07-20 ENCOUNTER — OFFICE VISIT (OUTPATIENT)
Dept: INTERNAL MEDICINE | Facility: CLINIC | Age: 77
End: 2022-07-20

## 2022-07-20 ENCOUNTER — LAB (OUTPATIENT)
Dept: LAB | Facility: HOSPITAL | Age: 77
End: 2022-07-20

## 2022-07-20 VITALS
DIASTOLIC BLOOD PRESSURE: 60 MMHG | BODY MASS INDEX: 19.19 KG/M2 | OXYGEN SATURATION: 99 % | WEIGHT: 115.2 LBS | SYSTOLIC BLOOD PRESSURE: 132 MMHG | HEIGHT: 65 IN | HEART RATE: 88 BPM | RESPIRATION RATE: 18 BRPM

## 2022-07-20 DIAGNOSIS — Z86.19 HISTORY OF ACUTE HEPATITIS: Primary | Chronic | ICD-10-CM

## 2022-07-20 DIAGNOSIS — N95.1 MENOPAUSAL STATE: Chronic | ICD-10-CM

## 2022-07-20 DIAGNOSIS — M15.9 PRIMARY OSTEOARTHRITIS INVOLVING MULTIPLE JOINTS: Chronic | ICD-10-CM

## 2022-07-20 DIAGNOSIS — N95.1 SYMPTOMATIC MENOPAUSAL OR FEMALE CLIMACTERIC STATES: ICD-10-CM

## 2022-07-20 DIAGNOSIS — R74.8 ELEVATED LIVER ENZYMES: ICD-10-CM

## 2022-07-20 DIAGNOSIS — M25.50 ARTHRALGIA OF MULTIPLE JOINTS: Chronic | ICD-10-CM

## 2022-07-20 DIAGNOSIS — Z12.11 COLON CANCER SCREENING: ICD-10-CM

## 2022-07-20 DIAGNOSIS — Z80.0 FAMILY HISTORY OF COLON CANCER REQUIRING SCREENING COLONOSCOPY: ICD-10-CM

## 2022-07-20 DIAGNOSIS — Z51.81 THERAPEUTIC DRUG MONITORING: ICD-10-CM

## 2022-07-20 DIAGNOSIS — Z91.09 MULTIPLE ENVIRONMENTAL ALLERGIES: ICD-10-CM

## 2022-07-20 DIAGNOSIS — J30.1 CHRONIC SEASONAL ALLERGIC RHINITIS DUE TO POLLEN: ICD-10-CM

## 2022-07-20 DIAGNOSIS — Z83.71 FAMILY HISTORY OF COLONIC POLYPS: ICD-10-CM

## 2022-07-20 LAB
ALBUMIN SERPL-MCNC: 4.1 G/DL (ref 3.5–5.2)
ALBUMIN/GLOB SERPL: 1.6 G/DL
ALP SERPL-CCNC: 70 U/L (ref 39–117)
ALT SERPL W P-5'-P-CCNC: 16 U/L (ref 1–33)
ANION GAP SERPL CALCULATED.3IONS-SCNC: 13 MMOL/L (ref 5–15)
AST SERPL-CCNC: 24 U/L (ref 1–32)
BILIRUB SERPL-MCNC: 0.3 MG/DL (ref 0–1.2)
BUN SERPL-MCNC: 21 MG/DL (ref 8–23)
BUN/CREAT SERPL: 28 (ref 7–25)
CALCIUM SPEC-SCNC: 9.2 MG/DL (ref 8.6–10.5)
CHLORIDE SERPL-SCNC: 107 MMOL/L (ref 98–107)
CO2 SERPL-SCNC: 22 MMOL/L (ref 22–29)
CREAT SERPL-MCNC: 0.75 MG/DL (ref 0.57–1)
DEPRECATED RDW RBC AUTO: 42.8 FL (ref 37–54)
EGFRCR SERPLBLD CKD-EPI 2021: 81.6 ML/MIN/1.73
ERYTHROCYTE [DISTWIDTH] IN BLOOD BY AUTOMATED COUNT: 13.5 % (ref 12.3–15.4)
GLOBULIN UR ELPH-MCNC: 2.5 GM/DL
GLUCOSE SERPL-MCNC: 87 MG/DL (ref 65–99)
HCT VFR BLD AUTO: 36.5 % (ref 34–46.6)
HGB BLD-MCNC: 12.2 G/DL (ref 12–15.9)
MCH RBC QN AUTO: 29.5 PG (ref 26.6–33)
MCHC RBC AUTO-ENTMCNC: 33.4 G/DL (ref 31.5–35.7)
MCV RBC AUTO: 88.2 FL (ref 79–97)
PLATELET # BLD AUTO: 320 10*3/MM3 (ref 140–450)
PMV BLD AUTO: 10.6 FL (ref 6–12)
POTASSIUM SERPL-SCNC: 4 MMOL/L (ref 3.5–5.2)
PROT SERPL-MCNC: 6.6 G/DL (ref 6–8.5)
RBC # BLD AUTO: 4.14 10*6/MM3 (ref 3.77–5.28)
SODIUM SERPL-SCNC: 142 MMOL/L (ref 136–145)
WBC NRBC COR # BLD: 6.89 10*3/MM3 (ref 3.4–10.8)

## 2022-07-20 PROCEDURE — 99214 OFFICE O/P EST MOD 30 MIN: CPT | Performed by: INTERNAL MEDICINE

## 2022-07-20 PROCEDURE — 85027 COMPLETE CBC AUTOMATED: CPT | Performed by: INTERNAL MEDICINE

## 2022-07-20 PROCEDURE — 36415 COLL VENOUS BLD VENIPUNCTURE: CPT | Performed by: INTERNAL MEDICINE

## 2022-07-20 PROCEDURE — 80053 COMPREHEN METABOLIC PANEL: CPT | Performed by: INTERNAL MEDICINE

## 2022-07-20 RX ORDER — FEXOFENADINE HCL AND PSEUDOEPHEDRINE HCI 180; 240 MG/1; MG/1
1 TABLET, EXTENDED RELEASE ORAL DAILY PRN
Qty: 30 TABLET | Refills: 6
Start: 2022-07-20

## 2022-07-20 RX ORDER — MELOXICAM 15 MG/1
15 TABLET ORAL DAILY
Qty: 90 TABLET | Refills: 3 | Status: SHIPPED | OUTPATIENT
Start: 2022-07-20 | End: 2023-01-25 | Stop reason: SDUPTHER

## 2022-07-20 NOTE — PROGRESS NOTES
07/20/2022    Patient Information  Ashley Mata                                                                                          [id number] [facility] [id number]      1945  [unfilled]  There is no work phone number on file.    Chief Complaint:     Follow-up recent medication adjustments.  Follow-up recent acute hepatitis of uncertain etiology.    History of Present Illness:    Patient who was admitted to the hospital a while back with markedly elevated liver enzymes due to acute hepatitis of uncertain etiology.  Patient has arthralgia of multiple joints due to osteoarthritis and she requested to go back on the meloxicam since her liver enzymes had returned to normal.  I somewhat reluctantly agreed to this.  Subsequently patient started having postmenopausal symptoms due to the lack of estrogen replacement therapy and she requested to go back on the Premarin.  I agreed and she is here today for follow-up.  She reports she is feeling much better after restarting both of her medications.  Her past medical history reviewed and updated were necessary including health maintenance parameters.  This reveals she is up-to-date or else accounted for after today's visit with the exception of the colonoscopy which I will order.    Review of Systems   Constitutional: Negative.   HENT: Negative.    Eyes: Negative.    Cardiovascular: Negative.    Respiratory: Negative.    Endocrine: Negative.    Hematologic/Lymphatic: Negative.    Skin: Negative.    Musculoskeletal: Positive for joint pain.   Gastrointestinal: Negative.    Genitourinary: Negative.    Neurological: Negative.    Psychiatric/Behavioral: Negative.    Allergic/Immunologic: Negative.        Active Problems:    Patient Active Problem List   Diagnosis   • Allergic rhinitis   • Benign essential hypertension   • Diverticulosis of colon   • Gastroesophageal reflux disease without esophagitis   • Hyperlipidemia   • Multiple  environmental allergies   • Primary osteoarthritis of both wrists   • Primary osteoarthritis of both hands   • Postmenopausal hormone replacement therapy   • Vitamin D deficiency   • Therapeutic drug monitoring   • Alopecia   • Lumbar scoliosis   • Lumbar disc herniation, L4-L5   • Primary hypothyroidism   • Family history of breast cancer in first degree relative   • Menopausal state   • History of COVID-19, August 12, 2021; May 17, 2022.   • Hammertoe of second toe of right foot   • Chronic toe pain, right foot   • Family history of colonic polyps   • Osteopenia of multiple sites, 2/25/2022--lumbar spine 1.1.  Left femoral neck -1.0.  Right femoral neck -0.3.   • History of acute hepatitis   • Arthralgia of multiple joints   • Primary osteoarthritis involving multiple joints   • Elevated liver enzymes         Past Medical History:   Diagnosis Date   • Allergic rhinitis 01/30/2015 11/13/2015--patient was evaluated by ENT and was started on generic Flonase and patient reports this has improved her symptoms quite a bit.   01/30/2015--allergy testing revealed positive reactivity to cat, dust mite, cockroach, mold spores, and grass pollen. Environmental control measures.   • Alopecia 04/12/2017    Evaluated and treated by the dermatologist.   • Arthralgia of multiple joints 5/9/2022   • Benign essential hypertension 04/18/2016   • Chronic toe pain, right foot 10/19/2021    October 19, 2021--patient reports a 1 year history of progressively worsening right toe pain that particular involves the second toe.  On exam she has obvious hammertoe which is causing irritation.  There is some hammering of the third digit as well.  Patient referred to orthopedist who specializes in foot problems such as Dr. Vieira.  In the meantime patient should use over-the-counter toe pads to   • Diverticulosis of colon 03/17/2009    03/10/2017--colonoscopy revealed many small and large mouth diverticula in the sigmoid colon and descending  colon.  Nonthrombosed external hemorrhoids and internal hemorrhoids noted.  Otherwise, normal colonoscopy.  03/17/2009--colonoscopy revealed external hemorrhoids, torturous colon with a sigmoid stricture, sigmoid diverticulosis.   • Family history of breast cancer in first degree relative 07/13/2020   • Family history of colonic polyps 04/01/2022   • Gastroesophageal reflux disease without esophagitis 03/17/2009 06/09/2015--EGD revealed Z line irregular, 35 cm from incisors. Biopsied. Small hiatal hernia. Gastritis. Biopsied. Bilious gastric fluid. Fluid aspiration performed. Normal duodenal bulb and second part of duodenum. Biopsied. Pathology revealed the antral biopsy revealed small intestinal mucosa with no pathologic diagnosis. Good preservation of villous architecture. Duodenum biopsy revealed largely antral type gastric mucosa with mild patchy superficial chronic gastritis. Gastroesophageal junction revealed squamous and glandular mucosa with mild chronic inflammation. Negative for intestinal metaplasia or dysplasia. There appeared to be mislabeling of the antral biopsies and duodenal biopsies.   04/17/2012--EGD revealed irregular Z line, hiatal hernia, gastritis, duodenitis.   03/17/2009--EGD revealed grade B. reflux esophagitis, hiatal hernia, gastritis, a few gastric polyps, duodenitis.   • Hammertoe of right foot    • Hammertoe of second toe of right foot 10/19/2021    October 19, 2021--patient reports a 1 year history of progressively worsening right toe pain that particular involves the second toe.  On exam she has obvious hammertoe which is causing irritation.  There is some hammering of the third digit as well.  Patient referred to orthopedist who specializes in foot problems such as Dr. Vieira.  In the meantime patient should use over-the-counter toe pads to   • History of COVID-19, August 12, 2021; May 17, 2022. 10/19/2021    May 17, 2022--patient again tested positive for COVID.  Ordered by  gastroenterology nurse practitioner.  October 19, 2021--patient seen in follow-up and reports her symptoms totally resolved.  She wants to know when she should get her Covid booster vaccine.  I have suggested that she receive it approximately 3 months after initially testing positive.  She has an appointment in November 6, 2021 for   • History of Hyperplastic polyps of stomach 06/09/2015 06/09/2015--EGD revealed Z line irregular, 35 cm from incisors. Biopsied. Small hiatal hernia. Gastritis. Biopsied. Bilious gastric fluid. Fluid aspiration performed. Normal duodenal bulb and second part of duodenum. Biopsied. Pathology revealed the antral biopsy revealed small intestinal mucosa with no pathologic diagnosis. Good preservation of villous architecture. Duodenum biopsy revealed large   • Hyperlipidemia 07/17/2012 07/17/2012--treatment for hyperlipidemia begun.   • Lumbar disc herniation, L4-L5 08/07/2018 01/16/2019--patient seen in follow-up by Dr. Shukla.  She has seen the neurosurgeon who recommended conservative therapy with physical therapy.  Patient reports that has been extremely helpful.  Currently has no significant pain.  08/07/2018--patient seen in follow-up and reports her pain is much better after taking prednisone.  She is now down to half a pill per day and is almost off of it.  I rev   • Lumbar scoliosis 08/07/2018 01/16/2019--patient seen in follow-up by Dr. Shukla.  She has seen the neurosurgeon who recommended conservative therapy with physical therapy.  Patient reports that has been extremely helpful.  Currently has no significant pain.  08/07/2018--patient seen in follow-up and reports her pain is much better after taking prednisone.  She is now down to half a pill per day and is almost off of it.  I rev   • Menopausal state 07/13/2020   • Multiple environmental allergies 01/30/2015 01/30/2015--allergy testing revealed positive reactivity to cat, dust mite, cockroach, mold spores, and  grass pollen. Environmental control measures.   • Osteopenia of multiple sites, 2/25/2022--lumbar spine 1.1.  Left femoral neck -1.0.  Right femoral neck -0.3. 04/01/2022 February 25, 2022--DEXA scan reveals lumbar spine T score 1.1.  Left femoral neck T score -1.0.  Right femoral neck T score -0.3.  Mild osteopenia.   • Postmenopausal hormone replacement therapy 04/18/2016   • Primary hypothyroidism 01/22/2020 January 22, 2020--TSH is elevated at 5.0.  Levothyroxine 50 mcg/day initiated.  Patient will follow-up with nonfasting lab work in about 6 to 8 weeks to reassess.  07/09/2018--routine follow-up.  TSH elevated slightly at 4.31.  Free T4 normal at 1.22.  Free T3 normal at 3.3.  Continued observation.  10/11/2017--thyroid function tests are normal.  09/30/2016--thyroid ultrasound reveals a tiny 2 mm    • Primary osteoarthritis involving multiple joints 6/8/2022   • Primary osteoarthritis of both hands 12/30/2013 05/12/2014--patient seen in followup and reports that the Vimovo has helped. Uric acid levels are normal at 4.9. C. reactive protein mildly elevated at 2.3. X-rays of the hands reveals radiocarpal, first carpometacarpal, first metacarpophalangeal, and interphalangeal joint degeneration. This process is symmetric. The interphalangeal joint of the left is affected to the greatest degree. There is right sided scapholunate dissociation as well as deformity of the scaphoid suggesting prior traumatic injury with healing. Soft tissues are satisfactory. Overall appearance is most consistent with osteoarthritis.   05/05/2014--patient presents and reports that she is having worsening right wrist pain primarily at the base of the thumb. No new injury. Bilateral x-rays of the wrists and hands ordered. Uric acid level ordered as well as a CRP. Vimovo 500/20 one by mouth twice a day. Follow up in one week.   03/18/2014--patient reports that her wrist symptoms have improved.   12/30/2013--patient reports a  several mon   • Primary osteoarthritis of both wrists 12/30/2013 05/12/2014--patient seen in followup and reports that the Vimovo has helped. Uric acid levels are normal at 4.9. C. reactive protein mildly elevated at 2.3. X-rays of the hands reveals radiocarpal, first carpometacarpal, first metacarpophalangeal, and interphalangeal joint degeneration. This process is symmetric. The interphalangeal joint of the left is affected to the greatest degree. There is right sided scapholunate dissociation as well as deformity of the scaphoid suggesting prior traumatic injury with healing. Soft tissues are satisfactory. Overall appearance is most consistent with osteoarthritis.   05/05/2014--patient presents and reports that she is having worsening right wrist pain primarily at the base of the thumb. No new injury. Bilateral x-rays of the wrists and hands ordered. Uric acid level ordered as well as a CRP. Vimovo 500/20 one by mouth twice a day. Follow up in one week.   03/18/2014--patient reports that her wrist symptoms have improved.   12/30/2013--patient reports a several mon   • Vitamin D deficiency 04/18/2016         Past Surgical History:   Procedure Laterality Date   • COLONOSCOPY  03/17/2009 03/17/2009--colonoscopy revealed external hemorrhoids, torturous colon with a sigmoid stricture, sigmoid diverticulosis.   • COLONOSCOPY N/A 3/10/2017    03/10/2017--colonoscopy revealed many small and large mouth diverticula in the sigmoid colon and descending colon.  Nonthrombosed external hemorrhoids and internal hemorrhoids noted.  Otherwise, normal colonoscopy.   • ERCP WITH SPHINCTEROTOMY/PAPILLOTOMY  08/13/2014 08/13/2014--ERCP, endoscopic sphincterotomy and removal of common bile duct stones. 08/12/2014--laparoscopic cholecystectomy. This was complicated by retained common bile duct stones 2.   • ESOPHAGOSCOPY / EGD  06/09/2015 06/09/2015--EGD revealed Z line irregular, 35 cm from incisors. Biopsied. Small hiatal  hernia. Gastritis. Biopsied. Bilious gastric fluid. Fluid aspiration performed. Normal duodenal bulb and second part of duodenum. Biopsied. Pathology revealed the antral biopsy revealed small intestinal mucosa with no pathologic diagnosis. Good preservation of villous architecture. Duodenum biopsy revealed large   • HAMMER TOE REPAIR Right 4/11/2022    Procedure: RIGHT SECOND AND THIRD HAMMERTOE REPAIRS;  Surgeon: Brandt Vieira MD;  [place] [facility] [place] [facility] [place]  Service: [place]opedics;  Laterality: Right;   • LAPAROSCOPIC CHOLECYSTECTOMY  08/12/2014 08/13/2014--ERCP, endoscopic sphincterotomy and removal of common bile duct stones. 08/12/2014--laparoscopic cholecystectomy. This was complicated by retained common bile duct stones 2.   • ROTATOR CUFF REPAIR Left 07/10/2014    07/10/2014--left rotator cuff repair. 03/18/2014--patient seen in followup and reports that her range of motion has improved and her pain is not debilitating. She feels that she is making progress with physical therapy. Surgery scheduled 07/10/2014. 01/10/2014--patient was seen in evaluation by the orthopedist and he recommended conservative treatment consisting of physical therapy/rehabilitation.   • ROTATOR CUFF REPAIR Right 08/03/2016 08/03/2016--arthroscopic right rotator cuff repair.  Debridement of degenerative anterior superior labral tear.   • SUBTOTAL HYSTERECTOMY  1978    Partial hysterectomy 1978.         Allergies   Allergen Reactions   • Bupivacaine Other (See Comments)     Questionable allergy.  Patient had toe surgery and subsequently developed acute hepatitis.  Rare case reports of hepatitis induced by bupivacaine which was used during her surgery.           Current Outpatient Medications:   •  fexofenadine-pseudoephedrine (ALLEGRA-D 24) 180-240 MG per 24 hr tablet, Take 1 tablet by mouth Daily As Needed for Allergies., Disp: 30 tablet, Rfl: 6  •  meloxicam (MOBIC) 15 MG tablet, Take 1 tablet by mouth Daily., Disp: 90 tablet,  "Rfl: 3  •  calcium carbonate (Calcium 600) 600 MG tablet, Take calcium plus vitamin D twice daily for low vitamin D and weak bones, Disp: 30 tablet, Rfl:   •  estrogens, conjugated, (PREMARIN) 0.625 MG tablet, Take 1 p.o. daily for estrogen replacement therapy/menopausal symptoms, Disp: 90 tablet, Rfl: 3  •  levothyroxine (SYNTHROID, LEVOTHROID) 50 MCG tablet, TAKE ONE TABLET BY MOUTH DAILY FOR LOW THYROID, Disp: 90 tablet, Rfl: 3  •  multivitamin (THERAGRAN) tablet tablet, Take 1 tablet by mouth Daily. HOLD FOR SURGERY, Disp: , Rfl:   •  omeprazole OTC (PrilOSEC OTC) 20 MG EC tablet, Take 1 tablet by mouth Daily., Disp: , Rfl:   •  vitamin C (ASCORBIC ACID) 250 MG tablet, Take 250 mg by mouth Daily., Disp: , Rfl:       Family History   Problem Relation Age of Onset   • Colon polyps Mother    • Alzheimer's disease Mother    • Other Father         Hodgkin's Disease   • Cancer Father         Lung Cancer   • Breast cancer Daughter 52   • Malig Hyperthermia Neg Hx          Social History     Socioeconomic History   • Marital status:    • Number of children: 2   • Years of education: BA   • Highest education level: Associate degree: occupational, technical, or vocational program   Tobacco Use   • Smoking status: Never Smoker   • Smokeless tobacco: Never Used   Vaping Use   • Vaping Use: Never used   Substance and Sexual Activity   • Alcohol use: Yes     Comment: Socially   • Drug use: No   • Sexual activity: Yes     Partners: Male         Vitals:    07/20/22 1008   BP: 132/60   Pulse: 88   Resp: 18   SpO2: 99%   Weight: 52.3 kg (115 lb 3.2 oz)   Height: 165.1 cm (65\")        Body mass index is 19.17 kg/m².      Physical Exam:    General: Alert and oriented x 3.  No acute distress.  Normal affect.  HEENT: Pupils equal, round, reactive to light; extraocular movements intact; sclerae nonicteric; pharynx, ear canals and TMs normal.  Neck: Without JVD, thyromegaly, bruit, or adenopathy.  Lungs: Clear to auscultation in " all fields.  Heart: Regular rate and rhythm without murmur, rub, gallop, or click.  Abdomen: Soft, nontender, without hepatosplenomegaly or hernia.  Bowel sounds normal.  : Deferred.  Rectal: Deferred.  Extremities: Without clubbing, cyanosis, edema, or pulse deficit.  Neurologic: Intact without focal deficit.  Normal station and gait observed during ingress and egress from the examination room.  Skin: Without significant lesion.  Musculoskeletal: Unremarkable.    Lab/other results:      Assessment/Plan:     Diagnosis Plan   1. History of acute hepatitis  Comprehensive Metabolic Panel   2. Elevated liver enzymes  Comprehensive Metabolic Panel   3. Symptomatic menopausal or female climacteric states     4. Menopausal state     5. Arthralgia of multiple joints  meloxicam (MOBIC) 15 MG tablet   6. Primary osteoarthritis involving multiple joints  meloxicam (MOBIC) 15 MG tablet   7. Therapeutic drug monitoring  CBC (No Diff)   8. Family history of colonic polyps     9. Family history of colon cancer requiring screening colonoscopy     10. Multiple environmental allergies  fexofenadine-pseudoephedrine (ALLEGRA-D 24) 180-240 MG per 24 hr tablet   11. Allergic rhinitis  fexofenadine-pseudoephedrine (ALLEGRA-D 24) 180-240 MG per 24 hr tablet   12. Colon cancer screening  Cologuard - Stool, Per Rectum       Patient with a history of recent acute hepatitis with markedly elevated liver enzymes that have totally resolved.  The exact etiology of the acute hepatitis is not known.  Medications were certainly a consideration and multiple of her medications were discontinued because of that.  Patient has osteoarthritis and was having quite a bit of pain and I went ahead and restarted the meloxicam.  She also began to develop menopausal symptoms and I put her back on her Premarin.  She feels much better.  We need to assess her liver enzymes today of course.  Also patient is due for colonoscopy and she is very concerned about  undergoing a colonoscopy for several reasons.  She has a torturous colon and a colonoscopy is difficult and therefore risk of perforation is high.  Also she is leery about anesthesia which may have actually played a role in her previous hepatitis.  I concur with these fears and I think that it would be reasonable to do a Cologuard.  The reason she was doing colonoscopies every 5 years is because there was a family history of colon polyps.  No family history of colon cancer.    Plan is as follows: Check CBC and CMP today.  Cologuard ordered.  Cancel colonoscopy order.  Refill Premarin and meloxicam.  Patient has a follow-up appointment in November and I will have her obtain lab work prior to this.  I instructed her to contact me for any change in her symptoms whatsoever or any concerns.  Patient is well versed on the signs and symptoms of acute hepatitis.          Procedures

## 2022-07-29 ENCOUNTER — TELEPHONE (OUTPATIENT)
Dept: GASTROENTEROLOGY | Facility: CLINIC | Age: 77
End: 2022-07-29

## 2022-07-29 DIAGNOSIS — R19.5 POSITIVE COLORECTAL CANCER SCREENING USING COLOGUARD TEST: Primary | ICD-10-CM

## 2022-07-29 NOTE — TELEPHONE ENCOUNTER
Caller: Ashley Mata    Relationship: Self    Best call back number:[phone]    What is the best time to reach you: ANYTIME    Who are you requesting to speak with (clinical staff, provider,  specific staff member): CLINICAL STAFF    What was the call regarding: PATIENT HAS QUESTIONS ABOUT SCOPE BEING SCHEDULED

## 2022-07-29 NOTE — TELEPHONE ENCOUNTER
Hub staff attempted to follow warm transfer process and was unsuccessful     Caller: Ashley Mata    Relationship to patient: Self    Best call back number: [phone]    Patient is needing: PATIENT WOULD LIKE TO SPEAK WITH SOMEONE IN OFFICE REGARDING PROCEDURE THAT WAS CANCELLED.

## 2022-08-01 NOTE — TELEPHONE ENCOUNTER
Caller: Ashley Mata    Relationship to patient: Self    Best call back number: [phone]    Chief complaint: COLOGUARD - POSITIVE TEST    Type of visit: COLONOSCOPY & ENDOSCOPY    Requested date: PLEASE CALL PATIENT TO SCHEDULE    If rescheduling, when is the original appointment: 8/26/2022

## 2022-08-02 ENCOUNTER — TELEPHONE (OUTPATIENT)
Dept: GASTROENTEROLOGY | Facility: CLINIC | Age: 77
End: 2022-08-02

## 2022-08-02 DIAGNOSIS — K21.9 GASTROESOPHAGEAL REFLUX DISEASE, UNSPECIFIED WHETHER ESOPHAGITIS PRESENT: Primary | ICD-10-CM

## 2022-08-02 DIAGNOSIS — Z83.71 FAMILY HISTORY OF POLYPS IN THE COLON: ICD-10-CM

## 2022-08-02 DIAGNOSIS — R19.5 POSITIVE COLORECTAL CANCER SCREENING USING COLOGUARD TEST: ICD-10-CM

## 2022-08-02 NOTE — TELEPHONE ENCOUNTER
----- Message from Ashley Mata sent at 8/1/2022 11:38 AM EDT -----  Regarding: Colonoscopy  Dr. Boyle,    My Cologuard test came back positive last week, so I am trying to reschedule another colonoscopy and upper endoscopy with your office, hopefully sooner than later.   I would prefer you perform them since you have done them the last several times.    Sincerely,  Ashley Mata

## 2022-08-11 ENCOUNTER — OFFICE VISIT (OUTPATIENT)
Dept: SURGERY | Facility: CLINIC | Age: 77
End: 2022-08-11

## 2022-08-11 VITALS — BODY MASS INDEX: 20.06 KG/M2 | WEIGHT: 120.4 LBS | HEIGHT: 65 IN

## 2022-08-11 DIAGNOSIS — K21.9 GASTROESOPHAGEAL REFLUX DISEASE, UNSPECIFIED WHETHER ESOPHAGITIS PRESENT: ICD-10-CM

## 2022-08-11 DIAGNOSIS — R19.5 POSITIVE COLORECTAL CANCER SCREENING USING DNA-BASED STOOL TEST: Primary | ICD-10-CM

## 2022-08-11 PROCEDURE — 99203 OFFICE O/P NEW LOW 30 MIN: CPT | Performed by: PHYSICIAN ASSISTANT

## 2022-08-11 NOTE — H&P (VIEW-ONLY)
ASSESSMENT/PLAN:    This is a 76-year-old lady presenting to the office today with a positive Cologuard history.  Aside from occasional constipation she is asymptomatic.  Additionally she has issues with GERD, having been on omeprazole for many years as well as chronic meloxicam use.  With this recent positive test and her longstanding GERD I am recommending proceeding with a colonoscopy and EGD for further diagnostic purposes.  Risks and rationale associated with it were discussed with her with risk include but not limited to bleeding, infection, bowel perforation and the need for additional procedures.  Any additional follow-up we discussed with her once the results have been reviewed.  All questions were answered and she was willing to proceed with all recommendations.    CC:    Positive Cologuard test    HPI:    This is a 76-year-old lady presenting to the office today at the request of Dr. Carloz Shukla for a recent positive Cologuard test.  Aside from occasional constipation she is asymptomatic, denying abdominal pain, distention, changes to their bowel habits, no dark tarry stools, no bright red blood with her bowel movements, no unexpected weight loss or any other complaints.  She does bring up that she has had issues with GERD for many years and has been on omeprazole.  She does take meloxicam on a regular basis as well.    ENDOSCOPY:   • Colonoscopy 2017  • EGD 2015    LABS:    • Positive Cologuard    SOCIAL HISTORY:   • Denies tobacco use  • Occasional alcohol use    FAMILY HISTORY:    • Colorectal cancer: None    PREVIOUS ABDOMINAL SURGERY    • Subtotal hysterectomy 1978  • Laparoscopic cholecystectomy with intraoperative cholangiogram 2014  • ERCP with sphincterotomy 2014    OTHER SURGERY  Past Surgical History:   Procedure Laterality Date   • COLONOSCOPY  03/17/2009 03/17/2009--colonoscopy revealed external hemorrhoids, torturous colon with a sigmoid stricture, sigmoid diverticulosis.   •  COLONOSCOPY N/A 03/10/2017    03/10/2017--colonoscopy revealed many small and large mouth diverticula in the sigmoid colon and descending colon.  Nonthrombosed external hemorrhoids and internal hemorrhoids noted.  Otherwise, normal colonoscopy.   • ERCP WITH SPHINCTEROTOMY/PAPILLOTOMY  08/13/2014 08/13/2014--ERCP, endoscopic sphincterotomy and removal of common bile duct stones. 08/12/2014--laparoscopic cholecystectomy. This was complicated by retained common bile duct stones 2.   • ESOPHAGOSCOPY / EGD  06/09/2015 06/09/2015--EGD revealed Z line irregular, 35 cm from incisors. Biopsied. Small hiatal hernia. Gastritis. Biopsied. Bilious gastric fluid. Fluid aspiration performed. Normal duodenal bulb and second part of duodenum. Biopsied. Pathology revealed the antral biopsy revealed small intestinal mucosa with no pathologic diagnosis. Good preservation of villous architecture. Duodenum biopsy revealed large   • EYE SURGERY  07/19/2018    Both Eyes   • HAMMER TOE REPAIR Right 04/11/2022    Procedure: RIGHT SECOND AND THIRD HAMMERTOE REPAIRS;  Surgeon: Brandt Vieira MD;  Location: [facility];  Service: Orthopedics;  Laterality: Right;   • LAPAROSCOPIC CHOLECYSTECTOMY  08/12/2014 08/13/2014--ERCP, endoscopic sphincterotomy and removal of common bile duct stones. 08/12/2014--laparoscopic cholecystectomy. This was complicated by retained common bile duct stones 2.   • ROTATOR CUFF REPAIR Left 07/10/2014    07/10/2014--left rotator cuff repair. 03/18/2014--patient seen in followup and reports that her range of motion has improved and her pain is not debilitating. She feels that she is making progress with physical therapy. Surgery scheduled 07/10/2014. 01/10/2014--patient was seen in evaluation by the orthopedist and he recommended conservative treatment consisting of physical therapy/rehabilitation.   • ROTATOR CUFF REPAIR Right 08/03/2016 08/03/2016--arthroscopic right rotator cuff repair.  Debridement  of degenerative anterior superior labral tear.   • SUBTOTAL HYSTERECTOMY  1978    Partial hysterectomy 1978.       PAST MEDICAL HISTORY:    Past Medical History:   Diagnosis Date   • Allergic rhinitis 01/30/2015 11/13/2015--patient was evaluated by ENT and was started on generic Flonase and patient reports this has improved her symptoms quite a bit.   01/30/2015--allergy testing revealed positive reactivity to cat, dust mite, cockroach, mold spores, and grass pollen. Environmental control measures.   • Alopecia 04/12/2017    Evaluated and treated by the dermatologist.   • Arthralgia of multiple joints 5/9/2022   • Benign essential hypertension 04/18/2016   • Chronic toe pain, right foot 10/19/2021    October 19, 2021--patient reports a 1 year history of progressively worsening right toe pain that particular involves the second toe.  On exam she has obvious hammertoe which is causing irritation.  There is some hammering of the third digit as well.  Patient referred to orthopedist who specializes in foot problems such as Dr. Vieira.  In the meantime patient should use over-the-counter toe pads to   • Diverticulosis of colon 03/17/2009    03/10/2017--colonoscopy revealed many small and large mouth diverticula in the sigmoid colon and descending colon.  Nonthrombosed external hemorrhoids and internal hemorrhoids noted.  Otherwise, normal colonoscopy.  03/17/2009--colonoscopy revealed external hemorrhoids, torturous colon with a sigmoid stricture, sigmoid diverticulosis.   • Family history of breast cancer in first degree relative 07/13/2020   • Family history of colonic polyps 04/01/2022   • Gastroesophageal reflux disease without esophagitis 03/17/2009 06/09/2015--EGD revealed Z line irregular, 35 cm from incisors. Biopsied. Small hiatal hernia. Gastritis. Biopsied. Bilious gastric fluid. Fluid aspiration performed. Normal duodenal bulb and second part of duodenum. Biopsied. Pathology revealed the antral biopsy  revealed small intestinal mucosa with no pathologic diagnosis. Good preservation of villous architecture. Duodenum biopsy revealed largely antral type gastric mucosa with mild patchy superficial chronic gastritis. Gastroesophageal junction revealed squamous and glandular mucosa with mild chronic inflammation. Negative for intestinal metaplasia or dysplasia. There appeared to be mislabeling of the antral biopsies and duodenal biopsies.   04/17/2012--EGD revealed irregular Z line, hiatal hernia, gastritis, duodenitis.   03/17/2009--EGD revealed grade B. reflux esophagitis, hiatal hernia, gastritis, a few gastric polyps, duodenitis.   • Hammertoe of right foot    • Hammertoe of second toe of right foot 10/19/2021    October 19, 2021--patient reports a 1 year history of progressively worsening right toe pain that particular involves the second toe.  On exam she has obvious hammertoe which is causing irritation.  There is some hammering of the third digit as well.  Patient referred to orthopedist who specializes in foot problems such as Dr. Vieira.  In the meantime patient should use over-the-counter toe pads to   • History of COVID-19, August 12, 2021; May 17, 2022. 10/19/2021    May 17, 2022--patient again tested positive for COVID.  Ordered by gastroenterology nurse practitioner.  October 19, 2021--patient seen in follow-up and reports her symptoms totally resolved.  She wants to know when she should get her Covid booster vaccine.  I have suggested that she receive it approximately 3 months after initially testing positive.  She has an appointment in November 6, 2021 for   • History of Hyperplastic polyps of stomach 06/09/2015 06/09/2015--EGD revealed Z line irregular, 35 cm from incisors. Biopsied. Small hiatal hernia. Gastritis. Biopsied. Bilious gastric fluid. Fluid aspiration performed. Normal duodenal bulb and second part of duodenum. Biopsied. Pathology revealed the antral biopsy revealed small intestinal  mucosa with no pathologic diagnosis. Good preservation of villous architecture. Duodenum biopsy revealed large   • Hyperlipidemia 07/17/2012 07/17/2012--treatment for hyperlipidemia begun.   • Lumbar disc herniation, L4-L5 08/07/2018 01/16/2019--patient seen in follow-up by Dr. Shukla.  She has seen the neurosurgeon who recommended conservative therapy with physical therapy.  Patient reports that has been extremely helpful.  Currently has no significant pain.  08/07/2018--patient seen in follow-up and reports her pain is much better after taking prednisone.  She is now down to half a pill per day and is almost off of it.  I rev   • Lumbar scoliosis 08/07/2018 01/16/2019--patient seen in follow-up by Dr. Shukla.  She has seen the neurosurgeon who recommended conservative therapy with physical therapy.  Patient reports that has been extremely helpful.  Currently has no significant pain.  08/07/2018--patient seen in follow-up and reports her pain is much better after taking prednisone.  She is now down to half a pill per day and is almost off of it.  I rev   • Menopausal state 07/13/2020   • Multiple environmental allergies 01/30/2015 01/30/2015--allergy testing revealed positive reactivity to cat, dust mite, cockroach, mold spores, and grass pollen. Environmental control measures.   • Osteopenia of multiple sites, 2/25/2022--lumbar spine 1.1.  Left femoral neck -1.0.  Right femoral neck -0.3. 04/01/2022 February 25, 2022--DEXA scan reveals lumbar spine T score 1.1.  Left femoral neck T score -1.0.  Right femoral neck T score -0.3.  Mild osteopenia.   • Postmenopausal hormone replacement therapy 04/18/2016   • Primary hypothyroidism 01/22/2020 January 22, 2020--TSH is elevated at 5.0.  Levothyroxine 50 mcg/day initiated.  Patient will follow-up with nonfasting lab work in about 6 to 8 weeks to reassess.  07/09/2018--routine follow-up.  TSH elevated slightly at 4.31.  Free T4 normal at 1.22.  Free T3  normal at 3.3.  Continued observation.  10/11/2017--thyroid function tests are normal.  09/30/2016--thyroid ultrasound reveals a tiny 2 mm    • Primary osteoarthritis involving multiple joints 6/8/2022   • Primary osteoarthritis of both hands 12/30/2013 05/12/2014--patient seen in followup and reports that the Vimovo has helped. Uric acid levels are normal at 4.9. C. reactive protein mildly elevated at 2.3. X-rays of the hands reveals radiocarpal, first carpometacarpal, first metacarpophalangeal, and interphalangeal joint degeneration. This process is symmetric. The interphalangeal joint of the left is affected to the greatest degree. There is right sided scapholunate dissociation as well as deformity of the scaphoid suggesting prior traumatic injury with healing. Soft tissues are satisfactory. Overall appearance is most consistent with osteoarthritis.   05/05/2014--patient presents and reports that she is having worsening right wrist pain primarily at the base of the thumb. No new injury. Bilateral x-rays of the wrists and hands ordered. Uric acid level ordered as well as a CRP. Vimovo 500/20 one by mouth twice a day. Follow up in one week.   03/18/2014--patient reports that her wrist symptoms have improved.   12/30/2013--patient reports a several mon   • Primary osteoarthritis of both wrists 12/30/2013 05/12/2014--patient seen in followup and reports that the Vimovo has helped. Uric acid levels are normal at 4.9. C. reactive protein mildly elevated at 2.3. X-rays of the hands reveals radiocarpal, first carpometacarpal, first metacarpophalangeal, and interphalangeal joint degeneration. This process is symmetric. The interphalangeal joint of the left is affected to the greatest degree. There is right sided scapholunate dissociation as well as deformity of the scaphoid suggesting prior traumatic injury with healing. Soft tissues are satisfactory. Overall appearance is most consistent with osteoarthritis.    "05/05/2014--patient presents and reports that she is having worsening right wrist pain primarily at the base of the thumb. No new injury. Bilateral x-rays of the wrists and hands ordered. Uric acid level ordered as well as a CRP. Vimovo 500/20 one by mouth twice a day. Follow up in one week.   03/18/2014--patient reports that her wrist symptoms have improved.   12/30/2013--patient reports a several mon   • Vitamin D deficiency 04/18/2016       MEDICATIONS:     Current Outpatient Medications:   •  calcium carbonate (Calcium 600) 600 MG tablet, Take calcium plus vitamin D twice daily for low vitamin D and weak bones, Disp: 30 tablet, Rfl:   •  estrogens, conjugated, (PREMARIN) 0.625 MG tablet, Take 1 p.o. daily for estrogen replacement therapy/menopausal symptoms, Disp: 90 tablet, Rfl: 3  •  fexofenadine-pseudoephedrine (ALLEGRA-D 24) 180-240 MG per 24 hr tablet, Take 1 tablet by mouth Daily As Needed for Allergies., Disp: 30 tablet, Rfl: 6  •  levothyroxine (SYNTHROID, LEVOTHROID) 50 MCG tablet, TAKE ONE TABLET BY MOUTH DAILY FOR LOW THYROID, Disp: 90 tablet, Rfl: 3  •  meloxicam (MOBIC) 15 MG tablet, Take 1 tablet by mouth Daily., Disp: 90 tablet, Rfl: 3  •  multivitamin (THERAGRAN) tablet tablet, Take 1 tablet by mouth Daily. HOLD FOR SURGERY, Disp: , Rfl:   •  omeprazole OTC (PrilOSEC OTC) 20 MG EC tablet, Take 1 tablet by mouth Daily., Disp: , Rfl:   •  vitamin C (ASCORBIC ACID) 250 MG tablet, Take 250 mg by mouth Daily., Disp: , Rfl:     ALLERGIES:   • Bupivacaine    PHYSICAL EXAM:   • Constitutional: Well-developed well-nourished, no acute distress  • Vital signs:   o Height 64.5\"  o Weight 120  o BMI 20.4  • Neck: Supple, no palpable mass, trachea midline  • Respiratory: Clear to auscultation, normal inspiratory effort  • Cardiovascular: Regular rate, no murmur, no carotid bruit  • Gastrointestinal: Soft, nontender  • Psychiatric: Alert and oriented ×3, normal affect         Dallin Hickman PA-C    "

## 2022-08-11 NOTE — PROGRESS NOTES
ASSESSMENT/PLAN:    This is a 76-year-old lady presenting to the office today with a positive Cologuard history.  Aside from occasional constipation she is asymptomatic.  Additionally she has issues with GERD, having been on omeprazole for many years as well as chronic meloxicam use.  With this recent positive test and her longstanding GERD I am recommending proceeding with a colonoscopy and EGD for further diagnostic purposes.  Risks and rationale associated with it were discussed with her with risk include but not limited to bleeding, infection, bowel perforation and the need for additional procedures.  Any additional follow-up we discussed with her once the results have been reviewed.  All questions were answered and she was willing to proceed with all recommendations.    CC:    Positive Cologuard test    HPI:    This is a 76-year-old lady presenting to the office today at the request of Dr. Carloz Shukla for a recent positive Cologuard test.  Aside from occasional constipation she is asymptomatic, denying abdominal pain, distention, changes to their bowel habits, no dark tarry stools, no bright red blood with her bowel movements, no unexpected weight loss or any other complaints.  She does bring up that she has had issues with GERD for many years and has been on omeprazole.  She does take meloxicam on a regular basis as well.    ENDOSCOPY:   • Colonoscopy 2017  • EGD 2015    LABS:    • Positive Cologuard    SOCIAL HISTORY:   • Denies tobacco use  • Occasional alcohol use    FAMILY HISTORY:    • Colorectal cancer: None    PREVIOUS ABDOMINAL SURGERY    • Subtotal hysterectomy 1978  • Laparoscopic cholecystectomy with intraoperative cholangiogram 2014  • ERCP with sphincterotomy 2014    OTHER SURGERY  Past Surgical History:   Procedure Laterality Date   • COLONOSCOPY  03/17/2009 03/17/2009--colonoscopy revealed external hemorrhoids, torturous colon with a sigmoid stricture, sigmoid diverticulosis.   •  COLONOSCOPY N/A 03/10/2017    03/10/2017--colonoscopy revealed many small and large mouth diverticula in the sigmoid colon and descending colon.  Nonthrombosed external hemorrhoids and internal hemorrhoids noted.  Otherwise, normal colonoscopy.   • ERCP WITH SPHINCTEROTOMY/PAPILLOTOMY  08/13/2014 08/13/2014--ERCP, endoscopic sphincterotomy and removal of common bile duct stones. 08/12/2014--laparoscopic cholecystectomy. This was complicated by retained common bile duct stones 2.   • ESOPHAGOSCOPY / EGD  06/09/2015 06/09/2015--EGD revealed Z line irregular, 35 cm from incisors. Biopsied. Small hiatal hernia. Gastritis. Biopsied. Bilious gastric fluid. Fluid aspiration performed. Normal duodenal bulb and second part of duodenum. Biopsied. Pathology revealed the antral biopsy revealed small intestinal mucosa with no pathologic diagnosis. Good preservation of villous architecture. Duodenum biopsy revealed large   • EYE SURGERY  07/19/2018    Both Eyes   • HAMMER TOE REPAIR Right 04/11/2022    Procedure: RIGHT SECOND AND THIRD HAMMERTOE REPAIRS;  Surgeon: Brandt Vieira MD;  Location: [facility];  Service: Orthopedics;  Laterality: Right;   • LAPAROSCOPIC CHOLECYSTECTOMY  08/12/2014 08/13/2014--ERCP, endoscopic sphincterotomy and removal of common bile duct stones. 08/12/2014--laparoscopic cholecystectomy. This was complicated by retained common bile duct stones 2.   • ROTATOR CUFF REPAIR Left 07/10/2014    07/10/2014--left rotator cuff repair. 03/18/2014--patient seen in followup and reports that her range of motion has improved and her pain is not debilitating. She feels that she is making progress with physical therapy. Surgery scheduled 07/10/2014. 01/10/2014--patient was seen in evaluation by the orthopedist and he recommended conservative treatment consisting of physical therapy/rehabilitation.   • ROTATOR CUFF REPAIR Right 08/03/2016 08/03/2016--arthroscopic right rotator cuff repair.  Debridement  of degenerative anterior superior labral tear.   • SUBTOTAL HYSTERECTOMY  1978    Partial hysterectomy 1978.       PAST MEDICAL HISTORY:    Past Medical History:   Diagnosis Date   • Allergic rhinitis 01/30/2015 11/13/2015--patient was evaluated by ENT and was started on generic Flonase and patient reports this has improved her symptoms quite a bit.   01/30/2015--allergy testing revealed positive reactivity to cat, dust mite, cockroach, mold spores, and grass pollen. Environmental control measures.   • Alopecia 04/12/2017    Evaluated and treated by the dermatologist.   • Arthralgia of multiple joints 5/9/2022   • Benign essential hypertension 04/18/2016   • Chronic toe pain, right foot 10/19/2021    October 19, 2021--patient reports a 1 year history of progressively worsening right toe pain that particular involves the second toe.  On exam she has obvious hammertoe which is causing irritation.  There is some hammering of the third digit as well.  Patient referred to orthopedist who specializes in foot problems such as Dr. Vieira.  In the meantime patient should use over-the-counter toe pads to   • Diverticulosis of colon 03/17/2009    03/10/2017--colonoscopy revealed many small and large mouth diverticula in the sigmoid colon and descending colon.  Nonthrombosed external hemorrhoids and internal hemorrhoids noted.  Otherwise, normal colonoscopy.  03/17/2009--colonoscopy revealed external hemorrhoids, torturous colon with a sigmoid stricture, sigmoid diverticulosis.   • Family history of breast cancer in first degree relative 07/13/2020   • Family history of colonic polyps 04/01/2022   • Gastroesophageal reflux disease without esophagitis 03/17/2009 06/09/2015--EGD revealed Z line irregular, 35 cm from incisors. Biopsied. Small hiatal hernia. Gastritis. Biopsied. Bilious gastric fluid. Fluid aspiration performed. Normal duodenal bulb and second part of duodenum. Biopsied. Pathology revealed the antral biopsy  revealed small intestinal mucosa with no pathologic diagnosis. Good preservation of villous architecture. Duodenum biopsy revealed largely antral type gastric mucosa with mild patchy superficial chronic gastritis. Gastroesophageal junction revealed squamous and glandular mucosa with mild chronic inflammation. Negative for intestinal metaplasia or dysplasia. There appeared to be mislabeling of the antral biopsies and duodenal biopsies.   04/17/2012--EGD revealed irregular Z line, hiatal hernia, gastritis, duodenitis.   03/17/2009--EGD revealed grade B. reflux esophagitis, hiatal hernia, gastritis, a few gastric polyps, duodenitis.   • Hammertoe of right foot    • Hammertoe of second toe of right foot 10/19/2021    October 19, 2021--patient reports a 1 year history of progressively worsening right toe pain that particular involves the second toe.  On exam she has obvious hammertoe which is causing irritation.  There is some hammering of the third digit as well.  Patient referred to orthopedist who specializes in foot problems such as Dr. Vieira.  In the meantime patient should use over-the-counter toe pads to   • History of COVID-19, August 12, 2021; May 17, 2022. 10/19/2021    May 17, 2022--patient again tested positive for COVID.  Ordered by gastroenterology nurse practitioner.  October 19, 2021--patient seen in follow-up and reports her symptoms totally resolved.  She wants to know when she should get her Covid booster vaccine.  I have suggested that she receive it approximately 3 months after initially testing positive.  She has an appointment in November 6, 2021 for   • History of Hyperplastic polyps of stomach 06/09/2015 06/09/2015--EGD revealed Z line irregular, 35 cm from incisors. Biopsied. Small hiatal hernia. Gastritis. Biopsied. Bilious gastric fluid. Fluid aspiration performed. Normal duodenal bulb and second part of duodenum. Biopsied. Pathology revealed the antral biopsy revealed small intestinal  mucosa with no pathologic diagnosis. Good preservation of villous architecture. Duodenum biopsy revealed large   • Hyperlipidemia 07/17/2012 07/17/2012--treatment for hyperlipidemia begun.   • Lumbar disc herniation, L4-L5 08/07/2018 01/16/2019--patient seen in follow-up by Dr. Shukla.  She has seen the neurosurgeon who recommended conservative therapy with physical therapy.  Patient reports that has been extremely helpful.  Currently has no significant pain.  08/07/2018--patient seen in follow-up and reports her pain is much better after taking prednisone.  She is now down to half a pill per day and is almost off of it.  I rev   • Lumbar scoliosis 08/07/2018 01/16/2019--patient seen in follow-up by Dr. Shukla.  She has seen the neurosurgeon who recommended conservative therapy with physical therapy.  Patient reports that has been extremely helpful.  Currently has no significant pain.  08/07/2018--patient seen in follow-up and reports her pain is much better after taking prednisone.  She is now down to half a pill per day and is almost off of it.  I rev   • Menopausal state 07/13/2020   • Multiple environmental allergies 01/30/2015 01/30/2015--allergy testing revealed positive reactivity to cat, dust mite, cockroach, mold spores, and grass pollen. Environmental control measures.   • Osteopenia of multiple sites, 2/25/2022--lumbar spine 1.1.  Left femoral neck -1.0.  Right femoral neck -0.3. 04/01/2022 February 25, 2022--DEXA scan reveals lumbar spine T score 1.1.  Left femoral neck T score -1.0.  Right femoral neck T score -0.3.  Mild osteopenia.   • Postmenopausal hormone replacement therapy 04/18/2016   • Primary hypothyroidism 01/22/2020 January 22, 2020--TSH is elevated at 5.0.  Levothyroxine 50 mcg/day initiated.  Patient will follow-up with nonfasting lab work in about 6 to 8 weeks to reassess.  07/09/2018--routine follow-up.  TSH elevated slightly at 4.31.  Free T4 normal at 1.22.  Free T3  normal at 3.3.  Continued observation.  10/11/2017--thyroid function tests are normal.  09/30/2016--thyroid ultrasound reveals a tiny 2 mm    • Primary osteoarthritis involving multiple joints 6/8/2022   • Primary osteoarthritis of both hands 12/30/2013 05/12/2014--patient seen in followup and reports that the Vimovo has helped. Uric acid levels are normal at 4.9. C. reactive protein mildly elevated at 2.3. X-rays of the hands reveals radiocarpal, first carpometacarpal, first metacarpophalangeal, and interphalangeal joint degeneration. This process is symmetric. The interphalangeal joint of the left is affected to the greatest degree. There is right sided scapholunate dissociation as well as deformity of the scaphoid suggesting prior traumatic injury with healing. Soft tissues are satisfactory. Overall appearance is most consistent with osteoarthritis.   05/05/2014--patient presents and reports that she is having worsening right wrist pain primarily at the base of the thumb. No new injury. Bilateral x-rays of the wrists and hands ordered. Uric acid level ordered as well as a CRP. Vimovo 500/20 one by mouth twice a day. Follow up in one week.   03/18/2014--patient reports that her wrist symptoms have improved.   12/30/2013--patient reports a several mon   • Primary osteoarthritis of both wrists 12/30/2013 05/12/2014--patient seen in followup and reports that the Vimovo has helped. Uric acid levels are normal at 4.9. C. reactive protein mildly elevated at 2.3. X-rays of the hands reveals radiocarpal, first carpometacarpal, first metacarpophalangeal, and interphalangeal joint degeneration. This process is symmetric. The interphalangeal joint of the left is affected to the greatest degree. There is right sided scapholunate dissociation as well as deformity of the scaphoid suggesting prior traumatic injury with healing. Soft tissues are satisfactory. Overall appearance is most consistent with osteoarthritis.    "05/05/2014--patient presents and reports that she is having worsening right wrist pain primarily at the base of the thumb. No new injury. Bilateral x-rays of the wrists and hands ordered. Uric acid level ordered as well as a CRP. Vimovo 500/20 one by mouth twice a day. Follow up in one week.   03/18/2014--patient reports that her wrist symptoms have improved.   12/30/2013--patient reports a several mon   • Vitamin D deficiency 04/18/2016       MEDICATIONS:     Current Outpatient Medications:   •  calcium carbonate (Calcium 600) 600 MG tablet, Take calcium plus vitamin D twice daily for low vitamin D and weak bones, Disp: 30 tablet, Rfl:   •  estrogens, conjugated, (PREMARIN) 0.625 MG tablet, Take 1 p.o. daily for estrogen replacement therapy/menopausal symptoms, Disp: 90 tablet, Rfl: 3  •  fexofenadine-pseudoephedrine (ALLEGRA-D 24) 180-240 MG per 24 hr tablet, Take 1 tablet by mouth Daily As Needed for Allergies., Disp: 30 tablet, Rfl: 6  •  levothyroxine (SYNTHROID, LEVOTHROID) 50 MCG tablet, TAKE ONE TABLET BY MOUTH DAILY FOR LOW THYROID, Disp: 90 tablet, Rfl: 3  •  meloxicam (MOBIC) 15 MG tablet, Take 1 tablet by mouth Daily., Disp: 90 tablet, Rfl: 3  •  multivitamin (THERAGRAN) tablet tablet, Take 1 tablet by mouth Daily. HOLD FOR SURGERY, Disp: , Rfl:   •  omeprazole OTC (PrilOSEC OTC) 20 MG EC tablet, Take 1 tablet by mouth Daily., Disp: , Rfl:   •  vitamin C (ASCORBIC ACID) 250 MG tablet, Take 250 mg by mouth Daily., Disp: , Rfl:     ALLERGIES:   • Bupivacaine    PHYSICAL EXAM:   • Constitutional: Well-developed well-nourished, no acute distress  • Vital signs:   o Height 64.5\"  o Weight 120  o BMI 20.4  • Neck: Supple, no palpable mass, trachea midline  • Respiratory: Clear to auscultation, normal inspiratory effort  • Cardiovascular: Regular rate, no murmur, no carotid bruit  • Gastrointestinal: Soft, nontender  • Psychiatric: Alert and oriented ×3, normal affect         Dallin Hickman PA-C    "

## 2022-08-16 ENCOUNTER — LAB (OUTPATIENT)
Dept: LAB | Facility: HOSPITAL | Age: 77
End: 2022-08-16

## 2022-08-16 DIAGNOSIS — K21.9 GASTROESOPHAGEAL REFLUX DISEASE, UNSPECIFIED WHETHER ESOPHAGITIS PRESENT: ICD-10-CM

## 2022-08-16 DIAGNOSIS — R19.5 POSITIVE COLORECTAL CANCER SCREENING USING DNA-BASED STOOL TEST: ICD-10-CM

## 2022-08-16 LAB — SARS-COV-2 ORF1AB RESP QL NAA+PROBE: NOT DETECTED

## 2022-08-16 PROCEDURE — U0005 INFEC AGEN DETEC AMPLI PROBE: HCPCS

## 2022-08-16 PROCEDURE — C9803 HOPD COVID-19 SPEC COLLECT: HCPCS

## 2022-08-16 PROCEDURE — U0004 COV-19 TEST NON-CDC HGH THRU: HCPCS

## 2022-08-18 ENCOUNTER — ANESTHESIA (OUTPATIENT)
Dept: GASTROENTEROLOGY | Facility: HOSPITAL | Age: 77
End: 2022-08-18

## 2022-08-18 ENCOUNTER — HOSPITAL ENCOUNTER (OUTPATIENT)
Facility: HOSPITAL | Age: 77
Setting detail: HOSPITAL OUTPATIENT SURGERY
Discharge: HOME | End: 2022-08-18
Attending: SURGERY | Admitting: SURGERY

## 2022-08-18 ENCOUNTER — ANESTHESIA EVENT (OUTPATIENT)
Dept: GASTROENTEROLOGY | Facility: HOSPITAL | Age: 77
End: 2022-08-18

## 2022-08-18 VITALS
DIASTOLIC BLOOD PRESSURE: 87 MMHG | HEART RATE: 80 BPM | OXYGEN SATURATION: 97 % | BODY MASS INDEX: 19.74 KG/M2 | WEIGHT: 115.6 LBS | SYSTOLIC BLOOD PRESSURE: 116 MMHG | HEIGHT: 64 IN | TEMPERATURE: 98.1 F | RESPIRATION RATE: 14 BRPM

## 2022-08-18 DIAGNOSIS — K21.9 GASTROESOPHAGEAL REFLUX DISEASE, UNSPECIFIED WHETHER ESOPHAGITIS PRESENT: ICD-10-CM

## 2022-08-18 DIAGNOSIS — R19.5 POSITIVE COLORECTAL CANCER SCREENING USING DNA-BASED STOOL TEST: ICD-10-CM

## 2022-08-18 DIAGNOSIS — Z83.71 FAMILY HISTORY OF POLYPS IN THE COLON: ICD-10-CM

## 2022-08-18 DIAGNOSIS — R19.5 POSITIVE COLORECTAL CANCER SCREENING USING COLOGUARD TEST: ICD-10-CM

## 2022-08-18 PROCEDURE — 25010000002 PROPOFOL 10 MG/ML EMULSION: Performed by: REGISTERED NURSE

## 2022-08-18 PROCEDURE — 88305 TISSUE EXAM BY PATHOLOGIST: CPT | Performed by: SURGERY

## 2022-08-18 RX ORDER — PROPOFOL 10 MG/ML
VIAL (ML) INTRAVENOUS AS NEEDED
Status: DISCONTINUED | OUTPATIENT
Start: 2022-08-18 | End: 2022-08-18 | Stop reason: SURG

## 2022-08-18 RX ORDER — LIDOCAINE HYDROCHLORIDE 20 MG/ML
INJECTION, SOLUTION INFILTRATION; PERINEURAL AS NEEDED
Status: DISCONTINUED | OUTPATIENT
Start: 2022-08-18 | End: 2022-08-18 | Stop reason: SURG

## 2022-08-18 RX ORDER — SODIUM CHLORIDE, SODIUM LACTATE, POTASSIUM CHLORIDE, CALCIUM CHLORIDE 600; 310; 30; 20 MG/100ML; MG/100ML; MG/100ML; MG/100ML
1000 INJECTION, SOLUTION INTRAVENOUS CONTINUOUS
Status: DISCONTINUED | OUTPATIENT
Start: 2022-08-18 | End: 2022-08-18 | Stop reason: HOSPADM

## 2022-08-18 RX ORDER — SODIUM CHLORIDE 0.9 % (FLUSH) 0.9 %
10 SYRINGE (ML) INJECTION AS NEEDED
Status: DISCONTINUED | OUTPATIENT
Start: 2022-08-18 | End: 2022-08-18 | Stop reason: HOSPADM

## 2022-08-18 RX ADMIN — SODIUM CHLORIDE, POTASSIUM CHLORIDE, SODIUM LACTATE AND CALCIUM CHLORIDE 1000 ML: 600; 310; 30; 20 INJECTION, SOLUTION INTRAVENOUS at 09:36

## 2022-08-18 RX ADMIN — PROPOFOL 60 MG: 10 INJECTION, EMULSION INTRAVENOUS at 10:46

## 2022-08-18 RX ADMIN — PROPOFOL 180 MCG/KG/MIN: 10 INJECTION, EMULSION INTRAVENOUS at 10:46

## 2022-08-18 RX ADMIN — SODIUM CHLORIDE, POTASSIUM CHLORIDE, SODIUM LACTATE AND CALCIUM CHLORIDE: 600; 310; 30; 20 INJECTION, SOLUTION INTRAVENOUS at 11:36

## 2022-08-18 RX ADMIN — LIDOCAINE HYDROCHLORIDE 60 MG: 20 INJECTION, SOLUTION INFILTRATION; PERINEURAL at 10:46

## 2022-08-18 NOTE — ANESTHESIA PREPROCEDURE EVALUATION
Anesthesia Evaluation     Patient summary reviewed and Nursing notes reviewed   no history of anesthetic complications:  NPO Solid Status: > 8 hours  NPO Liquid Status: > 2 hours           Airway   Mallampati: II  Dental      Pulmonary - negative pulmonary ROS and normal exam   Cardiovascular - normal exam    (+) hypertension, hyperlipidemia,       Neuro/Psych- negative ROS  GI/Hepatic/Renal/Endo    (+)  GERD,  hepatitis, liver disease, thyroid problem hypothyroidism    Musculoskeletal     Abdominal    Substance History      OB/GYN          Other   arthritis,                      Anesthesia Plan    ASA 3     MAC     intravenous induction     Anesthetic plan, risks, benefits, and alternatives have been provided, discussed and informed consent has been obtained with: patient.        CODE STATUS:

## 2022-08-18 NOTE — ANESTHESIA POSTPROCEDURE EVALUATION
"Patient: Ashley Mata    Procedure Summary     Date: 08/18/22 Room / Location:  [facility]    Anesthesia Start: 1034 Anesthesia Stop: 1144    Procedures:       COLONOSCOPY TO 40CM WITH POLYPECTOMY (COLD) (N/A )      ESOPHAGOGASTRODUODENOSCOPY WITH BIOPSIES AND COLD BIOPSY POLYPECTOMY (N/A Esophagus) Diagnosis:       Positive colorectal cancer screening using DNA-based stool test      Gastroesophageal reflux disease, unspecified whether esophagitis present      (Positive colorectal cancer screening using DNA-based stool test [R19.5])      (Gastroesophageal reflux disease, unspecified whether esophagitis present [K21.9])    Surgeons: Mario Ray MD Provider: Jeff Anne MD    Anesthesia Type: MAC ASA Status: 3          Anesthesia Type: MAC    Vitals  Vitals Value Taken Time   /87 08/18/22 1205   Temp     Pulse 80 08/18/22 1205   Resp 14 08/18/22 1205   SpO2 97 % 08/18/22 1205           Post Anesthesia Care and Evaluation    Patient location during evaluation: bedside  Patient participation: complete - patient participated  Level of consciousness: awake and alert  Pain management: adequate    Airway patency: patent  Anesthetic complications: No anesthetic complications    Cardiovascular status: acceptable  Respiratory status: acceptable  Hydration status: acceptable    Comments: /87   Pulse 80   Temp 36.7 °C (98.1 °F) (Oral)   Resp 14   Ht 162.6 cm (64\")   Wt 52.4 kg (115 lb 9.6 oz)   LMP  (LMP Unknown)   SpO2 97%   BMI 19.84 kg/m²       "

## 2022-08-18 NOTE — DISCHARGE INSTRUCTIONS
For the next 24 hours patient needs to be with a responsible adult.    For 24 hours DO NOT drive, operate machinery, appliances, drink alcohol, make important decisions or sign legal documents.    Start with a light or bland diet if you are feeling sick to your stomach otherwise advance to regular diet as tolerated.    Follow recommendations on procedure report if provided by your doctor.    Call Dr. Win for problems [phone]    Problems may include but not limited to: large amounts of bleeding, trouble breathing, repeated vomiting, severe unrelieved pain, fever or chills.      BARIUM ENEMA TO BE SCHEDULED BY DR. WIN'S OFFICE (THEY WILL CALL YOU).

## 2022-08-19 ENCOUNTER — TELEPHONE (OUTPATIENT)
Dept: SURGERY | Facility: CLINIC | Age: 77
End: 2022-08-19

## 2022-08-19 LAB
LAB AP CASE REPORT: NORMAL
PATH REPORT.FINAL DX SPEC: NORMAL
PATH REPORT.GROSS SPEC: NORMAL

## 2022-08-19 NOTE — TELEPHONE ENCOUNTER
----- Message from Mario Ray MD sent at 8/19/2022  2:45 PM EDT -----  Please call the patient regarding her upper endoscopy and colonoscopy results.  She will need to have a barium enema.  We will call her with the results of those whenever they are available

## 2022-08-19 NOTE — TELEPHONE ENCOUNTER
Left message for patient (ok per verbal release) w/ results and recommendation. Advised that the scheduling department will be contacting her to set up the barium enema.

## 2022-09-02 ENCOUNTER — TELEPHONE (OUTPATIENT)
Dept: SURGERY | Facility: CLINIC | Age: 77
End: 2022-09-02

## 2022-09-02 RX ORDER — MAGNESIUM CARB/ALUMINUM HYDROX 105-160MG
296 TABLET,CHEWABLE ORAL ONCE
Qty: 195 ML | Refills: 0 | Status: SHIPPED | OUTPATIENT
Start: 2022-09-02 | End: 2022-09-02

## 2022-09-02 NOTE — TELEPHONE ENCOUNTER
Provider: DR WIN  Caller: DEEPTI MOSELEY  Relationship to Patient:     Phone Number: [phone]    Reason for Call: PT  CALLED TO FIND OUT WHAT THEY NEED TO DO FOR THE PATIENT'S PREP FOR HER PROCEDURE ON 9/9 SINCE THERE IS NO MAGNESIUM CITRATE AVAILABLE.    PT ADVISED HE SPOKE WITH A PHARMACIST ABOUT THE MAGNESIUM CITRATE AND WAS TOLD THAT IF DR WIN COULD WRITE A PRESCRIPTION FOR THE MAGNESIUM CITRATE, THEY COULD PROVIDE A SUBSTITUTION THROUGH THE PHARMACY.    PT CAN BE REACHED ANYTIME; OKAY TO LEAVE MESSAGE IF NO ANSWER.

## 2022-09-06 ENCOUNTER — TELEPHONE (OUTPATIENT)
Dept: SURGERY | Facility: CLINIC | Age: 77
End: 2022-09-06

## 2022-09-06 NOTE — TELEPHONE ENCOUNTER
SPOKE TO PT'S SP REGARDING THE RECALL ON MAGNESIUM CITRATE. HE DECIDED TO HAVE HER FOLLOW THE COLON PREP FOR THE BARIUM ENEMA.

## 2022-09-09 ENCOUNTER — TELEPHONE (OUTPATIENT)
Dept: SURGERY | Facility: CLINIC | Age: 77
End: 2022-09-09

## 2022-09-09 ENCOUNTER — PATIENT MESSAGE (OUTPATIENT)
Dept: SURGERY | Facility: CLINIC | Age: 77
End: 2022-09-09

## 2022-09-09 DIAGNOSIS — R19.5 POSITIVE COLORECTAL CANCER SCREENING USING DNA-BASED STOOL TEST: Primary | ICD-10-CM

## 2022-09-09 NOTE — TELEPHONE ENCOUNTER
Caller: Ashley Mata    Relationship to patient: Self    Best call back number: [phone]    Patient is needing: TO SPEAK TO DR WIN OR MA. WOULD NOT RELEASE ADDTL INFO. SAYS URGENT. UNABLE TO WARM TRANSFER

## 2022-09-09 NOTE — TELEPHONE ENCOUNTER
Ashley Mata Alberto Sebastian, MD          9/9/22 12:26 PM  I went as scheduled to have the Barium Enema on 9/9/22 that you ordered.  Before the procedure they  x-rayed my stomach.  The hospital doctor (Dr. Denney) came in to talk to me before he was to begin.  He said there was a new procedure called Colonography or Virtual Colonoscopy that was new and better then what they  have now but it wasn't yet offered in [place].   I would have to go to [facility].     I didn't have the scheduled procedure done only to check  when I got home and discovered that procedure isn't covered by Medicare B.  Now I need to ask you to reschedule me for as soon as you can.   We called the hospital here and it seems they are fairly open the week of 26 thru 30 if it could be rescheduled then.        Thanks for your help in this matter.     Ashley Mata

## 2022-09-09 NOTE — TELEPHONE ENCOUNTER
[employer] message sent to patient that she will need to contact central scheduling to have this rescheduled.

## 2022-09-09 NOTE — TELEPHONE ENCOUNTER
SPOKE TO PT'S , DEEPTI REGARDING THE BARIUM ENEMA THAT SCHEDULED. SINCE THE MAG CITRATE IS STILL ON RECALL, INSTRUCTED HIM TO HAVE HER FOLLOW THE PREP FOR A COLONOSCOPY & DIET FOR A BARIUM ENEMA.

## 2022-09-21 ENCOUNTER — HOSPITAL ENCOUNTER (OUTPATIENT)
Dept: GENERAL RADIOLOGY | Facility: HOSPITAL | Age: 77
Discharge: HOME | End: 2022-09-21
Admitting: SURGERY

## 2022-09-21 ENCOUNTER — TELEPHONE (OUTPATIENT)
Dept: INTERNAL MEDICINE | Facility: CLINIC | Age: 77
End: 2022-09-21

## 2022-09-21 DIAGNOSIS — R19.5 POSITIVE COLORECTAL CANCER SCREENING USING DNA-BASED STOOL TEST: ICD-10-CM

## 2022-09-21 PROCEDURE — A9270 NON-COVERED ITEM OR SERVICE: HCPCS | Performed by: SURGERY

## 2022-09-21 PROCEDURE — 74280 X-RAY XM COLON 2CNTRST STD: CPT

## 2022-09-21 PROCEDURE — 63710000001 BARIUM SULFATE 105 % SUSPENSION: Performed by: SURGERY

## 2022-09-21 RX ADMIN — BARIUM SULFATE 1500 ML: 1.05 SUSPENSION ORAL; RECTAL at 09:27

## 2022-09-21 NOTE — TELEPHONE ENCOUNTER
Caller: Ashley Mata    Relationship to patient: Self    Best call back number: [phone]    Patient is needing: PATIENT IS WANTING TO KNOW IF SHE NEEDS TO COME IN A WEEK BEFORE HER APPOINTMENT ON 11/23 TO DO LABS

## 2022-09-22 NOTE — TELEPHONE ENCOUNTER
Tell patient that I will have Racheal call her regarding this appointment and tell her exactly what to do.

## 2022-09-24 ENCOUNTER — TELEPHONE (OUTPATIENT)
Dept: GASTROENTEROLOGY | Facility: CLINIC | Age: 77
End: 2022-09-24

## 2022-10-18 ENCOUNTER — TELEPHONE (OUTPATIENT)
Dept: SURGERY | Facility: CLINIC | Age: 77
End: 2022-10-18

## 2022-10-18 NOTE — TELEPHONE ENCOUNTER
I discussed with the patient about the results of the barium enema.  She does not have any large lesions that is concerning for cancer.  I discussed with the patient that although barium enema can rule out large lesions this will not be sensitive for polyps that may eventually become cancerous.  Discussed with the patient about the need to try to repeat a colonoscopy in 1 year.  She verbalized understanding and agreed.

## 2022-11-23 ENCOUNTER — OFFICE VISIT (OUTPATIENT)
Dept: INTERNAL MEDICINE | Facility: CLINIC | Age: 77
End: 2022-11-23

## 2022-11-23 VITALS
DIASTOLIC BLOOD PRESSURE: 76 MMHG | RESPIRATION RATE: 18 BRPM | SYSTOLIC BLOOD PRESSURE: 126 MMHG | HEIGHT: 64 IN | HEART RATE: 77 BPM | BODY MASS INDEX: 19.87 KG/M2 | OXYGEN SATURATION: 99 % | WEIGHT: 116.4 LBS

## 2022-11-23 DIAGNOSIS — Z86.16 HISTORY OF COVID-19: ICD-10-CM

## 2022-11-23 DIAGNOSIS — Z51.81 THERAPEUTIC DRUG MONITORING: ICD-10-CM

## 2022-11-23 DIAGNOSIS — E03.9 PRIMARY HYPOTHYROIDISM: Chronic | ICD-10-CM

## 2022-11-23 DIAGNOSIS — E55.9 VITAMIN D DEFICIENCY: Chronic | ICD-10-CM

## 2022-11-23 DIAGNOSIS — M25.50 ARTHRALGIA OF MULTIPLE JOINTS: ICD-10-CM

## 2022-11-23 DIAGNOSIS — I10 BENIGN ESSENTIAL HYPERTENSION: Chronic | ICD-10-CM

## 2022-11-23 DIAGNOSIS — B17.9 ACUTE HEPATITIS: ICD-10-CM

## 2022-11-23 DIAGNOSIS — Z00.00 ENCOUNTER FOR SUBSEQUENT ANNUAL WELLNESS VISIT (AWV) IN MEDICARE PATIENT: Primary | ICD-10-CM

## 2022-11-23 DIAGNOSIS — E78.2 MIXED HYPERLIPIDEMIA: Chronic | ICD-10-CM

## 2022-11-23 DIAGNOSIS — Z86.010 HISTORY OF COLON POLYPS: ICD-10-CM

## 2022-11-23 PROBLEM — Z86.0100 HISTORY OF COLON POLYPS: Chronic | Status: ACTIVE | Noted: 2022-11-23

## 2022-11-23 PROBLEM — R74.8 ELEVATED LIVER ENZYMES: Status: RESOLVED | Noted: 2022-07-20 | Resolved: 2022-11-23

## 2022-11-23 PROBLEM — R19.5 POSITIVE COLORECTAL CANCER SCREENING USING DNA-BASED STOOL TEST: Status: RESOLVED | Noted: 2022-08-11 | Resolved: 2022-11-23

## 2022-11-23 PROBLEM — Z86.0100 HISTORY OF COLON POLYPS: Status: ACTIVE | Noted: 2022-11-23

## 2022-11-23 PROCEDURE — 1126F AMNT PAIN NOTED NONE PRSNT: CPT | Performed by: INTERNAL MEDICINE

## 2022-11-23 PROCEDURE — G0439 PPPS, SUBSEQ VISIT: HCPCS | Performed by: INTERNAL MEDICINE

## 2022-11-23 PROCEDURE — 1170F FXNL STATUS ASSESSED: CPT | Performed by: INTERNAL MEDICINE

## 2022-11-23 PROCEDURE — 1160F RVW MEDS BY RX/DR IN RCRD: CPT | Performed by: INTERNAL MEDICINE

## 2022-11-23 NOTE — PROGRESS NOTES
The ABCs of the Annual Wellness Visit  Subsequent Medicare Wellness Visit    No chief complaint on file.     Subjective    History of Present Illness:  Ashley Mata is a 77 y.o. female who presents for a Subsequent Medicare Wellness Visit.    The following portions of the patient's history were reviewed and   updated as appropriate: allergies, current medications, past family history, past medical history, past social history, past surgical history and problem list.    Compared to one year ago, the patient feels her physical   health is the same.    Compared to one year ago, the patient feels her mental   health is the same.    Recent Hospitalizations:  This patient has had a [facility] admission record on file within the last 365 days.    Current Medical Providers:  Patient Care Team:  Carloz Shukla MD as PCP - General (Internal Medicine)    Outpatient Medications Prior to Visit   Medication Sig Dispense Refill   • calcium carbonate (Calcium 600) 600 MG tablet Take calcium plus vitamin D twice daily for low vitamin D and weak bones 30 tablet    • esomeprazole (nexIUM) 20 MG capsule Take 20 mg by mouth Every Morning Before Breakfast.     • estrogens, conjugated, (PREMARIN) 0.625 MG tablet Take 1 p.o. daily for estrogen replacement therapy/menopausal symptoms 90 tablet 3   • fexofenadine-pseudoephedrine (ALLEGRA-D 24) 180-240 MG per 24 hr tablet Take 1 tablet by mouth Daily As Needed for Allergies. 30 tablet 6   • levothyroxine (SYNTHROID, LEVOTHROID) 50 MCG tablet TAKE ONE TABLET BY MOUTH DAILY FOR LOW THYROID 90 tablet 3   • meloxicam (MOBIC) 15 MG tablet Take 1 tablet by mouth Daily. 90 tablet 3   • multivitamin (THERAGRAN) tablet tablet Take 1 tablet by mouth Daily. HOLD FOR SURGERY     • omeprazole OTC (PrilOSEC OTC) 20 MG EC tablet Take 1 tablet by mouth Daily.     • vitamin C (ASCORBIC ACID) 250 MG tablet Take 250 mg by mouth Daily.       No facility-administered medications prior to visit.        No opioid medication identified on active medication list. I have reviewed chart for other potential  high risk medication/s and harmful drug interactions in the elderly.          Aspirin is not on active medication list.  Aspirin use is not indicated based on review of current medical condition/s. Risk of harm outweighs potential benefits.  .    Patient Active Problem List   Diagnosis   • Allergic rhinitis   • Benign essential hypertension   • Diverticulosis of colon   • Gastroesophageal reflux disease without esophagitis   • Hyperlipidemia   • Multiple environmental allergies   • Primary osteoarthritis of both wrists   • Primary osteoarthritis of both hands   • Postmenopausal hormone replacement therapy   • Vitamin D deficiency   • Therapeutic drug monitoring   • Alopecia   • Lumbar scoliosis   • Lumbar disc herniation, L4-L5   • Primary hypothyroidism   • Family history of breast cancer in first degree relative   • Menopausal state   • History of COVID-19, August 12, 2021; May 17, 2022.   • Hammertoe of second toe of right foot   • Chronic toe pain, right foot   • Family history of colonic polyps   • Osteopenia of multiple sites, 2/25/2022--lumbar spine 1.1.  Left femoral neck -1.0.  Right femoral neck -0.3.   • History of acute hepatitis   • Arthralgia of multiple joints   • Primary osteoarthritis involving multiple joints   • History of colon polyps, 8/18/2022--hyperplastic x1.     Advance Care Planning  Advance Directive is not on file.  ACP discussion was held with the patient during this visit. Patient has an advance directive (not in EMR), copy requested.    Review of Systems   Constitutional: Negative.    HENT: Negative.    Eyes: Negative.    Respiratory: Negative.    Cardiovascular: Negative.    Gastrointestinal: Negative.    Endocrine: Negative.    Genitourinary: Negative.    Musculoskeletal: Negative.    Skin: Negative.    Allergic/Immunologic: Negative.    Neurological: Negative.   "  Hematological: Negative.    Psychiatric/Behavioral: Negative.         Objective    Vitals:    11/23/22 1014   BP: 126/76   Pulse: 77   Resp: 18   SpO2: 99%   Weight: 52.8 kg (116 lb 6.4 oz)   Height: 162.6 cm (64\")   PainSc: 0-No pain     Estimated body mass index is 19.98 kg/m² as calculated from the following:    Height as of this encounter: 162.6 cm (64\").    Weight as of this encounter: 52.8 kg (116 lb 6.4 oz).    BMI is within normal parameters. No other follow-up for BMI required.      Does the patient have evidence of cognitive impairment? No    Physical Exam        General: Alert and oriented x 3.  No acute distress.  Normal affect.  HEENT: Pupils equal, round, reactive to light; extraocular movements intact; sclerae nonicteric; pharynx, ear canals and TMs normal.  Neck: Without JVD, thyromegaly, bruit, or adenopathy.  Lungs: Clear to auscultation in all fields.  Heart: Regular rate and rhythm without murmur, rub, gallop, or click.  Abdomen: Soft, nontender, without hepatosplenomegaly or hernia.  Bowel sounds normal.  : Deferred.  Rectal: Deferred.  Extremities: Without clubbing, cyanosis, edema, or pulse deficit.  Neurologic: Intact without focal deficit.  Normal station and gait observed during ingress and egress from the examination room.  Skin: Without significant lesion.  Musculoskeletal: Unremarkable.      HEALTH RISK ASSESSMENT    Smoking Status:  Social History     Tobacco Use   Smoking Status Never   Smokeless Tobacco Never     Alcohol Consumption:  Social History     Substance and Sexual Activity   Alcohol Use Yes   • Alcohol/week: 2.0 standard drinks   • Types: 2 Glasses of wine per week    Comment: occassionally     Fall Risk Screen:    [facility]    Depression Screening:  PHQ-2/PHQ-9 Depression Screening 4/1/2022   Retired PHQ-9 Total Score -   Retired Total Score -   Little Interest or Pleasure in Doing Things 0-->not at all   Feeling Down, Depressed " or Hopeless 0-->not at all   PHQ-9: Brief Depression Severity Measure Score 0       Health Habits and Functional and Cognitive Screening:  Functional & Cognitive Status 11/23/2022   Do you have difficulty preparing food and eating? No   Do you have difficulty bathing yourself, getting dressed or grooming yourself? No   Do you have difficulty using the toilet? No   Do you have difficulty moving around from place to place? No   Do you have trouble with steps or getting out of a bed or a chair? No   Current Diet -   Dental Exam Up to date   Eye Exam Up to date   Exercise (times per week) -   Current Exercises Include -   Current Exercise Activities Include -   Do you need help using the phone?  No   Are you deaf or do you have serious difficulty hearing?  No   Do you need help with transportation? No   Do you need help shopping? No   Do you need help preparing meals?  No   Do you need help with housework?  No   Do you need help with laundry? -   Do you need help taking your medications? No   Do you need help managing money? No   Do you ever drive or ride in a car without wearing a seat belt? No   Have you felt unusual stress, anger or loneliness in the last month? No   Who do you live with? Spouse   If you need help, do you have trouble finding someone available to you? No   Have you been bothered in the last four weeks by sexual problems? No   Do you have difficulty concentrating, remembering or making decisions? No       Age-appropriate Screening Schedule:  Refer to the list below for future screening recommendations based on patient's age, sex and/or medical conditions. Orders for these recommended tests are listed in the plan section. The patient has been provided with a written plan.    Health Maintenance   Topic Date Due   • LIPID PANEL  11/09/2022   • DXA SCAN  02/24/2024   • MAMMOGRAM  07/18/2024   • TDAP/TD VACCINES (2 - Td or Tdap) 04/12/2027   • INFLUENZA VACCINE  Completed   • ZOSTER VACCINE  Discontinued               Assessment & Plan   CMS Preventative Services Quick Reference  Risk Factors Identified During Encounter  Immunizations Discussed/Encouraged (specific Immunizations; COVID19  The above risks/problems have been discussed with the patient.  Follow up actions/plans if indicated are seen below in the Assessment/Plan Section.  Pertinent information has been shared with the patient in the After Visit Summary.    Diagnoses and all orders for this visit:    1. Encounter for subsequent annual wellness visit (AWV) in Medicare patient (Primary)    2. History of COVID-19  -     SARS-CoV-2 Antibodies (Roche)    3. Acute hepatitis  -     Parvovirus B19 Antibody, IgG & IgM    4. Arthralgia of multiple joints  -     Parvovirus B19 Antibody, IgG & IgM    5. Primary hypothyroidism  -     T3, Free  -     T4, Free  -     TSH    6. Vitamin D deficiency  -     Vitamin D 25 Hydroxy    7. Hyperlipidemia  -     NMR LipoProfile  -     Comprehensive Metabolic Panel  -     CK    8. Therapeutic drug monitoring  -     CBC (No Diff)    9. History of colon polyps, 8/18/2022--hyperplastic x1.        Follow Up:   No follow-ups on file.     An After Visit Summary and PPPS were made available to the patient.

## 2022-11-30 ENCOUNTER — OFFICE VISIT (OUTPATIENT)
Dept: INTERNAL MEDICINE | Facility: CLINIC | Age: 77
End: 2022-11-30

## 2022-11-30 VITALS
SYSTOLIC BLOOD PRESSURE: 130 MMHG | DIASTOLIC BLOOD PRESSURE: 70 MMHG | BODY MASS INDEX: 20.08 KG/M2 | WEIGHT: 117.6 LBS | HEIGHT: 64 IN | RESPIRATION RATE: 18 BRPM

## 2022-12-01 LAB
25(OH)D3+25(OH)D2 SERPL-MCNC: 65.4 NG/ML (ref 30–100)
25(OH)D3+25(OH)D2 SERPL-MCNC: 71.9 NG/ML (ref 30–100)
ALBUMIN SERPL-MCNC: 4 G/DL (ref 3.5–5.2)
ALBUMIN SERPL-MCNC: 4.2 G/DL (ref 3.5–5.2)
ALBUMIN/GLOB SERPL: 1.6 G/DL
ALBUMIN/GLOB SERPL: 1.8 G/DL
ALP SERPL-CCNC: 81 U/L (ref 39–117)
ALP SERPL-CCNC: 83 U/L (ref 39–117)
ALT SERPL-CCNC: 15 U/L (ref 1–33)
ALT SERPL-CCNC: 16 U/L (ref 1–33)
AST SERPL-CCNC: 27 U/L (ref 1–32)
AST SERPL-CCNC: 27 U/L (ref 1–32)
BILIRUB SERPL-MCNC: 0.3 MG/DL (ref 0–1.2)
BILIRUB SERPL-MCNC: 0.4 MG/DL (ref 0–1.2)
BUN SERPL-MCNC: 19 MG/DL (ref 8–23)
BUN SERPL-MCNC: 27 MG/DL (ref 8–23)
BUN/CREAT SERPL: 22.4 (ref 7–25)
BUN/CREAT SERPL: 33.3 (ref 7–25)
CALCIUM SERPL-MCNC: 10.2 MG/DL (ref 8.6–10.5)
CALCIUM SERPL-MCNC: 9.2 MG/DL (ref 8.6–10.5)
CHLORIDE SERPL-SCNC: 103 MMOL/L (ref 98–107)
CHLORIDE SERPL-SCNC: 105 MMOL/L (ref 98–107)
CHOLEST SERPL-MCNC: 231 MG/DL (ref 100–199)
CHOLEST SERPL-MCNC: 241 MG/DL (ref 100–199)
CK SERPL-CCNC: 123 U/L (ref 20–180)
CK SERPL-CCNC: 73 U/L (ref 20–180)
CO2 SERPL-SCNC: 26.4 MMOL/L (ref 22–29)
CO2 SERPL-SCNC: 26.9 MMOL/L (ref 22–29)
CREAT SERPL-MCNC: 0.81 MG/DL (ref 0.57–1)
CREAT SERPL-MCNC: 0.85 MG/DL (ref 0.57–1)
EGFRCR SERPLBLD CKD-EPI 2021: 69.8 ML/MIN/1.73
EGFRCR SERPLBLD CKD-EPI 2021: 73.9 ML/MIN/1.73
ERYTHROCYTE [DISTWIDTH] IN BLOOD BY AUTOMATED COUNT: 12.8 % (ref 12.3–15.4)
ERYTHROCYTE [DISTWIDTH] IN BLOOD BY AUTOMATED COUNT: 13 % (ref 12.3–15.4)
GLOBULIN SER CALC-MCNC: 2.2 GM/DL
GLOBULIN SER CALC-MCNC: 2.6 GM/DL
GLUCOSE SERPL-MCNC: 105 MG/DL (ref 65–99)
GLUCOSE SERPL-MCNC: 92 MG/DL (ref 65–99)
HCT VFR BLD AUTO: 35.7 % (ref 34–46.6)
HCT VFR BLD AUTO: 36.8 % (ref 34–46.6)
HDL SERPL-SCNC: 51.9 UMOL/L
HDL SERPL-SCNC: 54.1 UMOL/L
HDLC SERPL-MCNC: 100 MG/DL
HDLC SERPL-MCNC: 104 MG/DL
HGB BLD-MCNC: 12.2 G/DL (ref 12–15.9)
HGB BLD-MCNC: 12.5 G/DL (ref 12–15.9)
LDL SERPL QN: 21.3 NM
LDL SERPL QN: 21.4 NM
LDL SERPL-SCNC: 1156 NMOL/L
LDL SERPL-SCNC: 1179 NMOL/L
LDL SMALL SERPL-SCNC: <90 NMOL/L
LDL SMALL SERPL-SCNC: <90 NMOL/L
LDLC SERPL CALC-MCNC: 100 MG/DL (ref 0–99)
LDLC SERPL CALC-MCNC: 102 MG/DL (ref 0–99)
MCH RBC QN AUTO: 29.5 PG (ref 26.6–33)
MCH RBC QN AUTO: 30 PG (ref 26.6–33)
MCHC RBC AUTO-ENTMCNC: 33.2 G/DL (ref 31.5–35.7)
MCHC RBC AUTO-ENTMCNC: 35 G/DL (ref 31.5–35.7)
MCV RBC AUTO: 85.8 FL (ref 79–97)
MCV RBC AUTO: 89.1 FL (ref 79–97)
PLATELET # BLD AUTO: 294 10*3/MM3 (ref 140–450)
PLATELET # BLD AUTO: 306 10*3/MM3 (ref 140–450)
POTASSIUM SERPL-SCNC: 4.1 MMOL/L (ref 3.5–5.2)
POTASSIUM SERPL-SCNC: 4.5 MMOL/L (ref 3.5–5.2)
PROT SERPL-MCNC: 6.2 G/DL (ref 6–8.5)
PROT SERPL-MCNC: 6.8 G/DL (ref 6–8.5)
RBC # BLD AUTO: 4.13 10*6/MM3 (ref 3.77–5.28)
RBC # BLD AUTO: 4.16 10*6/MM3 (ref 3.77–5.28)
SARS-COV-2 AB SERPL QL IA: POSITIVE
SARS-COV-2 AB SERPL QL IA: POSITIVE
SODIUM SERPL-SCNC: 141 MMOL/L (ref 136–145)
SODIUM SERPL-SCNC: 141 MMOL/L (ref 136–145)
T3FREE SERPL-MCNC: 2.7 PG/ML (ref 2–4.4)
T3FREE SERPL-MCNC: 2.8 PG/ML (ref 2–4.4)
T4 FREE SERPL-MCNC: 1.42 NG/DL (ref 0.93–1.7)
T4 FREE SERPL-MCNC: 1.51 NG/DL (ref 0.93–1.7)
TRIGL SERPL-MCNC: 162 MG/DL (ref 0–149)
TRIGL SERPL-MCNC: 235 MG/DL (ref 0–149)
TSH SERPL DL<=0.005 MIU/L-ACNC: 1.67 UIU/ML (ref 0.27–4.2)
TSH SERPL DL<=0.005 MIU/L-ACNC: 1.97 UIU/ML (ref 0.27–4.2)
WBC # BLD AUTO: 7.05 10*3/MM3 (ref 3.4–10.8)
WBC # BLD AUTO: 7.14 10*3/MM3 (ref 3.4–10.8)

## 2022-12-07 ENCOUNTER — OFFICE VISIT (OUTPATIENT)
Dept: INTERNAL MEDICINE | Facility: CLINIC | Age: 77
End: 2022-12-07

## 2022-12-07 VITALS
OXYGEN SATURATION: 98 % | SYSTOLIC BLOOD PRESSURE: 140 MMHG | DIASTOLIC BLOOD PRESSURE: 70 MMHG | BODY MASS INDEX: 19.97 KG/M2 | RESPIRATION RATE: 18 BRPM | WEIGHT: 117 LBS | HEIGHT: 64 IN | HEART RATE: 88 BPM

## 2022-12-07 DIAGNOSIS — M19.031 PRIMARY OSTEOARTHRITIS OF BOTH WRISTS: Chronic | ICD-10-CM

## 2022-12-07 DIAGNOSIS — Z51.81 THERAPEUTIC DRUG MONITORING: ICD-10-CM

## 2022-12-07 DIAGNOSIS — Z78.0 POSTMENOPAUSAL STATE: Chronic | ICD-10-CM

## 2022-12-07 DIAGNOSIS — E55.9 VITAMIN D DEFICIENCY: Chronic | ICD-10-CM

## 2022-12-07 DIAGNOSIS — Z83.71 FAMILY HISTORY OF COLONIC POLYPS: Chronic | ICD-10-CM

## 2022-12-07 DIAGNOSIS — Z86.010 HISTORY OF COLON POLYPS: Chronic | ICD-10-CM

## 2022-12-07 DIAGNOSIS — M25.50 ARTHRALGIA OF MULTIPLE JOINTS: Chronic | ICD-10-CM

## 2022-12-07 DIAGNOSIS — M15.9 PRIMARY OSTEOARTHRITIS INVOLVING MULTIPLE JOINTS: Chronic | ICD-10-CM

## 2022-12-07 DIAGNOSIS — M19.042 PRIMARY OSTEOARTHRITIS OF BOTH HANDS: Chronic | ICD-10-CM

## 2022-12-07 DIAGNOSIS — M19.041 PRIMARY OSTEOARTHRITIS OF BOTH HANDS: Chronic | ICD-10-CM

## 2022-12-07 DIAGNOSIS — K21.9 GASTROESOPHAGEAL REFLUX DISEASE WITHOUT ESOPHAGITIS: Chronic | ICD-10-CM

## 2022-12-07 DIAGNOSIS — M41.26 OTHER IDIOPATHIC SCOLIOSIS, LUMBAR REGION: Chronic | ICD-10-CM

## 2022-12-07 DIAGNOSIS — E03.9 PRIMARY HYPOTHYROIDISM: Chronic | ICD-10-CM

## 2022-12-07 DIAGNOSIS — M19.032 PRIMARY OSTEOARTHRITIS OF BOTH WRISTS: Chronic | ICD-10-CM

## 2022-12-07 DIAGNOSIS — Z86.19 HISTORY OF ACUTE HEPATITIS: Chronic | ICD-10-CM

## 2022-12-07 DIAGNOSIS — Z91.09 MULTIPLE ENVIRONMENTAL ALLERGIES: Chronic | ICD-10-CM

## 2022-12-07 DIAGNOSIS — B35.1 ONYCHOMYCOSIS OF RIGHT GREAT TOE: ICD-10-CM

## 2022-12-07 DIAGNOSIS — Z86.16 HISTORY OF COVID-19: Chronic | ICD-10-CM

## 2022-12-07 DIAGNOSIS — E78.2 MIXED HYPERLIPIDEMIA: Primary | Chronic | ICD-10-CM

## 2022-12-07 DIAGNOSIS — M85.89 OSTEOPENIA OF MULTIPLE SITES: Chronic | ICD-10-CM

## 2022-12-07 DIAGNOSIS — M51.26 LUMBAR DISC HERNIATION: Chronic | ICD-10-CM

## 2022-12-07 DIAGNOSIS — I10 BENIGN ESSENTIAL HYPERTENSION: Chronic | ICD-10-CM

## 2022-12-07 DIAGNOSIS — Z80.3 FAMILY HISTORY OF BREAST CANCER IN FIRST DEGREE RELATIVE: Chronic | ICD-10-CM

## 2022-12-07 PROCEDURE — 99214 OFFICE O/P EST MOD 30 MIN: CPT | Performed by: INTERNAL MEDICINE

## 2022-12-07 RX ORDER — ATORVASTATIN CALCIUM 20 MG/1
TABLET, FILM COATED ORAL
Qty: 90 TABLET | Refills: 3 | Status: SHIPPED | OUTPATIENT
Start: 2022-12-07

## 2022-12-07 RX ORDER — EZETIMIBE 10 MG/1
TABLET ORAL
Qty: 90 TABLET | Refills: 3 | Status: SHIPPED | OUTPATIENT
Start: 2022-12-07

## 2022-12-07 NOTE — PROGRESS NOTES
12/07/2022    Patient Information  Ashley Mata                                                                                          [id number] [facility] [address]      1945  [unfilled]  There is no work phone number on file.    Chief Complaint:     Follow-up lab work in order to monitor chronic medical issues listed in history of present illness.  No new acute complaints.    History of Present Illness:    Patient with hyperlipidemia, hypertension, vitamin D deficiency, primary hypothyroidism, esophageal reflux, history of previous COVID-19 infection, osteopenia and postmenopausal state, fairly recent history of acute hepatitis of uncertain etiology, arthralgia multiple joints related to primary osteoarthritis of multiple joints, lumbar scoliosis and history of lumbar disc degeneration, history of colon polyps and family history of colon polyps, family history of breast cancer, environmental allergies.  She presents today for follow-up with lab prior in order to monitor chronic medical issues.  Her past medical history reviewed and updated were necessary including health maintenance parameters.  This reveals she is up-to-date or else accounted for after today's visit.    Review of Systems   Constitutional: Negative.   HENT: Negative.    Eyes: Negative.    Cardiovascular: Negative.    Respiratory: Negative.    Endocrine: Negative.    Hematologic/Lymphatic: Negative.    Skin: Negative.    Musculoskeletal: Positive for arthritis, back pain and joint pain.   Gastrointestinal: Negative.    Genitourinary: Negative.    Neurological: Negative.    Psychiatric/Behavioral: Negative.    Allergic/Immunologic: Negative.        Active Problems:    Patient Active Problem List   Diagnosis   • Allergic rhinitis   • Benign essential hypertension   • Diverticulosis of colon   • Gastroesophageal reflux disease without esophagitis   • Hyperlipidemia   • Multiple environmental allergies   •  Primary osteoarthritis of both wrists   • Primary osteoarthritis of both hands   • Vitamin D deficiency   • Therapeutic drug monitoring   • Alopecia   • Lumbar scoliosis   • Lumbar disc herniation, L4-L5   • Primary hypothyroidism   • Family history of breast cancer in first degree relative   • Postmenopausal state   • History of COVID-19, August 12, 2021; May 17, 2022.   • Hammertoe of second toe of right foot   • Chronic toe pain, right foot   • Family history of colonic polyps   • Osteopenia of multiple sites, 2/25/2022--lumbar spine 1.1.  Left femoral neck -1.0.  Right femoral neck -0.3.   • History of acute hepatitis   • Arthralgia of multiple joints   • Primary osteoarthritis involving multiple joints   • History of colon polyps, 8/18/2022--hyperplastic x1.   • Onychomycosis of right great toe         Past Medical History:   Diagnosis Date   • Acute hepatitis     from bupivicaine during toe surgery; May 2022   • Allergic rhinitis 01/30/2015 11/13/2015--patient was evaluated by ENT and was started on generic Flonase and patient reports this has improved her symptoms quite a bit.   01/30/2015--allergy testing revealed positive reactivity to cat, dust mite, cockroach, mold spores, and grass pollen. Environmental control measures.   • Alopecia 04/12/2017    Evaluated and treated by the dermatologist.   • Arthralgia of multiple joints 05/09/2022   • Benign essential hypertension 04/18/2016   • Chronic toe pain, right foot 10/19/2021    October 19, 2021--patient reports a 1 year history of progressively worsening right toe pain that particular involves the second toe.  On exam she has obvious hammertoe which is causing irritation.  There is some hammering of the third digit as well.  Patient referred to orthopedist who specializes in foot problems such as Dr. Vieira.  In the meantime patient should use over-the-counter toe pads to   • Diverticulosis of colon 03/17/2009    03/10/2017--colonoscopy revealed many  small and large mouth diverticula in the sigmoid colon and descending colon.  Nonthrombosed external hemorrhoids and internal hemorrhoids noted.  Otherwise, normal colonoscopy.  03/17/2009--colonoscopy revealed external hemorrhoids, torturous colon with a sigmoid stricture, sigmoid diverticulosis.   • Family history of breast cancer in first degree relative 07/13/2020   • Family history of colonic polyps 04/01/2022   • Gastroesophageal reflux disease without esophagitis 03/17/2009 06/09/2015--EGD revealed Z line irregular, 35 cm from incisors. Biopsied. Small hiatal hernia. Gastritis. Biopsied. Bilious gastric fluid. Fluid aspiration performed. Normal duodenal bulb and second part of duodenum. Biopsied. Pathology revealed the antral biopsy revealed small intestinal mucosa with no pathologic diagnosis. Good preservation of villous architecture. Duodenum biopsy revealed largely antral type gastric mucosa with mild patchy superficial chronic gastritis. Gastroesophageal junction revealed squamous and glandular mucosa with mild chronic inflammation. Negative for intestinal metaplasia or dysplasia. There appeared to be mislabeling of the antral biopsies and duodenal biopsies.   04/17/2012--EGD revealed irregular Z line, hiatal hernia, gastritis, duodenitis.   03/17/2009--EGD revealed grade B. reflux esophagitis, hiatal hernia, gastritis, a few gastric polyps, duodenitis.   • Hammertoe of right foot    • Hammertoe of second toe of right foot 10/19/2021    October 19, 2021--patient reports a 1 year history of progressively worsening right toe pain that particular involves the second toe.  On exam she has obvious hammertoe which is causing irritation.  There is some hammering of the third digit as well.  Patient referred to orthopedist who specializes in foot problems such as Dr. Vieira.  In the meantime patient should use over-the-counter toe pads to   • History of COVID-19, August 12, 2021; May 17, 2022. 10/19/2021     May 17, 2022--patient again tested positive for COVID.  Ordered by gastroenterology nurse practitioner.  October 19, 2021--patient seen in follow-up and reports her symptoms totally resolved.  She wants to know when she should get her Covid booster vaccine.  I have suggested that she receive it approximately 3 months after initially testing positive.  She has an appointment in November 6, 2021 for   • History of Hyperplastic polyps of stomach 06/09/2015 06/09/2015--EGD revealed Z line irregular, 35 cm from incisors. Biopsied. Small hiatal hernia. Gastritis. Biopsied. Bilious gastric fluid. Fluid aspiration performed. Normal duodenal bulb and second part of duodenum. Biopsied. Pathology revealed the antral biopsy revealed small intestinal mucosa with no pathologic diagnosis. Good preservation of villous architecture. Duodenum biopsy revealed large   • Hyperlipidemia 07/17/2012 07/17/2012--treatment for hyperlipidemia begun.   • Lumbar disc herniation, L4-L5 08/07/2018 01/16/2019--patient seen in follow-up by Dr. Shukla.  She has seen the neurosurgeon who recommended conservative therapy with physical therapy.  Patient reports that has been extremely helpful.  Currently has no significant pain.  08/07/2018--patient seen in follow-up and reports her pain is much better after taking prednisone.  She is now down to half a pill per day and is almost off of it.  I rev   • Lumbar scoliosis 08/07/2018 01/16/2019--patient seen in follow-up by Dr. Shukla.  She has seen the neurosurgeon who recommended conservative therapy with physical therapy.  Patient reports that has been extremely helpful.  Currently has no significant pain.  08/07/2018--patient seen in follow-up and reports her pain is much better after taking prednisone.  She is now down to half a pill per day and is almost off of it.  I rev   • Menopausal state 07/13/2020   • Multiple environmental allergies 01/30/2015 01/30/2015--allergy testing revealed  positive reactivity to cat, dust mite, cockroach, mold spores, and grass pollen. Environmental control measures.   • Osteopenia of multiple sites, 2/25/2022--lumbar spine 1.1.  Left femoral neck -1.0.  Right femoral neck -0.3. 04/01/2022 February 25, 2022--DEXA scan reveals lumbar spine T score 1.1.  Left femoral neck T score -1.0.  Right femoral neck T score -0.3.  Mild osteopenia.   • Postmenopausal hormone replacement therapy 04/18/2016   • Primary hypothyroidism 01/22/2020 January 22, 2020--TSH is elevated at 5.0.  Levothyroxine 50 mcg/day initiated.  Patient will follow-up with nonfasting lab work in about 6 to 8 weeks to reassess.  07/09/2018--routine follow-up.  TSH elevated slightly at 4.31.  Free T4 normal at 1.22.  Free T3 normal at 3.3.  Continued observation.  10/11/2017--thyroid function tests are normal.  09/30/2016--thyroid ultrasound reveals a tiny 2 mm    • Primary osteoarthritis involving multiple joints 06/08/2022   • Primary osteoarthritis of both hands 12/30/2013 05/12/2014--patient seen in followup and reports that the Vimovo has helped. Uric acid levels are normal at 4.9. C. reactive protein mildly elevated at 2.3. X-rays of the hands reveals radiocarpal, first carpometacarpal, first metacarpophalangeal, and interphalangeal joint degeneration. This process is symmetric. The interphalangeal joint of the left is affected to the greatest degree. There is right sided scapholunate dissociation as well as deformity of the scaphoid suggesting prior traumatic injury with healing. Soft tissues are satisfactory. Overall appearance is most consistent with osteoarthritis.   05/05/2014--patient presents and reports that she is having worsening right wrist pain primarily at the base of the thumb. No new injury. Bilateral x-rays of the wrists and hands ordered. Uric acid level ordered as well as a CRP. Vimovo 500/20 one by mouth twice a day. Follow up in one week.   03/18/2014--patient reports that  her wrist symptoms have improved.   12/30/2013--patient reports a several mon   • Primary osteoarthritis of both wrists 12/30/2013 05/12/2014--patient seen in followup and reports that the Vimovo has helped. Uric acid levels are normal at 4.9. C. reactive protein mildly elevated at 2.3. X-rays of the hands reveals radiocarpal, first carpometacarpal, first metacarpophalangeal, and interphalangeal joint degeneration. This process is symmetric. The interphalangeal joint of the left is affected to the greatest degree. There is right sided scapholunate dissociation as well as deformity of the scaphoid suggesting prior traumatic injury with healing. Soft tissues are satisfactory. Overall appearance is most consistent with osteoarthritis.   05/05/2014--patient presents and reports that she is having worsening right wrist pain primarily at the base of the thumb. No new injury. Bilateral x-rays of the wrists and hands ordered. Uric acid level ordered as well as a CRP. Vimovo 500/20 one by mouth twice a day. Follow up in one week.   03/18/2014--patient reports that her wrist symptoms have improved.   12/30/2013--patient reports a several mon   • Vitamin D deficiency 04/18/2016         Past Surgical History:   Procedure Laterality Date   • COLONOSCOPY  03/17/2009 03/17/2009--colonoscopy revealed external hemorrhoids, torturous colon with a sigmoid stricture, sigmoid diverticulosis.   • COLONOSCOPY N/A 03/10/2017    03/10/2017--colonoscopy revealed many small and large mouth diverticula in the sigmoid colon and descending colon.  Nonthrombosed external hemorrhoids and internal hemorrhoids noted.  Otherwise, normal colonoscopy.   • COLONOSCOPY N/A 8/18/2022    Procedure: COLONOSCOPY TO 40CM WITH POLYPECTOMY (COLD);  Surgeon: Mario Ray MD;  Location: [facility] ENDOSCOPY;  Service: General;  Laterality: N/A;  PRE- POSITIVE COLOGUARD  POST- POLYP, HEMORRHOIDS   • ENDOSCOPY N/A 8/18/2022    Procedure:  ESOPHAGOGASTRODUODENOSCOPY WITH BIOPSIES AND COLD BIOPSY POLYPECTOMY;  Surgeon: Mario Ray MD;  Location: [facility] ENDOSCOPY;  Service: General;  Laterality: N/A;  PRE- ACID REFLUX  POST- GASTRITIS, GASTRIC POLYPS   • ERCP WITH SPHINCTEROTOMY/PAPILLOTOMY  08/13/2014 08/13/2014--ERCP, endoscopic sphincterotomy and removal of common bile duct stones. 08/12/2014--laparoscopic cholecystectomy. This was complicated by retained common bile duct stones 2.   • ESOPHAGOSCOPY / EGD  06/09/2015 06/09/2015--EGD revealed Z line irregular, 35 cm from incisors. Biopsied. Small hiatal hernia. Gastritis. Biopsied. Bilious gastric fluid. Fluid aspiration performed. Normal duodenal bulb and second part of duodenum. Biopsied. Pathology revealed the antral biopsy revealed small intestinal mucosa with no pathologic diagnosis. Good preservation of villous architecture. Duodenum biopsy revealed large   • EYE SURGERY  07/19/2018    Both Eyes; cataracts   • HAMMER TOE REPAIR Right 04/11/2022    Procedure: RIGHT SECOND AND THIRD HAMMERTOE REPAIRS;  Surgeon: Brandt Vieira MD;  [place] [facility] [place] [facility] [place]  Service: Orthopedics;  Laterality: Right;   • LAPAROSCOPIC CHOLECYSTECTOMY  08/12/2014 08/13/2014--ERCP, endoscopic sphincterotomy and removal of common bile duct stones. 08/12/2014--laparoscopic cholecystectomy. This was complicated by retained common bile duct stones 2.   • ROTATOR CUFF REPAIR Left 07/10/2014    07/10/2014--left rotator cuff repair. 03/18/2014--patient seen in followup and reports that her range of motion has improved and her pain is not debilitating. She feels that she is making progress with physical therapy. Surgery scheduled 07/10/2014. 01/10/2014--patient was seen in evaluation by the orthopedist and he recommended conservative treatment consisting of physical therapy/rehabilitation.   • ROTATOR CUFF REPAIR Right 08/03/2016 08/03/2016--arthroscopic right rotator cuff repair.  Debridement of  degenerative anterior superior labral tear.   • SUBTOTAL HYSTERECTOMY  1978    Partial hysterectomy 1978.         Allergies   Allergen Reactions   • Bupivacaine Other (See Comments)     Questionable allergy.  Patient had toe surgery and subsequently developed acute hepatitis.  Rare case reports of hepatitis induced by bupivacaine which was used during her surgery.           Current Outpatient Medications:   •  calcium carbonate (Calcium 600) 600 MG tablet, Take calcium plus vitamin D twice daily for low vitamin D and weak bones, Disp: 30 tablet, Rfl:   •  esomeprazole (nexIUM) 20 MG capsule, Take 20 mg by mouth Every Morning Before Breakfast., Disp: , Rfl:   •  estrogens, conjugated, (PREMARIN) 0.625 MG tablet, Take 1 p.o. daily for estrogen replacement therapy/menopausal symptoms, Disp: 90 tablet, Rfl: 3  •  fexofenadine-pseudoephedrine (ALLEGRA-D 24) 180-240 MG per 24 hr tablet, Take 1 tablet by mouth Daily As Needed for Allergies., Disp: 30 tablet, Rfl: 6  •  levothyroxine (SYNTHROID, LEVOTHROID) 50 MCG tablet, TAKE ONE TABLET BY MOUTH DAILY FOR LOW THYROID, Disp: 90 tablet, Rfl: 3  •  meloxicam (MOBIC) 15 MG tablet, Take 1 tablet by mouth Daily., Disp: 90 tablet, Rfl: 3  •  multivitamin (THERAGRAN) tablet tablet, Take 1 tablet by mouth Daily. HOLD FOR SURGERY, Disp: , Rfl:   •  omeprazole OTC (PrilOSEC OTC) 20 MG EC tablet, Take 1 tablet by mouth Daily., Disp: , Rfl:   •  vitamin C (ASCORBIC ACID) 250 MG tablet, Take 250 mg by mouth Daily., Disp: , Rfl:   •  atorvastatin (LIPITOR) 20 MG tablet, Take 1 p.o. daily for high cholesterol., Disp: 90 tablet, Rfl: 3  •  ezetimibe (Zetia) 10 MG tablet, Take 1 p.o. daily for high cholesterol, Disp: 90 tablet, Rfl: 3      Family History   Problem Relation Age of Onset   • Colon polyps Mother    • Alzheimer's disease Mother    • Hearing loss Mother    • Other Father         Hodgkin's Disease   • Cancer Father         Lung Cancer   • Early death Father         59 years old  "  • Breast cancer Daughter 52   • Malig Hyperthermia Neg Hx          Social History     Socioeconomic History   • Marital status:    • Number of children: 2   • Years of education: BA   • Highest education level: Associate degree: occupational, technical, or vocational program   Tobacco Use   • Smoking status: Never   • Smokeless tobacco: Never   Vaping Use   • Vaping Use: Never used   Substance and Sexual Activity   • Alcohol use: Yes     Alcohol/week: 2.0 standard drinks     Types: 2 Glasses of wine per week     Comment: occassionally   • Drug use: No   • Sexual activity: Not Currently     Partners: Male         Vitals:    12/07/22 0804   BP: 140/70   Pulse: 88   Resp: 18   SpO2: 98%   Weight: 53.1 kg (117 lb)   Height: 162.6 cm (64\")        Body mass index is 20.08 kg/m².      Physical Exam:    General: Alert and oriented x 3.  No acute distress.  Normal affect.  HEENT: Pupils equal, round, reactive to light; extraocular movements intact; sclerae nonicteric; pharynx, ear canals and TMs normal.  Neck: Without JVD, thyromegaly, bruit, or adenopathy.  Lungs: Clear to auscultation in all fields.  Heart: Regular rate and rhythm without murmur, rub, gallop, or click.  Abdomen: Soft, nontender, without hepatosplenomegaly or hernia.  Bowel sounds normal.  : Deferred.  Rectal: Deferred.  Extremities: Without clubbing, cyanosis, edema, or pulse deficit.  Neurologic: Intact without focal deficit.  Normal station and gait observed during ingress and egress from the examination room.  Skin: Without significant lesion.  Musculoskeletal: Diffuse joint changes consistent with degenerative arthritis.  No active synovitis.    Lab/other results:    CMP normal except glucose 105, BUN 27.  (Nonfasting) total cholesterol is 241, triglycerides 235, LDL particle #[id number], small LDL particle number less than 90, HDL particle number good at 51.9.  CBC normal.  Thyroid function tests are normal.  Vitamin D normal at 71.9.  SARS " antibodies are positive.    Assessment/Plan:     Diagnosis Plan   1. Hyperlipidemia  atorvastatin (LIPITOR) 20 MG tablet    ezetimibe (Zetia) 10 MG tablet    CK    NMR LipoProfile    Comprehensive Metabolic Panel      2. Benign essential hypertension  Comprehensive Metabolic Panel      3. Vitamin D deficiency        4. Primary hypothyroidism        5. Gastroesophageal reflux disease without esophagitis        6. History of COVID-19, August 12, 2021; May 17, 2022.        7. Osteopenia of multiple sites, 2/25/2022--lumbar spine 1.1.  Left femoral neck -1.0.  Right femoral neck -0.3.        8. Postmenopausal state        9. History of acute hepatitis        10. Arthralgia of multiple joints        11. Primary osteoarthritis involving multiple joints        12. Primary osteoarthritis of both wrists        13. Primary osteoarthritis of both hands        14. Lumbar scoliosis        15. Lumbar disc herniation, L4-L5        16. History of colon polyps, 8/18/2022--hyperplastic x1.        17. Family history of colonic polyps        18. Family history of breast cancer in first degree relative        19. Multiple environmental allergies        20. Therapeutic drug monitoring        21. Onychomycosis of right great toe          Patient has hyperlipidemia that needs to be treated with medication.  Statin therapy was discontinued when she developed an acute hepatitis.  She was previously on 80 mg of Lipitor.  She has not been on Zetia which may allow us to reduce the statin dose given her history of hepatitis.  She has arthralgia of multiple joints and has been evaluated for inflammatory arthropathy.  This work-up was negative and her joint pain is related to osteoarthritis of multiple joints.  Her back pain is tolerable.  She does have scoliosis.  She also has osteopenia and is postmenopausal and is taking appropriate calcium supplementation as well as vitamin D.  She is up-to-date on her DEXA scan.  She has a family history of  colon polyps and is up-to-date on her colonoscopy/colon cancer screening.  She also has a family history of breast cancer and is up-to-date on her mammogram.  Environmental allergies seem controlled with Allegra.    Plan is as follows: Generic Lipitor 20 mg/day along with Zetia 10 mg/day initiated.  I will have patient obtain fasting lab work in about 6 weeks and follow-up after that to review the results and to ascertain tolerability and to observe for recurrence of elevated liver enzymes.    Addendum: December 7, 2022--patient has a problem with her right great toenail.  Its become discolored and on exam she clearly has onychomycosis involving this nail.  I explained to the patient that the topical medications generally are not effective and that we would need to treat this orally.  However, she has had a recent episode of elevated liver enzymes and hepatitis of uncertain etiology and therefore I want to avoid oral medications to treat this.  We can revisit this at a later date.    Procedures

## 2023-01-18 ENCOUNTER — LAB (OUTPATIENT)
Dept: LAB | Facility: HOSPITAL | Age: 78
End: 2023-01-18
Payer: MEDICARE

## 2023-01-18 ENCOUNTER — LAB (OUTPATIENT)
Dept: INTERNAL MEDICINE | Facility: CLINIC | Age: 78
End: 2023-01-18
Payer: MEDICARE

## 2023-01-18 DIAGNOSIS — E78.2 MIXED HYPERLIPIDEMIA: Chronic | ICD-10-CM

## 2023-01-18 DIAGNOSIS — I10 BENIGN ESSENTIAL HYPERTENSION: Chronic | ICD-10-CM

## 2023-01-18 LAB
ALBUMIN SERPL-MCNC: 3.8 G/DL (ref 3.5–5.2)
ALBUMIN/GLOB SERPL: 1.5 G/DL
ALP SERPL-CCNC: 83 U/L (ref 39–117)
ALT SERPL W P-5'-P-CCNC: 14 U/L (ref 1–33)
ANION GAP SERPL CALCULATED.3IONS-SCNC: 6.5 MMOL/L (ref 5–15)
AST SERPL-CCNC: 23 U/L (ref 1–32)
BILIRUB SERPL-MCNC: 0.3 MG/DL (ref 0–1.2)
BUN SERPL-MCNC: 22 MG/DL (ref 8–23)
BUN/CREAT SERPL: 25.9 (ref 7–25)
CALCIUM SPEC-SCNC: 9 MG/DL (ref 8.6–10.5)
CHLORIDE SERPL-SCNC: 104 MMOL/L (ref 98–107)
CK SERPL-CCNC: 49 U/L (ref 20–180)
CO2 SERPL-SCNC: 26.5 MMOL/L (ref 22–29)
CREAT SERPL-MCNC: 0.85 MG/DL (ref 0.57–1)
EGFRCR SERPLBLD CKD-EPI 2021: 69.8 ML/MIN/1.73
GLOBULIN UR ELPH-MCNC: 2.6 GM/DL
GLUCOSE SERPL-MCNC: 93 MG/DL (ref 65–99)
POTASSIUM SERPL-SCNC: 4.3 MMOL/L (ref 3.5–5.2)
PROT SERPL-MCNC: 6.4 G/DL (ref 6–8.5)
SODIUM SERPL-SCNC: 137 MMOL/L (ref 136–145)

## 2023-01-18 PROCEDURE — 80053 COMPREHEN METABOLIC PANEL: CPT | Performed by: INTERNAL MEDICINE

## 2023-01-18 PROCEDURE — 82550 ASSAY OF CK (CPK): CPT | Performed by: INTERNAL MEDICINE

## 2023-01-18 PROCEDURE — 80061 LIPID PANEL: CPT | Performed by: INTERNAL MEDICINE

## 2023-01-18 PROCEDURE — 36415 COLL VENOUS BLD VENIPUNCTURE: CPT

## 2023-01-18 PROCEDURE — 83704 LIPOPROTEIN BLD QUAN PART: CPT | Performed by: INTERNAL MEDICINE

## 2023-01-21 LAB
CHOLEST SERPL-MCNC: 153 MG/DL (ref 100–199)
HDL SERPL-SCNC: 42.7 UMOL/L
HDLC SERPL-MCNC: 91 MG/DL
LDL SERPL QN: 21.1 NM
LDL SERPL-SCNC: 602 NMOL/L
LDL SMALL SERPL-SCNC: <90 NMOL/L
LDLC SERPL CALC-MCNC: 39 MG/DL (ref 0–99)
TRIGL SERPL-MCNC: 144 MG/DL (ref 0–149)

## 2023-01-25 ENCOUNTER — OFFICE VISIT (OUTPATIENT)
Dept: INTERNAL MEDICINE | Facility: CLINIC | Age: 78
End: 2023-01-25
Payer: MEDICARE

## 2023-01-25 VITALS
HEIGHT: 64 IN | SYSTOLIC BLOOD PRESSURE: 138 MMHG | HEART RATE: 90 BPM | BODY MASS INDEX: 20.49 KG/M2 | DIASTOLIC BLOOD PRESSURE: 70 MMHG | OXYGEN SATURATION: 96 % | WEIGHT: 120 LBS | RESPIRATION RATE: 18 BRPM

## 2023-01-25 DIAGNOSIS — M25.50 ARTHRALGIA OF MULTIPLE JOINTS: Chronic | ICD-10-CM

## 2023-01-25 DIAGNOSIS — E03.9 PRIMARY HYPOTHYROIDISM: ICD-10-CM

## 2023-01-25 DIAGNOSIS — M15.9 PRIMARY OSTEOARTHRITIS INVOLVING MULTIPLE JOINTS: Chronic | ICD-10-CM

## 2023-01-25 DIAGNOSIS — Z86.16 HISTORY OF COVID-19: Chronic | ICD-10-CM

## 2023-01-25 DIAGNOSIS — I10 BENIGN ESSENTIAL HYPERTENSION: Chronic | ICD-10-CM

## 2023-01-25 DIAGNOSIS — Z51.81 THERAPEUTIC DRUG MONITORING: ICD-10-CM

## 2023-01-25 DIAGNOSIS — E03.9 PRIMARY HYPOTHYROIDISM: Chronic | ICD-10-CM

## 2023-01-25 DIAGNOSIS — E78.2 MIXED HYPERLIPIDEMIA: Primary | Chronic | ICD-10-CM

## 2023-01-25 DIAGNOSIS — E55.9 VITAMIN D DEFICIENCY: Chronic | ICD-10-CM

## 2023-01-25 DIAGNOSIS — Z86.19 HISTORY OF ACUTE HEPATITIS: Chronic | ICD-10-CM

## 2023-01-25 PROCEDURE — 99213 OFFICE O/P EST LOW 20 MIN: CPT | Performed by: INTERNAL MEDICINE

## 2023-01-25 RX ORDER — LEVOTHYROXINE SODIUM 0.05 MG/1
TABLET ORAL
Qty: 90 TABLET | Refills: 3 | Status: SHIPPED | OUTPATIENT
Start: 2023-01-25

## 2023-01-25 RX ORDER — MELOXICAM 15 MG/1
15 TABLET ORAL DAILY
Qty: 90 TABLET | Refills: 3 | Status: SHIPPED | OUTPATIENT
Start: 2023-01-25

## 2023-01-25 NOTE — PROGRESS NOTES
01/25/2023    Patient Information  Ashley Mata                                                                                          [id number] [facility] [id number]      1945  [unfilled]  There is no work phone number on file.    Chief Complaint:     Follow-up blood work in order to monitor chronic medical issues listed below.  No new acute complaints.    History of Present Illness:    Patient with a history of acute hepatitis of uncertain etiology but likely related to anesthesia presents today for follow-up after we restarted her Lipitor 20 mg/day for hyperlipidemia.  She seems to be tolerating this well.  Past medical history reviewed and updated were necessary including health maintenance parameters.  This reveals she is up-to-date or else accounted for.    Review of Systems   Constitutional: Negative.   HENT: Negative.    Eyes: Negative.    Cardiovascular: Negative.    Respiratory: Negative.    Endocrine: Negative.    Hematologic/Lymphatic: Negative.    Skin: Negative.    Musculoskeletal: Negative.    Gastrointestinal: Negative.    Genitourinary: Negative.    Neurological: Negative.    Psychiatric/Behavioral: Negative.    Allergic/Immunologic: Negative.        Active Problems:    Patient Active Problem List   Diagnosis   • Allergic rhinitis   • Benign essential hypertension   • Diverticulosis of colon   • Gastroesophageal reflux disease without esophagitis   • Hyperlipidemia   • Multiple environmental allergies   • Primary osteoarthritis of both wrists   • Primary osteoarthritis of both hands   • Vitamin D deficiency   • Therapeutic drug monitoring   • Alopecia   • Lumbar scoliosis   • Lumbar disc herniation, L4-L5   • Primary hypothyroidism   • Family history of breast cancer in first degree relative   • Postmenopausal state   • History of COVID-19, August 12, 2021; May 17, 2022.   • Hammertoe of second toe of right foot   • Chronic toe pain, right foot   • Family  history of colonic polyps   • Osteopenia of multiple sites, 2/25/2022--lumbar spine 1.1.  Left femoral neck -1.0.  Right femoral neck -0.3.   • History of acute hepatitis   • Arthralgia of multiple joints   • Primary osteoarthritis involving multiple joints   • History of colon polyps, 8/18/2022--hyperplastic x1.   • Onychomycosis of right great toe         Past Medical History:   Diagnosis Date   • Allergic rhinitis 01/30/2015 11/13/2015--patient was evaluated by ENT and was started on generic Flonase and patient reports this has improved her symptoms quite a bit.   01/30/2015--allergy testing revealed positive reactivity to cat, dust mite, cockroach, mold spores, and grass pollen. Environmental control measures.   • Alopecia 04/12/2017    Evaluated and treated by the dermatologist.   • Arthralgia of multiple joints 05/09/2022   • Benign essential hypertension 04/18/2016   • Chronic toe pain, right foot 10/19/2021    October 19, 2021--patient reports a 1 year history of progressively worsening right toe pain that particular involves the second toe.  On exam she has obvious hammertoe which is causing irritation.  There is some hammering of the third digit as well.  Patient referred to orthopedist who specializes in foot problems such as Dr. Vieira.  In the meantime patient should use over-the-counter toe pads to   • Diverticulosis of colon 03/17/2009    03/10/2017--colonoscopy revealed many small and large mouth diverticula in the sigmoid colon and descending colon.  Nonthrombosed external hemorrhoids and internal hemorrhoids noted.  Otherwise, normal colonoscopy.  03/17/2009--colonoscopy revealed external hemorrhoids, torturous colon with a sigmoid stricture, sigmoid diverticulosis.   • Family history of breast cancer in first degree relative 07/13/2020   • Family history of colonic polyps 04/01/2022   • Gastroesophageal reflux disease without esophagitis 03/17/2009 06/09/2015--EGD revealed Z line irregular,  35 cm from incisors. Biopsied. Small hiatal hernia. Gastritis. Biopsied. Bilious gastric fluid. Fluid aspiration performed. Normal duodenal bulb and second part of duodenum. Biopsied. Pathology revealed the antral biopsy revealed small intestinal mucosa with no pathologic diagnosis. Good preservation of villous architecture. Duodenum biopsy revealed largely antral type gastric mucosa with mild patchy superficial chronic gastritis. Gastroesophageal junction revealed squamous and glandular mucosa with mild chronic inflammation. Negative for intestinal metaplasia or dysplasia. There appeared to be mislabeling of the antral biopsies and duodenal biopsies.   04/17/2012--EGD revealed irregular Z line, hiatal hernia, gastritis, duodenitis.   03/17/2009--EGD revealed grade B. reflux esophagitis, hiatal hernia, gastritis, a few gastric polyps, duodenitis.   • Hammertoe of right foot    • Hammertoe of second toe of right foot 10/19/2021    October 19, 2021--patient reports a 1 year history of progressively worsening right toe pain that particular involves the second toe.  On exam she has obvious hammertoe which is causing irritation.  There is some hammering of the third digit as well.  Patient referred to orthopedist who specializes in foot problems such as Dr. Vieira.  In the meantime patient should use over-the-counter toe pads to   • History of acute hepatitis 04/24/2022    May 9, 2022--patient seen in hospital discharge follow-up/transition of care.  I reviewed the documentation including the admission history and physical, hospital course, laboratory and radiographic studies, GI consultation, discharge summary and discharge medications.  I specifically reviewed the tissue pathology report which revealed moderate steatosis with moderate chronic portal inflammation a   • History of COVID-19, August 12, 2021; May 17, 2022. 10/19/2021    May 17, 2022--patient again tested positive for COVID.  Ordered by gastroenterology  nurse practitioner.  October 19, 2021--patient seen in follow-up and reports her symptoms totally resolved.  She wants to know when she should get her Covid booster vaccine.  I have suggested that she receive it approximately 3 months after initially testing positive.  She has an appointment in November 6, 2021 for   • History of Hyperplastic polyps of stomach 06/09/2015 06/09/2015--EGD revealed Z line irregular, 35 cm from incisors. Biopsied. Small hiatal hernia. Gastritis. Biopsied. Bilious gastric fluid. Fluid aspiration performed. Normal duodenal bulb and second part of duodenum. Biopsied. Pathology revealed the antral biopsy revealed small intestinal mucosa with no pathologic diagnosis. Good preservation of villous architecture. Duodenum biopsy revealed large   • Hyperlipidemia 07/17/2012 07/17/2012--treatment for hyperlipidemia begun.   • Lumbar disc herniation, L4-L5 08/07/2018 01/16/2019--patient seen in follow-up by Dr. Shukla.  She has seen the neurosurgeon who recommended conservative therapy with physical therapy.  Patient reports that has been extremely helpful.  Currently has no significant pain.  08/07/2018--patient seen in follow-up and reports her pain is much better after taking prednisone.  She is now down to half a pill per day and is almost off of it.  I rev   • Lumbar scoliosis 08/07/2018 01/16/2019--patient seen in follow-up by Dr. Shukla.  She has seen the neurosurgeon who recommended conservative therapy with physical therapy.  Patient reports that has been extremely helpful.  Currently has no significant pain.  08/07/2018--patient seen in follow-up and reports her pain is much better after taking prednisone.  She is now down to half a pill per day and is almost off of it.  I rev   • Menopausal state 07/13/2020   • Multiple environmental allergies 01/30/2015 01/30/2015--allergy testing revealed positive reactivity to cat, dust mite, cockroach, mold spores, and grass pollen.  Environmental control measures.   • Osteopenia of multiple sites, 2/25/2022--lumbar spine 1.1.  Left femoral neck -1.0.  Right femoral neck -0.3. 04/01/2022 February 25, 2022--DEXA scan reveals lumbar spine T score 1.1.  Left femoral neck T score -1.0.  Right femoral neck T score -0.3.  Mild osteopenia.   • Postmenopausal hormone replacement therapy 04/18/2016   • Primary hypothyroidism 01/22/2020 January 22, 2020--TSH is elevated at 5.0.  Levothyroxine 50 mcg/day initiated.  Patient will follow-up with nonfasting lab work in about 6 to 8 weeks to reassess.  07/09/2018--routine follow-up.  TSH elevated slightly at 4.31.  Free T4 normal at 1.22.  Free T3 normal at 3.3.  Continued observation.  10/11/2017--thyroid function tests are normal.  09/30/2016--thyroid ultrasound reveals a tiny 2 mm    • Primary osteoarthritis involving multiple joints 06/08/2022   • Primary osteoarthritis of both hands 12/30/2013 05/12/2014--patient seen in followup and reports that the Vimovo has helped. Uric acid levels are normal at 4.9. C. reactive protein mildly elevated at 2.3. X-rays of the hands reveals radiocarpal, first carpometacarpal, first metacarpophalangeal, and interphalangeal joint degeneration. This process is symmetric. The interphalangeal joint of the left is affected to the greatest degree. There is right sided scapholunate dissociation as well as deformity of the scaphoid suggesting prior traumatic injury with healing. Soft tissues are satisfactory. Overall appearance is most consistent with osteoarthritis.   05/05/2014--patient presents and reports that she is having worsening right wrist pain primarily at the base of the thumb. No new injury. Bilateral x-rays of the wrists and hands ordered. Uric acid level ordered as well as a CRP. Vimovo 500/20 one by mouth twice a day. Follow up in one week.   03/18/2014--patient reports that her wrist symptoms have improved.   12/30/2013--patient reports a several mon   •  Primary osteoarthritis of both wrists 12/30/2013 05/12/2014--patient seen in followup and reports that the Vimovo has helped. Uric acid levels are normal at 4.9. C. reactive protein mildly elevated at 2.3. X-rays of the hands reveals radiocarpal, first carpometacarpal, first metacarpophalangeal, and interphalangeal joint degeneration. This process is symmetric. The interphalangeal joint of the left is affected to the greatest degree. There is right sided scapholunate dissociation as well as deformity of the scaphoid suggesting prior traumatic injury with healing. Soft tissues are satisfactory. Overall appearance is most consistent with osteoarthritis.   05/05/2014--patient presents and reports that she is having worsening right wrist pain primarily at the base of the thumb. No new injury. Bilateral x-rays of the wrists and hands ordered. Uric acid level ordered as well as a CRP. Vimovo 500/20 one by mouth twice a day. Follow up in one week.   03/18/2014--patient reports that her wrist symptoms have improved.   12/30/2013--patient reports a several mon   • Vitamin D deficiency 04/18/2016         Past Surgical History:   Procedure Laterality Date   • COLONOSCOPY  03/17/2009 03/17/2009--colonoscopy revealed external hemorrhoids, torturous colon with a sigmoid stricture, sigmoid diverticulosis.   • COLONOSCOPY N/A 03/10/2017    03/10/2017--colonoscopy revealed many small and large mouth diverticula in the sigmoid colon and descending colon.  Nonthrombosed external hemorrhoids and internal hemorrhoids noted.  Otherwise, normal colonoscopy.   • COLONOSCOPY N/A 8/18/2022    Procedure: COLONOSCOPY TO 40CM WITH POLYPECTOMY (COLD);  Surgeon: Mario Ray MD;  Location: [facility] ENDOSCOPY;  Service: General;  Laterality: N/A;  PRE- POSITIVE COLOGUARD  POST- POLYP, HEMORRHOIDS   • ENDOSCOPY N/A 8/18/2022    Procedure: ESOPHAGOGASTRODUODENOSCOPY WITH BIOPSIES AND COLD BIOPSY POLYPECTOMY;  Surgeon: Mario Ray  MD Amandeep;  Location: [facility] ENDOSCOPY;  Service: General;  Laterality: N/A;  PRE- ACID REFLUX  POST- GASTRITIS, GASTRIC POLYPS   • ERCP WITH SPHINCTEROTOMY/PAPILLOTOMY  08/13/2014 08/13/2014--ERCP, endoscopic sphincterotomy and removal of common bile duct stones. 08/12/2014--laparoscopic cholecystectomy. This was complicated by retained common bile duct stones 2.   • ESOPHAGOSCOPY / EGD  06/09/2015 06/09/2015--EGD revealed Z line irregular, 35 cm from incisors. Biopsied. Small hiatal hernia. Gastritis. Biopsied. Bilious gastric fluid. Fluid aspiration performed. Normal duodenal bulb and second part of duodenum. Biopsied. Pathology revealed the antral biopsy revealed small intestinal mucosa with no pathologic diagnosis. Good preservation of villous architecture. Duodenum biopsy revealed large   • EYE SURGERY  07/19/2018    Both Eyes; cataracts   • HAMMER TOE REPAIR Right 04/11/2022    Procedure: RIGHT SECOND AND THIRD HAMMERTOE REPAIRS;  Surgeon: Brandt Vieira MD;  Location: [facility] OR [facility];  Service: Orthopedics;  Laterality: Right;   • LAPAROSCOPIC CHOLECYSTECTOMY  08/12/2014 08/13/2014--ERCP, endoscopic sphincterotomy and removal of common bile duct stones. 08/12/2014--laparoscopic cholecystectomy. This was complicated by retained common bile duct stones 2.   • ROTATOR CUFF REPAIR Left 07/10/2014    07/10/2014--left rotator cuff repair. 03/18/2014--patient seen in followup and reports that her range of motion has improved and her pain is not debilitating. She feels that she is making progress with physical therapy. Surgery scheduled 07/10/2014. 01/10/2014--patient was seen in evaluation by the orthopedist and he recommended conservative treatment consisting of physical therapy/rehabilitation.   • ROTATOR CUFF REPAIR Right 08/03/2016 08/03/2016--arthroscopic right rotator cuff repair.  Debridement of degenerative anterior superior labral tear.   • SUBTOTAL HYSTERECTOMY  1978    Partial  hysterectomy 1978.         Allergies   Allergen Reactions   • Bupivacaine Other (See Comments)     Questionable allergy.  Patient had toe surgery and subsequently developed acute hepatitis.  Rare case reports of hepatitis induced by bupivacaine which was used during her surgery.           Current Outpatient Medications:   •  atorvastatin (LIPITOR) 20 MG tablet, Take 1 p.o. daily for high cholesterol., Disp: 90 tablet, Rfl: 3  •  calcium carbonate (Calcium 600) 600 MG tablet, Take calcium plus vitamin D twice daily for low vitamin D and weak bones, Disp: 30 tablet, Rfl:   •  esomeprazole (nexIUM) 20 MG capsule, Take 20 mg by mouth Every Morning Before Breakfast., Disp: , Rfl:   •  estrogens, conjugated, (PREMARIN) 0.625 MG tablet, Take 1 p.o. daily for estrogen replacement therapy/menopausal symptoms, Disp: 90 tablet, Rfl: 3  •  ezetimibe (Zetia) 10 MG tablet, Take 1 p.o. daily for high cholesterol, Disp: 90 tablet, Rfl: 3  •  fexofenadine-pseudoephedrine (ALLEGRA-D 24) 180-240 MG per 24 hr tablet, Take 1 tablet by mouth Daily As Needed for Allergies., Disp: 30 tablet, Rfl: 6  •  levothyroxine (SYNTHROID, LEVOTHROID) 50 MCG tablet, TAKE ONE TABLET BY MOUTH DAILY FOR LOW THYROID, Disp: 90 tablet, Rfl: 3  •  meloxicam (MOBIC) 15 MG tablet, Take 1 tablet by mouth Daily., Disp: 90 tablet, Rfl: 3  •  multivitamin (THERAGRAN) tablet tablet, Take 1 tablet by mouth Daily. HOLD FOR SURGERY, Disp: , Rfl:   •  omeprazole OTC (PrilOSEC OTC) 20 MG EC tablet, Take 1 tablet by mouth Daily., Disp: , Rfl:   •  vitamin C (ASCORBIC ACID) 250 MG tablet, Take 250 mg by mouth Daily., Disp: , Rfl:       Family History   Problem Relation Age of Onset   • Colon polyps Mother    • Alzheimer's disease Mother    • Hearing loss Mother    • Other Father         Hodgkin's Disease   • Cancer Father         Lung Cancer   • Early death Father         59 years old   • Breast cancer Daughter 52   • Malig Hyperthermia Neg Hx          Social History  "    Socioeconomic History   • Marital status:    • Number of children: 2   • Years of education: BA   • Highest education level: Associate degree: occupational, technical, or vocational program   Tobacco Use   • Smoking status: Never   • Smokeless tobacco: Never   Vaping Use   • Vaping Use: Never used   Substance and Sexual Activity   • Alcohol use: Yes     Alcohol/week: 2.0 standard drinks     Types: 2 Glasses of wine per week     Comment: occassionally   • Drug use: No   • Sexual activity: Not Currently     Partners: Male         Vitals:    01/25/23 1003   BP: 138/70   Pulse: 90   Resp: 18   SpO2: 96%   Weight: 54.4 kg (120 lb)   Height: 162.6 cm (64\")        Body mass index is 20.6 kg/m².      Physical Exam:    General: Alert and oriented x 3.  No acute distress.  Normal affect.  HEENT: Pupils equal, round, reactive to light; extraocular movements intact; sclerae nonicteric; pharynx, ear canals and TMs normal.  Neck: Without JVD, thyromegaly, bruit, or adenopathy.  Lungs: Clear to auscultation in all fields.  Heart: Regular rate and rhythm without murmur, rub, gallop, or click.  Abdomen: Soft, nontender, without hepatosplenomegaly or hernia.  Bowel sounds normal.  : Deferred.  Rectal: Deferred.  Extremities: Without clubbing, cyanosis, edema, or pulse deficit.  Neurologic: Intact without focal deficit.  Normal station and gait observed during ingress and egress from the examination room.  Skin: Without significant lesion.  Musculoskeletal: Unremarkable.    Lab/other results:    CPK normal.  NMR is perfect with a total cholesterol 153, triglycerides 44, LDL particle #602, small LDL particle number less than 90, HDL particle #42.7.  CMP is normal including liver enzymes.    Assessment/Plan:     Diagnosis Plan   1. Hyperlipidemia  CK    Comprehensive Metabolic Panel    NMR LipoProfile      2. Benign essential hypertension  CK      3. Primary hypothyroidism  TSH    T4, Free    T3, Free      4. History of " COVID-19, August 12, 2021; May 17, 2022.        5. Vitamin D deficiency  Vitamin D,25-Hydroxy      6. History of acute hepatitis        7. Therapeutic drug monitoring  CBC (No Diff)        Patient has hyperlipidemia which is under excellent control.  She has a history of acute hepatitis related to surgery and her liver enzymes have not elevated at all.  Her blood pressure is well controlled.  Her thyroid has been therapeutic.  She has a history of COVID 19 infection and we are monitoring her COVID antibodies.  Her vitamin D has been normal which is important given her postmenopausal state.    Plan is as follows: We will need to monitor closely over the next several months.  I will have her obtain fasting lab work and follow-up in 3 months.        Procedures

## 2023-04-03 DIAGNOSIS — B02.9 HERPES ZOSTER WITHOUT COMPLICATION: Primary | ICD-10-CM

## 2023-04-03 RX ORDER — PREDNISONE 10 MG/1
TABLET ORAL
Qty: 46 TABLET | Refills: 0 | Status: SHIPPED | OUTPATIENT
Start: 2023-04-03

## 2023-04-05 ENCOUNTER — OFFICE VISIT (OUTPATIENT)
Dept: INTERNAL MEDICINE | Facility: CLINIC | Age: 78
End: 2023-04-05
Payer: MEDICARE

## 2023-04-05 VITALS
SYSTOLIC BLOOD PRESSURE: 140 MMHG | DIASTOLIC BLOOD PRESSURE: 78 MMHG | OXYGEN SATURATION: 97 % | RESPIRATION RATE: 16 BRPM | WEIGHT: 125.4 LBS | HEART RATE: 81 BPM | BODY MASS INDEX: 20.89 KG/M2 | HEIGHT: 65 IN

## 2023-04-05 DIAGNOSIS — G89.29 CHRONIC PAIN OF BOTH KNEES: ICD-10-CM

## 2023-04-05 DIAGNOSIS — B02.9 HERPES ZOSTER WITHOUT COMPLICATION: Primary | ICD-10-CM

## 2023-04-05 DIAGNOSIS — M17.0 PRIMARY OSTEOARTHRITIS OF BOTH KNEES: ICD-10-CM

## 2023-04-05 DIAGNOSIS — M25.562 CHRONIC PAIN OF BOTH KNEES: ICD-10-CM

## 2023-04-05 DIAGNOSIS — M25.561 CHRONIC PAIN OF BOTH KNEES: ICD-10-CM

## 2023-04-05 PROCEDURE — 1170F FXNL STATUS ASSESSED: CPT | Performed by: INTERNAL MEDICINE

## 2023-04-05 PROCEDURE — 1159F MED LIST DOCD IN RCRD: CPT | Performed by: INTERNAL MEDICINE

## 2023-04-05 PROCEDURE — 99213 OFFICE O/P EST LOW 20 MIN: CPT | Performed by: INTERNAL MEDICINE

## 2023-04-05 PROCEDURE — 3078F DIAST BP <80 MM HG: CPT | Performed by: INTERNAL MEDICINE

## 2023-04-05 PROCEDURE — 1160F RVW MEDS BY RX/DR IN RCRD: CPT | Performed by: INTERNAL MEDICINE

## 2023-04-05 PROCEDURE — 3077F SYST BP >= 140 MM HG: CPT | Performed by: INTERNAL MEDICINE

## 2023-04-05 NOTE — PROGRESS NOTES
04/05/2023    Patient Information  Ashley Mata                                                                                          [id number] [facility] [address]      1945  [unfilled]  There is no work phone number on file.    Chief Complaint:     Recent shingles.  Complaining of worsening knee pain.    History of Present Illness:    Patient who presented to the urgent care last Friday with a painful rash and was diagnosed with shingles involving her right shoulder area which radiates into the chest wall anteriorly.  She was treated with acyclovir 800 mg 5 times daily.  She contacted me not long after that and was concerned regarding the discomfort and I started her on prednisone 50 mg p.o. daily x5 days and taper.  She reports her pain is definitely better.  However, she has new complaints of worsening chronic bilateral knee pain and wants to consider viscosupplementation or some other conservative measures to help with her knee pain.  Her past medical history reviewed and updated were necessary including health maintenance parameters.  This reveals she is up-to-date or else accounted for after today's visit.    Review of Systems   Constitutional: Negative.   HENT: Negative.    Eyes: Negative.    Cardiovascular: Negative.    Respiratory: Negative.    Endocrine: Negative.    Hematologic/Lymphatic: Negative.    Skin: Positive for rash.        Painful rash right shoulder and anterior chest.  Shingles.   Musculoskeletal: Positive for arthritis and joint pain.        Worsening bilateral knee pain   Gastrointestinal: Negative.    Genitourinary: Negative.    Neurological: Negative.    Psychiatric/Behavioral: Negative.         Vivid dreams   Allergic/Immunologic: Negative.        Active Problems:    Patient Active Problem List   Diagnosis   • Allergic rhinitis   • Benign essential hypertension   • Diverticulosis of colon   • Gastroesophageal reflux disease without esophagitis    • Hyperlipidemia   • Multiple environmental allergies   • Primary osteoarthritis of both wrists   • Primary osteoarthritis of both hands   • Vitamin D deficiency   • Therapeutic drug monitoring   • Alopecia   • Lumbar scoliosis   • Lumbar disc herniation, L4-L5   • Primary hypothyroidism   • Family history of breast cancer in first degree relative   • Postmenopausal state   • History of COVID-19, August 12, 2021; May 17, 2022.   • Hammertoe of second toe of right foot   • Chronic toe pain, right foot   • Family history of colonic polyps   • Osteopenia of multiple sites, 2/25/2022--lumbar spine 1.1.  Left femoral neck -1.0.  Right femoral neck -0.3.   • History of acute hepatitis   • Arthralgia of multiple joints   • Primary osteoarthritis involving multiple joints   • History of colon polyps, 8/18/2022--hyperplastic x1.   • Onychomycosis of right great toe   • Herpes zoster without complication   • Primary osteoarthritis of both knees   • Chronic pain of both knees         Past Medical History:   Diagnosis Date   • Allergic rhinitis 01/30/2015 11/13/2015--patient was evaluated by ENT and was started on generic Flonase and patient reports this has improved her symptoms quite a bit.   01/30/2015--allergy testing revealed positive reactivity to cat, dust mite, cockroach, mold spores, and grass pollen. Environmental control measures.   • Alopecia 04/12/2017    Evaluated and treated by the dermatologist.   • Arthralgia of multiple joints 05/09/2022   • Benign essential hypertension 04/18/2016   • Chronic toe pain, right foot 10/19/2021    October 19, 2021--patient reports a 1 year history of progressively worsening right toe pain that particular involves the second toe.  On exam she has obvious hammertoe which is causing irritation.  There is some hammering of the third digit as well.  Patient referred to orthopedist who specializes in foot problems such as Dr. Vieira.  In the meantime patient should use  over-the-counter toe pads to   • Diverticulosis of colon 03/17/2009    03/10/2017--colonoscopy revealed many small and large mouth diverticula in the sigmoid colon and descending colon.  Nonthrombosed external hemorrhoids and internal hemorrhoids noted.  Otherwise, normal colonoscopy.  03/17/2009--colonoscopy revealed external hemorrhoids, torturous colon with a sigmoid stricture, sigmoid diverticulosis.   • Family history of breast cancer in first degree relative 07/13/2020   • Family history of colonic polyps 04/01/2022   • Gastroesophageal reflux disease without esophagitis 03/17/2009 06/09/2015--EGD revealed Z line irregular, 35 cm from incisors. Biopsied. Small hiatal hernia. Gastritis. Biopsied. Bilious gastric fluid. Fluid aspiration performed. Normal duodenal bulb and second part of duodenum. Biopsied. Pathology revealed the antral biopsy revealed small intestinal mucosa with no pathologic diagnosis. Good preservation of villous architecture. Duodenum biopsy revealed largely antral type gastric mucosa with mild patchy superficial chronic gastritis. Gastroesophageal junction revealed squamous and glandular mucosa with mild chronic inflammation. Negative for intestinal metaplasia or dysplasia. There appeared to be mislabeling of the antral biopsies and duodenal biopsies.   04/17/2012--EGD revealed irregular Z line, hiatal hernia, gastritis, duodenitis.   03/17/2009--EGD revealed grade B. reflux esophagitis, hiatal hernia, gastritis, a few gastric polyps, duodenitis.   • Hammertoe of right foot    • Hammertoe of second toe of right foot 10/19/2021    October 19, 2021--patient reports a 1 year history of progressively worsening right toe pain that particular involves the second toe.  On exam she has obvious hammertoe which is causing irritation.  There is some hammering of the third digit as well.  Patient referred to orthopedist who specializes in foot problems such as Dr. Vieira.  In the meantime patient  should use over-the-counter toe pads to   • History of acute hepatitis 04/24/2022    May 9, 2022--patient seen in hospital discharge follow-up/transition of care.  I reviewed the documentation including the admission history and physical, hospital course, laboratory and radiographic studies, GI consultation, discharge summary and discharge medications.  I specifically reviewed the tissue pathology report which revealed moderate steatosis with moderate chronic portal inflammation a   • History of COVID-19, August 12, 2021; May 17, 2022. 10/19/2021    May 17, 2022--patient again tested positive for COVID.  Ordered by gastroenterology nurse practitioner.  October 19, 2021--patient seen in follow-up and reports her symptoms totally resolved.  She wants to know when she should get her Covid booster vaccine.  I have suggested that she receive it approximately 3 months after initially testing positive.  She has an appointment in November 6, 2021 for   • History of Hyperplastic polyps of stomach 06/09/2015 06/09/2015--EGD revealed Z line irregular, 35 cm from incisors. Biopsied. Small hiatal hernia. Gastritis. Biopsied. Bilious gastric fluid. Fluid aspiration performed. Normal duodenal bulb and second part of duodenum. Biopsied. Pathology revealed the antral biopsy revealed small intestinal mucosa with no pathologic diagnosis. Good preservation of villous architecture. Duodenum biopsy revealed large   • Hyperlipidemia 07/17/2012 07/17/2012--treatment for hyperlipidemia begun.   • Infectious viral hepatitis    • Liver disease    • Lumbar disc herniation, L4-L5 08/07/2018 01/16/2019--patient seen in follow-up by Dr. Shukla.  She has seen the neurosurgeon who recommended conservative therapy with physical therapy.  Patient reports that has been extremely helpful.  Currently has no significant pain.  08/07/2018--patient seen in follow-up and reports her pain is much better after taking prednisone.  She is now down to  half a pill per day and is almost off of it.  I rev   • Lumbar scoliosis 08/07/2018 01/16/2019--patient seen in follow-up by Dr. Shukla.  She has seen the neurosurgeon who recommended conservative therapy with physical therapy.  Patient reports that has been extremely helpful.  Currently has no significant pain.  08/07/2018--patient seen in follow-up and reports her pain is much better after taking prednisone.  She is now down to half a pill per day and is almost off of it.  I rev   • Menopausal state 07/13/2020   • Multiple environmental allergies 01/30/2015 01/30/2015--allergy testing revealed positive reactivity to cat, dust mite, cockroach, mold spores, and grass pollen. Environmental control measures.   • Osteopenia of multiple sites, 2/25/2022--lumbar spine 1.1.  Left femoral neck -1.0.  Right femoral neck -0.3. 04/01/2022 February 25, 2022--DEXA scan reveals lumbar spine T score 1.1.  Left femoral neck T score -1.0.  Right femoral neck T score -0.3.  Mild osteopenia.   • Postmenopausal hormone replacement therapy 04/18/2016   • Primary hypothyroidism 01/22/2020 January 22, 2020--TSH is elevated at 5.0.  Levothyroxine 50 mcg/day initiated.  Patient will follow-up with nonfasting lab work in about 6 to 8 weeks to reassess.  07/09/2018--routine follow-up.  TSH elevated slightly at 4.31.  Free T4 normal at 1.22.  Free T3 normal at 3.3.  Continued observation.  10/11/2017--thyroid function tests are normal.  09/30/2016--thyroid ultrasound reveals a tiny 2 mm    • Primary osteoarthritis involving multiple joints 06/08/2022   • Primary osteoarthritis of both hands 12/30/2013 05/12/2014--patient seen in followup and reports that the Vimovo has helped. Uric acid levels are normal at 4.9. C. reactive protein mildly elevated at 2.3. X-rays of the hands reveals radiocarpal, first carpometacarpal, first metacarpophalangeal, and interphalangeal joint degeneration. This process is symmetric. The  interphalangeal joint of the left is affected to the greatest degree. There is right sided scapholunate dissociation as well as deformity of the scaphoid suggesting prior traumatic injury with healing. Soft tissues are satisfactory. Overall appearance is most consistent with osteoarthritis.   05/05/2014--patient presents and reports that she is having worsening right wrist pain primarily at the base of the thumb. No new injury. Bilateral x-rays of the wrists and hands ordered. Uric acid level ordered as well as a CRP. Vimovo 500/20 one by mouth twice a day. Follow up in one week.   03/18/2014--patient reports that her wrist symptoms have improved.   12/30/2013--patient reports a several mon   • Primary osteoarthritis of both wrists 12/30/2013 05/12/2014--patient seen in followup and reports that the Vimovo has helped. Uric acid levels are normal at 4.9. C. reactive protein mildly elevated at 2.3. X-rays of the hands reveals radiocarpal, first carpometacarpal, first metacarpophalangeal, and interphalangeal joint degeneration. This process is symmetric. The interphalangeal joint of the left is affected to the greatest degree. There is right sided scapholunate dissociation as well as deformity of the scaphoid suggesting prior traumatic injury with healing. Soft tissues are satisfactory. Overall appearance is most consistent with osteoarthritis.   05/05/2014--patient presents and reports that she is having worsening right wrist pain primarily at the base of the thumb. No new injury. Bilateral x-rays of the wrists and hands ordered. Uric acid level ordered as well as a CRP. Vimovo 500/20 one by mouth twice a day. Follow up in one week.   03/18/2014--patient reports that her wrist symptoms have improved.   12/30/2013--patient reports a several mon   • Vitamin D deficiency 04/18/2016         Past Surgical History:   Procedure Laterality Date   • COLONOSCOPY  03/17/2009 03/17/2009--colonoscopy revealed external  hemorrhoids, torturous colon with a sigmoid stricture, sigmoid diverticulosis.   • COLONOSCOPY N/A 03/10/2017    03/10/2017--colonoscopy revealed many small and large mouth diverticula in the sigmoid colon and descending colon.  Nonthrombosed external hemorrhoids and internal hemorrhoids noted.  Otherwise, normal colonoscopy.   • COLONOSCOPY N/A 8/18/2022    Procedure: COLONOSCOPY TO 40CM WITH POLYPECTOMY (COLD);  Surgeon: Mario Ray MD;  Location: [facility] ENDOSCOPY;  Service: General;  Laterality: N/A;  PRE- POSITIVE COLOGUARD  POST- POLYP, HEMORRHOIDS   • ENDOSCOPY N/A 8/18/2022    Procedure: ESOPHAGOGASTRODUODENOSCOPY WITH BIOPSIES AND COLD BIOPSY POLYPECTOMY;  Surgeon: Mario Ray MD;  Location: [facility] ENDOSCOPY;  Service: General;  Laterality: N/A;  PRE- ACID REFLUX  POST- GASTRITIS, GASTRIC POLYPS   • ERCP WITH SPHINCTEROTOMY/PAPILLOTOMY  08/13/2014 08/13/2014--ERCP, endoscopic sphincterotomy and removal of common bile duct stones. 08/12/2014--laparoscopic cholecystectomy. This was complicated by retained common bile duct stones 2.   • ESOPHAGOSCOPY / EGD  06/09/2015 06/09/2015--EGD revealed Z line irregular, 35 cm from incisors. Biopsied. Small hiatal hernia. Gastritis. Biopsied. Bilious gastric fluid. Fluid aspiration performed. Normal duodenal bulb and second part of duodenum. Biopsied. Pathology revealed the antral biopsy revealed small intestinal mucosa with no pathologic diagnosis. Good preservation of villous architecture. Duodenum biopsy revealed large   • EYE SURGERY  07/19/2018    Both Eyes; cataracts   • HAMMER TOE REPAIR Right 04/11/2022    Procedure: RIGHT SECOND AND THIRD HAMMERTOE REPAIRS;  Surgeon: Brandt Vieira MD;  Location: [facility] OR [facility];  Service: Orthopedics;  Laterality: Right;   • LAPAROSCOPIC CHOLECYSTECTOMY  08/12/2014 08/13/2014--ERCP, endoscopic sphincterotomy and removal of common bile duct stones. 08/12/2014--laparoscopic cholecystectomy.  This was complicated by retained common bile duct stones 2.   • ROTATOR CUFF REPAIR Left 07/10/2014    07/10/2014--left rotator cuff repair. 03/18/2014--patient seen in followup and reports that her range of motion has improved and her pain is not debilitating. She feels that she is making progress with physical therapy. Surgery scheduled 07/10/2014. 01/10/2014--patient was seen in evaluation by the orthopedist and he recommended conservative treatment consisting of physical therapy/rehabilitation.   • ROTATOR CUFF REPAIR Right 08/03/2016 08/03/2016--arthroscopic right rotator cuff repair.  Debridement of degenerative anterior superior labral tear.   • SUBTOTAL HYSTERECTOMY  1978    Partial hysterectomy 1978.         Allergies   Allergen Reactions   • Bupivacaine Other (See Comments)     Questionable allergy.  Patient had toe surgery and subsequently developed acute hepatitis.  Rare case reports of hepatitis induced by bupivacaine which was used during her surgery.           Current Outpatient Medications:   •  acyclovir (ZOVIRAX) 800 MG tablet, Take 1 tablet by mouth 5 (Five) Times a Day for 7 days., Disp: 35 tablet, Rfl: 0  •  atorvastatin (LIPITOR) 20 MG tablet, Take 1 p.o. daily for high cholesterol., Disp: 90 tablet, Rfl: 3  •  calcium carbonate (Calcium 600) 600 MG tablet, Take calcium plus vitamin D twice daily for low vitamin D and weak bones, Disp: 30 tablet, Rfl:   •  esomeprazole (nexIUM) 20 MG capsule, Take 1 capsule by mouth Every Morning Before Breakfast., Disp: , Rfl:   •  estrogens, conjugated, (PREMARIN) 0.625 MG tablet, Take 1 p.o. daily for estrogen replacement therapy/menopausal symptoms, Disp: 90 tablet, Rfl: 3  •  ezetimibe (Zetia) 10 MG tablet, Take 1 p.o. daily for high cholesterol, Disp: 90 tablet, Rfl: 3  •  fexofenadine-pseudoephedrine (ALLEGRA-D 24) 180-240 MG per 24 hr tablet, Take 1 tablet by mouth Daily As Needed for Allergies., Disp: 30 tablet, Rfl: 6  •  levothyroxine (SYNTHROID,  "LEVOTHROID) 50 MCG tablet, TAKE ONE TABLET BY MOUTH DAILY FOR LOW THYROID, Disp: 90 tablet, Rfl: 3  •  meloxicam (MOBIC) 15 MG tablet, Take 1 tablet by mouth Daily., Disp: 90 tablet, Rfl: 3  •  multivitamin (THERAGRAN) tablet tablet, Take 1 tablet by mouth Daily. HOLD FOR SURGERY, Disp: , Rfl:   •  omeprazole OTC (PrilOSEC OTC) 20 MG EC tablet, Take 1 tablet by mouth Daily., Disp: , Rfl:   •  predniSONE (DELTASONE) 10 MG tablet, 5 by mouth daily 5 days, then 4 daily 2 days, 3 daily 2 days, 2 daily 2 days, 1 daily 2 days, one half daily 2 days and then discontinue., Disp: 46 tablet, Rfl: 0  •  vitamin C (ASCORBIC ACID) 250 MG tablet, Take 1 tablet by mouth Daily., Disp: , Rfl:       Family History   Problem Relation Age of Onset   • Colon polyps Mother    • Alzheimer's disease Mother    • Hearing loss Mother    • Other Father         Hodgkin's Disease   • Cancer Father         Lung Cancer   • Early death Father         59 years old   • Breast cancer Daughter 52   • Malig Hyperthermia Neg Hx          Social History     Socioeconomic History   • Marital status:    • Number of children: 2   • Years of education: BA   • Highest education level: Associate degree: occupational, technical, or vocational program   Tobacco Use   • Smoking status: Never   • Smokeless tobacco: Never   Vaping Use   • Vaping Use: Never used   Substance and Sexual Activity   • Alcohol use: Yes     Alcohol/week: 2.0 standard drinks     Types: 2 Glasses of wine per week     Comment: None since 04/11/22   • Drug use: No   • Sexual activity: Not Currently     Partners: Male         Vitals:    04/05/23 1003   BP: 140/78   Pulse: 81   Resp: 16   SpO2: 97%   Weight: 56.9 kg (125 lb 6.4 oz)   Height: 165.1 cm (65\")        Body mass index is 20.87 kg/m².      Physical Exam:    General: Alert and oriented x 3.  No acute distress.  Normal affect.  HEENT: Pupils equal, round, reactive to light; extraocular movements intact; sclerae nonicteric; pharynx, " ear canals and TMs normal.  Neck: Without JVD, thyromegaly, bruit, or adenopathy.  Lungs: Clear to auscultation in all fields.  Heart: Regular rate and rhythm without murmur, rub, gallop, or click.  Abdomen: Soft, nontender, without hepatosplenomegaly or hernia.  Bowel sounds normal.  : Deferred.  Rectal: Deferred.  Extremities: Without clubbing, cyanosis, edema, or pulse deficit.  Neurologic: Intact without focal deficit.  Normal station and gait observed during ingress and egress from the examination room.  Skin: Patient has an obvious herpetic rash involving the right shoulder posteriorly over the scapular area and this extends into the upper chest wall.  No sign of superinfection.  Patient does have quite a bit of calamine lotion on the rash which obscures exact characterization.  Musculoskeletal: Consistent with degenerative osteoarthritis, particularly in the knees.    Lab/other results:      Assessment/Plan:     Diagnosis Plan   1. Herpes zoster without complication        2. Primary osteoarthritis of both knees  Ambulatory Referral to Orthopedic Surgery      3. Chronic pain of both knees  Ambulatory Referral to Orthopedic Surgery        Patient with recent herpes zoster as described above who seems to be progressing well.  Her pain is much better after the addition of prednisone and I think it be reasonable for her to continue the taper and discontinue as well as finish the acyclovir.  She has new complaints of osteoarthritis of both knees resulting in worsening pain and I think she would benefit from orthopedic referral to discuss options.  Patient wants to be conservative and consider viscosupplementation.  Patient also has been having some vivid dreams that we briefly reviewed but we will defer further evaluation at the next visit.    Plan is as follows: Finish prednisone and acyclovir.  Orthopedic referral.  Patient has a follow-up appointment with lab prior in about 2 weeks which I will have her  keep.        Procedures

## 2023-04-19 DIAGNOSIS — E78.2 MIXED HYPERLIPIDEMIA: Chronic | ICD-10-CM

## 2023-04-19 DIAGNOSIS — E55.9 VITAMIN D DEFICIENCY: Chronic | ICD-10-CM

## 2023-04-19 DIAGNOSIS — E03.9 PRIMARY HYPOTHYROIDISM: Chronic | ICD-10-CM

## 2023-04-19 DIAGNOSIS — I10 BENIGN ESSENTIAL HYPERTENSION: Chronic | ICD-10-CM

## 2023-04-19 DIAGNOSIS — Z51.81 THERAPEUTIC DRUG MONITORING: ICD-10-CM

## 2023-04-21 LAB
25(OH)D3+25(OH)D2 SERPL-MCNC: 62.4 NG/ML (ref 30–100)
ALBUMIN SERPL-MCNC: 4.1 G/DL (ref 3.7–4.7)
ALBUMIN/GLOB SERPL: 1.8 {RATIO} (ref 1.2–2.2)
ALP SERPL-CCNC: 81 IU/L (ref 44–121)
ALT SERPL-CCNC: 23 IU/L (ref 0–32)
AST SERPL-CCNC: 23 IU/L (ref 0–40)
BILIRUB SERPL-MCNC: 0.4 MG/DL (ref 0–1.2)
BUN SERPL-MCNC: 17 MG/DL (ref 8–27)
BUN/CREAT SERPL: 21 (ref 12–28)
CALCIUM SERPL-MCNC: 9.2 MG/DL (ref 8.7–10.3)
CHLORIDE SERPL-SCNC: 100 MMOL/L (ref 96–106)
CHOLEST SERPL-MCNC: 168 MG/DL (ref 100–199)
CK SERPL-CCNC: 31 U/L (ref 32–182)
CO2 SERPL-SCNC: 25 MMOL/L (ref 20–29)
CREAT SERPL-MCNC: 0.81 MG/DL (ref 0.57–1)
EGFRCR SERPLBLD CKD-EPI 2021: 74 ML/MIN/1.73
ERYTHROCYTE [DISTWIDTH] IN BLOOD BY AUTOMATED COUNT: 13.7 % (ref 11.7–15.4)
GLOBULIN SER CALC-MCNC: 2.3 G/DL (ref 1.5–4.5)
GLUCOSE SERPL-MCNC: 87 MG/DL (ref 70–99)
HCT VFR BLD AUTO: 41.6 % (ref 34–46.6)
HDL SERPL-SCNC: 55.2 UMOL/L
HDLC SERPL-MCNC: 101 MG/DL
HGB BLD-MCNC: 14 G/DL (ref 11.1–15.9)
LDL SERPL QN: 20.3 NM
LDL SERPL-SCNC: 432 NMOL/L
LDL SMALL SERPL-SCNC: 220 NMOL/L
LDLC SERPL CALC-MCNC: 44 MG/DL (ref 0–99)
MCH RBC QN AUTO: 29.8 PG (ref 26.6–33)
MCHC RBC AUTO-ENTMCNC: 33.7 G/DL (ref 31.5–35.7)
MCV RBC AUTO: 89 FL (ref 79–97)
PLATELET # BLD AUTO: 254 X10E3/UL (ref 150–450)
POTASSIUM SERPL-SCNC: 4.8 MMOL/L (ref 3.5–5.2)
PROT SERPL-MCNC: 6.4 G/DL (ref 6–8.5)
RBC # BLD AUTO: 4.7 X10E6/UL (ref 3.77–5.28)
SODIUM SERPL-SCNC: 139 MMOL/L (ref 134–144)
T3FREE SERPL-MCNC: 3.5 PG/ML (ref 2–4.4)
T4 FREE SERPL-MCNC: 1.58 NG/DL (ref 0.82–1.77)
TRIGL SERPL-MCNC: 140 MG/DL (ref 0–149)
TSH SERPL DL<=0.005 MIU/L-ACNC: 2.04 UIU/ML (ref 0.45–4.5)
WBC # BLD AUTO: 12.6 X10E3/UL (ref 3.4–10.8)

## 2023-04-27 ENCOUNTER — OFFICE VISIT (OUTPATIENT)
Dept: INTERNAL MEDICINE | Facility: CLINIC | Age: 78
End: 2023-04-27
Payer: MEDICARE

## 2023-04-27 VITALS
WEIGHT: 124.8 LBS | SYSTOLIC BLOOD PRESSURE: 128 MMHG | HEIGHT: 65 IN | HEART RATE: 88 BPM | OXYGEN SATURATION: 99 % | BODY MASS INDEX: 20.79 KG/M2 | DIASTOLIC BLOOD PRESSURE: 76 MMHG

## 2023-04-27 DIAGNOSIS — M85.89 OSTEOPENIA OF MULTIPLE SITES: Chronic | ICD-10-CM

## 2023-04-27 DIAGNOSIS — K21.9 GASTROESOPHAGEAL REFLUX DISEASE WITHOUT ESOPHAGITIS: Chronic | ICD-10-CM

## 2023-04-27 DIAGNOSIS — K57.30 DIVERTICULOSIS OF COLON: Chronic | ICD-10-CM

## 2023-04-27 DIAGNOSIS — K57.33 DIVERTICULITIS OF LARGE INTESTINE WITHOUT PERFORATION OR ABSCESS WITH BLEEDING: ICD-10-CM

## 2023-04-27 DIAGNOSIS — Z80.3 FAMILY HISTORY OF BREAST CANCER IN FIRST DEGREE RELATIVE: Chronic | ICD-10-CM

## 2023-04-27 DIAGNOSIS — Z86.19 HISTORY OF ACUTE HEPATITIS: Chronic | ICD-10-CM

## 2023-04-27 DIAGNOSIS — M17.0 PRIMARY OSTEOARTHRITIS OF BOTH KNEES: ICD-10-CM

## 2023-04-27 DIAGNOSIS — Z51.81 THERAPEUTIC DRUG MONITORING: ICD-10-CM

## 2023-04-27 DIAGNOSIS — J30.1 CHRONIC SEASONAL ALLERGIC RHINITIS DUE TO POLLEN: Chronic | ICD-10-CM

## 2023-04-27 DIAGNOSIS — E03.9 PRIMARY HYPOTHYROIDISM: Chronic | ICD-10-CM

## 2023-04-27 DIAGNOSIS — G89.29 CHRONIC PAIN OF BOTH KNEES: ICD-10-CM

## 2023-04-27 DIAGNOSIS — Z91.09 MULTIPLE ENVIRONMENTAL ALLERGIES: Chronic | ICD-10-CM

## 2023-04-27 DIAGNOSIS — M19.042 PRIMARY OSTEOARTHRITIS OF BOTH HANDS: Chronic | ICD-10-CM

## 2023-04-27 DIAGNOSIS — E78.2 MIXED HYPERLIPIDEMIA: Primary | Chronic | ICD-10-CM

## 2023-04-27 DIAGNOSIS — M19.032 PRIMARY OSTEOARTHRITIS OF BOTH WRISTS: Chronic | ICD-10-CM

## 2023-04-27 DIAGNOSIS — M25.562 CHRONIC PAIN OF BOTH KNEES: ICD-10-CM

## 2023-04-27 DIAGNOSIS — Z86.16 HISTORY OF COVID-19: Chronic | ICD-10-CM

## 2023-04-27 DIAGNOSIS — M25.50 ARTHRALGIA OF MULTIPLE JOINTS: Chronic | ICD-10-CM

## 2023-04-27 DIAGNOSIS — Z86.010 HISTORY OF COLON POLYPS: Chronic | ICD-10-CM

## 2023-04-27 DIAGNOSIS — E55.9 VITAMIN D DEFICIENCY: Chronic | ICD-10-CM

## 2023-04-27 DIAGNOSIS — M19.031 PRIMARY OSTEOARTHRITIS OF BOTH WRISTS: Chronic | ICD-10-CM

## 2023-04-27 DIAGNOSIS — M15.9 PRIMARY OSTEOARTHRITIS INVOLVING MULTIPLE JOINTS: Chronic | ICD-10-CM

## 2023-04-27 DIAGNOSIS — B02.9 HERPES ZOSTER WITHOUT COMPLICATION: ICD-10-CM

## 2023-04-27 DIAGNOSIS — M19.041 PRIMARY OSTEOARTHRITIS OF BOTH HANDS: Chronic | ICD-10-CM

## 2023-04-27 DIAGNOSIS — M25.561 CHRONIC PAIN OF BOTH KNEES: ICD-10-CM

## 2023-04-27 DIAGNOSIS — I10 BENIGN ESSENTIAL HYPERTENSION: Chronic | ICD-10-CM

## 2023-04-27 RX ORDER — ZINC OXIDE 13 %
CREAM (GRAM) TOPICAL
Start: 2023-04-27

## 2023-04-27 RX ORDER — CIPROFLOXACIN 500 MG/1
TABLET, FILM COATED ORAL
Qty: 20 TABLET | Refills: 0 | Status: SHIPPED | OUTPATIENT
Start: 2023-04-27

## 2023-04-27 RX ORDER — METRONIDAZOLE 250 MG/1
TABLET ORAL
Qty: 20 TABLET | Refills: 0 | Status: SHIPPED | OUTPATIENT
Start: 2023-04-27

## 2023-04-27 NOTE — PROGRESS NOTES
04/27/2023    Patient Information  Ashley Mata                                                                                          [id number] [facility] [address]      1945  [unfilled]  There is no work phone number on file.    Chief Complaint:     Follow-up chronic medical problems and recent lab work.  Follow-up recent episode of shingles.  Knee pain.    History of Present Illness:    Patient with a history of chronic medical problems noted below in the assessment and plan presents today for follow-up with lab prior in order to monitor these chronic medical issues.  Patient also had a recent episode of shingles and was having quite a bit of discomfort from this and I put her on some prednisone about a week ago.  This seems to be helping the situation.  Her past medical history reviewed and updated were necessary including health maintenance parameters.  This reveals she is currently up-to-date or else accounted for.    Review of Systems   Constitutional: Negative.   HENT: Negative.    Eyes: Negative.    Cardiovascular: Negative.    Respiratory: Negative.    Endocrine: Negative.    Hematologic/Lymphatic: Negative.    Skin: Negative.    Musculoskeletal: Positive for arthritis and joint pain.   Gastrointestinal: Positive for abdominal pain.        Occasional blood in stool.  Loose stools but no overt diarrhea.  No fever or chills   Genitourinary: Negative.    Neurological: Negative.    Psychiatric/Behavioral: Negative.    Allergic/Immunologic: Negative.        Active Problems:    Patient Active Problem List   Diagnosis   • Allergic rhinitis   • Benign essential hypertension   • Diverticulosis of colon   • Gastroesophageal reflux disease without esophagitis   • Hyperlipidemia   • Multiple environmental allergies   • Primary osteoarthritis of both wrists   • Primary osteoarthritis of both hands   • Vitamin D deficiency   • Therapeutic drug monitoring   • Alopecia   • Lumbar  scoliosis   • Lumbar disc herniation, L4-L5   • Primary hypothyroidism   • Family history of breast cancer in first degree relative   • Postmenopausal state   • History of COVID-19, August 12, 2021; May 17, 2022.   • Hammertoe of second toe of right foot   • Chronic toe pain, right foot   • Family history of colonic polyps   • Osteopenia of multiple sites, 2/25/2022--lumbar spine 1.1.  Left femoral neck -1.0.  Right femoral neck -0.3.   • History of acute hepatitis   • Arthralgia of multiple joints   • Primary osteoarthritis involving multiple joints   • History of colon polyps, 8/18/2022--hyperplastic x1.   • Onychomycosis of right great toe   • Herpes zoster without complication   • Primary osteoarthritis of both knees   • Chronic pain of both knees   • Diverticulitis of large intestine without perforation or abscess with bleeding         Past Medical History:   Diagnosis Date   • Allergic rhinitis 01/30/2015 11/13/2015--patient was evaluated by ENT and was started on generic Flonase and patient reports this has improved her symptoms quite a bit.   01/30/2015--allergy testing revealed positive reactivity to cat, dust mite, cockroach, mold spores, and grass pollen. Environmental control measures.   • Alopecia 04/12/2017    Evaluated and treated by the dermatologist.   • Arthralgia of multiple joints 05/09/2022   • Benign essential hypertension 04/18/2016   • Chronic toe pain, right foot 10/19/2021    October 19, 2021--patient reports a 1 year history of progressively worsening right toe pain that particular involves the second toe.  On exam she has obvious hammertoe which is causing irritation.  There is some hammering of the third digit as well.  Patient referred to orthopedist who specializes in foot problems such as Dr. Vieira.  In the meantime patient should use over-the-counter toe pads to   • Diverticulosis of colon 03/17/2009    03/10/2017--colonoscopy revealed many small and large mouth diverticula in the  sigmoid colon and descending colon.  Nonthrombosed external hemorrhoids and internal hemorrhoids noted.  Otherwise, normal colonoscopy.  03/17/2009--colonoscopy revealed external hemorrhoids, torturous colon with a sigmoid stricture, sigmoid diverticulosis.   • Family history of breast cancer in first degree relative 07/13/2020   • Family history of colonic polyps 04/01/2022   • Gastroesophageal reflux disease without esophagitis 03/17/2009 06/09/2015--EGD revealed Z line irregular, 35 cm from incisors. Biopsied. Small hiatal hernia. Gastritis. Biopsied. Bilious gastric fluid. Fluid aspiration performed. Normal duodenal bulb and second part of duodenum. Biopsied. Pathology revealed the antral biopsy revealed small intestinal mucosa with no pathologic diagnosis. Good preservation of villous architecture. Duodenum biopsy revealed largely antral type gastric mucosa with mild patchy superficial chronic gastritis. Gastroesophageal junction revealed squamous and glandular mucosa with mild chronic inflammation. Negative for intestinal metaplasia or dysplasia. There appeared to be mislabeling of the antral biopsies and duodenal biopsies.   04/17/2012--EGD revealed irregular Z line, hiatal hernia, gastritis, duodenitis.   03/17/2009--EGD revealed grade B. reflux esophagitis, hiatal hernia, gastritis, a few gastric polyps, duodenitis.   • Hammertoe of right foot    • Hammertoe of second toe of right foot 10/19/2021    October 19, 2021--patient reports a 1 year history of progressively worsening right toe pain that particular involves the second toe.  On exam she has obvious hammertoe which is causing irritation.  There is some hammering of the third digit as well.  Patient referred to orthopedist who specializes in foot problems such as Dr. Vieira.  In the meantime patient should use over-the-counter toe pads to   • History of acute hepatitis 04/24/2022    May 9, 2022--patient seen in hospital discharge follow-up/transition  of care.  I reviewed the documentation including the admission history and physical, hospital course, laboratory and radiographic studies, GI consultation, discharge summary and discharge medications.  I specifically reviewed the tissue pathology report which revealed moderate steatosis with moderate chronic portal inflammation a   • History of COVID-19, August 12, 2021; May 17, 2022. 10/19/2021    May 17, 2022--patient again tested positive for COVID.  Ordered by gastroenterology nurse practitioner.  October 19, 2021--patient seen in follow-up and reports her symptoms totally resolved.  She wants to know when she should get her Covid booster vaccine.  I have suggested that she receive it approximately 3 months after initially testing positive.  She has an appointment in November 6, 2021 for   • History of Hyperplastic polyps of stomach 06/09/2015 06/09/2015--EGD revealed Z line irregular, 35 cm from incisors. Biopsied. Small hiatal hernia. Gastritis. Biopsied. Bilious gastric fluid. Fluid aspiration performed. Normal duodenal bulb and second part of duodenum. Biopsied. Pathology revealed the antral biopsy revealed small intestinal mucosa with no pathologic diagnosis. Good preservation of villous architecture. Duodenum biopsy revealed large   • Hyperlipidemia 07/17/2012 07/17/2012--treatment for hyperlipidemia begun.   • Infectious viral hepatitis    • Liver disease    • Lumbar disc herniation, L4-L5 08/07/2018 01/16/2019--patient seen in follow-up by Dr. Shukla.  She has seen the neurosurgeon who recommended conservative therapy with physical therapy.  Patient reports that has been extremely helpful.  Currently has no significant pain.  08/07/2018--patient seen in follow-up and reports her pain is much better after taking prednisone.  She is now down to half a pill per day and is almost off of it.  I rev   • Lumbar scoliosis 08/07/2018 01/16/2019--patient seen in follow-up by Dr. Shukla.  She has seen the  neurosurgeon who recommended conservative therapy with physical therapy.  Patient reports that has been extremely helpful.  Currently has no significant pain.  08/07/2018--patient seen in follow-up and reports her pain is much better after taking prednisone.  She is now down to half a pill per day and is almost off of it.  I rev   • Menopausal state 07/13/2020   • Multiple environmental allergies 01/30/2015 01/30/2015--allergy testing revealed positive reactivity to cat, dust mite, cockroach, mold spores, and grass pollen. Environmental control measures.   • Osteopenia of multiple sites, 2/25/2022--lumbar spine 1.1.  Left femoral neck -1.0.  Right femoral neck -0.3. 04/01/2022 February 25, 2022--DEXA scan reveals lumbar spine T score 1.1.  Left femoral neck T score -1.0.  Right femoral neck T score -0.3.  Mild osteopenia.   • Postmenopausal hormone replacement therapy 04/18/2016   • Primary hypothyroidism 01/22/2020 January 22, 2020--TSH is elevated at 5.0.  Levothyroxine 50 mcg/day initiated.  Patient will follow-up with nonfasting lab work in about 6 to 8 weeks to reassess.  07/09/2018--routine follow-up.  TSH elevated slightly at 4.31.  Free T4 normal at 1.22.  Free T3 normal at 3.3.  Continued observation.  10/11/2017--thyroid function tests are normal.  09/30/2016--thyroid ultrasound reveals a tiny 2 mm    • Primary osteoarthritis involving multiple joints 06/08/2022   • Primary osteoarthritis of both hands 12/30/2013 05/12/2014--patient seen in followup and reports that the Vimovo has helped. Uric acid levels are normal at 4.9. C. reactive protein mildly elevated at 2.3. X-rays of the hands reveals radiocarpal, first carpometacarpal, first metacarpophalangeal, and interphalangeal joint degeneration. This process is symmetric. The interphalangeal joint of the left is affected to the greatest degree. There is right sided scapholunate dissociation as well as deformity of the scaphoid suggesting prior  traumatic injury with healing. Soft tissues are satisfactory. Overall appearance is most consistent with osteoarthritis.   05/05/2014--patient presents and reports that she is having worsening right wrist pain primarily at the base of the thumb. No new injury. Bilateral x-rays of the wrists and hands ordered. Uric acid level ordered as well as a CRP. Vimovo 500/20 one by mouth twice a day. Follow up in one week.   03/18/2014--patient reports that her wrist symptoms have improved.   12/30/2013--patient reports a several mon   • Primary osteoarthritis of both knees 4/5/2023   • Primary osteoarthritis of both wrists 12/30/2013 05/12/2014--patient seen in followup and reports that the Vimovo has helped. Uric acid levels are normal at 4.9. C. reactive protein mildly elevated at 2.3. X-rays of the hands reveals radiocarpal, first carpometacarpal, first metacarpophalangeal, and interphalangeal joint degeneration. This process is symmetric. The interphalangeal joint of the left is affected to the greatest degree. There is right sided scapholunate dissociation as well as deformity of the scaphoid suggesting prior traumatic injury with healing. Soft tissues are satisfactory. Overall appearance is most consistent with osteoarthritis.   05/05/2014--patient presents and reports that she is having worsening right wrist pain primarily at the base of the thumb. No new injury. Bilateral x-rays of the wrists and hands ordered. Uric acid level ordered as well as a CRP. Vimovo 500/20 one by mouth twice a day. Follow up in one week.   03/18/2014--patient reports that her wrist symptoms have improved.   12/30/2013--patient reports a several mon   • Vitamin D deficiency 04/18/2016         Past Surgical History:   Procedure Laterality Date   • COLONOSCOPY  03/17/2009 03/17/2009--colonoscopy revealed external hemorrhoids, torturous colon with a sigmoid stricture, sigmoid diverticulosis.   • COLONOSCOPY N/A 03/10/2017     03/10/2017--colonoscopy revealed many small and large mouth diverticula in the sigmoid colon and descending colon.  Nonthrombosed external hemorrhoids and internal hemorrhoids noted.  Otherwise, normal colonoscopy.   • COLONOSCOPY N/A 8/18/2022    Procedure: COLONOSCOPY TO 40CM WITH POLYPECTOMY (COLD);  Surgeon: Mario Ray MD;  Location: [facility] ENDOSCOPY;  Service: General;  Laterality: N/A;  PRE- POSITIVE COLOGUARD  POST- POLYP, HEMORRHOIDS   • ENDOSCOPY N/A 8/18/2022    Procedure: ESOPHAGOGASTRODUODENOSCOPY WITH BIOPSIES AND COLD BIOPSY POLYPECTOMY;  Surgeon: Mario Ray MD;  Location: [facility] ENDOSCOPY;  Service: General;  Laterality: N/A;  PRE- ACID REFLUX  POST- GASTRITIS, GASTRIC POLYPS   • ERCP WITH SPHINCTEROTOMY/PAPILLOTOMY  08/13/2014 08/13/2014--ERCP, endoscopic sphincterotomy and removal of common bile duct stones. 08/12/2014--laparoscopic cholecystectomy. This was complicated by retained common bile duct stones 2.   • ESOPHAGOSCOPY / EGD  06/09/2015 06/09/2015--EGD revealed Z line irregular, 35 cm from incisors. Biopsied. Small hiatal hernia. Gastritis. Biopsied. Bilious gastric fluid. Fluid aspiration performed. Normal duodenal bulb and second part of duodenum. Biopsied. Pathology revealed the antral biopsy revealed small intestinal mucosa with no pathologic diagnosis. Good preservation of villous architecture. Duodenum biopsy revealed large   • EYE SURGERY  07/19/2018    Both Eyes; cataracts   • HAMMER TOE REPAIR Right 04/11/2022    Procedure: RIGHT SECOND AND THIRD HAMMERTOE REPAIRS;  Surgeon: Brandt Vieira MD;  Location: [facility] OR [facility];  Service: Orthopedics;  Laterality: Right;   • LAPAROSCOPIC CHOLECYSTECTOMY  08/12/2014 08/13/2014--ERCP, endoscopic sphincterotomy and removal of common bile duct stones. 08/12/2014--laparoscopic cholecystectomy. This was complicated by retained common bile duct stones 2.   • ROTATOR CUFF REPAIR Left 07/10/2014     07/10/2014--left rotator cuff repair. 03/18/2014--patient seen in followup and reports that her range of motion has improved and her pain is not debilitating. She feels that she is making progress with physical therapy. Surgery scheduled 07/10/2014. 01/10/2014--patient was seen in evaluation by the orthopedist and he recommended conservative treatment consisting of physical therapy/rehabilitation.   • ROTATOR CUFF REPAIR Right 08/03/2016 08/03/2016--arthroscopic right rotator cuff repair.  Debridement of degenerative anterior superior labral tear.   • SUBTOTAL HYSTERECTOMY  1978    Partial hysterectomy 1978.         Allergies   Allergen Reactions   • Bupivacaine Other (See Comments)     Questionable allergy.  Patient had toe surgery and subsequently developed acute hepatitis.  Rare case reports of hepatitis induced by bupivacaine which was used during her surgery.           Current Outpatient Medications:   •  atorvastatin (LIPITOR) 20 MG tablet, Take 1 p.o. daily for high cholesterol., Disp: 90 tablet, Rfl: 3  •  calcium carbonate (Calcium 600) 600 MG tablet, Take calcium plus vitamin D twice daily for low vitamin D and weak bones, Disp: 30 tablet, Rfl:   •  esomeprazole (nexIUM) 20 MG capsule, Take 1 capsule by mouth Every Morning Before Breakfast., Disp: , Rfl:   •  estrogens, conjugated, (PREMARIN) 0.625 MG tablet, Take 1 p.o. daily for estrogen replacement therapy/menopausal symptoms, Disp: 90 tablet, Rfl: 3  •  ezetimibe (Zetia) 10 MG tablet, Take 1 p.o. daily for high cholesterol, Disp: 90 tablet, Rfl: 3  •  fexofenadine-pseudoephedrine (ALLEGRA-D 24) 180-240 MG per 24 hr tablet, Take 1 tablet by mouth Daily As Needed for Allergies., Disp: 30 tablet, Rfl: 6  •  levothyroxine (SYNTHROID, LEVOTHROID) 50 MCG tablet, TAKE ONE TABLET BY MOUTH DAILY FOR LOW THYROID, Disp: 90 tablet, Rfl: 3  •  meloxicam (MOBIC) 15 MG tablet, Take 1 tablet by mouth Daily., Disp: 90 tablet, Rfl: 3  •  multivitamin (THERAGRAN)  "tablet tablet, Take 1 tablet by mouth Daily. HOLD FOR SURGERY, Disp: , Rfl:   •  omeprazole OTC (PrilOSEC OTC) 20 MG EC tablet, Take 1 tablet by mouth Daily., Disp: , Rfl:   •  vitamin C (ASCORBIC ACID) 250 MG tablet, Take 1 tablet by mouth Daily., Disp: , Rfl:   •  ciprofloxacin (Cipro) 500 MG tablet, Take 1 p.o. twice daily until gone, Disp: 20 tablet, Rfl: 0  •  metroNIDAZOLE (FLAGYL) 250 MG tablet, Take 1 p.o. twice daily until gone, Disp: 20 tablet, Rfl: 0  •  Probiotic Product (Probiotic Daily) capsule, Take 1 p.o. daily as directed, Disp: , Rfl:       Family History   Problem Relation Age of Onset   • Colon polyps Mother    • Alzheimer's disease Mother    • Hearing loss Mother    • Other Father         Hodgkin's Disease   • Cancer Father         Lung Cancer   • Early death Father         59 years old   • Breast cancer Daughter 52   • Malig Hyperthermia Neg Hx          Social History     Socioeconomic History   • Marital status:    • Number of children: 2   • Years of education: BA   • Highest education level: Associate degree: occupational, technical, or vocational program   Tobacco Use   • Smoking status: Never   • Smokeless tobacco: Never   Vaping Use   • Vaping Use: Never used   Substance and Sexual Activity   • Alcohol use: Yes     Alcohol/week: 2.0 standard drinks     Types: 2 Glasses of wine per week     Comment: None since 04/11/22   • Drug use: No   • Sexual activity: Not Currently     Partners: Male         Vitals:    04/27/23 1024   BP: 128/76   BP Location: Left arm   Patient Position: Sitting   Cuff Size: Adult   Pulse: 88   SpO2: 99%   Weight: 56.6 kg (124 lb 12.8 oz)   Height: 165.1 cm (65\")        Body mass index is 20.77 kg/m².      Physical Exam:    General: Alert and oriented x 3.  No acute distress.  Normal affect.  HEENT: Pupils equal, round, reactive to light; extraocular movements intact; sclerae nonicteric; pharynx, ear canals and TMs normal.  Neck: Without JVD, thyromegaly, " bruit, or adenopathy.  Lungs: Clear to auscultation in all fields.  Heart: Regular rate and rhythm without murmur, rub, gallop, or click.  Abdomen: Soft, nontender, without hepatosplenomegaly or hernia.  Bowel sounds normal.  : Deferred.  Rectal: Deferred.  Extremities: Without clubbing, cyanosis, edema, or pulse deficit.  Neurologic: Intact without focal deficit.  Normal station and gait observed during ingress and egress from the examination room.  Skin: Without significant lesion.  The shingles involving the right posterior neck and shoulder/thorax has essentially healed.  Musculoskeletal: Unremarkable.    Lab/other results:    CBC is normal other than white count mildly elevated 12.6.  CPK is low at 31.  CMP normal.  Total cholesterol 168, triglycerides 140, LDL particle #432, HDL particle number excellent at 55.2.  Thyroid function tests are normal.  Vitamin D normal at 62.5.    Assessment/Plan:     Diagnosis Plan   1. Hyperlipidemia  CK    Comprehensive Metabolic Panel    NMR LipoProfile      2. Primary hypothyroidism  TSH    T4, Free    T3, Free      3. Benign essential hypertension        4. Vitamin D deficiency  Vitamin D,25-Hydroxy      5. Gastroesophageal reflux disease without esophagitis        6. Multiple environmental allergies        7. Allergic rhinitis        8. Primary osteoarthritis of both wrists        9. Primary osteoarthritis of both hands        10. Family history of breast cancer in first degree relative        11. History of COVID-19, August 12, 2021; May 17, 2022.  SARS-CoV-2 Antibodies, Nucleocapsid (Natural Immunity)      12. Osteopenia of multiple sites, 2/25/2022--lumbar spine 1.1.  Left femoral neck -1.0.  Right femoral neck -0.3.        13. History of acute hepatitis        14. Arthralgia of multiple joints        15. Primary osteoarthritis involving multiple joints        16. History of colon polyps, 8/18/2022--hyperplastic x1.        17. Primary osteoarthritis of both knees         18. Chronic pain of both knees        19. Herpes zoster without complication        20. Therapeutic drug monitoring  CBC (No Diff)    Urinalysis With Microscopic If Indicated (No Culture) - Urine, Clean Catch      21. Diverticulitis of large intestine without perforation or abscess with bleeding  ciprofloxacin (Cipro) 500 MG tablet    metroNIDAZOLE (FLAGYL) 250 MG tablet      22. Diverticulosis of colon  Probiotic Product (Probiotic Daily) capsule        Patient has hyperlipidemia which is under excellent control with 20 mg of Lipitor along with 10 mg of Zetia.  She is tolerating it well.  Her thyroid is therapeutic on the current dose of levothyroxine 50 mcg/day.  Esophageal reflux controlled with Prilosec OTC.  Her environmental allergies/allergic rhinitis controlled with Allegra-D.  Symptomatically her biggest problem is that of osteoarthritis of multiple joints particularly the hands and fingers as well as the knees.  Meloxicam provides some relief.  She recently was treated with prednisone for shingles and this definitely help with her arthritic aches and pains but she understands that is not a medication she can stay on.  She had a history of acute hepatitis just this past year of uncertain etiology.  She is now back on her statin therapy and there is no sign of return of hepatitis.  She has a history of colon polyps and is up-to-date on her colonoscopy.  She has osteopenia and is up-to-date on her DEXA scan as well.  She has a history of COVID infection in the past and we will monitor her antibodies in the future.    Plan is as follows: No change in current medical regimen.  Patient will follow-up on or shortly after November 23, 2023 for her subsequent Medicare wellness visit.  Otherwise she will follow-up as needed.    Addendum: At the end of the visit patient reports a new problem: April 27, 2023--patient presents with approximately 1 week history of lower abdominal pain described as crampy and  associated with loose stools but no overt diarrhea.  Patient also has noticed a little blood in her stools as well.  She has had no fever, chills, or other systemic signs or symptoms.  Patient had a colonoscopy less than 1 year ago which showed a spastic tortuous colon and extensive diverticulosis.  Examination today reveals no significant tenderness of the abdomen and there is certainly no signs of peritoneal inflammation on today's exam.  However, I think it would be reasonable to treat her empirically for an early diverticulitis.  Plan is as follows: Cipro 500 mg p.o. twice daily x8 days and metronidazole 250 mg p.o. twice daily x8 days.  I also recommended the patient start taking a probiotic over-the-counter.    Note: Greater than 40 minutes was spent evaluating this patient today with several chronic issues as well as a new complaint of abdominal pain/diverticulitis and greater than 50% of this time was spent counseling patient regarding her acute and chronic problems.  [address]24 level service justified.    Procedures

## 2023-05-22 DIAGNOSIS — Z79.890 POSTMENOPAUSAL HORMONE REPLACEMENT THERAPY: Chronic | ICD-10-CM

## 2023-05-22 DIAGNOSIS — N95.1 MENOPAUSAL STATE: Chronic | ICD-10-CM

## 2023-05-30 DIAGNOSIS — Z79.890 POSTMENOPAUSAL HORMONE REPLACEMENT THERAPY: Chronic | ICD-10-CM

## 2023-05-30 DIAGNOSIS — N95.1 MENOPAUSAL STATE: Chronic | ICD-10-CM

## 2023-05-30 NOTE — TELEPHONE ENCOUNTER
Caller: Ashley Mata    Relationship: Self    Best call back number: [phone]  Requested Prescriptions:   Requested Prescriptions     Pending Prescriptions Disp Refills   • estrogens, conjugated, (PREMARIN) 0.625 MG tablet 90 tablet 3     Sig: Take 1 p.o. daily for estrogen replacement therapy/menopausal symptoms        Pharmacy where request should be sent: [id number] [address] AT [facility] [phone] [facility] [phone]      Last office visit with prescribing clinician: 4/27/2023   Last telemedicine visit with prescribing clinician: Visit date not found   Next office visit with prescribing clinician: 11/27/2023     Additional details provided by patient: PLEASE SEND MEDICATION TO [facility]  Does the patient have less than a 3 day supply:  [x] Yes  [] No    Would you like a call back once the refill request has been completed: [x] Yes [] No    If the office needs to give you a call back, can they leave a voicemail: [x] Yes [] No    Kami Bear Rep   05/30/23 07:59 EDT

## 2023-08-01 ENCOUNTER — OFFICE VISIT (OUTPATIENT)
Dept: OBSTETRICS AND GYNECOLOGY | Facility: CLINIC | Age: 78
End: 2023-08-01
Payer: MEDICARE

## 2023-08-01 ENCOUNTER — PROCEDURE VISIT (OUTPATIENT)
Dept: OBSTETRICS AND GYNECOLOGY | Facility: CLINIC | Age: 78
End: 2023-08-01
Payer: MEDICARE

## 2023-08-01 VITALS
WEIGHT: 128 LBS | HEIGHT: 65 IN | SYSTOLIC BLOOD PRESSURE: 128 MMHG | BODY MASS INDEX: 21.33 KG/M2 | DIASTOLIC BLOOD PRESSURE: 70 MMHG

## 2023-08-01 DIAGNOSIS — Z12.31 VISIT FOR SCREENING MAMMOGRAM: Primary | ICD-10-CM

## 2023-08-01 DIAGNOSIS — Z01.419 ENCOUNTER FOR ROUTINE GYNECOLOGIC EXAMINATION IN MEDICARE PATIENT: Primary | ICD-10-CM

## 2023-08-07 DIAGNOSIS — Z91.09 MULTIPLE ENVIRONMENTAL ALLERGIES: ICD-10-CM

## 2023-08-07 DIAGNOSIS — J30.1 CHRONIC SEASONAL ALLERGIC RHINITIS DUE TO POLLEN: ICD-10-CM

## 2023-08-07 RX ORDER — FEXOFENADINE HCL AND PSEUDOEPHEDRINE HCI 180; 240 MG/1; MG/1
TABLET, EXTENDED RELEASE ORAL
Qty: 30 TABLET | Refills: 6
Start: 2023-08-07 | End: 2023-08-08 | Stop reason: SDUPTHER

## 2023-08-08 RX ORDER — FEXOFENADINE HCL AND PSEUDOEPHEDRINE HCI 180; 240 MG/1; MG/1
TABLET, EXTENDED RELEASE ORAL
Qty: 30 TABLET | Refills: 6 | Status: SHIPPED | OUTPATIENT
Start: 2023-08-08

## 2023-11-07 ENCOUNTER — PATIENT MESSAGE (OUTPATIENT)
Dept: INTERNAL MEDICINE | Facility: CLINIC | Age: 78
End: 2023-11-07
Payer: MEDICARE

## 2023-11-13 ENCOUNTER — OFFICE VISIT (OUTPATIENT)
Dept: ORTHOPEDIC SURGERY | Facility: CLINIC | Age: 78
End: 2023-11-13
Payer: MEDICARE

## 2023-11-13 VITALS — TEMPERATURE: 98.3 F | BODY MASS INDEX: 20.04 KG/M2 | HEIGHT: 65 IN | WEIGHT: 120.3 LBS

## 2023-11-13 DIAGNOSIS — M25.521 RIGHT ELBOW PAIN: Primary | ICD-10-CM

## 2023-11-13 PROCEDURE — 1160F RVW MEDS BY RX/DR IN RCRD: CPT | Performed by: ORTHOPAEDIC SURGERY

## 2023-11-13 PROCEDURE — 99213 OFFICE O/P EST LOW 20 MIN: CPT | Performed by: ORTHOPAEDIC SURGERY

## 2023-11-13 PROCEDURE — 1159F MED LIST DOCD IN RCRD: CPT | Performed by: ORTHOPAEDIC SURGERY

## 2023-11-13 PROCEDURE — 73070 X-RAY EXAM OF ELBOW: CPT | Performed by: ORTHOPAEDIC SURGERY

## 2023-11-13 NOTE — PROGRESS NOTES
Chief Complaint:  Right arm injury    HPI:  Ms. Mata is seen today for her right arm.  She is known to me from a right rotator cuff repair approximately 5 or 6 years ago.  She says her shoulder was fine up until just a couple of weeks ago.  She was lifting a heavy coat when she felt something rip in her shoulder.  She noticed some swelling of the biceps.  She reports mild residual aching at this point.  She reports good use and function of the arm.    Exam: Right arm and shoulder are examined.  Skin is benign.  She has subtle Kenn deformity of the biceps.  Mild tenderness over the upper biceps muscle belly.  She has good shoulder and elbow motion.  Elbow flexion and forearm supination are both mildly uncomfortable.    Imaging:  AP and lateral views of the right elbow are ordered by myself and reviewed to evaluate the patient's complaint.  No comparison films are immediately available.  The x-rays show no obvious acute abnormalities, lesions, masses, significant degenerative changes, or other concerning findings.     Assessment:  Right long head of biceps tendon rupture    Plan: Talked about the natural history of this condition.  I assured her it is favorable.  We talked about surgical and nonsurgical treatment.  She has elected for expectant management.  She can follow-up as needed.    Ash Jimenez MD  11/13/2023

## 2023-11-19 DIAGNOSIS — E78.2 MIXED HYPERLIPIDEMIA: Chronic | ICD-10-CM

## 2023-11-20 RX ORDER — EZETIMIBE 10 MG/1
TABLET ORAL
Qty: 90 TABLET | Refills: 1 | Status: SHIPPED | OUTPATIENT
Start: 2023-11-20

## 2023-11-27 ENCOUNTER — TELEPHONE (OUTPATIENT)
Dept: SURGERY | Facility: CLINIC | Age: 78
End: 2023-11-27
Payer: MEDICARE

## 2023-11-27 ENCOUNTER — OFFICE VISIT (OUTPATIENT)
Dept: INTERNAL MEDICINE | Facility: CLINIC | Age: 78
End: 2023-11-27
Payer: MEDICARE

## 2023-11-27 VITALS
HEIGHT: 64 IN | BODY MASS INDEX: 22.02 KG/M2 | SYSTOLIC BLOOD PRESSURE: 122 MMHG | TEMPERATURE: 96.6 F | DIASTOLIC BLOOD PRESSURE: 76 MMHG | OXYGEN SATURATION: 97 % | HEART RATE: 80 BPM | RESPIRATION RATE: 16 BRPM | WEIGHT: 129 LBS

## 2023-11-27 DIAGNOSIS — M25.50 ARTHRALGIA OF MULTIPLE JOINTS: Chronic | ICD-10-CM

## 2023-11-27 DIAGNOSIS — M15.9 PRIMARY OSTEOARTHRITIS INVOLVING MULTIPLE JOINTS: Chronic | ICD-10-CM

## 2023-11-27 DIAGNOSIS — E03.9 PRIMARY HYPOTHYROIDISM: Chronic | ICD-10-CM

## 2023-11-27 DIAGNOSIS — Z80.3 FAMILY HISTORY OF BREAST CANCER IN FIRST DEGREE RELATIVE: Chronic | ICD-10-CM

## 2023-11-27 DIAGNOSIS — M41.26 OTHER IDIOPATHIC SCOLIOSIS, LUMBAR REGION: Chronic | ICD-10-CM

## 2023-11-27 DIAGNOSIS — M19.031 PRIMARY OSTEOARTHRITIS OF BOTH WRISTS: Chronic | ICD-10-CM

## 2023-11-27 DIAGNOSIS — Z83.719 FAMILY HISTORY OF COLONIC POLYPS: Chronic | ICD-10-CM

## 2023-11-27 DIAGNOSIS — K57.30 DIVERTICULOSIS OF COLON: Chronic | ICD-10-CM

## 2023-11-27 DIAGNOSIS — Z00.00 ENCOUNTER FOR SUBSEQUENT ANNUAL WELLNESS VISIT (AWV) IN MEDICARE PATIENT: Primary | ICD-10-CM

## 2023-11-27 DIAGNOSIS — K21.9 GASTROESOPHAGEAL REFLUX DISEASE WITHOUT ESOPHAGITIS: Chronic | ICD-10-CM

## 2023-11-27 DIAGNOSIS — M25.561 CHRONIC PAIN OF BOTH KNEES: ICD-10-CM

## 2023-11-27 DIAGNOSIS — Z86.16 HISTORY OF COVID-19: Chronic | ICD-10-CM

## 2023-11-27 DIAGNOSIS — G89.29 CHRONIC TOE PAIN, RIGHT FOOT: Chronic | ICD-10-CM

## 2023-11-27 DIAGNOSIS — M17.0 PRIMARY OSTEOARTHRITIS OF BOTH KNEES: Chronic | ICD-10-CM

## 2023-11-27 DIAGNOSIS — E78.2 MIXED HYPERLIPIDEMIA: Chronic | ICD-10-CM

## 2023-11-27 DIAGNOSIS — L65.9 ALOPECIA: Chronic | ICD-10-CM

## 2023-11-27 DIAGNOSIS — G89.29 CHRONIC PAIN OF BOTH KNEES: ICD-10-CM

## 2023-11-27 DIAGNOSIS — Z91.09 MULTIPLE ENVIRONMENTAL ALLERGIES: Chronic | ICD-10-CM

## 2023-11-27 DIAGNOSIS — M51.26 LUMBAR DISC HERNIATION: Chronic | ICD-10-CM

## 2023-11-27 DIAGNOSIS — M19.041 PRIMARY OSTEOARTHRITIS OF BOTH HANDS: Chronic | ICD-10-CM

## 2023-11-27 DIAGNOSIS — Z86.010 HISTORY OF COLON POLYPS: Chronic | ICD-10-CM

## 2023-11-27 DIAGNOSIS — Z78.0 POSTMENOPAUSAL STATE: Chronic | ICD-10-CM

## 2023-11-27 DIAGNOSIS — M79.674 CHRONIC TOE PAIN, RIGHT FOOT: Chronic | ICD-10-CM

## 2023-11-27 DIAGNOSIS — M19.042 PRIMARY OSTEOARTHRITIS OF BOTH HANDS: Chronic | ICD-10-CM

## 2023-11-27 DIAGNOSIS — E55.9 VITAMIN D DEFICIENCY: Chronic | ICD-10-CM

## 2023-11-27 DIAGNOSIS — M19.032 PRIMARY OSTEOARTHRITIS OF BOTH WRISTS: Chronic | ICD-10-CM

## 2023-11-27 DIAGNOSIS — M85.89 OSTEOPENIA OF MULTIPLE SITES: Chronic | ICD-10-CM

## 2023-11-27 DIAGNOSIS — Z86.19 HISTORY OF ACUTE HEPATITIS: Chronic | ICD-10-CM

## 2023-11-27 DIAGNOSIS — Z51.81 THERAPEUTIC DRUG MONITORING: ICD-10-CM

## 2023-11-27 DIAGNOSIS — I10 BENIGN ESSENTIAL HYPERTENSION: Chronic | ICD-10-CM

## 2023-11-27 DIAGNOSIS — M20.41 HAMMERTOE OF SECOND TOE OF RIGHT FOOT: Chronic | ICD-10-CM

## 2023-11-27 DIAGNOSIS — M25.562 CHRONIC PAIN OF BOTH KNEES: ICD-10-CM

## 2023-11-27 DIAGNOSIS — J30.1 CHRONIC SEASONAL ALLERGIC RHINITIS DUE TO POLLEN: Chronic | ICD-10-CM

## 2023-11-27 PROBLEM — K57.33 DIVERTICULITIS OF LARGE INTESTINE WITHOUT PERFORATION OR ABSCESS WITH BLEEDING: Status: RESOLVED | Noted: 2023-04-27 | Resolved: 2023-11-27

## 2023-11-27 PROBLEM — B35.1 ONYCHOMYCOSIS OF RIGHT GREAT TOE: Status: RESOLVED | Noted: 2022-12-07 | Resolved: 2023-11-27

## 2023-11-27 PROBLEM — B02.9 HERPES ZOSTER WITHOUT COMPLICATION: Status: RESOLVED | Noted: 2023-04-03 | Resolved: 2023-11-27

## 2023-11-27 NOTE — TELEPHONE ENCOUNTER
I spoke with the patient has been regarding indication for next colonoscopy.  She had a barium enema on 9/9/2022 that did not show any concerning lesion.  She has a very tortuous colon.  Her last colonoscopy was unable to be completed due to tortuosity of her colon and severe diverticulosis.  She had colon polyps that were benign.  I discussed with the patient  that she should probably have Cologuard testing and colonoscopy only for positive results, otherwise I do not think screening colonoscopy at her age with her colon tortuosity will be warranted.  Discussed with Dr. Shukla

## 2023-11-27 NOTE — PROGRESS NOTES
The ABCs of the Annual Wellness Visit  Subsequent Medicare Wellness Visit    Subjective      Ashley Mata is a 78 y.o. female who presents for a Subsequent Medicare Wellness Visit.    The following portions of the patient's history were reviewed and   updated as appropriate: allergies, current medications, past family history, past medical history, past social history, past surgical history, and problem list.    Compared to one year ago, the patient feels her physical   health is the same.    Compared to one year ago, the patient feels her mental   health is the same.    Recent Hospitalizations:  She was not admitted to the hospital during the last year.       Current Medical Providers:  Patient Care Team:  Carloz Shukla MD as PCP - General (Internal Medicine)    Outpatient Medications Prior to Visit   Medication Sig Dispense Refill    atorvastatin (LIPITOR) 20 MG tablet Take 1 p.o. daily for high cholesterol. 90 tablet 3    calcium carbonate (Calcium 600) 600 MG tablet Take calcium plus vitamin D twice daily for low vitamin D and weak bones 30 tablet     estrogens, conjugated, (PREMARIN) 0.625 MG tablet Take 1 p.o. daily for estrogen replacement therapy/menopausal symptoms 90 tablet 3    ezetimibe (ZETIA) 10 MG tablet TAKE 1 TABLET BY MOUTH EVERY DAY 90 tablet 1    fexofenadine-pseudoephedrine (ALLEGRA-D 24) 180-240 MG per 24 hr tablet Take 1 p.o. daily for environmental allergies 30 tablet 6    levothyroxine (SYNTHROID, LEVOTHROID) 50 MCG tablet TAKE ONE TABLET BY MOUTH DAILY FOR LOW THYROID 90 tablet 3    meloxicam (MOBIC) 15 MG tablet Take 1 tablet by mouth Daily. 90 tablet 3    multivitamin (THERAGRAN) tablet tablet Take 1 tablet by mouth Daily. HOLD FOR SURGERY      omeprazole OTC (PrilOSEC OTC) 20 MG EC tablet Take 1 tablet by mouth Daily.      Probiotic Product (Probiotic Daily) capsule Take 1 p.o. daily as directed      vitamin C (ASCORBIC ACID) 250 MG tablet Take 1 tablet by mouth Daily.       No  "facility-administered medications prior to visit.       No opioid medication identified on active medication list. I have reviewed chart for other potential  high risk medication/s and harmful drug interactions in the elderly.        Aspirin is not on active medication list.  Aspirin use is not indicated based on review of current medical condition/s. Risk of harm outweighs potential benefits.  .    Patient Active Problem List   Diagnosis    Allergic rhinitis    Benign essential hypertension    Diverticulosis of colon    Gastroesophageal reflux disease without esophagitis    Hyperlipidemia    Multiple environmental allergies    Primary osteoarthritis of both wrists    Primary osteoarthritis of both hands    Vitamin D deficiency    Therapeutic drug monitoring    Alopecia    Lumbar scoliosis    Lumbar disc herniation, L4-L5    Primary hypothyroidism    Family history of breast cancer in first degree relative    Postmenopausal state    History of COVID-19, August 12, 2021; May 17, 2022.    Hammertoe of second toe of right foot    Chronic toe pain, right foot    Family history of colonic polyps    Osteopenia of multiple sites, 2/25/2022--lumbar spine 1.1.  Left femoral neck -1.0.  Right femoral neck -0.3.    History of acute hepatitis    Arthralgia of multiple joints    Primary osteoarthritis involving multiple joints    History of colon polyps, 8/18/2022--hyperplastic x1.    Primary osteoarthritis of both knees    Chronic pain of both knees     Advance Care Planning   Advance Care Planning     Advance Directive is not on file.  ACP discussion was held with the patient during this visit. Patient has an advance directive (not in EMR), copy requested.     Objective    Vitals:    11/27/23 0953   BP: 122/76   Pulse: 80   Resp: 16   Temp: 96.6 °F (35.9 °C)   TempSrc: Temporal   SpO2: 97%   Weight: 58.5 kg (129 lb)   Height: 163.8 cm (64.49\")     Estimated body mass index is 21.81 kg/m² as calculated from the following:    " "Height as of this encounter: 163.8 cm (64.49\").    Weight as of this encounter: 58.5 kg (129 lb).    BMI is within normal parameters. No other follow-up for BMI required.      Does the patient have evidence of cognitive impairment?   No            HEALTH RISK ASSESSMENT    Smoking Status:  Social History     Tobacco Use   Smoking Status Never    Passive exposure: Never   Smokeless Tobacco Never     Alcohol Consumption:  Social History     Substance and Sexual Activity   Alcohol Use Yes    Alcohol/week: 2.0 standard drinks of alcohol    Types: 2 Glasses of wine per week    Comment: None since 20     Fall Risk Screen:    MARTÍN Fall Risk Assessment was completed, and patient is at LOW risk for falls.Assessment completed on:2023    Depression Screenin/27/2023    10:01 AM   PHQ-2/PHQ-9 Depression Screening   Little Interest or Pleasure in Doing Things 0-->not at all   Feeling Down, Depressed or Hopeless 0-->not at all   PHQ-9: Brief Depression Severity Measure Score 0       Health Habits and Functional and Cognitive Screenin/27/2023    10:02 AM   Functional & Cognitive Status   Do you have difficulty preparing food and eating? No   Do you have difficulty bathing yourself, getting dressed or grooming yourself? No   Do you have difficulty using the toilet? No   Do you have difficulty moving around from place to place? No   Do you have trouble with steps or getting out of a bed or a chair? No   Current Diet Well Balanced Diet   Dental Exam Up to date   Eye Exam Up to date   Exercise (times per week) 3 times per week   Current Exercises Include Walking   Do you need help using the phone?  No   Are you deaf or do you have serious difficulty hearing?  No   Do you need help to go to places out of walking distance? No   Do you need help shopping? No   Do you need help preparing meals?  No   Do you need help with housework?  No   Do you need help with laundry? No   Do you need help taking your " medications? No   Do you need help managing money? No   Do you ever drive or ride in a car without wearing a seat belt? No   Do you have difficulty concentrating, remembering or making decisions? No       Age-appropriate Screening Schedule:  Refer to the list below for future screening recommendations based on patient's age, sex and/or medical conditions. Orders for these recommended tests are listed in the plan section. The patient has been provided with a written plan.    Health Maintenance   Topic Date Due    COLORECTAL CANCER SCREENING  08/18/2023    DXA SCAN  02/24/2024    LIPID PANEL  04/19/2024    ANNUAL WELLNESS VISIT  11/27/2024    TDAP/TD VACCINES (2 - Td or Tdap) 04/12/2027    INFLUENZA VACCINE  Completed    Pneumococcal Vaccine 65+  Completed    COVID-19 Vaccine  Discontinued    ZOSTER VACCINE  Discontinued                  CMS Preventative Services Quick Reference  Risk Factors Identified During Encounter:    Immunizations Discussed/Encouraged: Influenza, COVID19, and RSV (Respiratory Syncytial Virus)    The above risks/problems have been discussed with the patient.  Pertinent information has been shared with the patient in the After Visit Summary.    Diagnoses and all orders for this visit:    1. Encounter for subsequent annual wellness visit (AWV) in Medicare patient (Primary)    2. Allergic rhinitis    3. Alopecia    4. Arthralgia of multiple joints    5. Benign essential hypertension    6. Chronic toe pain, right foot    7. Diverticulosis of colon    8. Family history of breast cancer in first degree relative    9. Gastroesophageal reflux disease without esophagitis    10. Family history of colonic polyps    11. Hammertoe of second toe of right foot    12. History of acute hepatitis    13. History of colon polyps, 8/18/2022--hyperplastic x1.  -     Cancel: Ambulatory Referral For Screening Colonoscopy    14. History of COVID-19, August 12, 2021; May 17, 2022.    15. Hyperlipidemia    16. Lumbar disc  herniation, L4-L5    17. Lumbar scoliosis    18. Multiple environmental allergies    19. Osteopenia of multiple sites, 2/25/2022--lumbar spine 1.1.  Left femoral neck -1.0.  Right femoral neck -0.3.    20. Postmenopausal state    21. Primary hypothyroidism    22. Primary osteoarthritis involving multiple joints    23. Primary osteoarthritis of both hands    24. Primary osteoarthritis of both knees    25. Primary osteoarthritis of both wrists    26. Vitamin D deficiency    27. Chronic pain of both knees    28. Therapeutic drug monitoring        Follow Up:   Next Medicare Wellness visit to be scheduled in 1 year.      An After Visit Summary and PPPS were made available to the patient.

## 2023-12-02 DIAGNOSIS — E78.2 MIXED HYPERLIPIDEMIA: Chronic | ICD-10-CM

## 2023-12-04 RX ORDER — ATORVASTATIN CALCIUM 20 MG/1
TABLET, FILM COATED ORAL
Qty: 90 TABLET | Refills: 3 | Status: SHIPPED | OUTPATIENT
Start: 2023-12-04

## 2023-12-11 ENCOUNTER — OFFICE VISIT (OUTPATIENT)
Dept: INTERNAL MEDICINE | Facility: CLINIC | Age: 78
End: 2023-12-11
Payer: MEDICARE

## 2023-12-11 VITALS
WEIGHT: 129 LBS | OXYGEN SATURATION: 99 % | HEART RATE: 85 BPM | DIASTOLIC BLOOD PRESSURE: 74 MMHG | TEMPERATURE: 97.5 F | RESPIRATION RATE: 16 BRPM | BODY MASS INDEX: 22.02 KG/M2 | HEIGHT: 64 IN | SYSTOLIC BLOOD PRESSURE: 112 MMHG

## 2023-12-11 DIAGNOSIS — M15.9 PRIMARY OSTEOARTHRITIS INVOLVING MULTIPLE JOINTS: Chronic | ICD-10-CM

## 2023-12-11 DIAGNOSIS — Z80.3 FAMILY HISTORY OF BREAST CANCER IN FIRST DEGREE RELATIVE: Chronic | ICD-10-CM

## 2023-12-11 DIAGNOSIS — Z86.16 HISTORY OF COVID-19: Chronic | ICD-10-CM

## 2023-12-11 DIAGNOSIS — E78.2 MIXED HYPERLIPIDEMIA: Primary | Chronic | ICD-10-CM

## 2023-12-11 DIAGNOSIS — E03.9 PRIMARY HYPOTHYROIDISM: Chronic | ICD-10-CM

## 2023-12-11 DIAGNOSIS — I10 BENIGN ESSENTIAL HYPERTENSION: Chronic | ICD-10-CM

## 2023-12-11 DIAGNOSIS — E55.9 VITAMIN D DEFICIENCY: Chronic | ICD-10-CM

## 2023-12-11 DIAGNOSIS — Z51.81 THERAPEUTIC DRUG MONITORING: ICD-10-CM

## 2023-12-11 DIAGNOSIS — Z86.010 HISTORY OF COLON POLYPS: Chronic | ICD-10-CM

## 2023-12-11 DIAGNOSIS — Z78.0 POSTMENOPAUSAL STATE: Chronic | ICD-10-CM

## 2023-12-11 DIAGNOSIS — M25.50 ARTHRALGIA OF MULTIPLE JOINTS: Chronic | ICD-10-CM

## 2023-12-11 DIAGNOSIS — M85.89 OSTEOPENIA OF MULTIPLE SITES: Chronic | ICD-10-CM

## 2023-12-11 DIAGNOSIS — Z91.09 MULTIPLE ENVIRONMENTAL ALLERGIES: Chronic | ICD-10-CM

## 2023-12-11 DIAGNOSIS — K21.9 GASTROESOPHAGEAL REFLUX DISEASE WITHOUT ESOPHAGITIS: Chronic | ICD-10-CM

## 2023-12-11 DIAGNOSIS — Z83.719 FAMILY HISTORY OF COLONIC POLYPS: Chronic | ICD-10-CM

## 2023-12-11 PROBLEM — M25.562 CHRONIC PAIN OF BOTH KNEES: Chronic | Status: ACTIVE | Noted: 2023-04-05

## 2023-12-11 PROBLEM — Z86.19 HISTORY OF ACUTE HEPATITIS: Chronic | Status: RESOLVED | Noted: 2022-04-24 | Resolved: 2023-12-11

## 2023-12-11 PROBLEM — M17.0 PRIMARY OSTEOARTHRITIS OF BOTH KNEES: Chronic | Status: RESOLVED | Noted: 2023-04-05 | Resolved: 2023-12-11

## 2023-12-11 PROBLEM — M51.26 LUMBAR DISC HERNIATION: Chronic | Status: RESOLVED | Noted: 2018-08-07 | Resolved: 2023-12-11

## 2023-12-11 PROBLEM — M25.562 CHRONIC PAIN OF BOTH KNEES: Chronic | Status: RESOLVED | Noted: 2023-04-05 | Resolved: 2023-12-11

## 2023-12-11 PROBLEM — G89.29 CHRONIC PAIN OF BOTH KNEES: Chronic | Status: RESOLVED | Noted: 2023-04-05 | Resolved: 2023-12-11

## 2023-12-11 PROBLEM — M25.561 CHRONIC PAIN OF BOTH KNEES: Chronic | Status: ACTIVE | Noted: 2023-04-05

## 2023-12-11 PROBLEM — M25.561 CHRONIC PAIN OF BOTH KNEES: Chronic | Status: RESOLVED | Noted: 2023-04-05 | Resolved: 2023-12-11

## 2023-12-11 PROBLEM — G89.29 CHRONIC TOE PAIN, RIGHT FOOT: Chronic | Status: RESOLVED | Noted: 2021-10-19 | Resolved: 2023-12-11

## 2023-12-11 PROBLEM — G89.29 CHRONIC PAIN OF BOTH KNEES: Chronic | Status: ACTIVE | Noted: 2023-04-05

## 2023-12-11 PROBLEM — M79.674 CHRONIC TOE PAIN, RIGHT FOOT: Chronic | Status: RESOLVED | Noted: 2021-10-19 | Resolved: 2023-12-11

## 2023-12-11 NOTE — PROGRESS NOTES
12/11/2023    Patient Information  Ashley Mata                                                                                          [id number] [facility] [address]      1945  [unfilled]  There is no work phone number on file.    Chief Complaint:     Follow-up medical problems as noted below.  No new acute complaints.    History of Present Illness:    Patient with several chronic medical problems as noted below in the assessment and plan presents today for follow-up.  Patient had lab work in order to monitor these chronic medical issues.  Her past medical history reviewed and updated were necessary including health maintenance parameters.  This reveals she is currently up-to-date or else accounted for with the exception of colonoscopy which I have ordered.  She has a history of colon polyps and the endoscopist wanted a repeat study in 1 year which is about now.    Review of Systems   Constitutional: Negative.   HENT: Negative.     Eyes: Negative.    Cardiovascular: Negative.    Respiratory: Negative.     Endocrine: Negative.    Hematologic/Lymphatic: Negative.    Skin: Negative.    Musculoskeletal:  Positive for arthritis and joint pain.   Gastrointestinal: Negative.    Genitourinary: Negative.    Neurological: Negative.    Psychiatric/Behavioral: Negative.     Allergic/Immunologic: Negative.        Active Problems:    Patient Active Problem List   Diagnosis    Allergic rhinitis    Benign essential hypertension    Diverticulosis of colon    Gastroesophageal reflux disease without esophagitis    Hyperlipidemia    Multiple environmental allergies    Vitamin D deficiency    Therapeutic drug monitoring    Alopecia    Lumbar scoliosis    Primary hypothyroidism    Family history of breast cancer in first degree relative    Postmenopausal state    History of COVID-19, August 12, 2021; May 17, 2022.    Hammertoe of second toe of right foot    Family history of colonic polyps     Osteopenia of multiple sites, 2/25/2022--lumbar spine 1.1.  Left femoral neck -1.0.  Right femoral neck -0.3.    Primary osteoarthritis involving multiple joints    History of colon polyps, 8/18/2022--hyperplastic x1.         Past Medical History:   Diagnosis Date    Allergic rhinitis 01/30/2015 11/13/2015--patient was evaluated by ENT and was started on generic Flonase and patient reports this has improved her symptoms quite a bit.   01/30/2015--allergy testing revealed positive reactivity to cat, dust mite, cockroach, mold spores, and grass pollen. Environmental control measures.    Alopecia 04/12/2017    Evaluated and treated by the dermatologist.    Arthralgia of multiple joints 05/09/2022    Benign essential hypertension 04/18/2016    Chronic pain of both knees 04/05/2023    Chronic toe pain, right foot 10/19/2021    October 19, 2021--patient reports a 1 year history of progressively worsening right toe pain that particular involves the second toe.  On exam she has obvious hammertoe which is causing irritation.  There is some hammering of the third digit as well.  Patient referred to orthopedist who specializes in foot problems such as Dr. Vieira.  In the meantime patient should use over-the-counter toe pads to    Diverticulosis of colon 03/17/2009    03/10/2017--colonoscopy revealed many small and large mouth diverticula in the sigmoid colon and descending colon.  Nonthrombosed external hemorrhoids and internal hemorrhoids noted.  Otherwise, normal colonoscopy.  03/17/2009--colonoscopy revealed external hemorrhoids, torturous colon with a sigmoid stricture, sigmoid diverticulosis.    Family history of breast cancer in first degree relative 07/13/2020    Family history of colonic polyps 04/01/2022    Gastroesophageal reflux disease without esophagitis 03/17/2009 06/09/2015--EGD revealed Z line irregular, 35 cm from incisors. Biopsied. Small hiatal hernia. Gastritis. Biopsied. Bilious gastric fluid. Fluid  aspiration performed. Normal duodenal bulb and second part of duodenum. Biopsied. Pathology revealed the antral biopsy revealed small intestinal mucosa with no pathologic diagnosis. Good preservation of villous architecture. Duodenum biopsy revealed largely antral type gastric mucosa with mild patchy superficial chronic gastritis. Gastroesophageal junction revealed squamous and glandular mucosa with mild chronic inflammation. Negative for intestinal metaplasia or dysplasia. There appeared to be mislabeling of the antral biopsies and duodenal biopsies.   04/17/2012--EGD revealed irregular Z line, hiatal hernia, gastritis, duodenitis.   03/17/2009--EGD revealed grade B. reflux esophagitis, hiatal hernia, gastritis, a few gastric polyps, duodenitis.    Hammertoe of right foot     Hammertoe of second toe of right foot 10/19/2021    October 19, 2021--patient reports a 1 year history of progressively worsening right toe pain that particular involves the second toe.  On exam she has obvious hammertoe which is causing irritation.  There is some hammering of the third digit as well.  Patient referred to orthopedist who specializes in foot problems such as Dr. Vieira.  In the meantime patient should use over-the-counter toe pads to    History of acute hepatitis 04/24/2022    May 9, 2022--patient seen in hospital discharge follow-up/transition of care.  I reviewed the documentation including the admission history and physical, hospital course, laboratory and radiographic studies, GI consultation, discharge summary and discharge medications.  I specifically reviewed the tissue pathology report which revealed moderate steatosis with moderate chronic portal inflammation a    History of COVID-19, August 12, 2021; May 17, 2022. 10/19/2021    May 17, 2022--patient again tested positive for COVID.  Ordered by gastroenterology nurse practitioner.  October 19, 2021--patient seen in follow-up and reports her symptoms totally resolved.   She wants to know when she should get her Covid booster vaccine.  I have suggested that she receive it approximately 3 months after initially testing positive.  She has an appointment in November 6, 2021 for    History of Hyperplastic polyps of stomach 06/09/2015 06/09/2015--EGD revealed Z line irregular, 35 cm from incisors. Biopsied. Small hiatal hernia. Gastritis. Biopsied. Bilious gastric fluid. Fluid aspiration performed. Normal duodenal bulb and second part of duodenum. Biopsied. Pathology revealed the antral biopsy revealed small intestinal mucosa with no pathologic diagnosis. Good preservation of villous architecture. Duodenum biopsy revealed large    Hyperlipidemia 07/17/2012 07/17/2012--treatment for hyperlipidemia begun.    Lumbar disc herniation, L4-L5 08/07/2018 01/16/2019--patient seen in follow-up by Dr. Shukla.  She has seen the neurosurgeon who recommended conservative therapy with physical therapy.  Patient reports that has been extremely helpful.  Currently has no significant pain.  08/07/2018--patient seen in follow-up and reports her pain is much better after taking prednisone.  She is now down to half a pill per day and is almost off of it.  I rev    Lumbar scoliosis 08/07/2018 01/16/2019--patient seen in follow-up by Dr. Shukla.  She has seen the neurosurgeon who recommended conservative therapy with physical therapy.  Patient reports that has been extremely helpful.  Currently has no significant pain.  08/07/2018--patient seen in follow-up and reports her pain is much better after taking prednisone.  She is now down to half a pill per day and is almost off of it.  I rev    Menopausal state 07/13/2020    Multiple environmental allergies 01/30/2015 01/30/2015--allergy testing revealed positive reactivity to cat, dust mite, cockroach, mold spores, and grass pollen. Environmental control measures.    Osteopenia of multiple sites, 2/25/2022--lumbar spine 1.1.  Left femoral neck -1.0.   Right femoral neck -0.3. 04/01/2022 February 25, 2022--DEXA scan reveals lumbar spine T score 1.1.  Left femoral neck T score -1.0.  Right femoral neck T score -0.3.  Mild osteopenia.    Postmenopausal hormone replacement therapy 04/18/2016    Primary hypothyroidism 01/22/2020 January 22, 2020--TSH is elevated at 5.0.  Levothyroxine 50 mcg/day initiated.  Patient will follow-up with nonfasting lab work in about 6 to 8 weeks to reassess.  07/09/2018--routine follow-up.  TSH elevated slightly at 4.31.  Free T4 normal at 1.22.  Free T3 normal at 3.3.  Continued observation.  10/11/2017--thyroid function tests are normal.  09/30/2016--thyroid ultrasound reveals a tiny 2 mm     Primary osteoarthritis involving multiple joints 06/08/2022    Primary osteoarthritis of both hands 12/30/2013 05/12/2014--patient seen in followup and reports that the Vimovo has helped. Uric acid levels are normal at 4.9. C. reactive protein mildly elevated at 2.3. X-rays of the hands reveals radiocarpal, first carpometacarpal, first metacarpophalangeal, and interphalangeal joint degeneration. This process is symmetric. The interphalangeal joint of the left is affected to the greatest degree. There is right sided scapholunate dissociation as well as deformity of the scaphoid suggesting prior traumatic injury with healing. Soft tissues are satisfactory. Overall appearance is most consistent with osteoarthritis.   05/05/2014--patient presents and reports that she is having worsening right wrist pain primarily at the base of the thumb. No new injury. Bilateral x-rays of the wrists and hands ordered. Uric acid level ordered as well as a CRP. Vimovo 500/20 one by mouth twice a day. Follow up in one week.   03/18/2014--patient reports that her wrist symptoms have improved.   12/30/2013--patient reports a several mon    Primary osteoarthritis of both knees 04/05/2023    Primary osteoarthritis of both wrists 12/30/2013 05/12/2014--patient  seen in followup and reports that the Vimovo has helped. Uric acid levels are normal at 4.9. C. reactive protein mildly elevated at 2.3. X-rays of the hands reveals radiocarpal, first carpometacarpal, first metacarpophalangeal, and interphalangeal joint degeneration. This process is symmetric. The interphalangeal joint of the left is affected to the greatest degree. There is right sided scapholunate dissociation as well as deformity of the scaphoid suggesting prior traumatic injury with healing. Soft tissues are satisfactory. Overall appearance is most consistent with osteoarthritis.   05/05/2014--patient presents and reports that she is having worsening right wrist pain primarily at the base of the thumb. No new injury. Bilateral x-rays of the wrists and hands ordered. Uric acid level ordered as well as a CRP. Vimovo 500/20 one by mouth twice a day. Follow up in one week.   03/18/2014--patient reports that her wrist symptoms have improved.   12/30/2013--patient reports a several mon    Vitamin D deficiency 04/18/2016         Past Surgical History:   Procedure Laterality Date    COLONOSCOPY  03/17/2009 03/17/2009--colonoscopy revealed external hemorrhoids, torturous colon with a sigmoid stricture, sigmoid diverticulosis.    COLONOSCOPY N/A 03/10/2017    03/10/2017--colonoscopy revealed many small and large mouth diverticula in the sigmoid colon and descending colon.  Nonthrombosed external hemorrhoids and internal hemorrhoids noted.  Otherwise, normal colonoscopy.    COLONOSCOPY N/A 08/18/2022    Procedure: COLONOSCOPY TO 40CM WITH POLYPECTOMY (COLD);  Surgeon: Mario Ray MD;  Location: [facility] ENDOSCOPY;  Service: General;  Laterality: N/A;  PRE- POSITIVE COLOGUARD  POST- POLYP, HEMORRHOIDS    ENDOSCOPY N/A 08/18/2022    Procedure: ESOPHAGOGASTRODUODENOSCOPY WITH BIOPSIES AND COLD BIOPSY POLYPECTOMY;  Surgeon: Mario Ray MD;  Location: [facility] ENDOSCOPY;  Service: General;   Laterality: N/A;  PRE- ACID REFLUX  POST- GASTRITIS, GASTRIC POLYPS    ERCP WITH SPHINCTEROTOMY/PAPILLOTOMY  08/13/2014 08/13/2014--ERCP, endoscopic sphincterotomy and removal of common bile duct stones. 08/12/2014--laparoscopic cholecystectomy. This was complicated by retained common bile duct stones 2.    ESOPHAGOSCOPY / EGD  06/09/2015 06/09/2015--EGD revealed Z line irregular, 35 cm from incisors. Biopsied. Small hiatal hernia. Gastritis. Biopsied. Bilious gastric fluid. Fluid aspiration performed. Normal duodenal bulb and second part of duodenum. Biopsied. Pathology revealed the antral biopsy revealed small intestinal mucosa with no pathologic diagnosis. Good preservation of villous architecture. Duodenum biopsy revealed large    EYE SURGERY  07/19/2018    Both Eyes; cataracts    HAMMER TOE REPAIR Right 04/11/2022    Procedure: RIGHT SECOND AND THIRD HAMMERTOE REPAIRS;  Surgeon: Brandt Vieira MD;  Location: [facility] OR [facility];  Service: Orthopedics;  Laterality: Right;    LAPAROSCOPIC CHOLECYSTECTOMY  08/12/2014 08/13/2014--ERCP, endoscopic sphincterotomy and removal of common bile duct stones. 08/12/2014--laparoscopic cholecystectomy. This was complicated by retained common bile duct stones 2.    ROTATOR CUFF REPAIR Left 07/10/2014    07/10/2014--left rotator cuff repair. 03/18/2014--patient seen in followup and reports that her range of motion has improved and her pain is not debilitating. She feels that she is making progress with physical therapy. Surgery scheduled 07/10/2014. 01/10/2014--patient was seen in evaluation by the orthopedist and he recommended conservative treatment consisting of physical therapy/rehabilitation.    ROTATOR CUFF REPAIR Right 08/03/2016 08/03/2016--arthroscopic right rotator cuff repair.  Debridement of degenerative anterior superior labral tear.    SHOULDER SURGERY  2014 & 2016    L & R rotator cuff surgery    SUBTOTAL HYSTERECTOMY  1978    Partial hysterectomy 1978.     TRIGGER POINT INJECTION  2023    Gel in both knees         Allergies   Allergen Reactions    Bupivacaine Other (See Comments)     Questionable allergy.  Patient had toe surgery and subsequently developed acute hepatitis.  Rare case reports of hepatitis induced by bupivacaine which was used during her surgery.           Current Outpatient Medications:     atorvastatin (LIPITOR) 20 MG tablet, TAKE 1 TABLET BY MOUTH EVERY DAY, Disp: 90 tablet, Rfl: 3    calcium carbonate (Calcium 600) 600 MG tablet, Take calcium plus vitamin D twice daily for low vitamin D and weak bones, Disp: 30 tablet, Rfl:     estrogens, conjugated, (PREMARIN) 0.625 MG tablet, Take 1 p.o. daily for estrogen replacement therapy/menopausal symptoms, Disp: 90 tablet, Rfl: 3    ezetimibe (ZETIA) 10 MG tablet, TAKE 1 TABLET BY MOUTH EVERY DAY, Disp: 90 tablet, Rfl: 1    fexofenadine-pseudoephedrine (ALLEGRA-D 24) 180-240 MG per 24 hr tablet, Take 1 p.o. daily for environmental allergies, Disp: 30 tablet, Rfl: 6    levothyroxine (SYNTHROID, LEVOTHROID) 50 MCG tablet, TAKE ONE TABLET BY MOUTH DAILY FOR LOW THYROID, Disp: 90 tablet, Rfl: 3    meloxicam (MOBIC) 15 MG tablet, Take 1 tablet by mouth Daily., Disp: 90 tablet, Rfl: 3    multivitamin (THERAGRAN) tablet tablet, Take 1 tablet by mouth Daily. HOLD FOR SURGERY, Disp: , Rfl:     omeprazole OTC (PrilOSEC OTC) 20 MG EC tablet, Take 1 tablet by mouth Daily., Disp: , Rfl:       Family History   Problem Relation Age of Onset    Other Father         Hodgkin's Disease    Cancer Father         Lung Cancer    Early death Father         59 years old    Colon polyps Mother     Alzheimer's disease Mother     Hearing loss Mother     Osteoporosis Mother     Breast cancer Daughter 52    Malig Hyperthermia Neg Hx          Social History     Socioeconomic History    Marital status:     Number of children: 2    Years of education: BA    Highest education level: Associate degree: occupational, technical, or  "vocational program   Tobacco Use    Smoking status: Never     Passive exposure: Never    Smokeless tobacco: Never   Vaping Use    Vaping Use: Never used   Substance and Sexual Activity    Alcohol use: Yes     Alcohol/week: 2.0 standard drinks of alcohol     Types: 2 Glasses of wine per week     Comment: None since 04/11/22    Drug use: No    Sexual activity: Not Currently     Partners: Male     Birth control/protection: Hysterectomy         Vitals:    12/11/23 1252   BP: 112/74   Pulse: 85   Resp: 16   Temp: 97.5 °F (36.4 °C)   SpO2: 99%   Weight: 58.5 kg (129 lb)   Height: 163.8 cm (64.49\")        Body mass index is 21.81 kg/m².      Physical Exam:    General: Alert and oriented x 3.  No acute distress.  Normal affect.  HEENT: Pupils equal, round, reactive to light; extraocular movements intact; sclerae nonicteric; pharynx, ear canals and TMs normal.  Neck: Without JVD, thyromegaly, bruit, or adenopathy.  Lungs: Clear to auscultation in all fields.  Heart: Regular rate and rhythm without murmur, rub, gallop, or click.  Abdomen: Soft, nontender, without hepatosplenomegaly or hernia.  Bowel sounds normal.  : Deferred.  Rectal: Deferred.  Extremities: Without clubbing, cyanosis, edema, or pulse deficit.  Neurologic: Intact without focal deficit.  Normal station and gait observed during ingress and egress from the examination room.  Skin: Without significant lesion.  Musculoskeletal: Diffuse changes consistent with generalized osteoarthritis    Lab/other results:    CBC normal.  CPK normal at 75.  CMP normal.  Total cholesterol 171, triglycerides 199, LDL particle #686, HDL particle number very good at 54.8.  Thyroid function tests are normal.  Urinalysis canceled.  Vitamin D normal at 66.7.  SARS antibodies are positive.    Assessment/Plan:     Diagnosis Plan   1. Hyperlipidemia  CK    Comprehensive Metabolic Panel    NMR LipoProfile      2. Primary hypothyroidism  TSH    T4, Free    T3, Free      3. Vitamin D " deficiency  Vitamin D,25-Hydroxy      4. Benign essential hypertension  Comprehensive Metabolic Panel      5. Arthralgia of multiple joints        6. Primary osteoarthritis involving multiple joints        7. Multiple environmental allergies        8. Osteopenia of multiple sites, 2/25/2022--lumbar spine 1.1.  Left femoral neck -1.0.  Right femoral neck -0.3.        9. Postmenopausal state        10. History of COVID-19, August 12, 2021; May 17, 2022.  SARS-CoV-2 Antibodies, Nucleocapsid (Natural Immunity)      11. History of colon polyps, 8/18/2022--hyperplastic x1.  Ambulatory Referral For Screening Colonoscopy      12. Gastroesophageal reflux disease without esophagitis        13. Family history of colonic polyps  Ambulatory Referral For Screening Colonoscopy      14. Family history of breast cancer in first degree relative        15. Therapeutic drug monitoring  CBC (No Diff)        Patient has multiple medical problems as noted above which are stable.  Hyperlipidemia is under excellent control with Zetia and generic Lipitor.  Her thyroid is therapeutic on the current dose of levothyroxine.  Her vitamin D is in normal range with supplementation which is important given her osteopenia and postmenopausal state.  She is up-to-date on her DEXA scan.  Environmental allergies are stable.  She has a history of COVID infection in the past and still has antibodies.  Symptoms have resolved.  She has personal history of colon polyps and family history of colon polyps and is due a repeat colonoscopy according to the endoscopist and I have ordered this.  She is on calcium and vitamin D supplementation as well as estrogen.  Symptomatically her osteoarthritis of multiple joints is the biggest issue and is currently seems fairly well-controlled with meloxicam.    Plan is as follows: No change in current medical regimen.  Patient will follow-up in 6 months with lab prior or follow-up as needed.        Procedures

## 2024-01-16 DIAGNOSIS — N95.1 MENOPAUSAL STATE: Chronic | ICD-10-CM

## 2024-01-16 DIAGNOSIS — E03.9 PRIMARY HYPOTHYROIDISM: ICD-10-CM

## 2024-01-16 DIAGNOSIS — M25.50 ARTHRALGIA OF MULTIPLE JOINTS: Chronic | ICD-10-CM

## 2024-01-16 DIAGNOSIS — M15.9 PRIMARY OSTEOARTHRITIS INVOLVING MULTIPLE JOINTS: Chronic | ICD-10-CM

## 2024-01-16 DIAGNOSIS — E78.2 MIXED HYPERLIPIDEMIA: Chronic | ICD-10-CM

## 2024-01-16 DIAGNOSIS — Z79.890 POSTMENOPAUSAL HORMONE REPLACEMENT THERAPY: Chronic | ICD-10-CM

## 2024-01-16 RX ORDER — EZETIMIBE 10 MG/1
TABLET ORAL
Qty: 90 TABLET | Refills: 3 | Status: SHIPPED | OUTPATIENT
Start: 2024-01-16

## 2024-01-16 RX ORDER — LEVOTHYROXINE SODIUM 0.05 MG/1
TABLET ORAL
Qty: 90 TABLET | Refills: 3 | Status: SHIPPED | OUTPATIENT
Start: 2024-01-16

## 2024-01-16 RX ORDER — MELOXICAM 15 MG/1
15 TABLET ORAL DAILY
Qty: 90 TABLET | Refills: 3 | Status: SHIPPED | OUTPATIENT
Start: 2024-01-16

## 2024-02-23 DIAGNOSIS — E78.2 MIXED HYPERLIPIDEMIA: Chronic | ICD-10-CM

## 2024-02-23 RX ORDER — ATORVASTATIN CALCIUM 20 MG/1
TABLET, FILM COATED ORAL
Qty: 90 TABLET | Refills: 3 | Status: SHIPPED | OUTPATIENT
Start: 2024-02-23

## 2024-02-23 NOTE — TELEPHONE ENCOUNTER
Caller: Darrius Mata    Relationship: Emergency Contact    Requested Prescriptions:   Requested Prescriptions     Pending Prescriptions Disp Refills    atorvastatin (LIPITOR) 20 MG tablet 90 tablet 3     Sig: TAKE 1 TABLET BY MOUTH EVERY DAY      Pharmacy where request should be sent: [facility] [id number] [facility] [id number] [facility] AT [facility] [address] [facility] [phone] [facility] [phone] FX     Last office visit with prescribing clinician: 12/11/2023   Last telemedicine visit with prescribing clinician: Visit date not found   Next office visit with prescribing clinician: 6/11/2024     Additional details provided by patient: [facility] DOES NOT HAVE THIS PRESCRIPTION ON FILE.   PLEASE SEND PRESCRIPTION.    WANTS QUANTITY OF 90.        Does the patient have less than a 3 day supply:  [] Yes  [x] No    Would you like a call back once the refill request has been completed: [] Yes [x] No    If the office needs to give you a call back, can they leave a voicemail: [] Yes [x] No    Kami Booker Rep   02/23/24 09:49 EST

## 2024-03-26 DIAGNOSIS — Z78.0 POSTMENOPAUSAL STATE: Primary | Chronic | ICD-10-CM

## 2024-03-26 DIAGNOSIS — M85.89 OSTEOPENIA OF MULTIPLE SITES: Chronic | ICD-10-CM

## 2024-03-26 RX ORDER — ESTRADIOL 1 MG/1
TABLET ORAL
Qty: 289.29 TABLET | Refills: 3 | Status: SHIPPED | OUTPATIENT
Start: 2024-03-26

## 2024-04-10 ENCOUNTER — OFFICE VISIT (OUTPATIENT)
Dept: ORTHOPEDIC SURGERY | Facility: CLINIC | Age: 79
End: 2024-04-10
Payer: MEDICARE

## 2024-04-10 VITALS — TEMPERATURE: 98.1 F | HEIGHT: 65 IN | WEIGHT: 125.8 LBS | BODY MASS INDEX: 20.96 KG/M2

## 2024-04-10 DIAGNOSIS — M25.511 RIGHT SHOULDER PAIN, UNSPECIFIED CHRONICITY: Primary | ICD-10-CM

## 2024-04-10 DIAGNOSIS — G89.29 CHRONIC RIGHT SHOULDER PAIN: ICD-10-CM

## 2024-04-10 DIAGNOSIS — M25.511 CHRONIC RIGHT SHOULDER PAIN: ICD-10-CM

## 2024-04-10 RX ORDER — FLUTICASONE PROPIONATE 50 MCG
2 SPRAY, SUSPENSION (ML) NASAL DAILY
COMMUNITY

## 2024-04-10 RX ORDER — LIDOCAINE HYDROCHLORIDE 10 MG/ML
2 INJECTION, SOLUTION EPIDURAL; INFILTRATION; INTRACAUDAL; PERINEURAL
Status: COMPLETED | OUTPATIENT
Start: 2024-04-10 | End: 2024-04-10

## 2024-04-10 RX ORDER — METHYLPREDNISOLONE ACETATE 80 MG/ML
80 INJECTION, SUSPENSION INTRA-ARTICULAR; INTRALESIONAL; INTRAMUSCULAR; SOFT TISSUE
Status: COMPLETED | OUTPATIENT
Start: 2024-04-10 | End: 2024-04-10

## 2024-04-10 RX ADMIN — LIDOCAINE HYDROCHLORIDE 2 ML: 10 INJECTION, SOLUTION EPIDURAL; INFILTRATION; INTRACAUDAL; PERINEURAL at 14:09

## 2024-04-10 RX ADMIN — METHYLPREDNISOLONE ACETATE 80 MG: 80 INJECTION, SUSPENSION INTRA-ARTICULAR; INTRALESIONAL; INTRAMUSCULAR; SOFT TISSUE at 14:09

## 2024-04-10 NOTE — PROGRESS NOTES
Chief Complaint:  Worsening right shoulder pain    HPI:  Ms. Mata is seen today for her right shoulder.  I did a rotator cuff repair for her many years ago.  She did well up until the last few months.  I did see her late last year for a long head of biceps tendon rupture.  She says this is a different issue.  She reports moderate aching pain in her upper arm.  It is worse at night and if she tries to reach or lift overhead.  She says that she has trouble raising the arm fully.  She does report associated weakness.  Denies any numbness or tingling.    Exam: Right shoulder is examined.  Skin is benign.  No atrophy, swelling or masses.  Mild but not exquisite anterior tenderness.  She has full motion but she struggles with mid arc forward elevation in the scapular plane.  She has pain and weakness with forward elevation in the scapular plane and external rotation.  Negative external rotation lag sign.  Negative Neer, Nugent, speeds and Staley's maneuvers.    Imaging: AP, scapular Y and axillary views right shoulder are ordered and reviewed evaluate her complaint.  No comparison films are immediately available.  She appears to have superior migration of the humeral head relative to the caudal glenoid but the acromiohumeral interval measures normal.  No significant glenohumeral arthritis.    Assessment: Right shoulder pain and weakness, suspected recurrent rotator cuff tear    Plan: We talked about her options including a trial of conservative treatment versus further workup with an eye towards possible surgical options.  She is not interested in pursuing any surgical option at this time.  She elected to try a subacromial injection.  The risk, benefits and alternatives were discussed.  She consented and the injection was performed as described below.  She will follow-up as needed.    Large Joint Arthrocentesis: R subacromial bursa  Date/Time: 4/10/2024 2:09 PM  Consent given by: patient  Site marked: site  marked  Timeout: Immediately prior to procedure a time out was called to verify the correct patient, procedure, equipment, support staff and site/side marked as required   Supporting Documentation  Indications: pain   Procedure Details  Location: shoulder - R subacromial bursa  Preparation: Patient was prepped and draped in the usual sterile fashion  Needle gauge: 21G.  Approach: posterior  Medications administered: 80 mg methylPREDNISolone acetate 80 MG/ML; 2 mL lidocaine PF 1% 1 %  Patient tolerance: patient tolerated the procedure well with no immediate complications         Ash Jimenez MD  04/10/2024    Answers submitted by the patient for this visit:  Other (Submitted on 4/3/2024)  Please describe your symptoms.: Sore right shoulder and arm.  Have you had these symptoms before?: Yes  How long have you been having these symptoms?: Greater than 2 weeks  Please describe any probable cause for these symptoms. : Don't know.  Primary Reason for Visit (Submitted on 4/3/2024)  What is the primary reason for your visit?: Other

## 2024-04-25 ENCOUNTER — TELEPHONE (OUTPATIENT)
Dept: ORTHOPEDIC SURGERY | Facility: CLINIC | Age: 79
End: 2024-04-25
Payer: MEDICARE

## 2024-04-25 DIAGNOSIS — M25.511 RIGHT SHOULDER PAIN, UNSPECIFIED CHRONICITY: Primary | ICD-10-CM

## 2024-04-25 DIAGNOSIS — M25.511 CHRONIC RIGHT SHOULDER PAIN: ICD-10-CM

## 2024-04-25 DIAGNOSIS — G89.29 CHRONIC RIGHT SHOULDER PAIN: ICD-10-CM

## 2024-04-25 NOTE — TELEPHONE ENCOUNTER
Caller: PATIENT    Relationship to patient: SELF     Best call back number: [phone]    Caller is needing: PATIENT AND HER , CARRIE WERE CALLING TO LET DR. AVERY KNOW THAT HER RECENT INJECTION IN THE RIGHT SHOULDER DID NOT SEEM TO HELP WITH THE PAIN AT ALL. CARRIE MENTIONED DR. AVERY ADVISED THEM TO CALL THE OFFICE IF THE INJECTION DID NOT HELP WITH HER PAIN AND THEN HE WOULD ORDER AN MRI. PATIENT WOULD LIKE A CALL BACK TO DISCUSS THIS. THANK YOU!

## 2024-05-28 ENCOUNTER — HOSPITAL ENCOUNTER (OUTPATIENT)
Dept: MRI IMAGING | Facility: HOSPITAL | Age: 79
Discharge: HOME | End: 2024-05-28
Payer: MEDICARE

## 2024-05-28 DIAGNOSIS — F40.240 CLAUSTROPHOBIA: Primary | ICD-10-CM

## 2024-05-28 DIAGNOSIS — G89.29 CHRONIC RIGHT SHOULDER PAIN: ICD-10-CM

## 2024-05-28 DIAGNOSIS — M25.511 CHRONIC RIGHT SHOULDER PAIN: ICD-10-CM

## 2024-05-28 DIAGNOSIS — M25.511 RIGHT SHOULDER PAIN, UNSPECIFIED CHRONICITY: ICD-10-CM

## 2024-05-28 RX ORDER — DIAZEPAM 5 MG/1
TABLET ORAL
Qty: 1 TABLET | Refills: 0 | Status: SHIPPED | OUTPATIENT
Start: 2024-05-28

## 2024-05-28 NOTE — TELEPHONE ENCOUNTER
Caller: DEEPTI    Relationship:     Best call back number: [phone]    What is the best time to reach you: ANY    Who are you requesting to speak with (clinical staff, provider,  specific staff member): CLINICAL    What was the call regarding: MRI RIGHT SHOULDER WAS RESCHEDULED TO 5/31/24 NEEDS SOMETHING CALLED IN TO CALM HER DOWN- WAS UNABLE TO COMPLETE DUE TO UNABLE TO RELAX DURING PROCEDURE. IMAGES WERE CLOUDY     Is it okay if the provider responds through MyChart: CALL

## 2024-05-31 ENCOUNTER — HOSPITAL ENCOUNTER (OUTPATIENT)
Dept: MRI IMAGING | Facility: HOSPITAL | Age: 79
Discharge: HOME | End: 2024-05-31
Payer: MEDICARE

## 2024-05-31 PROCEDURE — 73221 MRI JOINT UPR EXTREM W/O DYE: CPT

## 2024-06-05 ENCOUNTER — TELEPHONE (OUTPATIENT)
Dept: ORTHOPEDIC SURGERY | Facility: CLINIC | Age: 79
End: 2024-06-05

## 2024-06-05 ENCOUNTER — PREP FOR SURGERY (OUTPATIENT)
Dept: OTHER | Facility: HOSPITAL | Age: 79
End: 2024-06-05
Payer: MEDICARE

## 2024-06-05 DIAGNOSIS — M75.101 RIGHT ROTATOR CUFF TEAR: Primary | ICD-10-CM

## 2024-06-05 RX ORDER — MELOXICAM 15 MG/1
15 TABLET ORAL ONCE
OUTPATIENT
Start: 2024-06-05 | End: 2024-06-05

## 2024-06-05 RX ORDER — PREGABALIN 75 MG/1
150 CAPSULE ORAL ONCE
OUTPATIENT
Start: 2024-06-05 | End: 2024-06-05

## 2024-06-05 RX ORDER — ACETAMINOPHEN 500 MG
1000 TABLET ORAL ONCE
OUTPATIENT
Start: 2024-06-05 | End: 2024-06-05

## 2024-06-05 NOTE — TELEPHONE ENCOUNTER
I spoke with her regarding her MRI results.  We discussed the options.  She says the injection did not help.  She reports worsening pain and dysfunction.  She wants to move forward with a reverse arthroplasty.  I will have my  contact her about setting this up.  She will follow-up for a preoperative visit.

## 2024-06-05 NOTE — TELEPHONE ENCOUNTER
Caller: Darrius Mata    Relationship: Emergency Contact    Best call back number: [phone]    Who are you requesting to speak with (clinical staff, provider,  specific staff member): DR AVERY OR MA    What was the call regarding: PT HAD MRI ON 05/31/24 AND HAS BEEN WAITING FOR DR AVERY TO CALL HER REGARDING THE RESULTS. PLEASE CONTACT PT TO DISCUS MRI.

## 2024-06-06 ENCOUNTER — OFFICE VISIT (OUTPATIENT)
Dept: INTERNAL MEDICINE | Facility: CLINIC | Age: 79
End: 2024-06-06
Payer: MEDICARE

## 2024-06-06 VITALS
HEIGHT: 64 IN | SYSTOLIC BLOOD PRESSURE: 124 MMHG | RESPIRATION RATE: 16 BRPM | DIASTOLIC BLOOD PRESSURE: 76 MMHG | TEMPERATURE: 94 F | WEIGHT: 129 LBS | BODY MASS INDEX: 22.02 KG/M2 | HEART RATE: 92 BPM | OXYGEN SATURATION: 97 %

## 2024-06-06 DIAGNOSIS — Z86.16 HISTORY OF COVID-19: Chronic | ICD-10-CM

## 2024-06-06 DIAGNOSIS — I10 BENIGN ESSENTIAL HYPERTENSION: Chronic | ICD-10-CM

## 2024-06-06 DIAGNOSIS — R41.89 COGNITIVE IMPAIRMENT: ICD-10-CM

## 2024-06-06 DIAGNOSIS — Z51.81 THERAPEUTIC DRUG MONITORING: ICD-10-CM

## 2024-06-06 DIAGNOSIS — E55.9 VITAMIN D DEFICIENCY: Chronic | ICD-10-CM

## 2024-06-06 DIAGNOSIS — Z86.010 HISTORY OF COLON POLYPS: Chronic | ICD-10-CM

## 2024-06-06 DIAGNOSIS — E78.2 MIXED HYPERLIPIDEMIA: Chronic | ICD-10-CM

## 2024-06-06 DIAGNOSIS — R41.0 INTERMITTENT CONFUSION: ICD-10-CM

## 2024-06-06 DIAGNOSIS — E03.9 PRIMARY HYPOTHYROIDISM: Chronic | ICD-10-CM

## 2024-06-06 DIAGNOSIS — F40.240 CLAUSTROPHOBIA: ICD-10-CM

## 2024-06-06 DIAGNOSIS — R41.3 MEMORY LOSS: Primary | ICD-10-CM

## 2024-06-06 PROCEDURE — 1159F MED LIST DOCD IN RCRD: CPT | Performed by: INTERNAL MEDICINE

## 2024-06-06 PROCEDURE — 1160F RVW MEDS BY RX/DR IN RCRD: CPT | Performed by: INTERNAL MEDICINE

## 2024-06-06 PROCEDURE — 1125F AMNT PAIN NOTED PAIN PRSNT: CPT | Performed by: INTERNAL MEDICINE

## 2024-06-06 PROCEDURE — 99214 OFFICE O/P EST MOD 30 MIN: CPT | Performed by: INTERNAL MEDICINE

## 2024-06-06 PROCEDURE — 3078F DIAST BP <80 MM HG: CPT | Performed by: INTERNAL MEDICINE

## 2024-06-06 PROCEDURE — 3074F SYST BP LT 130 MM HG: CPT | Performed by: INTERNAL MEDICINE

## 2024-06-06 RX ORDER — DIAZEPAM 5 MG/1
TABLET ORAL
Qty: 2 TABLET | Refills: 0 | Status: SHIPPED | OUTPATIENT
Start: 2024-06-06

## 2024-06-06 NOTE — PROGRESS NOTES
06/06/2024    Patient Information  Ashley Mata                                                                                          [address] [facility] [address]      1945  [unfilled]  There is no work phone number on file.    Chief Complaint:     Complaining of problems with memory and confusion.    History of Present Illness:    June 6, 2024--patient presents with at least a 1 year history of problems with cognitive function including memory that seems to be getting progressively worse.  Her  is here today and he is reporting that she will have some episodes of confusion such as not knowing what day it was.  For instance this morning she was asking about going to [employer] and has not Sunday or [employer] today and instead she had an appointment with me here in the office.  She is having trouble with names and sometimes places but has not become lost.  Her  notes that sometimes at night she will get up and wander around and seems confused.  She has never had any neuropsychiatric testing.  On today's exam she seems alert and oriented x 3 and I will see neurologic deficit.  Also reviewed her medications and do not see any medications that obviously would be causing or aggravating these issues.  Plan is as follows: MRI of the brain ordered.  Patient is due for fasting lab work and follow-up and will make arrangements for that.  Neuropsychiatric testing ordered.  Further to follow.    Review of Systems   Constitutional: Negative.   HENT: Negative.     Eyes: Negative.    Cardiovascular: Negative.    Respiratory: Negative.     Endocrine: Negative.    Hematologic/Lymphatic: Negative.    Skin: Negative.    Musculoskeletal: Negative.    Gastrointestinal: Negative.    Genitourinary: Negative.    Neurological:  Positive for difficulty with concentration. Negative for aphonia, brief paralysis, disturbances in coordination, dizziness, focal weakness, headaches, loss of  balance, numbness, paresthesias, tremors and vertigo.   Psychiatric/Behavioral:  Positive for memory loss.         Intermittent confusion   Allergic/Immunologic: Negative.        Active Problems:    Patient Active Problem List   Diagnosis    Allergic rhinitis    Benign essential hypertension    Diverticulosis of colon    Gastroesophageal reflux disease without esophagitis    Hyperlipidemia    Multiple environmental allergies    Vitamin D deficiency    Therapeutic drug monitoring    Alopecia    Lumbar scoliosis    Primary hypothyroidism    Family history of breast cancer in first degree relative    Postmenopausal state    History of COVID-19, August 12, 2021; May 17, 2022.    Hammertoe of second toe of right foot    Family history of colonic polyps    Osteopenia of multiple sites, 2/25/2022--lumbar spine 1.1.  Left femoral neck -1.0.  Right femoral neck -0.3.    Primary osteoarthritis involving multiple joints    History of colon polyps, 8/18/2022--hyperplastic x1.    Right rotator cuff tear    Memory loss    Intermittent confusion    Cognitive impairment         Past Medical History:   Diagnosis Date    Allergic rhinitis 01/30/2015 11/13/2015--patient was evaluated by ENT and was started on generic Flonase and patient reports this has improved her symptoms quite a bit.   01/30/2015--allergy testing revealed positive reactivity to cat, dust mite, cockroach, mold spores, and grass pollen. Environmental control measures.    Alopecia 04/12/2017    Evaluated and treated by the dermatologist.    Arthralgia of multiple joints 05/09/2022    Benign essential hypertension 04/18/2016    Chronic pain of both knees 04/05/2023    Chronic toe pain, right foot 10/19/2021    October 19, 2021--patient reports a 1 year history of progressively worsening right toe pain that particular involves the second toe.  On exam she has obvious hammertoe which is causing irritation.  There is some hammering of the third digit as well.  Patient  referred to orthopedist who specializes in foot problems such as Dr. Vieira.  In the meantime patient should use over-the-counter toe pads to    Diverticulosis of colon 03/17/2009    03/10/2017--colonoscopy revealed many small and large mouth diverticula in the sigmoid colon and descending colon.  Nonthrombosed external hemorrhoids and internal hemorrhoids noted.  Otherwise, normal colonoscopy.  03/17/2009--colonoscopy revealed external hemorrhoids, torturous colon with a sigmoid stricture, sigmoid diverticulosis.    Family history of breast cancer in first degree relative 07/13/2020    Family history of colonic polyps 04/01/2022    Gastroesophageal reflux disease without esophagitis 03/17/2009 06/09/2015--EGD revealed Z line irregular, 35 cm from incisors. Biopsied. Small hiatal hernia. Gastritis. Biopsied. Bilious gastric fluid. Fluid aspiration performed. Normal duodenal bulb and second part of duodenum. Biopsied. Pathology revealed the antral biopsy revealed small intestinal mucosa with no pathologic diagnosis. Good preservation of villous architecture. Duodenum biopsy revealed largely antral type gastric mucosa with mild patchy superficial chronic gastritis. Gastroesophageal junction revealed squamous and glandular mucosa with mild chronic inflammation. Negative for intestinal metaplasia or dysplasia. There appeared to be mislabeling of the antral biopsies and duodenal biopsies.   04/17/2012--EGD revealed irregular Z line, hiatal hernia, gastritis, duodenitis.   03/17/2009--EGD revealed grade B. reflux esophagitis, hiatal hernia, gastritis, a few gastric polyps, duodenitis.    Hammertoe of right foot     Hammertoe of second toe of right foot 10/19/2021    October 19, 2021--patient reports a 1 year history of progressively worsening right toe pain that particular involves the second toe.  On exam she has obvious hammertoe which is causing irritation.  There is some hammering of the third digit as well.   Patient referred to orthopedist who specializes in foot problems such as Dr. Vieira.  In the meantime patient should use over-the-counter toe pads to    History of acute hepatitis 04/24/2022    May 9, 2022--patient seen in hospital discharge follow-up/transition of care.  I reviewed the documentation including the admission history and physical, hospital course, laboratory and radiographic studies, GI consultation, discharge summary and discharge medications.  I specifically reviewed the tissue pathology report which revealed moderate steatosis with moderate chronic portal inflammation a    History of COVID-19, August 12, 2021; May 17, 2022. 10/19/2021    May 17, 2022--patient again tested positive for COVID.  Ordered by gastroenterology nurse practitioner.  October 19, 2021--patient seen in follow-up and reports her symptoms totally resolved.  She wants to know when she should get her Covid booster vaccine.  I have suggested that she receive it approximately 3 months after initially testing positive.  She has an appointment in November 6, 2021 for    History of Hyperplastic polyps of stomach 06/09/2015 06/09/2015--EGD revealed Z line irregular, 35 cm from incisors. Biopsied. Small hiatal hernia. Gastritis. Biopsied. Bilious gastric fluid. Fluid aspiration performed. Normal duodenal bulb and second part of duodenum. Biopsied. Pathology revealed the antral biopsy revealed small intestinal mucosa with no pathologic diagnosis. Good preservation of villous architecture. Duodenum biopsy revealed large    Hyperlipidemia 07/17/2012 07/17/2012--treatment for hyperlipidemia begun.    Lumbar disc herniation, L4-L5 08/07/2018 01/16/2019--patient seen in follow-up by Dr. Shukla.  She has seen the neurosurgeon who recommended conservative therapy with physical therapy.  Patient reports that has been extremely helpful.  Currently has no significant pain.  08/07/2018--patient seen in follow-up and reports her pain is much  better after taking prednisone.  She is now down to half a pill per day and is almost off of it.  I rev    Lumbar scoliosis 08/07/2018 01/16/2019--patient seen in follow-up by Dr. Shukla.  She has seen the neurosurgeon who recommended conservative therapy with physical therapy.  Patient reports that has been extremely helpful.  Currently has no significant pain.  08/07/2018--patient seen in follow-up and reports her pain is much better after taking prednisone.  She is now down to half a pill per day and is almost off of it.  I rev    Menopausal state 07/13/2020    Multiple environmental allergies 01/30/2015 01/30/2015--allergy testing revealed positive reactivity to cat, dust mite, cockroach, mold spores, and grass pollen. Environmental control measures.    Osteopenia of multiple sites, 2/25/2022--lumbar spine 1.1.  Left femoral neck -1.0.  Right femoral neck -0.3. 04/01/2022 February 25, 2022--DEXA scan reveals lumbar spine T score 1.1.  Left femoral neck T score -1.0.  Right femoral neck T score -0.3.  Mild osteopenia.    Postmenopausal hormone replacement therapy 04/18/2016    Primary hypothyroidism 01/22/2020 January 22, 2020--TSH is elevated at 5.0.  Levothyroxine 50 mcg/day initiated.  Patient will follow-up with nonfasting lab work in about 6 to 8 weeks to reassess.  07/09/2018--routine follow-up.  TSH elevated slightly at 4.31.  Free T4 normal at 1.22.  Free T3 normal at 3.3.  Continued observation.  10/11/2017--thyroid function tests are normal.  09/30/2016--thyroid ultrasound reveals a tiny 2 mm     Primary osteoarthritis involving multiple joints 06/08/2022    Primary osteoarthritis of both hands 12/30/2013 05/12/2014--patient seen in followup and reports that the Vimovo has helped. Uric acid levels are normal at 4.9. C. reactive protein mildly elevated at 2.3. X-rays of the hands reveals radiocarpal, first carpometacarpal, first metacarpophalangeal, and interphalangeal joint degeneration.  This process is symmetric. The interphalangeal joint of the left is affected to the greatest degree. There is right sided scapholunate dissociation as well as deformity of the scaphoid suggesting prior traumatic injury with healing. Soft tissues are satisfactory. Overall appearance is most consistent with osteoarthritis.   05/05/2014--patient presents and reports that she is having worsening right wrist pain primarily at the base of the thumb. No new injury. Bilateral x-rays of the wrists and hands ordered. Uric acid level ordered as well as a CRP. Vimovo 500/20 one by mouth twice a day. Follow up in one week.   03/18/2014--patient reports that her wrist symptoms have improved.   12/30/2013--patient reports a several mon    Primary osteoarthritis of both knees 04/05/2023    Primary osteoarthritis of both wrists 12/30/2013 05/12/2014--patient seen in followup and reports that the Vimovo has helped. Uric acid levels are normal at 4.9. C. reactive protein mildly elevated at 2.3. X-rays of the hands reveals radiocarpal, first carpometacarpal, first metacarpophalangeal, and interphalangeal joint degeneration. This process is symmetric. The interphalangeal joint of the left is affected to the greatest degree. There is right sided scapholunate dissociation as well as deformity of the scaphoid suggesting prior traumatic injury with healing. Soft tissues are satisfactory. Overall appearance is most consistent with osteoarthritis.   05/05/2014--patient presents and reports that she is having worsening right wrist pain primarily at the base of the thumb. No new injury. Bilateral x-rays of the wrists and hands ordered. Uric acid level ordered as well as a CRP. Vimovo 500/20 one by mouth twice a day. Follow up in one week.   03/18/2014--patient reports that her wrist symptoms have improved.   12/30/2013--patient reports a several mon    Rotator cuff syndrome     Vitamin D deficiency 04/18/2016         Past Surgical History:    Procedure Laterality Date    COLONOSCOPY  03/17/2009 03/17/2009--colonoscopy revealed external hemorrhoids, torturous colon with a sigmoid stricture, sigmoid diverticulosis.    COLONOSCOPY N/A 03/10/2017    03/10/2017--colonoscopy revealed many small and large mouth diverticula in the sigmoid colon and descending colon.  Nonthrombosed external hemorrhoids and internal hemorrhoids noted.  Otherwise, normal colonoscopy.    COLONOSCOPY N/A 08/18/2022    Procedure: COLONOSCOPY TO 40CM WITH POLYPECTOMY (COLD);  Surgeon: Mario Ray MD;  Location: [facility] ENDOSCOPY;  Service: General;  Laterality: N/A;  PRE- POSITIVE COLOGUARD  POST- POLYP, HEMORRHOIDS    ENDOSCOPY N/A 08/18/2022    Procedure: ESOPHAGOGASTRODUODENOSCOPY WITH BIOPSIES AND COLD BIOPSY POLYPECTOMY;  Surgeon: Mario Ray MD;  Location: [facility] ENDOSCOPY;  Service: General;  Laterality: N/A;  PRE- ACID REFLUX  POST- GASTRITIS, GASTRIC POLYPS    ERCP WITH SPHINCTEROTOMY/PAPILLOTOMY  08/13/2014 08/13/2014--ERCP, endoscopic sphincterotomy and removal of common bile duct stones. 08/12/2014--laparoscopic cholecystectomy. This was complicated by retained common bile duct stones 2.    ESOPHAGOSCOPY / EGD  06/09/2015 06/09/2015--EGD revealed Z line irregular, 35 cm from incisors. Biopsied. Small hiatal hernia. Gastritis. Biopsied. Bilious gastric fluid. Fluid aspiration performed. Normal duodenal bulb and second part of duodenum. Biopsied. Pathology revealed the antral biopsy revealed small intestinal mucosa with no pathologic diagnosis. Good preservation of villous architecture. Duodenum biopsy revealed large    EYE SURGERY  07/19/2018    Both Eyes; cataracts    FOOT [facility]  April 11, 2022    [facility]    HAMMER TOE REPAIR Right 04/11/2022    Procedure: RIGHT SECOND AND THIRD HAMMERTOE REPAIRS;  Surgeon: Brandt Vieira MD;  [place] [facility] [place] [facility] [place]  Service: Orthopedics;  Laterality: Right;    LAPAROSCOPIC  CHOLECYSTECTOMY  08/12/2014 08/13/2014--ERCP, endoscopic sphincterotomy and removal of common bile duct stones. 08/12/2014--laparoscopic cholecystectomy. This was complicated by retained common bile duct stones 2.    ROTATOR CUFF REPAIR Left 07/10/2014    07/10/2014--left rotator cuff repair. 03/18/2014--patient seen in followup and reports that her range of motion has improved and her pain is not debilitating. She feels that she is making progress with physical therapy. Surgery scheduled 07/10/2014. 01/10/2014--patient was seen in evaluation by the orthopedist and he recommended conservative treatment consisting of physical therapy/rehabilitation.    ROTATOR CUFF REPAIR Right 08/03/2016 08/03/2016--arthroscopic right rotator cuff repair.  Debridement of degenerative anterior superior labral tear.    SHOULDER SURGERY  2014 & 2016    L & R rotator cuff surgery    SUBTOTAL HYSTERECTOMY  1978    Partial hysterectomy 1978.    TRIGGER POINT INJECTION  2023    Gel in both knees         Allergies   Allergen Reactions    Bupivacaine Other (See Comments)     Questionable allergy.  Patient had toe surgery and subsequently developed acute hepatitis.  Rare case reports of hepatitis induced by bupivacaine which was used during her surgery.           Current Outpatient Medications:     atorvastatin (LIPITOR) 20 MG tablet, TAKE 1 TABLET BY MOUTH EVERY DAY, Disp: 90 tablet, Rfl: 3    calcium carbonate (Calcium 600) 600 MG tablet, Take calcium plus vitamin D twice daily for low vitamin D and weak bones, Disp: 30 tablet, Rfl:     diazePAM (Valium) 5 MG tablet, TAKE ONE HOUR PRIOR TO APPOINTMENT. DO NOT DRIVE WHILE TAKING THIS MEDICATION., Disp: 2 tablet, Rfl: 0    estradiol (ESTRACE) 1 MG tablet, Take 1 tablet (1 mg) daily as directed for postmenopausal state, Disp: 289.29 tablet, Rfl: 3    ezetimibe (ZETIA) 10 MG tablet, TAKE 1 TABLET BY MOUTH EVERY DAY, Disp: 90 tablet, Rfl: 3    fexofenadine-pseudoephedrine (ALLEGRA-D 24)  "180-240 MG per 24 hr tablet, Take 1 p.o. daily for environmental allergies, Disp: 30 tablet, Rfl: 6    fluticasone (FLONASE) 50 MCG/ACT nasal spray, 2 sprays into the nostril(s) as directed by provider Daily., Disp: , Rfl:     levothyroxine (SYNTHROID, LEVOTHROID) 50 MCG tablet, TAKE ONE TABLET BY MOUTH DAILY FOR LOW THYROID, Disp: 90 tablet, Rfl: 3    meloxicam (MOBIC) 15 MG tablet, Take 1 tablet by mouth Daily., Disp: 90 tablet, Rfl: 3    multivitamin (THERAGRAN) tablet tablet, Take 1 tablet by mouth Daily. HOLD FOR SURGERY, Disp: , Rfl:     omeprazole OTC (PrilOSEC OTC) 20 MG EC tablet, Take 1 tablet by mouth Daily., Disp: , Rfl:       Family History   Problem Relation Age of Onset    Other Father         Hodgkin's Disease    Cancer Father         Lung Cancer    Early death Father         59 years old    Colon polyps Mother     Alzheimer's disease Mother     Hearing loss Mother     Osteoporosis Mother     Breast cancer Daughter 52    Malig Hyperthermia Neg Hx          Social History     Socioeconomic History    Marital status:     Number of children: 2    Years of education: BA    Highest education level: Associate degree: occupational, technical, or vocational program   Tobacco Use    Smoking status: Never     Passive exposure: Never    Smokeless tobacco: Never   Vaping Use    Vaping status: Never Used   Substance and Sexual Activity    Alcohol use: Yes     Alcohol/week: 2.0 standard drinks of alcohol     Types: 2 Glasses of wine per week     Comment: None since 04/11/22    Drug use: No    Sexual activity: Not Currently     Partners: Male     Birth control/protection: Hysterectomy         Vitals:    06/06/24 1050   BP: 124/76   Pulse: 92   Resp: 16   Temp: 94 °F (34.4 °C)   TempSrc: Temporal   SpO2: 97%   Weight: 58.5 kg (129 lb)   Height: 163.8 cm (64.49\")        Body mass index is 21.81 kg/m².      Physical Exam:    General: Alert and oriented x 3.  No acute distress.  Normal affect.  HEENT: Pupils " equal, round, reactive to light; extraocular movements intact; sclerae nonicteric; pharynx, ear canals and TMs normal.  Neck: Without JVD, thyromegaly, bruit, or adenopathy.  Lungs: Clear to auscultation in all fields.  Heart: Regular rate and rhythm without murmur, rub, gallop, or click.  Abdomen: Soft, nontender, without hepatosplenomegaly or hernia.  Bowel sounds normal.  : Deferred.  Rectal: Deferred.  Extremities: Without clubbing, cyanosis, edema, or pulse deficit.  Neurologic: Intact without focal deficit.  Normal station and gait observed during ingress and egress from the examination room.  Skin: Without significant lesion.  Musculoskeletal: Unremarkable.    Lab/other results:      Assessment/Plan:     Diagnosis Plan   1. Memory loss  C-reactive Protein    MRI Brain With & Without Contrast    NeuroPsych Testing      2. Intermittent confusion  MRI Brain With & Without Contrast    NeuroPsych Testing      3. Cognitive impairment  MRI Brain With & Without Contrast    NeuroPsych Testing      4. Claustrophobia  diazePAM (Valium) 5 MG tablet      5. Hyperlipidemia  NMR LipoProfile      6. Primary hypothyroidism  TSH    T4, Free    T3, Free      7. History of COVID-19, August 12, 2021; May 17, 2022.        8. History of colon polyps, 8/18/2022--hyperplastic x1.        9. Benign essential hypertension  Urinalysis With Microscopic If Indicated (No Culture) - Urine, Clean Catch      10. Therapeutic drug monitoring  CBC (No Diff)    Urinalysis With Microscopic If Indicated (No Culture) - Urine, Clean Catch      11. Vitamin D deficiency  Vitamin D,25-Hydroxy        June 6, 2024--patient presents with at least a 1 year history of problems with cognitive function including memory that seems to be getting progressively worse.  Her  is here today and he is reporting that she will have some episodes of confusion such as not knowing what day it was.  For instance this morning she was asking about going to [employer] and  "has not Sunday or [employer] today and instead she had an appointment with me here in the office.  She is having trouble with names and sometimes places but has not become lost.  Her  notes that sometimes at night she will get up and wander around and seems confused.  She has never had any neuropsychiatric testing.  On today's exam she seems alert and oriented x 3 and I will see neurologic deficit.  Also reviewed her medications and do not see any medications that obviously would be causing or aggravating these issues.  Plan is as follows: MRI of the brain ordered.  Patient is due for fasting lab work and follow-up and will make arrangements for that.  Neuropsychiatric testing ordered.  Further to follow.    This patient has a PCP that is the continuing focal point for all health care services, and the patient sees this physician to be evaluated for memory loss and confusion. The inherent complexity that this code () captures is not in the clinical condition itself, but rather the cognitition of the continued responsibility of being the focal point for all needed services for this patient.\"       Procedures        Answers submitted by the patient for this visit:  Other (Submitted on 5/30/2024)  Please describe your symptoms.: Memory loss.  Have you had these symptoms before?: Yes  How long have you been having these symptoms?: Greater than 2 weeks  Please list any medications you are currently taking for this condition.: Prevagen  Please describe any probable cause for these symptoms. : Age or onest of Alzheimers?  Primary Reason for Visit (Submitted on 5/30/2024)  What is the primary reason for your visit?: Other    "

## 2024-06-26 LAB
25(OH)D3+25(OH)D2 SERPL-MCNC: 66.7 NG/ML (ref 30–100)
APPEARANCE UR: CLEAR
BILIRUB UR QL STRIP: NEGATIVE
CHOLEST SERPL-MCNC: 164 MG/DL (ref 100–199)
COLOR UR: YELLOW
CRP SERPL-MCNC: 3 MG/L (ref 0–10)
ERYTHROCYTE [DISTWIDTH] IN BLOOD BY AUTOMATED COUNT: 12.5 % (ref 11.7–15.4)
GLUCOSE UR QL STRIP: NEGATIVE
HCT VFR BLD AUTO: 41 % (ref 34–46.6)
HDL SERPL-SCNC: 51.3 UMOL/L
HDLC SERPL-MCNC: 88 MG/DL
HGB BLD-MCNC: 13.8 G/DL (ref 11.1–15.9)
HGB UR QL STRIP: NEGATIVE
KETONES UR QL STRIP: NEGATIVE
LDL SERPL QN: 20.2 NM
LDL SERPL-SCNC: 553 NMOL/L
LDL SMALL SERPL-SCNC: 284 NMOL/L
LDLC SERPL CALC-MCNC: 56 MG/DL (ref 0–99)
LEUKOCYTE ESTERASE UR QL STRIP: NEGATIVE
MCH RBC QN AUTO: 30.9 PG (ref 26.6–33)
MCHC RBC AUTO-ENTMCNC: 33.7 G/DL (ref 31.5–35.7)
MCV RBC AUTO: 92 FL (ref 79–97)
MICRO URNS: NORMAL
NITRITE UR QL STRIP: NEGATIVE
PH UR STRIP: 7.5 [PH] (ref 5–7.5)
PLATELET # BLD AUTO: 308 X10E3/UL (ref 150–450)
PROT UR QL STRIP: NEGATIVE
RBC # BLD AUTO: 4.47 X10E6/UL (ref 3.77–5.28)
SP GR UR STRIP: 1.01 (ref 1–1.03)
T3FREE SERPL-MCNC: 3 PG/ML (ref 2–4.4)
T4 FREE SERPL-MCNC: 1.46 NG/DL (ref 0.82–1.77)
TRIGL SERPL-MCNC: 117 MG/DL (ref 0–149)
TSH SERPL DL<=0.005 MIU/L-ACNC: 1.35 UIU/ML (ref 0.45–4.5)
UROBILINOGEN UR STRIP-MCNC: 0.2 MG/DL (ref 0.2–1)
WBC # BLD AUTO: 8.4 X10E3/UL (ref 3.4–10.8)

## 2024-06-27 ENCOUNTER — HOSPITAL ENCOUNTER (OUTPATIENT)
Dept: MRI IMAGING | Facility: HOSPITAL | Age: 79
Discharge: HOME | End: 2024-06-27
Admitting: INTERNAL MEDICINE
Payer: MEDICARE

## 2024-06-27 DIAGNOSIS — R41.0 INTERMITTENT CONFUSION: ICD-10-CM

## 2024-06-27 DIAGNOSIS — R41.3 MEMORY LOSS: ICD-10-CM

## 2024-06-27 DIAGNOSIS — R41.89 COGNITIVE IMPAIRMENT: ICD-10-CM

## 2024-06-27 PROCEDURE — 70553 MRI BRAIN STEM W/O & W/DYE: CPT

## 2024-06-27 PROCEDURE — 82565 ASSAY OF CREATININE: CPT

## 2024-06-27 PROCEDURE — 0 GADOBENATE DIMEGLUMINE 529 MG/ML SOLUTION: Performed by: INTERNAL MEDICINE

## 2024-06-27 PROCEDURE — A9577 INJ MULTIHANCE: HCPCS | Performed by: INTERNAL MEDICINE

## 2024-06-27 RX ADMIN — GADOBENATE DIMEGLUMINE 10 ML: 529 INJECTION, SOLUTION INTRAVENOUS at 17:56

## 2024-06-28 LAB — CREAT BLDA-MCNC: 0.8 MG/DL (ref 0.6–1.3)

## 2024-07-05 ENCOUNTER — PRE-ADMISSION TESTING (OUTPATIENT)
Dept: PREADMISSION TESTING | Facility: HOSPITAL | Age: 79
End: 2024-07-05
Payer: MEDICARE

## 2024-07-05 VITALS
RESPIRATION RATE: 16 BRPM | OXYGEN SATURATION: 98 % | WEIGHT: 127.8 LBS | DIASTOLIC BLOOD PRESSURE: 80 MMHG | SYSTOLIC BLOOD PRESSURE: 139 MMHG | TEMPERATURE: 97.5 F | BODY MASS INDEX: 22.64 KG/M2 | HEIGHT: 63 IN | HEART RATE: 86 BPM

## 2024-07-05 LAB
ANION GAP SERPL CALCULATED.3IONS-SCNC: 9 MMOL/L (ref 5–15)
BUN SERPL-MCNC: 20 MG/DL (ref 8–23)
BUN/CREAT SERPL: 24.1 (ref 7–25)
CALCIUM SPEC-SCNC: 9.4 MG/DL (ref 8.6–10.5)
CHLORIDE SERPL-SCNC: 106 MMOL/L (ref 98–107)
CO2 SERPL-SCNC: 26 MMOL/L (ref 22–29)
CREAT SERPL-MCNC: 0.83 MG/DL (ref 0.57–1)
EGFRCR SERPLBLD CKD-EPI 2021: 71.4 ML/MIN/1.73
GLUCOSE SERPL-MCNC: 105 MG/DL (ref 65–99)
POTASSIUM SERPL-SCNC: 3.9 MMOL/L (ref 3.5–5.2)
SODIUM SERPL-SCNC: 141 MMOL/L (ref 136–145)

## 2024-07-05 PROCEDURE — 93005 ELECTROCARDIOGRAM TRACING: CPT

## 2024-07-05 PROCEDURE — 80048 BASIC METABOLIC PNL TOTAL CA: CPT

## 2024-07-05 PROCEDURE — 93010 ELECTROCARDIOGRAM REPORT: CPT | Performed by: INTERNAL MEDICINE

## 2024-07-05 PROCEDURE — 36415 COLL VENOUS BLD VENIPUNCTURE: CPT

## 2024-07-05 NOTE — DISCHARGE INSTRUCTIONS
Take the following medications the morning of surgery: OMEPRAZOLE, LEVOTHRYOXINE      If you are on prescription narcotic pain medication to control your pain you may also take that medication the morning of surgery.    General Instructions:  Do not eat solid food after midnight the night before surgery.  You may drink clear liquids day of surgery but must stop at least one hour before your hospital arrival time.  It is beneficial for you to have a clear drink that contains carbohydrates the day of surgery.  We suggest a 12 to 20 ounce bottle of Gatorade or Powerade for non-diabetic patients or a 12 to 20 ounce bottle of G2 or Powerade Zero for diabetic patients. (Pediatric patients, are not advised to drink a 12 to 20 ounce carbohydrate drink)    Clear liquids are liquids you can see through.  Nothing red in color.     Plain water                               Sports drinks  Sodas                                   Gelatin (Jell-O)  Fruit juices without pulp such as white grape juice and apple juice  Popsicles that contain no fruit or yogurt  Tea or coffee (no cream or milk added)  Gatorade / Powerade  G2 / Powerade Zero      Patients who avoid smoking, chewing tobacco and alcohol for 4 weeks prior to surgery have a reduced risk of post-operative complications.  Quit smoking as many days before surgery as you can.  Do not smoke, use chewing tobacco or drink alcohol the day of surgery.   If applicable bring your C-PAP/ BI-PAP machine in with you to preop day of surgery.  Bring any papers given to you in the doctor’s office.  Wear clean comfortable clothes.  Do not wear contact lenses, false eyelashes or make-up.  Bring a case for your glasses.   Bring crutches or walker if applicable.  Remove all piercings.  Leave jewelry and any other valuables at home.  Hair extensions with metal clips must be removed prior to surgery.  The Pre-Admission Testing nurse will instruct you to bring medications if unable to obtain an  accurate list in Pre-Admission Testing.        If you were given a blood bank ID arm band remember to bring it with you the day of surgery.    Day of surgery:  Your arrival time is approximately two hours before your scheduled surgery time.  Upon arrival, a Pre-op nurse and Anesthesiologist will review your health history, obtain vital signs, and answer questions you may have.  The only belongings needed at this time will be a list of your home medications and if applicable your C-PAP/BI-PAP machine.  A Pre-op nurse will start an IV and you may receive medication in preparation for surgery, including something to help you relax.     Please be aware that surgery does come with discomfort.  We want to make every effort to control your discomfort so please discuss any uncontrolled symptoms with your nurse.   Your doctor will most likely have prescribed pain medications.      If you are going home after surgery you will receive individualized written care instructions before being discharged.  A responsible adult must drive you to and from the hospital on the day of your surgery and ideally stay with you through the night.  Discharge prescriptions can be filled by the hospital pharmacy during regular pharmacy hours.  If you are having surgery late in the day/evening your prescription may be e-prescribed to your pharmacy.  Please verify your pharmacy hours or chose a 24 hour pharmacy to avoid not having access to your prescription because your pharmacy has closed for the day.    If you are staying overnight following surgery, you will be transported to your hospital room following the recovery period.  [facility] has all private rooms.    If you have any questions please call Pre-Admission Testing at [phone].  Deductibles and co-payments are collected on the day of service. Please be prepared to pay the required co-pay, deductible or deposit on the day of service as defined by your plan.    Call your  surgeon immediately if you experience any of the following symptoms:  Sore Throat  Shortness of Breath or difficulty breathing  Cough  Chills  Body soreness or muscle pain  Headache  Fever  New loss of taste or smell  Do not arrive for your surgery ill.  Your procedure will need to be rescheduled to another time.  You will need to call your physician before the day of surgery to avoid any unnecessary exposure to hospital staff as well as other patients.        PREVENTING INFECTION IN SHOULDER SURGICAL SITES     C. acnes is a bacteria that lives deep within follicles and pores of the skin. It is found in large numbers on the skin of the face, axilla (armpit), chest and back and is the primary bacteria to cause a surgical site infection after shoulder surgery.      Use of a Benzoyl Peroxide solution prior to shoulder surgery decreases C. acnes and reduces post-op infections.   Your surgeon has ordered 5% Benzoyl Peroxide wash to be used three times prior to your surgery.     Please read the following instructions carefully and bring this form with you the day of surgery.     General bathing instructions starting two days before your surgery:    Shower using a fresh bar of anti-bacterial soap (such as Dial) and clean washcloth.  Pay special attention to the neck, shoulder and armpit area.   Wash your hair as usual with your regular shampoo.   Rinse hair and body thoroughly with warm water (not hot water) to remove shampoo and residue.   Dry with a clean towel.              Sleep in a clean bed with clean clothing.  Do not allow pets to sleep with you.     For 2 days before surgery, avoid shaving with a razor because the razor can irritate skin and make it easier to develop an infection.    Any areas of open skin can increase the risk of a post-operative wound infection by allowing bacteria to enter and travel throughout the body.  Notify your surgeon if you have any skin wounds / rashes even if it is not near the  expected surgical site.  The area will need assessed to determine if surgery should be delayed until it is healed.      First application of 5% Benzoyl Peroxide Wash two nights before surgery:                                                                Wash neck, shoulder (front, back, side) and armpit   with warm water, rinse and dry - see picture.  Gently wash the same areas with the Benzoyl Peroxide   cleanser going away from the neck for 10-20 seconds.   Work into a full lather and leave on the skin for   2 minutes for greatest effect.  Rinse thoroughly with warm water, not hot water.                     Pat dry with a clean towel.                                                            Wash your hands thoroughly.  Do not apply lotion, powder, perfume or deodorant.   Put on clean clothes.    Second application the night before surgery:  Repeat the above steps.        Third application morning of surgery:  Repeat the above steps.    Due to shoulder pain or decreased range of motion of your shoulder and arm, you may need assistance washing under the arm or the back portion of your shoulder.     Avoid further washing the areas of the skin treated with Benzoyl Peroxide for at least 1 hour.    For your convenience, you may purchase Benzoyl Peroxide at [facility] retail pharmacy.     Warning:  Let your physician know if you are allergic to Benzoyl Peroxide or have very sensitive skin and cannot use it.   Stop using and contact your surgeon if you experience any excessive scaling, itching, swelling, skin irritation or other signs of a reaction.  Keep out of eyes, ears, nose and mouth.  Do not apply to sunburned, irritated or broken area on the skin.  Avoid unwanted problems with bleaching effect by following these tips:  Wash hands after each use.  Avoid contact with hair, clothing, furnishings or carpeting.  Wear clean, old t-shirt or clothing to bed.   Use clean, old white pillow cases and sheets to avoid  discoloring your bed linens.                                                                                                                                                                                                                                                                                   Please complete the checklist below, bring it with you to the hospital                               the day of surgery and give to the Pre-op Nurse     Preoperative Skin Prep Checklist        Patient Name Label             Enter dates and ?  boxes to indicate completed    Surgery Date: _______________ Regular Shower  Benzoyl Peroxide Wash   First Application:  2 days before_______________  (Stop shaving all body parts)           Morning or Evening                        Evening    Second Application:  1 day  before________________        Morning or Evening                          Evening   Third Application:  Day of Surgery______________                           Morning                   Morning

## 2024-07-07 LAB
QT INTERVAL: 386 MS
QTC INTERVAL: 423 MS

## 2024-07-10 ENCOUNTER — OFFICE VISIT (OUTPATIENT)
Dept: ORTHOPEDIC SURGERY | Facility: CLINIC | Age: 79
End: 2024-07-10
Payer: MEDICARE

## 2024-07-10 VITALS — WEIGHT: 127.2 LBS | HEIGHT: 65 IN | BODY MASS INDEX: 21.19 KG/M2 | TEMPERATURE: 97.8 F

## 2024-07-10 DIAGNOSIS — Z01.818 PRE-OP EVALUATION: Primary | ICD-10-CM

## 2024-07-10 PROCEDURE — 1160F RVW MEDS BY RX/DR IN RCRD: CPT | Performed by: ORTHOPAEDIC SURGERY

## 2024-07-10 PROCEDURE — 1159F MED LIST DOCD IN RCRD: CPT | Performed by: ORTHOPAEDIC SURGERY

## 2024-07-10 PROCEDURE — S0260 H&P FOR SURGERY: HCPCS | Performed by: ORTHOPAEDIC SURGERY

## 2024-07-10 NOTE — H&P (VIEW-ONLY)
History & Physical         Patient: Ashley Mata    YOB: 1945    Medical Record Number: [id number]    Chief Complaints: Pre-op for right shoulder replacement    History of Present Illness: 78 y.o. female comes in today for upcoming shoulder replacement surgery. Reports progressively worsening pain, motion loss, and dysfunction.  She cannot raise the arm overhead without the assistance of her left.  Describes current pain as severe. Denies any new complaints or issues.    Allergies   Allergen Reactions    Bupivacaine Other (See Comments)     Questionable allergy.  Patient had toe surgery and subsequently developed acute hepatitis.  Rare case reports of hepatitis induced by bupivacaine which was used during her surgery.       Current Outpatient Medications:     atorvastatin (LIPITOR) 20 MG tablet, TAKE 1 TABLET BY MOUTH EVERY DAY (Patient taking differently: Take 1 tablet by mouth Daily. TAKE 1 TABLET BY MOUTH EVERY DAY), Disp: 90 tablet, Rfl: 3    calcium carbonate (Calcium 600) 600 MG tablet, Take calcium plus vitamin D twice daily for low vitamin D and weak bones (Patient taking differently: Take 1 tablet by mouth 2 (Two) Times a Day With Meals. Take calcium plus vitamin D twice daily for low vitamin D and weak bones), Disp: 30 tablet, Rfl:     estradiol (ESTRACE) 1 MG tablet, Take 1 tablet (1 mg) daily as directed for postmenopausal state (Patient taking differently: Take 1 tablet by mouth Daily. Take 1 tablet (1 mg) daily as directed for postmenopausal state), Disp: 289.29 tablet, Rfl: 3    ezetimibe (ZETIA) 10 MG tablet, TAKE 1 TABLET BY MOUTH EVERY DAY (Patient taking differently: Take 1 tablet by mouth Daily. TAKE 1 TABLET BY MOUTH EVERY DAY), Disp: 90 tablet, Rfl: 3    fexofenadine-pseudoephedrine (ALLEGRA-D 24) 180-240 MG per 24 hr tablet, Take 1 p.o. daily for environmental allergies (Patient taking differently: Take 1 tablet by mouth As Needed. Take 1 p.o. daily for environmental  allergies), Disp: 30 tablet, Rfl: 6    fluticasone (FLONASE) 50 MCG/ACT nasal spray, 2 sprays into the nostril(s) as directed by provider As Needed., Disp: , Rfl:     levothyroxine (SYNTHROID, LEVOTHROID) 50 MCG tablet, TAKE ONE TABLET BY MOUTH DAILY FOR LOW THYROID (Patient taking differently: Take 1 tablet by mouth Daily. TAKE ONE TABLET BY MOUTH DAILY FOR LOW THYROID), Disp: 90 tablet, Rfl: 3    multivitamin (THERAGRAN) tablet tablet, Take 1 tablet by mouth Daily. HOLD ONE WEEK FOR SURGERY, Disp: , Rfl:     omeprazole OTC (PrilOSEC OTC) 20 MG EC tablet, Take 1 tablet by mouth Daily., Disp: , Rfl:     diazePAM (Valium) 5 MG tablet, TAKE ONE HOUR PRIOR TO APPOINTMENT. DO NOT DRIVE WHILE TAKING THIS MEDICATION., Disp: 2 tablet, Rfl: 0    meloxicam (MOBIC) 15 MG tablet, Take 1 tablet by mouth Daily. (Patient not taking: Reported on 7/10/2024), Disp: 90 tablet, Rfl: 3    Past Medical History:   Diagnosis Date    Allergic rhinitis 01/30/2015 11/13/2015--patient was evaluated by ENT and was started on generic Flonase and patient reports this has improved her symptoms quite a bit.   01/30/2015--allergy testing revealed positive reactivity to cat, dust mite, cockroach, mold spores, and grass pollen. Environmental control measures.    Alopecia 04/12/2017    Evaluated and treated by the dermatologist.    Anesthesia complication     ACUTE HEPATITIS POST TOE SURGERY IN 2022 POSSIBLE FROM BUPIVICAINE PER PATIENTS PCP, HAS HAD BILATERAL ROTATOR CUFF YEARS BEFORE SURGERY IN 2022 WITHOUT DIFFICULTY    Arthralgia of multiple joints 05/09/2022    Benign essential hypertension 04/18/2016    Chronic pain of both knees 04/05/2023    Chronic toe pain, right foot 10/19/2021    October 19, 2021--patient reports a 1 year history of progressively worsening right toe pain that particular involves the second toe.  On exam she has obvious hammertoe which is causing irritation.  There is some hammering of the third digit as well.  Patient  referred to orthopedist who specializes in foot problems such as Dr. Vieira.  In the meantime patient should use over-the-counter toe pads to    Diverticulosis of colon 03/17/2009    03/10/2017--colonoscopy revealed many small and large mouth diverticula in the sigmoid colon and descending colon.  Nonthrombosed external hemorrhoids and internal hemorrhoids noted.  Otherwise, normal colonoscopy.  03/17/2009--colonoscopy revealed external hemorrhoids, torturous colon with a sigmoid stricture, sigmoid diverticulosis.    Family history of breast cancer in first degree relative 07/13/2020    Family history of colonic polyps 04/01/2022    Gastroesophageal reflux disease without esophagitis 03/17/2009 06/09/2015--EGD revealed Z line irregular, 35 cm from incisors. Biopsied. Small hiatal hernia. Gastritis. Biopsied. Bilious gastric fluid. Fluid aspiration performed. Normal duodenal bulb and second part of duodenum. Biopsied. Pathology revealed the antral biopsy revealed small intestinal mucosa with no pathologic diagnosis. Good preservation of villous architecture. Duodenum biopsy revealed largely antral type gastric mucosa with mild patchy superficial chronic gastritis. Gastroesophageal junction revealed squamous and glandular mucosa with mild chronic inflammation. Negative for intestinal metaplasia or dysplasia. There appeared to be mislabeling of the antral biopsies and duodenal biopsies.   04/17/2012--EGD revealed irregular Z line, hiatal hernia, gastritis, duodenitis.   03/17/2009--EGD revealed grade B. reflux esophagitis, hiatal hernia, gastritis, a few gastric polyps, duodenitis.    Hammertoe of right foot     Hammertoe of second toe of right foot 10/19/2021    October 19, 2021--patient reports a 1 year history of progressively worsening right toe pain that particular involves the second toe.  On exam she has obvious hammertoe which is causing irritation.  There is some hammering of the third digit as well.   Patient referred to orthopedist who specializes in foot problems such as Dr. Vieira.  In the meantime patient should use over-the-counter toe pads to    History of acute hepatitis 04/24/2022    May 9, 2022--patient seen in hospital discharge follow-up/transition of care.  I reviewed the documentation including the admission history and physical, hospital course, laboratory and radiographic studies, GI consultation, discharge summary and discharge medications.  I specifically reviewed the tissue pathology report which revealed moderate steatosis with moderate chronic portal inflammation a    History of colon polyps     History of COVID-19, August 12, 2021; May 17, 2022. 10/19/2021    May 17, 2022--patient again tested positive for COVID.  Ordered by gastroenterology nurse practitioner.  October 19, 2021--patient seen in follow-up and reports her symptoms totally resolved.  She wants to know when she should get her Covid booster vaccine.  I have suggested that she receive it approximately 3 months after initially testing positive.  She has an appointment in November 6, 2021 for    History of Hyperplastic polyps of stomach 06/09/2015 06/09/2015--EGD revealed Z line irregular, 35 cm from incisors. Biopsied. Small hiatal hernia. Gastritis. Biopsied. Bilious gastric fluid. Fluid aspiration performed. Normal duodenal bulb and second part of duodenum. Biopsied. Pathology revealed the antral biopsy revealed small intestinal mucosa with no pathologic diagnosis. Good preservation of villous architecture. Duodenum biopsy revealed large    Hyperlipidemia 07/17/2012 07/17/2012--treatment for hyperlipidemia begun.    Lumbar disc herniation, L4-L5 08/07/2018 01/16/2019--patient seen in follow-up by Dr. Shukla.  She has seen the neurosurgeon who recommended conservative therapy with physical therapy.  Patient reports that has been extremely helpful.  Currently has no significant pain.  08/07/2018--patient seen in follow-up  and reports her pain is much better after taking prednisone.  She is now down to half a pill per day and is almost off of it.  I rev    Lumbar scoliosis 08/07/2018 01/16/2019--patient seen in follow-up by Dr. Shukla.  She has seen the neurosurgeon who recommended conservative therapy with physical therapy.  Patient reports that has been extremely helpful.  Currently has no significant pain.  08/07/2018--patient seen in follow-up and reports her pain is much better after taking prednisone.  She is now down to half a pill per day and is almost off of it.  I rev    Menopausal state 07/13/2020    Multiple environmental allergies 01/30/2015 01/30/2015--allergy testing revealed positive reactivity to cat, dust mite, cockroach, mold spores, and grass pollen. Environmental control measures.    Osteopenia of multiple sites, 2/25/2022--lumbar spine 1.1.  Left femoral neck -1.0.  Right femoral neck -0.3. 04/01/2022 February 25, 2022--DEXA scan reveals lumbar spine T score 1.1.  Left femoral neck T score -1.0.  Right femoral neck T score -0.3.  Mild osteopenia.    Postmenopausal hormone replacement therapy 04/18/2016    Primary hypothyroidism 01/22/2020 January 22, 2020--TSH is elevated at 5.0.  Levothyroxine 50 mcg/day initiated.  Patient will follow-up with nonfasting lab work in about 6 to 8 weeks to reassess.  07/09/2018--routine follow-up.  TSH elevated slightly at 4.31.  Free T4 normal at 1.22.  Free T3 normal at 3.3.  Continued observation.  10/11/2017--thyroid function tests are normal.  09/30/2016--thyroid ultrasound reveals a tiny 2 mm     Primary osteoarthritis involving multiple joints 06/08/2022    Primary osteoarthritis of both hands 12/30/2013 05/12/2014--patient seen in followup and reports that the Vimovo has helped. Uric acid levels are normal at 4.9. C. reactive protein mildly elevated at 2.3. X-rays of the hands reveals radiocarpal, first carpometacarpal, first metacarpophalangeal, and  interphalangeal joint degeneration. This process is symmetric. The interphalangeal joint of the left is affected to the greatest degree. There is right sided scapholunate dissociation as well as deformity of the scaphoid suggesting prior traumatic injury with healing. Soft tissues are satisfactory. Overall appearance is most consistent with osteoarthritis.   05/05/2014--patient presents and reports that she is having worsening right wrist pain primarily at the base of the thumb. No new injury. Bilateral x-rays of the wrists and hands ordered. Uric acid level ordered as well as a CRP. Vimovo 500/20 one by mouth twice a day. Follow up in one week.   03/18/2014--patient reports that her wrist symptoms have improved.   12/30/2013--patient reports a several mon    Primary osteoarthritis of both knees 04/05/2023    Primary osteoarthritis of both wrists 12/30/2013 05/12/2014--patient seen in followup and reports that the Vimovo has helped. Uric acid levels are normal at 4.9. C. reactive protein mildly elevated at 2.3. X-rays of the hands reveals radiocarpal, first carpometacarpal, first metacarpophalangeal, and interphalangeal joint degeneration. This process is symmetric. The interphalangeal joint of the left is affected to the greatest degree. There is right sided scapholunate dissociation as well as deformity of the scaphoid suggesting prior traumatic injury with healing. Soft tissues are satisfactory. Overall appearance is most consistent with osteoarthritis.   05/05/2014--patient presents and reports that she is having worsening right wrist pain primarily at the base of the thumb. No new injury. Bilateral x-rays of the wrists and hands ordered. Uric acid level ordered as well as a CRP. Vimovo 500/20 one by mouth twice a day. Follow up in one week.   03/18/2014--patient reports that her wrist symptoms have improved.   12/30/2013--patient reports a several mon    Rotator cuff syndrome     Vitamin D deficiency  04/18/2016          Past Surgical History:   Procedure Laterality Date    COLONOSCOPY  03/17/2009 03/17/2009--colonoscopy revealed external hemorrhoids, torturous colon with a sigmoid stricture, sigmoid diverticulosis.    COLONOSCOPY N/A 03/10/2017    03/10/2017--colonoscopy revealed many small and large mouth diverticula in the sigmoid colon and descending colon.  Nonthrombosed external hemorrhoids and internal hemorrhoids noted.  Otherwise, normal colonoscopy.    COLONOSCOPY N/A 08/18/2022    Procedure: COLONOSCOPY TO 40CM WITH POLYPECTOMY (COLD);  Surgeon: Mario Ray MD;  Location: [facility] ENDOSCOPY;  Service: General;  Laterality: N/A;  PRE- POSITIVE COLOGUARD  POST- POLYP, HEMORRHOIDS    ENDOSCOPY N/A 08/18/2022    Procedure: ESOPHAGOGASTRODUODENOSCOPY WITH BIOPSIES AND COLD BIOPSY POLYPECTOMY;  Surgeon: Mario Ray MD;  Location: [facility] ENDOSCOPY;  Service: General;  Laterality: N/A;  PRE- ACID REFLUX  POST- GASTRITIS, GASTRIC POLYPS    ERCP WITH SPHINCTEROTOMY/PAPILLOTOMY  08/13/2014 08/13/2014--ERCP, endoscopic sphincterotomy and removal of common bile duct stones. 08/12/2014--laparoscopic cholecystectomy. This was complicated by retained common bile duct stones 2.    ESOPHAGOSCOPY / EGD  06/09/2015 06/09/2015--EGD revealed Z line irregular, 35 cm from incisors. Biopsied. Small hiatal hernia. Gastritis. Biopsied. Bilious gastric fluid. Fluid aspiration performed. Normal duodenal bulb and second part of duodenum. Biopsied. Pathology revealed the antral biopsy revealed small intestinal mucosa with no pathologic diagnosis. Good preservation of villous architecture. Duodenum biopsy revealed large    EYE SURGERY  07/19/2018    Both Eyes; cataracts    FOOT SURGERY  April 11, 2022    Dr. Vieira - toe urgery    HAMMER TOE REPAIR Right 04/11/2022    Procedure: RIGHT SECOND AND THIRD HAMMERTOE REPAIRS;  Surgeon: Brandt Vieira MD;  Location: [facility] OR [facility];  Service: Orthopedics;   Laterality: Right;    LAPAROSCOPIC CHOLECYSTECTOMY  08/12/2014 08/13/2014--ERCP, endoscopic sphincterotomy and removal of common bile duct stones. 08/12/2014--laparoscopic cholecystectomy. This was complicated by retained common bile duct stones 2.    ROTATOR CUFF REPAIR Left 07/10/2014    07/10/2014--left rotator cuff repair. 03/18/2014--patient seen in followup and reports that her range of motion has improved and her pain is not debilitating. She feels that she is making progress with physical therapy. Surgery scheduled 07/10/2014. 01/10/2014--patient was seen in evaluation by the orthopedist and he recommended conservative treatment consisting of physical therapy/rehabilitation.    ROTATOR CUFF REPAIR Right 08/03/2016 08/03/2016--arthroscopic right rotator cuff repair.  Debridement of degenerative anterior superior labral tear.    SUBTOTAL HYSTERECTOMY  1978    Partial hysterectomy 1978.    TRIGGER POINT INJECTION  2023    Gel in both knees          Social History     Occupational History    Occupation:  - [employer]   Tobacco Use    Smoking status: Never     Passive exposure: Never    Smokeless tobacco: Never   Vaping Use    Vaping status: Never Used   Substance and Sexual Activity    Alcohol use: Yes     Alcohol/week: 2.0 standard drinks of alcohol     Types: 2 Glasses of wine per week     Comment: SOCIALLY    Drug use: No    Sexual activity: Not Currently     Partners: Male     Birth control/protection: Hysterectomy      Social History     Social History Narrative    Not on file          Family History   Problem Relation Age of Onset    Other Father         Hodgkin's Disease    Cancer Father         Lung Cancer    Early death Father         59 years old    Colon polyps Mother     Alzheimer's disease Mother     Hearing loss Mother     Osteoporosis Mother     Breast cancer Daughter 52    Malig Hyperthermia Neg Hx        Review of Systems:     Constitutional:  Denies fever, shaking or  "chills   Eyes:  Denies change in visual acuity   HEENT:  Denies nasal congestion or sore throat   Respiratory:  Denies cough or shortness of breath   Cardiovascular:  Denies chest pain or edema   GI:  Denies abdominal pain, nausea, vomiting, bloody stools or diarrhea   Musculoskeletal:  Denies numbness, tingling or loss of motor function except as outlined above in history of present illness.  Integument:  Denies rash, lesion or ulceration   Neurologic:  Denies headache or focal weakness, deficits      All other pertinent positives and negatives as noted above in HPI.    Physical Exam: 78 y.o. female    Vitals:    07/10/24 1359   Temp: 97.8 °F (36.6 °C)   Weight: 57.7 kg (127 lb 3.2 oz)   Height: 163.8 cm (64.5\")     General:  Patient is awake and alert.  Appears in no acute distress or discomfort.    Psych:  Affect and demeanor are appropriate.    Cardiovascular:  Regular rate and rhythm.    Lungs:  Good chest expansion.  Breathing unlabored.    Lymph:  No palpable adenopathy about neck or axilla.    Neck:  Supple.  Normal ROM.  Negative Spurling's for shoulder or arm pain.    Right upper extremity:  Skin benign and intact without evidence for swelling, masses or atrophy.  No palpable masses.  Moderate anterior tenderness.  Shoulder ROM limited and uncomfortable--80 FE, 40 ER, L2 IR.  No evident instability or apprehension.  Weakness with elevation in the scapular plane and external rotation.  Deltoid fires on exam.  Good strength in the lower arm and hand.  Intact sensation throughout the arm and hand palpable radial pulse with brisk cap refill.    Diagnostic Tests:  Lab Results   Component Value Date    GLUCOSE 105 (H) 07/05/2024    CALCIUM 9.4 07/05/2024     07/05/2024    K 3.9 07/05/2024    CO2 26.0 07/05/2024     07/05/2024    BUN 20 07/05/2024    CREATININE 0.83 07/05/2024    EGFRIFAFRI 74 03/04/2020    EGFRIFNONA 81 11/09/2021    BCR 24.1 07/05/2024    ANIONGAP 9.0 07/05/2024     Lab Results "   Component Value Date    WBC 8.4 06/25/2024    HGB 13.8 06/25/2024    HCT 41.0 06/25/2024    MCV 92 06/25/2024     06/25/2024     Lab Results   Component Value Date    INR 0.95 04/28/2022    INR 0.93 04/27/2022    INR 0.95 04/26/2022    PROTIME 12.6 04/28/2022    PROTIME 12.3 04/27/2022    PROTIME 12.6 04/26/2022     Imaging:  Previous x-rays of the right shoulder are reviewed.  I have reviewed her MRI of the right shoulder along with the radiology report.  I have independently interpreted the images.  I agree with the radiology assessment which is listed below.    IMPRESSION:     1. Evidence of prior rotator cuff repair with a recurrent massive  rotator cuff tear including full-thickness, full width or near full  width tears of the supraspinatus and infraspinatus tendons from the  footprint, medial retraction, and mild muscular fatty atrophy.  2. Moderate subscapularis tendinopathy with mild partial-thickness  articular sided fraying at the footprint.  3. Moderate to severe intra-articular long head biceps tendinopathy and  partial-thickness tear at the lesser tuberosity.  4. Moderate glenohumeral joint effusion with diffuse synovial thickening  and intra-articular debris and multifocal full-thickness chondral loss  at the superior humeral head.  5. Moderate DJD at the AC joint and postoperative changes from  acromioplasty and distal clavicle resection with a moderate joint  effusion communicating with the subacromial/subdeltoid space.    Assessment:  Large recurrent right rotator cuff tear with osteoarthritis    Plan: We had a thorough discussion regarding the risks, benefits and alternatives to an arthroplasty and non-surgical management versus surgery.  I explained that surgical risks include infection, hematoma, hardware related complications including failure of fixation, loosening, fracture, persistent pain and/or loss of motion, iatrogenic nerve and/or blood vessel injury resulting in permanent  weakness, numbness or dysfunction, DVT, PE, positioning related neuropraxia, and anesthesia related complications resulting in death.  We discussed the indication for a reverse as opposed to a standard total shoulder and the risks inherent to the reverse including notching, glenoid loosening, instability, and traction related neuropraxia, any of which could result in persistent pain or problems requiring further surgery.  Lastly, we discussed the rehab and all that will be expected of the patient post operatively to ensure an optimal outcome.  She voiced understanding of the risks, benefits, and alternative forms of treatment that were discussed and the patient consents to proceed with the reverse arthroplasty.  She is planning for discharge home the day of surgery.  Denies any history of DVT or PE.  Denies any history of metal allergy.    Ash Jimenez MD  07/10/24

## 2024-07-10 NOTE — PROGRESS NOTES
History & Physical         Patient: Ashley Mata    YOB: 1945    Medical Record Number: [id number]    Chief Complaints: Pre-op for right shoulder replacement    History of Present Illness: 78 y.o. female comes in today for upcoming shoulder replacement surgery. Reports progressively worsening pain, motion loss, and dysfunction.  She cannot raise the arm overhead without the assistance of her left.  Describes current pain as severe. Denies any new complaints or issues.    Allergies   Allergen Reactions    Bupivacaine Other (See Comments)     Questionable allergy.  Patient had toe surgery and subsequently developed acute hepatitis.  Rare case reports of hepatitis induced by bupivacaine which was used during her surgery.       Current Outpatient Medications:     atorvastatin (LIPITOR) 20 MG tablet, TAKE 1 TABLET BY MOUTH EVERY DAY (Patient taking differently: Take 1 tablet by mouth Daily. TAKE 1 TABLET BY MOUTH EVERY DAY), Disp: 90 tablet, Rfl: 3    calcium carbonate (Calcium 600) 600 MG tablet, Take calcium plus vitamin D twice daily for low vitamin D and weak bones (Patient taking differently: Take 1 tablet by mouth 2 (Two) Times a Day With Meals. Take calcium plus vitamin D twice daily for low vitamin D and weak bones), Disp: 30 tablet, Rfl:     estradiol (ESTRACE) 1 MG tablet, Take 1 tablet (1 mg) daily as directed for postmenopausal state (Patient taking differently: Take 1 tablet by mouth Daily. Take 1 tablet (1 mg) daily as directed for postmenopausal state), Disp: 289.29 tablet, Rfl: 3    ezetimibe (ZETIA) 10 MG tablet, TAKE 1 TABLET BY MOUTH EVERY DAY (Patient taking differently: Take 1 tablet by mouth Daily. TAKE 1 TABLET BY MOUTH EVERY DAY), Disp: 90 tablet, Rfl: 3    fexofenadine-pseudoephedrine (ALLEGRA-D 24) 180-240 MG per 24 hr tablet, Take 1 p.o. daily for environmental allergies (Patient taking differently: Take 1 tablet by mouth As Needed. Take 1 p.o. daily for environmental  allergies), Disp: 30 tablet, Rfl: 6    fluticasone (FLONASE) 50 MCG/ACT nasal spray, 2 sprays into the nostril(s) as directed by provider As Needed., Disp: , Rfl:     levothyroxine (SYNTHROID, LEVOTHROID) 50 MCG tablet, TAKE ONE TABLET BY MOUTH DAILY FOR LOW THYROID (Patient taking differently: Take 1 tablet by mouth Daily. TAKE ONE TABLET BY MOUTH DAILY FOR LOW THYROID), Disp: 90 tablet, Rfl: 3    multivitamin (THERAGRAN) tablet tablet, Take 1 tablet by mouth Daily. HOLD ONE WEEK FOR SURGERY, Disp: , Rfl:     omeprazole OTC (PrilOSEC OTC) 20 MG EC tablet, Take 1 tablet by mouth Daily., Disp: , Rfl:     diazePAM (Valium) 5 MG tablet, TAKE ONE HOUR PRIOR TO APPOINTMENT. DO NOT DRIVE WHILE TAKING THIS MEDICATION., Disp: 2 tablet, Rfl: 0    meloxicam (MOBIC) 15 MG tablet, Take 1 tablet by mouth Daily. (Patient not taking: Reported on 7/10/2024), Disp: 90 tablet, Rfl: 3    Past Medical History:   Diagnosis Date    Allergic rhinitis 01/30/2015 11/13/2015--patient was evaluated by ENT and was started on generic Flonase and patient reports this has improved her symptoms quite a bit.   01/30/2015--allergy testing revealed positive reactivity to cat, dust mite, cockroach, mold spores, and grass pollen. Environmental control measures.    Alopecia 04/12/2017    Evaluated and treated by the dermatologist.    Anesthesia complication     ACUTE HEPATITIS POST TOE SURGERY IN 2022 POSSIBLE FROM BUPIVICAINE PER PATIENTS PCP, HAS HAD BILATERAL ROTATOR CUFF YEARS BEFORE SURGERY IN 2022 WITHOUT DIFFICULTY    Arthralgia of multiple joints 05/09/2022    Benign essential hypertension 04/18/2016    Chronic pain of both knees 04/05/2023    Chronic toe pain, right foot 10/19/2021    October 19, 2021--patient reports a 1 year history of progressively worsening right toe pain that particular involves the second toe.  On exam she has obvious hammertoe which is causing irritation.  There is some hammering of the third digit as well.  Patient  referred to orthopedist who specializes in foot problems such as Dr. Vieira.  In the meantime patient should use over-the-counter toe pads to    Diverticulosis of colon 03/17/2009    03/10/2017--colonoscopy revealed many small and large mouth diverticula in the sigmoid colon and descending colon.  Nonthrombosed external hemorrhoids and internal hemorrhoids noted.  Otherwise, normal colonoscopy.  03/17/2009--colonoscopy revealed external hemorrhoids, torturous colon with a sigmoid stricture, sigmoid diverticulosis.    Family history of breast cancer in first degree relative 07/13/2020    Family history of colonic polyps 04/01/2022    Gastroesophageal reflux disease without esophagitis 03/17/2009 06/09/2015--EGD revealed Z line irregular, 35 cm from incisors. Biopsied. Small hiatal hernia. Gastritis. Biopsied. Bilious gastric fluid. Fluid aspiration performed. Normal duodenal bulb and second part of duodenum. Biopsied. Pathology revealed the antral biopsy revealed small intestinal mucosa with no pathologic diagnosis. Good preservation of villous architecture. Duodenum biopsy revealed largely antral type gastric mucosa with mild patchy superficial chronic gastritis. Gastroesophageal junction revealed squamous and glandular mucosa with mild chronic inflammation. Negative for intestinal metaplasia or dysplasia. There appeared to be mislabeling of the antral biopsies and duodenal biopsies.   04/17/2012--EGD revealed irregular Z line, hiatal hernia, gastritis, duodenitis.   03/17/2009--EGD revealed grade B. reflux esophagitis, hiatal hernia, gastritis, a few gastric polyps, duodenitis.    Hammertoe of right foot     Hammertoe of second toe of right foot 10/19/2021    October 19, 2021--patient reports a 1 year history of progressively worsening right toe pain that particular involves the second toe.  On exam she has obvious hammertoe which is causing irritation.  There is some hammering of the third digit as well.   Patient referred to orthopedist who specializes in foot problems such as Dr. Vieira.  In the meantime patient should use over-the-counter toe pads to    History of acute hepatitis 04/24/2022    May 9, 2022--patient seen in hospital discharge follow-up/transition of care.  I reviewed the documentation including the admission history and physical, hospital course, laboratory and radiographic studies, GI consultation, discharge summary and discharge medications.  I specifically reviewed the tissue pathology report which revealed moderate steatosis with moderate chronic portal inflammation a    History of colon polyps     History of COVID-19, August 12, 2021; May 17, 2022. 10/19/2021    May 17, 2022--patient again tested positive for COVID.  Ordered by gastroenterology nurse practitioner.  October 19, 2021--patient seen in follow-up and reports her symptoms totally resolved.  She wants to know when she should get her Covid booster vaccine.  I have suggested that she receive it approximately 3 months after initially testing positive.  She has an appointment in November 6, 2021 for    History of Hyperplastic polyps of stomach 06/09/2015 06/09/2015--EGD revealed Z line irregular, 35 cm from incisors. Biopsied. Small hiatal hernia. Gastritis. Biopsied. Bilious gastric fluid. Fluid aspiration performed. Normal duodenal bulb and second part of duodenum. Biopsied. Pathology revealed the antral biopsy revealed small intestinal mucosa with no pathologic diagnosis. Good preservation of villous architecture. Duodenum biopsy revealed large    Hyperlipidemia 07/17/2012 07/17/2012--treatment for hyperlipidemia begun.    Lumbar disc herniation, L4-L5 08/07/2018 01/16/2019--patient seen in follow-up by Dr. Shukla.  She has seen the neurosurgeon who recommended conservative therapy with physical therapy.  Patient reports that has been extremely helpful.  Currently has no significant pain.  08/07/2018--patient seen in follow-up  and reports her pain is much better after taking prednisone.  She is now down to half a pill per day and is almost off of it.  I rev    Lumbar scoliosis 08/07/2018 01/16/2019--patient seen in follow-up by Dr. Shukla.  She has seen the neurosurgeon who recommended conservative therapy with physical therapy.  Patient reports that has been extremely helpful.  Currently has no significant pain.  08/07/2018--patient seen in follow-up and reports her pain is much better after taking prednisone.  She is now down to half a pill per day and is almost off of it.  I rev    Menopausal state 07/13/2020    Multiple environmental allergies 01/30/2015 01/30/2015--allergy testing revealed positive reactivity to cat, dust mite, cockroach, mold spores, and grass pollen. Environmental control measures.    Osteopenia of multiple sites, 2/25/2022--lumbar spine 1.1.  Left femoral neck -1.0.  Right femoral neck -0.3. 04/01/2022 February 25, 2022--DEXA scan reveals lumbar spine T score 1.1.  Left femoral neck T score -1.0.  Right femoral neck T score -0.3.  Mild osteopenia.    Postmenopausal hormone replacement therapy 04/18/2016    Primary hypothyroidism 01/22/2020 January 22, 2020--TSH is elevated at 5.0.  Levothyroxine 50 mcg/day initiated.  Patient will follow-up with nonfasting lab work in about 6 to 8 weeks to reassess.  07/09/2018--routine follow-up.  TSH elevated slightly at 4.31.  Free T4 normal at 1.22.  Free T3 normal at 3.3.  Continued observation.  10/11/2017--thyroid function tests are normal.  09/30/2016--thyroid ultrasound reveals a tiny 2 mm     Primary osteoarthritis involving multiple joints 06/08/2022    Primary osteoarthritis of both hands 12/30/2013 05/12/2014--patient seen in followup and reports that the Vimovo has helped. Uric acid levels are normal at 4.9. C. reactive protein mildly elevated at 2.3. X-rays of the hands reveals radiocarpal, first carpometacarpal, first metacarpophalangeal, and  interphalangeal joint degeneration. This process is symmetric. The interphalangeal joint of the left is affected to the greatest degree. There is right sided scapholunate dissociation as well as deformity of the scaphoid suggesting prior traumatic injury with healing. Soft tissues are satisfactory. Overall appearance is most consistent with osteoarthritis.   05/05/2014--patient presents and reports that she is having worsening right wrist pain primarily at the base of the thumb. No new injury. Bilateral x-rays of the wrists and hands ordered. Uric acid level ordered as well as a CRP. Vimovo 500/20 one by mouth twice a day. Follow up in one week.   03/18/2014--patient reports that her wrist symptoms have improved.   12/30/2013--patient reports a several mon    Primary osteoarthritis of both knees 04/05/2023    Primary osteoarthritis of both wrists 12/30/2013 05/12/2014--patient seen in followup and reports that the Vimovo has helped. Uric acid levels are normal at 4.9. C. reactive protein mildly elevated at 2.3. X-rays of the hands reveals radiocarpal, first carpometacarpal, first metacarpophalangeal, and interphalangeal joint degeneration. This process is symmetric. The interphalangeal joint of the left is affected to the greatest degree. There is right sided scapholunate dissociation as well as deformity of the scaphoid suggesting prior traumatic injury with healing. Soft tissues are satisfactory. Overall appearance is most consistent with osteoarthritis.   05/05/2014--patient presents and reports that she is having worsening right wrist pain primarily at the base of the thumb. No new injury. Bilateral x-rays of the wrists and hands ordered. Uric acid level ordered as well as a CRP. Vimovo 500/20 one by mouth twice a day. Follow up in one week.   03/18/2014--patient reports that her wrist symptoms have improved.   12/30/2013--patient reports a several mon    Rotator cuff syndrome     Vitamin D deficiency  04/18/2016          Past Surgical History:   Procedure Laterality Date    COLONOSCOPY  03/17/2009 03/17/2009--colonoscopy revealed external hemorrhoids, torturous colon with a sigmoid stricture, sigmoid diverticulosis.    COLONOSCOPY N/A 03/10/2017    03/10/2017--colonoscopy revealed many small and large mouth diverticula in the sigmoid colon and descending colon.  Nonthrombosed external hemorrhoids and internal hemorrhoids noted.  Otherwise, normal colonoscopy.    COLONOSCOPY N/A 08/18/2022    Procedure: COLONOSCOPY TO 40CM WITH POLYPECTOMY (COLD);  Surgeon: Mario Ray MD;  Location: [facility] ENDOSCOPY;  Service: General;  Laterality: N/A;  PRE- POSITIVE COLOGUARD  POST- POLYP, HEMORRHOIDS    ENDOSCOPY N/A 08/18/2022    Procedure: ESOPHAGOGASTRODUODENOSCOPY WITH BIOPSIES AND COLD BIOPSY POLYPECTOMY;  Surgeon: Mario Ray MD;  Location: [facility] ENDOSCOPY;  Service: General;  Laterality: N/A;  PRE- ACID REFLUX  POST- GASTRITIS, GASTRIC POLYPS    ERCP WITH SPHINCTEROTOMY/PAPILLOTOMY  08/13/2014 08/13/2014--ERCP, endoscopic sphincterotomy and removal of common bile duct stones. 08/12/2014--laparoscopic cholecystectomy. This was complicated by retained common bile duct stones 2.    ESOPHAGOSCOPY / EGD  06/09/2015 06/09/2015--EGD revealed Z line irregular, 35 cm from incisors. Biopsied. Small hiatal hernia. Gastritis. Biopsied. Bilious gastric fluid. Fluid aspiration performed. Normal duodenal bulb and second part of duodenum. Biopsied. Pathology revealed the antral biopsy revealed small intestinal mucosa with no pathologic diagnosis. Good preservation of villous architecture. Duodenum biopsy revealed large    EYE SURGERY  07/19/2018    Both Eyes; cataracts    FOOT SURGERY  April 11, 2022    Dr. Vieira - toe urgery    HAMMER TOE REPAIR Right 04/11/2022    Procedure: RIGHT SECOND AND THIRD HAMMERTOE REPAIRS;  Surgeon: Brandt Vieira MD;  Location: [facility];  Service: Orthopedics;   Laterality: Right;    LAPAROSCOPIC CHOLECYSTECTOMY  08/12/2014 08/13/2014--ERCP, endoscopic sphincterotomy and removal of common bile duct stones. 08/12/2014--laparoscopic cholecystectomy. This was complicated by retained common bile duct stones 2.    ROTATOR CUFF REPAIR Left 07/10/2014    07/10/2014--left rotator cuff repair. 03/18/2014--patient seen in followup and reports that her range of motion has improved and her pain is not debilitating. She feels that she is making progress with physical therapy. Surgery scheduled 07/10/2014. 01/10/2014--patient was seen in evaluation by the orthopedist and he recommended conservative treatment consisting of physical therapy/rehabilitation.    ROTATOR CUFF REPAIR Right 08/03/2016 08/03/2016--arthroscopic right rotator cuff repair.  Debridement of degenerative anterior superior labral tear.    SUBTOTAL HYSTERECTOMY  1978    Partial hysterectomy 1978.    TRIGGER POINT INJECTION  2023    Gel in both knees          Social History     Occupational History    Occupation:  - [employer]   Tobacco Use    Smoking status: Never     Passive exposure: Never    Smokeless tobacco: Never   Vaping Use    Vaping status: Never Used   Substance and Sexual Activity    Alcohol use: Yes     Alcohol/week: 2.0 standard drinks of alcohol     Types: 2 Glasses of wine per week     Comment: SOCIALLY    Drug use: No    Sexual activity: Not Currently     Partners: Male     Birth control/protection: Hysterectomy      Social History     Social History Narrative    Not on file          Family History   Problem Relation Age of Onset    Other Father         Hodgkin's Disease    Cancer Father         Lung Cancer    Early death Father         59 years old    Colon polyps Mother     Alzheimer's disease Mother     Hearing loss Mother     Osteoporosis Mother     Breast cancer Daughter 52    Malig Hyperthermia Neg Hx        Review of Systems:     Constitutional:  Denies fever, shaking or  "chills   Eyes:  Denies change in visual acuity   HEENT:  Denies nasal congestion or sore throat   Respiratory:  Denies cough or shortness of breath   Cardiovascular:  Denies chest pain or edema   GI:  Denies abdominal pain, nausea, vomiting, bloody stools or diarrhea   Musculoskeletal:  Denies numbness, tingling or loss of motor function except as outlined above in history of present illness.  Integument:  Denies rash, lesion or ulceration   Neurologic:  Denies headache or focal weakness, deficits      All other pertinent positives and negatives as noted above in HPI.    Physical Exam: 78 y.o. female    Vitals:    07/10/24 1359   Temp: 97.8 °F (36.6 °C)   Weight: 57.7 kg (127 lb 3.2 oz)   Height: 163.8 cm (64.5\")     General:  Patient is awake and alert.  Appears in no acute distress or discomfort.    Psych:  Affect and demeanor are appropriate.    Cardiovascular:  Regular rate and rhythm.    Lungs:  Good chest expansion.  Breathing unlabored.    Lymph:  No palpable adenopathy about neck or axilla.    Neck:  Supple.  Normal ROM.  Negative Spurling's for shoulder or arm pain.    Right upper extremity:  Skin benign and intact without evidence for swelling, masses or atrophy.  No palpable masses.  Moderate anterior tenderness.  Shoulder ROM limited and uncomfortable--80 FE, 40 ER, L2 IR.  No evident instability or apprehension.  Weakness with elevation in the scapular plane and external rotation.  Deltoid fires on exam.  Good strength in the lower arm and hand.  Intact sensation throughout the arm and hand palpable radial pulse with brisk cap refill.    Diagnostic Tests:  Lab Results   Component Value Date    GLUCOSE 105 (H) 07/05/2024    CALCIUM 9.4 07/05/2024     07/05/2024    K 3.9 07/05/2024    CO2 26.0 07/05/2024     07/05/2024    BUN 20 07/05/2024    CREATININE 0.83 07/05/2024    EGFRIFAFRI 74 03/04/2020    EGFRIFNONA 81 11/09/2021    BCR 24.1 07/05/2024    ANIONGAP 9.0 07/05/2024     Lab Results "   Component Value Date    WBC 8.4 06/25/2024    HGB 13.8 06/25/2024    HCT 41.0 06/25/2024    MCV 92 06/25/2024     06/25/2024     Lab Results   Component Value Date    INR 0.95 04/28/2022    INR 0.93 04/27/2022    INR 0.95 04/26/2022    PROTIME 12.6 04/28/2022    PROTIME 12.3 04/27/2022    PROTIME 12.6 04/26/2022     Imaging:  Previous x-rays of the right shoulder are reviewed.  I have reviewed her MRI of the right shoulder along with the radiology report.  I have independently interpreted the images.  I agree with the radiology assessment which is listed below.    IMPRESSION:     1. Evidence of prior rotator cuff repair with a recurrent massive  rotator cuff tear including full-thickness, full width or near full  width tears of the supraspinatus and infraspinatus tendons from the  footprint, medial retraction, and mild muscular fatty atrophy.  2. Moderate subscapularis tendinopathy with mild partial-thickness  articular sided fraying at the footprint.  3. Moderate to severe intra-articular long head biceps tendinopathy and  partial-thickness tear at the lesser tuberosity.  4. Moderate glenohumeral joint effusion with diffuse synovial thickening  and intra-articular debris and multifocal full-thickness chondral loss  at the superior humeral head.  5. Moderate DJD at the AC joint and postoperative changes from  acromioplasty and distal clavicle resection with a moderate joint  effusion communicating with the subacromial/subdeltoid space.    Assessment:  Large recurrent right rotator cuff tear with osteoarthritis    Plan: We had a thorough discussion regarding the risks, benefits and alternatives to an arthroplasty and non-surgical management versus surgery.  I explained that surgical risks include infection, hematoma, hardware related complications including failure of fixation, loosening, fracture, persistent pain and/or loss of motion, iatrogenic nerve and/or blood vessel injury resulting in permanent  weakness, numbness or dysfunction, DVT, PE, positioning related neuropraxia, and anesthesia related complications resulting in death.  We discussed the indication for a reverse as opposed to a standard total shoulder and the risks inherent to the reverse including notching, glenoid loosening, instability, and traction related neuropraxia, any of which could result in persistent pain or problems requiring further surgery.  Lastly, we discussed the rehab and all that will be expected of the patient post operatively to ensure an optimal outcome.  She voiced understanding of the risks, benefits, and alternative forms of treatment that were discussed and the patient consents to proceed with the reverse arthroplasty.  She is planning for discharge home the day of surgery.  Denies any history of DVT or PE.  Denies any history of metal allergy.    Ash Jimenez MD  07/10/24

## 2024-07-11 ENCOUNTER — TELEPHONE (OUTPATIENT)
Dept: ORTHOPEDIC SURGERY | Facility: HOSPITAL | Age: 79
End: 2024-07-11
Payer: MEDICARE

## 2024-07-11 DIAGNOSIS — Z96.611 STATUS POST REVERSE TOTAL REPLACEMENT OF RIGHT SHOULDER: Primary | ICD-10-CM

## 2024-07-11 NOTE — TELEPHONE ENCOUNTER
Risk Factor yes no   Age >75 X    BMI <20 >40  X   Patient History     Chronic Pain (2 or more meds/Pain Management)  X   ETOH (more than 3 drinks Daily)  X   Uncontrolled Depression/Bipolar/Schizoaffective Disorder  X   Arrhythmias--  X   Stent placement/MI  X   DVT/PE  X   Pacemaker  X   HTN (uncontrolled or requiring more than 2 medications)  X   CHF/Retained fluids/Edema  X   Stroke with Residual   X   COPD/Asthma  X   RONAK--Non-compliant with CPAP  X   Diabetes (on insulin or more than 2 meds)         A1C:  X   BPH/Urinary retention (on medication)  X   CKD  X   Home environment and support     Current ambulation status (use of cane, walker, W/C, Multiple falls/weakness)  X   Stairs to enter and throughout home X    Lives Alone  X   Doesn't have support at home  X   Outpatient Screening Assessment    Home needs: (Walker/BSC):  Total Shoulder  ? Steps 2 steps   Caregiver 24-48hrs post-discharge:       Discharge Plan:   Total Shoulder     Prescriptions: Other pharmacy    Home medications:   [] Blood thinner/anti-coag therapy--   [] BPH or diuretic--  [] BP meds--   ? Pain/Anti-inflammatories--Mobic  Pre-op Education:  Educate patient on spinal anesthesia/pain control:  ? patient verbalize understanding    Educate patient on hospital course/timeline:  ?  patient verbalize understanding    Joint Care Class:  ?  yes [] no  Notes:   Doing OP PT at [facility] with Thom Knowles. Please send order so patient can schedule.

## 2024-07-18 ENCOUNTER — ANESTHESIA EVENT (OUTPATIENT)
Dept: PERIOP | Facility: HOSPITAL | Age: 79
End: 2024-07-18
Payer: MEDICARE

## 2024-07-18 ENCOUNTER — APPOINTMENT (OUTPATIENT)
Dept: GENERAL RADIOLOGY | Facility: HOSPITAL | Age: 79
End: 2024-07-18
Payer: MEDICARE

## 2024-07-18 ENCOUNTER — HOSPITAL ENCOUNTER (OUTPATIENT)
Facility: HOSPITAL | Age: 79
Discharge: HOME | End: 2024-07-18
Attending: ORTHOPAEDIC SURGERY | Admitting: ORTHOPAEDIC SURGERY
Payer: MEDICARE

## 2024-07-18 ENCOUNTER — ANESTHESIA (OUTPATIENT)
Dept: PERIOP | Facility: HOSPITAL | Age: 79
End: 2024-07-18
Payer: MEDICARE

## 2024-07-18 VITALS
SYSTOLIC BLOOD PRESSURE: 127 MMHG | RESPIRATION RATE: 18 BRPM | HEART RATE: 82 BPM | TEMPERATURE: 97.5 F | DIASTOLIC BLOOD PRESSURE: 71 MMHG | OXYGEN SATURATION: 98 %

## 2024-07-18 DIAGNOSIS — Z96.611 STATUS POST REVERSE TOTAL SHOULDER REPLACEMENT, RIGHT: Primary | ICD-10-CM

## 2024-07-18 DIAGNOSIS — M75.101 RIGHT ROTATOR CUFF TEAR: ICD-10-CM

## 2024-07-18 PROCEDURE — 25010000002 PROPOFOL 10 MG/ML EMULSION: Performed by: NURSE ANESTHETIST, CERTIFIED REGISTERED

## 2024-07-18 PROCEDURE — C1713 ANCHOR/SCREW BN/BN,TIS/BN: HCPCS | Performed by: ORTHOPAEDIC SURGERY

## 2024-07-18 PROCEDURE — 63710000001 POVIDONE-IODINE 10 % SOLUTION 14.8 ML BOTTLE: Performed by: ORTHOPAEDIC SURGERY

## 2024-07-18 PROCEDURE — C1776 JOINT DEVICE (IMPLANTABLE): HCPCS | Performed by: ORTHOPAEDIC SURGERY

## 2024-07-18 PROCEDURE — 25010000002 ONDANSETRON PER 1 MG: Performed by: NURSE ANESTHETIST, CERTIFIED REGISTERED

## 2024-07-18 PROCEDURE — 63710000001 ACETAMINOPHEN EXTRA STRENGTH 500 MG TABLET: Performed by: ORTHOPAEDIC SURGERY

## 2024-07-18 PROCEDURE — 25010000002 DEXAMETHASONE PER 1 MG: Performed by: NURSE ANESTHETIST, CERTIFIED REGISTERED

## 2024-07-18 PROCEDURE — 73020 X-RAY EXAM OF SHOULDER: CPT

## 2024-07-18 PROCEDURE — 25010000002 FENTANYL CITRATE (PF) 50 MCG/ML SOLUTION: Performed by: NURSE ANESTHETIST, CERTIFIED REGISTERED

## 2024-07-18 PROCEDURE — 25810000003 SODIUM CHLORIDE 0.9 % SOLUTION 250 ML FLEX CONT: Performed by: ORTHOPAEDIC SURGERY

## 2024-07-18 PROCEDURE — 25010000002 VANCOMYCIN 1 G RECONSTITUTED SOLUTION 1 EACH VIAL: Performed by: ORTHOPAEDIC SURGERY

## 2024-07-18 PROCEDURE — 25810000003 SODIUM CHLORIDE 0.9 % SOLUTION 250 ML FLEX CONT: Performed by: NURSE ANESTHETIST, CERTIFIED REGISTERED

## 2024-07-18 PROCEDURE — 97165 OT EVAL LOW COMPLEX 30 MIN: CPT

## 2024-07-18 PROCEDURE — 63710000001 MELOXICAM 15 MG TABLET: Performed by: ORTHOPAEDIC SURGERY

## 2024-07-18 PROCEDURE — A9270 NON-COVERED ITEM OR SERVICE: HCPCS | Performed by: ORTHOPAEDIC SURGERY

## 2024-07-18 PROCEDURE — 23472 RECONSTRUCT SHOULDER JOINT: CPT | Performed by: ORTHOPAEDIC SURGERY

## 2024-07-18 PROCEDURE — 25010000002 SUGAMMADEX 200 MG/2ML SOLUTION: Performed by: NURSE ANESTHETIST, CERTIFIED REGISTERED

## 2024-07-18 PROCEDURE — 25010000002 CEFAZOLIN PER 500 MG: Performed by: ORTHOPAEDIC SURGERY

## 2024-07-18 PROCEDURE — 25010000002 ROPIVACAINE PER 1 MG: Performed by: STUDENT IN AN ORGANIZED HEALTH CARE EDUCATION/TRAINING PROGRAM

## 2024-07-18 PROCEDURE — 25810000003 LACTATED RINGERS PER 1000 ML: Performed by: STUDENT IN AN ORGANIZED HEALTH CARE EDUCATION/TRAINING PROGRAM

## 2024-07-18 PROCEDURE — 97535 SELF CARE MNGMENT TRAINING: CPT

## 2024-07-18 PROCEDURE — 25010000002 FENTANYL CITRATE (PF) 50 MCG/ML SOLUTION: Performed by: STUDENT IN AN ORGANIZED HEALTH CARE EDUCATION/TRAINING PROGRAM

## 2024-07-18 PROCEDURE — 25010000002 PHENYLEPHRINE 10 MG/ML SOLUTION 5 ML VIAL: Performed by: NURSE ANESTHETIST, CERTIFIED REGISTERED

## 2024-07-18 DEVICE — BASE PLT AUG W/ADAPT MD: Type: IMPLANTABLE DEVICE | Site: SHOULDER | Status: FUNCTIONAL

## 2024-07-18 DEVICE — SCRW COMPRNSV CNTRL 6.5X25MM REUS: Type: IMPLANTABLE DEVICE | Site: SHOULDER | Status: FUNCTIONAL

## 2024-07-18 DEVICE — SCRW FIX LK HEX 4.75X30MM: Type: IMPLANTABLE DEVICE | Site: SHOULDER | Status: FUNCTIONAL

## 2024-07-18 DEVICE — TRY HUM/SHLDR COMPREHENSIVE/REVERSE MINI COCR STD PLS3MM 40M: Type: IMPLANTABLE DEVICE | Site: SHOULDER | Status: FUNCTIONAL

## 2024-07-18 DEVICE — TOTL SHLDER REV: Type: IMPLANTABLE DEVICE | Status: FUNCTIONAL

## 2024-07-18 DEVICE — BEAR HUM VIT/E STD 36MM: Type: IMPLANTABLE DEVICE | Site: SHOULDER | Status: FUNCTIONAL

## 2024-07-18 DEVICE — SCRW FIX LK HEX 4.75X25MM: Type: IMPLANTABLE DEVICE | Site: SHOULDER | Status: FUNCTIONAL

## 2024-07-18 DEVICE — GLENOSPHERE VERSA DIAL FIX STD 36MM: Type: IMPLANTABLE DEVICE | Site: SHOULDER | Status: FUNCTIONAL

## 2024-07-18 DEVICE — SCRW FIX LK HEX 4.75X15MM: Type: IMPLANTABLE DEVICE | Site: SHOULDER | Status: FUNCTIONAL

## 2024-07-18 DEVICE — SUT NONABS MAXBRAID/PE NMBR2 C7 38IN BLU 900335: Type: IMPLANTABLE DEVICE | Site: SHOULDER | Status: FUNCTIONAL

## 2024-07-18 DEVICE — DEV CONTRL TISS STRATAFIX SPIRAL MNCRYL UD 3/0 PLS 30CM: Type: IMPLANTABLE DEVICE | Site: SHOULDER | Status: FUNCTIONAL

## 2024-07-18 DEVICE — STEM HUM/SHLDR COMPREHENSIVE MIC 9X55MM: Type: IMPLANTABLE DEVICE | Site: SHOULDER | Status: FUNCTIONAL

## 2024-07-18 RX ORDER — PREGABALIN 75 MG/1
150 CAPSULE ORAL ONCE
Status: DISCONTINUED | OUTPATIENT
Start: 2024-07-18 | End: 2024-07-18

## 2024-07-18 RX ORDER — DROPERIDOL 2.5 MG/ML
0.62 INJECTION, SOLUTION INTRAMUSCULAR; INTRAVENOUS
Status: DISCONTINUED | OUTPATIENT
Start: 2024-07-18 | End: 2024-07-18 | Stop reason: HOSPADM

## 2024-07-18 RX ORDER — ROCURONIUM BROMIDE 10 MG/ML
INJECTION, SOLUTION INTRAVENOUS AS NEEDED
Status: DISCONTINUED | OUTPATIENT
Start: 2024-07-18 | End: 2024-07-18 | Stop reason: SURG

## 2024-07-18 RX ORDER — ACETAMINOPHEN 325 MG/1
650 TABLET ORAL 2 TIMES DAILY PRN
Qty: 60 TABLET | Refills: 0 | Status: SHIPPED | OUTPATIENT
Start: 2024-07-18

## 2024-07-18 RX ORDER — PROPOFOL 10 MG/ML
VIAL (ML) INTRAVENOUS AS NEEDED
Status: DISCONTINUED | OUTPATIENT
Start: 2024-07-18 | End: 2024-07-18 | Stop reason: SURG

## 2024-07-18 RX ORDER — LIDOCAINE HYDROCHLORIDE 20 MG/ML
INJECTION, SOLUTION INFILTRATION; PERINEURAL AS NEEDED
Status: DISCONTINUED | OUTPATIENT
Start: 2024-07-18 | End: 2024-07-18 | Stop reason: SURG

## 2024-07-18 RX ORDER — DEXAMETHASONE SODIUM PHOSPHATE 4 MG/ML
INJECTION, SOLUTION INTRA-ARTICULAR; INTRALESIONAL; INTRAMUSCULAR; INTRAVENOUS; SOFT TISSUE AS NEEDED
Status: DISCONTINUED | OUTPATIENT
Start: 2024-07-18 | End: 2024-07-18 | Stop reason: SURG

## 2024-07-18 RX ORDER — MAGNESIUM HYDROXIDE 1200 MG/15ML
LIQUID ORAL AS NEEDED
Status: DISCONTINUED | OUTPATIENT
Start: 2024-07-18 | End: 2024-07-18 | Stop reason: HOSPADM

## 2024-07-18 RX ORDER — ONDANSETRON 2 MG/ML
INJECTION INTRAMUSCULAR; INTRAVENOUS AS NEEDED
Status: DISCONTINUED | OUTPATIENT
Start: 2024-07-18 | End: 2024-07-18 | Stop reason: SURG

## 2024-07-18 RX ORDER — ROPIVACAINE HYDROCHLORIDE 5 MG/ML
INJECTION, SOLUTION EPIDURAL; INFILTRATION; PERINEURAL
Status: COMPLETED | OUTPATIENT
Start: 2024-07-18 | End: 2024-07-18

## 2024-07-18 RX ORDER — DIPHENHYDRAMINE HYDROCHLORIDE 50 MG/ML
12.5 INJECTION INTRAMUSCULAR; INTRAVENOUS
Status: DISCONTINUED | OUTPATIENT
Start: 2024-07-18 | End: 2024-07-18 | Stop reason: HOSPADM

## 2024-07-18 RX ORDER — ONDANSETRON 2 MG/ML
4 INJECTION INTRAMUSCULAR; INTRAVENOUS ONCE AS NEEDED
Status: DISCONTINUED | OUTPATIENT
Start: 2024-07-18 | End: 2024-07-18 | Stop reason: HOSPADM

## 2024-07-18 RX ORDER — LABETALOL HYDROCHLORIDE 5 MG/ML
5 INJECTION, SOLUTION INTRAVENOUS
Status: DISCONTINUED | OUTPATIENT
Start: 2024-07-18 | End: 2024-07-18 | Stop reason: HOSPADM

## 2024-07-18 RX ORDER — EPHEDRINE SULFATE 50 MG/ML
5 INJECTION, SOLUTION INTRAVENOUS ONCE AS NEEDED
Status: DISCONTINUED | OUTPATIENT
Start: 2024-07-18 | End: 2024-07-18 | Stop reason: HOSPADM

## 2024-07-18 RX ORDER — DOCUSATE SODIUM 100 MG/1
100 CAPSULE, LIQUID FILLED ORAL 2 TIMES DAILY
Qty: 60 CAPSULE | Refills: 0 | Status: SHIPPED | OUTPATIENT
Start: 2024-07-18

## 2024-07-18 RX ORDER — ONDANSETRON 4 MG/1
4 TABLET, FILM COATED ORAL EVERY 8 HOURS PRN
Qty: 12 TABLET | Refills: 0 | Status: SHIPPED | OUTPATIENT
Start: 2024-07-18

## 2024-07-18 RX ORDER — LIDOCAINE HYDROCHLORIDE 10 MG/ML
0.5 INJECTION, SOLUTION INFILTRATION; PERINEURAL ONCE AS NEEDED
Status: DISCONTINUED | OUTPATIENT
Start: 2024-07-18 | End: 2024-07-18 | Stop reason: HOSPADM

## 2024-07-18 RX ORDER — HYDROCODONE BITARTRATE AND ACETAMINOPHEN 5; 325 MG/1; MG/1
1 TABLET ORAL ONCE AS NEEDED
Status: DISCONTINUED | OUTPATIENT
Start: 2024-07-18 | End: 2024-07-18 | Stop reason: HOSPADM

## 2024-07-18 RX ORDER — ACETAMINOPHEN 500 MG
1000 TABLET ORAL ONCE
Status: COMPLETED | OUTPATIENT
Start: 2024-07-18 | End: 2024-07-18

## 2024-07-18 RX ORDER — HYDROCODONE BITARTRATE AND ACETAMINOPHEN 7.5; 325 MG/1; MG/1
1 TABLET ORAL EVERY 4 HOURS PRN
Status: DISCONTINUED | OUTPATIENT
Start: 2024-07-18 | End: 2024-07-18 | Stop reason: HOSPADM

## 2024-07-18 RX ORDER — PROMETHAZINE HYDROCHLORIDE 25 MG/1
25 SUPPOSITORY RECTAL ONCE AS NEEDED
Status: DISCONTINUED | OUTPATIENT
Start: 2024-07-18 | End: 2024-07-18 | Stop reason: HOSPADM

## 2024-07-18 RX ORDER — NALOXONE HCL 0.4 MG/ML
0.2 VIAL (ML) INJECTION AS NEEDED
Status: DISCONTINUED | OUTPATIENT
Start: 2024-07-18 | End: 2024-07-18 | Stop reason: HOSPADM

## 2024-07-18 RX ORDER — HYDROMORPHONE HYDROCHLORIDE 1 MG/ML
0.25 INJECTION, SOLUTION INTRAMUSCULAR; INTRAVENOUS; SUBCUTANEOUS
Status: DISCONTINUED | OUTPATIENT
Start: 2024-07-18 | End: 2024-07-18 | Stop reason: HOSPADM

## 2024-07-18 RX ORDER — IPRATROPIUM BROMIDE AND ALBUTEROL SULFATE 2.5; .5 MG/3ML; MG/3ML
3 SOLUTION RESPIRATORY (INHALATION) ONCE AS NEEDED
Status: DISCONTINUED | OUTPATIENT
Start: 2024-07-18 | End: 2024-07-18 | Stop reason: HOSPADM

## 2024-07-18 RX ORDER — MELOXICAM 15 MG/1
15 TABLET ORAL ONCE
Status: COMPLETED | OUTPATIENT
Start: 2024-07-18 | End: 2024-07-18

## 2024-07-18 RX ORDER — FLUMAZENIL 0.1 MG/ML
0.2 INJECTION INTRAVENOUS AS NEEDED
Status: DISCONTINUED | OUTPATIENT
Start: 2024-07-18 | End: 2024-07-18 | Stop reason: HOSPADM

## 2024-07-18 RX ORDER — ONDANSETRON 4 MG/1
4 TABLET, ORALLY DISINTEGRATING ORAL ONCE AS NEEDED
Status: DISCONTINUED | OUTPATIENT
Start: 2024-07-18 | End: 2024-07-18 | Stop reason: HOSPADM

## 2024-07-18 RX ORDER — SODIUM CHLORIDE 0.9 % (FLUSH) 0.9 %
3-10 SYRINGE (ML) INJECTION AS NEEDED
Status: DISCONTINUED | OUTPATIENT
Start: 2024-07-18 | End: 2024-07-18 | Stop reason: HOSPADM

## 2024-07-18 RX ORDER — FENTANYL CITRATE 50 UG/ML
INJECTION, SOLUTION INTRAMUSCULAR; INTRAVENOUS AS NEEDED
Status: DISCONTINUED | OUTPATIENT
Start: 2024-07-18 | End: 2024-07-18 | Stop reason: SURG

## 2024-07-18 RX ORDER — SODIUM CHLORIDE 0.9 % (FLUSH) 0.9 %
3 SYRINGE (ML) INJECTION EVERY 12 HOURS SCHEDULED
Status: DISCONTINUED | OUTPATIENT
Start: 2024-07-18 | End: 2024-07-18 | Stop reason: HOSPADM

## 2024-07-18 RX ORDER — PROMETHAZINE HYDROCHLORIDE 25 MG/1
25 TABLET ORAL ONCE AS NEEDED
Status: DISCONTINUED | OUTPATIENT
Start: 2024-07-18 | End: 2024-07-18 | Stop reason: HOSPADM

## 2024-07-18 RX ORDER — SODIUM CHLORIDE, SODIUM LACTATE, POTASSIUM CHLORIDE, CALCIUM CHLORIDE 600; 310; 30; 20 MG/100ML; MG/100ML; MG/100ML; MG/100ML
9 INJECTION, SOLUTION INTRAVENOUS CONTINUOUS
Status: DISCONTINUED | OUTPATIENT
Start: 2024-07-18 | End: 2024-07-18 | Stop reason: HOSPADM

## 2024-07-18 RX ORDER — TRANEXAMIC ACID 100 MG/ML
INJECTION, SOLUTION INTRAVENOUS AS NEEDED
Status: DISCONTINUED | OUTPATIENT
Start: 2024-07-18 | End: 2024-07-18 | Stop reason: SURG

## 2024-07-18 RX ORDER — FENTANYL CITRATE 50 UG/ML
50 INJECTION, SOLUTION INTRAMUSCULAR; INTRAVENOUS ONCE AS NEEDED
Status: COMPLETED | OUTPATIENT
Start: 2024-07-18 | End: 2024-07-18

## 2024-07-18 RX ORDER — HYDROCODONE BITARTRATE AND ACETAMINOPHEN 7.5; 325 MG/1; MG/1
2 TABLET ORAL ONCE AS NEEDED
Status: DISCONTINUED | OUTPATIENT
Start: 2024-07-18 | End: 2024-07-18 | Stop reason: HOSPADM

## 2024-07-18 RX ORDER — HYDRALAZINE HYDROCHLORIDE 20 MG/ML
5 INJECTION INTRAMUSCULAR; INTRAVENOUS
Status: DISCONTINUED | OUTPATIENT
Start: 2024-07-18 | End: 2024-07-18 | Stop reason: HOSPADM

## 2024-07-18 RX ORDER — FENTANYL CITRATE 50 UG/ML
25 INJECTION, SOLUTION INTRAMUSCULAR; INTRAVENOUS
Status: DISCONTINUED | OUTPATIENT
Start: 2024-07-18 | End: 2024-07-18 | Stop reason: HOSPADM

## 2024-07-18 RX ORDER — EPHEDRINE SULFATE 50 MG/ML
INJECTION, SOLUTION INTRAVENOUS AS NEEDED
Status: DISCONTINUED | OUTPATIENT
Start: 2024-07-18 | End: 2024-07-18 | Stop reason: SURG

## 2024-07-18 RX ORDER — HYDROCODONE BITARTRATE AND ACETAMINOPHEN 7.5; 325 MG/1; MG/1
TABLET ORAL
Qty: 30 TABLET | Refills: 0 | Status: SHIPPED | OUTPATIENT
Start: 2024-07-18

## 2024-07-18 RX ADMIN — SODIUM CHLORIDE, POTASSIUM CHLORIDE, SODIUM LACTATE AND CALCIUM CHLORIDE: 600; 310; 30; 20 INJECTION, SOLUTION INTRAVENOUS at 07:00

## 2024-07-18 RX ADMIN — ACETAMINOPHEN 1000 MG: 500 TABLET ORAL at 05:50

## 2024-07-18 RX ADMIN — EPHEDRINE SULFATE 5 MG: 50 INJECTION INTRAVENOUS at 07:49

## 2024-07-18 RX ADMIN — DEXAMETHASONE SODIUM PHOSPHATE 8 MG: 4 INJECTION, SOLUTION INTRA-ARTICULAR; INTRALESIONAL; INTRAMUSCULAR; INTRAVENOUS; SOFT TISSUE at 07:15

## 2024-07-18 RX ADMIN — ROPIVACAINE HYDROCHLORIDE 20 ML: 5 INJECTION EPIDURAL; INFILTRATION; PERINEURAL at 06:30

## 2024-07-18 RX ADMIN — MELOXICAM 15 MG: 15 TABLET ORAL at 05:50

## 2024-07-18 RX ADMIN — ROCURONIUM BROMIDE 50 MG: 10 INJECTION, SOLUTION INTRAVENOUS at 07:09

## 2024-07-18 RX ADMIN — PROPOFOL 110 MG: 10 INJECTION, EMULSION INTRAVENOUS at 07:09

## 2024-07-18 RX ADMIN — TRANEXAMIC ACID 1000 MG: 100 INJECTION, SOLUTION INTRAVENOUS at 07:30

## 2024-07-18 RX ADMIN — SUGAMMADEX 200 MG: 100 INJECTION, SOLUTION INTRAVENOUS at 08:23

## 2024-07-18 RX ADMIN — SODIUM CHLORIDE 2 G: 900 INJECTION INTRAVENOUS at 06:51

## 2024-07-18 RX ADMIN — PHENYLEPHRINE HYDROCHLORIDE 0.3 MCG/KG/MIN: 10 INJECTION, SOLUTION INTRAVENOUS at 07:15

## 2024-07-18 RX ADMIN — FENTANYL CITRATE 50 MCG: 50 INJECTION, SOLUTION INTRAMUSCULAR; INTRAVENOUS at 06:31

## 2024-07-18 RX ADMIN — PHENYLEPHRINE HYDROCHLORIDE 0.3 MCG/KG/MIN: 10 INJECTION, SOLUTION INTRAVENOUS at 07:35

## 2024-07-18 RX ADMIN — ONDANSETRON 4 MG: 2 INJECTION INTRAMUSCULAR; INTRAVENOUS at 07:57

## 2024-07-18 RX ADMIN — LIDOCAINE HYDROCHLORIDE 70 MG: 20 INJECTION, SOLUTION INFILTRATION; PERINEURAL at 07:09

## 2024-07-18 RX ADMIN — FENTANYL CITRATE 50 MCG: 50 INJECTION, SOLUTION INTRAMUSCULAR; INTRAVENOUS at 07:32

## 2024-07-18 RX ADMIN — VANCOMYCIN HYDROCHLORIDE 1000 MG: 1 INJECTION, POWDER, LYOPHILIZED, FOR SOLUTION INTRAVENOUS at 05:50

## 2024-07-18 NOTE — BRIEF OP NOTE
TOTAL SHOULDER REVERSE ARTHROPLASTY  Progress Note    Ashley Mata  7/18/2024    Pre-op Diagnosis:   Right rotator cuff tear [M75.101]       Post-Op Diagnosis Codes:     * Right rotator cuff tear [M75.101]    Procedure/CPT® Codes:        Procedure(s):  Right Reverse Total Shoulder Arthroplasty        SURGICAL APPROACH: Deltopectoral    SURGICAL TECHNIQUE: Tenotomy      Surgeon(s):  Ash Jimenez MD    Anesthesia: General with Block    Staff:   Assistant: Altagracia Del Angel CSA  Assistant: Altagracia Del Angel CSA      Estimated Blood Loss: 100ml    Urine Voided: * No values recorded between 7/18/2024 12:00 AM and 7/18/2024  7:24 AM *    Specimens:                None          Drains: * No LDAs found *    Findings: see above        Complications: none    Assistant: Altagracia Del Angel CSA  was responsible for performing the following activities: Retraction and their skilled assistance was necessary for the success of this case.    Ash Jimenez MD     Date: 7/18/2024  Time: 0830

## 2024-07-18 NOTE — ANESTHESIA POSTPROCEDURE EVALUATION
Patient: Ashley Mata    Procedure Summary       Date: 07/18/24 Room / Location:  [facility] [address] [place] [address] [facility]    Anesthesia Start: 0659 Anesthesia Stop: 0837    Procedure: Right Reverse Total Shoulder Arthroplasty (Right: Shoulder) Diagnosis:       Right rotator cuff tear      (Right rotator cuff tear [M75.101])    Surgeons: Ash Jimenez MD Provider: Carmelo Alarcon MD    Anesthesia Type: general with block ASA Status: 2            Anesthesia Type: general with block    Vitals  Vitals Value Taken Time   /75 07/18/24 0900   Temp 36.4 °C (97.5 °F) 07/18/24 0835   Pulse 83 07/18/24 0900   Resp 16 07/18/24 0900   SpO2 99 % 07/18/24 0900           Post Anesthesia Care and Evaluation    Level of consciousness: awake and alert  Pain management: adequate    Airway patency: patent  Anesthetic complications: No anesthetic complications  PONV Status: controlled  Cardiovascular status: blood pressure returned to baseline and acceptable  Respiratory status: acceptable  Hydration status: acceptable        
Altered GI Function

## 2024-07-18 NOTE — ANESTHESIA PROCEDURE NOTES
Peripheral Block      Patient reassessed immediately prior to procedure    Patient location during procedure: pre-op  Start time: 7/18/2024 6:30 AM  Reason for block: at surgeon's request and post-op pain management  Performed by  Anesthesiologist: Carmelo Alarcon MD  Preanesthetic Checklist  Completed: patient identified, IV checked, site marked, risks and benefits discussed, surgical consent, monitors and equipment checked, pre-op evaluation and timeout performed  Prep:  Pt Position: supine  Sterile barriers:cap, gloves and mask  Prep: ChloraPrep  Patient monitoring: blood pressure monitoring, continuous pulse oximetry and EKG  Procedure    Sedation: yes  Performed under: local infiltration  Guidance:ultrasound guided    ULTRASOUND INTERPRETATION.  Using ultrasound guidance a 21 G gauge needle was placed in close proximity to the brachial plexus nerve, at which point, under ultrasound guidance anesthetic was injected in the area of the nerve and spread of the anesthesia was seen on ultrasound in close proximity thereto.  There were no abnormalities seen on ultrasound; a digital image was taken; and the patient tolerated the procedure with no complications. Images:still images obtained, printed/placed on chart    Laterality:right  Block Type:interscalene  Injection Technique:single-shot  Needle Type:echogenic and Tuohy  Needle Gauge:21 G  Resistance on Injection: none    Medications Used: ropivacaine (NAROPIN) 0.5 % injection - Injection   20 mL - 7/18/2024 6:30:00 AM      Post Assessment  Injection Assessment: negative aspiration for heme, no paresthesia on injection and incremental injection  Patient Tolerance:comfortable throughout block  Complications:no  Performed by: Carmelo Alarcon MD

## 2024-07-18 NOTE — ANESTHESIA PREPROCEDURE EVALUATION
Anesthesia Evaluation     Patient summary reviewed and Nursing notes reviewed   NPO Solid Status: > 8 hours  NPO Liquid Status: > 2 hours           Airway   Mallampati: II  TM distance: >3 FB  Neck ROM: full  Dental      Pulmonary - negative pulmonary ROS   Cardiovascular     ECG reviewed    (+) hypertension, hyperlipidemia      Neuro/Psych- negative ROS  GI/Hepatic/Renal/Endo    (+) GERD, thyroid problem hypothyroidism    Musculoskeletal     Abdominal    Substance History - negative use     OB/GYN negative ob/gyn ROS         Other   arthritis,                       Anesthesia Plan    ASA 2     general with block     (Interscalene block for POPC)  intravenous induction     Anesthetic plan, risks, benefits, and alternatives have been provided, discussed and informed consent has been obtained with: patient.        CODE STATUS:

## 2024-07-18 NOTE — ANESTHESIA PROCEDURE NOTES
Airway  Urgency: elective    Date/Time: 7/18/2024 7:11 AM  Airway not difficult    General Information and Staff    Patient location during procedure: OR  Anesthesiologist: Carmelo Alarcon MD  CRNA/CAA: Tamiko Saleh CRNA    Indications and Patient Condition  Indications for airway management: airway protection    Preoxygenated: yes  MILS not maintained throughout  Mask difficulty assessment: 1 - vent by mask    Final Airway Details  Final airway type: endotracheal airway      Successful airway: ETT  Cuffed: yes   Successful intubation technique: direct laryngoscopy  Facilitating devices/methods: intubating stylet  Endotracheal tube insertion site: oral  Blade: Urena  Blade size: 2  ETT size (mm): 7.0  Cormack-Lehane Classification: grade I - full view of glottis  Placement verified by: chest auscultation and capnometry   Cuff volume (mL): 6  Measured from: teeth  ETT/EBT  to teeth (cm): 20  Number of attempts at approach: 1  Assessment: lips, teeth, and gum same as pre-op and atraumatic intubation    Additional Comments  Airway exam prior to DL, teeth/lips inspected. Preoxygenated with 100% O2; sniffing position, IV induction, eyes taped. Easy mask ventilation. Atraumatic intubation. ETT connected to vent. Confirmed EBBS, +EtCO2. Lips and teeth inspected, intact, no damage.

## 2024-07-18 NOTE — THERAPY EVALUATION
Patient Name: Ashley Mata  : 1945    MRN: [id number]                              Today's Date: 2024       Admit Date: 2024    Visit Dx:     ICD-10-CM ICD-9-CM   1. Status post reverse total shoulder replacement, right  Z96.611 V43.61   2. Right rotator cuff tear  M75.101 840.4     Patient Active Problem List   Diagnosis    Allergic rhinitis    Benign essential hypertension    Diverticulosis of colon    Gastroesophageal reflux disease without esophagitis    Hyperlipidemia    Multiple environmental allergies    Vitamin D deficiency    Therapeutic drug monitoring    Alopecia    Lumbar scoliosis    Primary hypothyroidism    Family history of breast cancer in first degree relative    Postmenopausal state    History of COVID-, 2021; May 17, 2022.    Hammertoe of second toe of right foot    Family history of colonic polyps    Osteopenia of multiple sites, 2022--lumbar spine 1.1.  Left femoral neck -1.0.  Right femoral neck -0.3.    Primary osteoarthritis involving multiple joints    History of colon polyps, 2022--hyperplastic x1.    Right rotator cuff tear    Memory loss    Intermittent confusion    Cognitive impairment     Past Medical History:   Diagnosis Date    Allergic rhinitis 2015--patient was evaluated by ENT and was started on generic Flonase and patient reports this has improved her symptoms quite a bit.   2015--allergy testing revealed positive reactivity to cat, dust mite, cockroach, mold spores, and grass pollen. Environmental control measures.    Alopecia 2017    Evaluated and treated by the dermatologist.    Anesthesia complication     ACUTE HEPATITIS POST TOE SURGERY IN  POSSIBLE FROM BUPIVICAINE PER PATIENTS PCP, HAS HAD BILATERAL ROTATOR CUFF YEARS BEFORE SURGERY IN  WITHOUT DIFFICULTY    Arthralgia of multiple joints 2022    Benign essential hypertension 2016    Chronic pain of both knees 2023     Chronic toe pain, right foot 10/19/2021    October 19, 2021--patient reports a 1 year history of progressively worsening right toe pain that particular involves the second toe.  On exam she has obvious hammertoe which is causing irritation.  There is some hammering of the third digit as well.  Patient referred to orthopedist who specializes in foot problems such as Dr. Vieira.  In the meantime patient should use over-the-counter toe pads to    Diverticulosis of colon 03/17/2009    03/10/2017--colonoscopy revealed many small and large mouth diverticula in the sigmoid colon and descending colon.  Nonthrombosed external hemorrhoids and internal hemorrhoids noted.  Otherwise, normal colonoscopy.  03/17/2009--colonoscopy revealed external hemorrhoids, torturous colon with a sigmoid stricture, sigmoid diverticulosis.    Family history of breast cancer in first degree relative 07/13/2020    Family history of colonic polyps 04/01/2022    Gastroesophageal reflux disease without esophagitis 03/17/2009 06/09/2015--EGD revealed Z line irregular, 35 cm from incisors. Biopsied. Small hiatal hernia. Gastritis. Biopsied. Bilious gastric fluid. Fluid aspiration performed. Normal duodenal bulb and second part of duodenum. Biopsied. Pathology revealed the antral biopsy revealed small intestinal mucosa with no pathologic diagnosis. Good preservation of villous architecture. Duodenum biopsy revealed largely antral type gastric mucosa with mild patchy superficial chronic gastritis. Gastroesophageal junction revealed squamous and glandular mucosa with mild chronic inflammation. Negative for intestinal metaplasia or dysplasia. There appeared to be mislabeling of the antral biopsies and duodenal biopsies.   04/17/2012--EGD revealed irregular Z line, hiatal hernia, gastritis, duodenitis.   03/17/2009--EGD revealed grade B. reflux esophagitis, hiatal hernia, gastritis, a few gastric polyps, duodenitis.    Hammertoe of right foot      Hammertoe of second toe of right foot 10/19/2021    October 19, 2021--patient reports a 1 year history of progressively worsening right toe pain that particular involves the second toe.  On exam she has obvious hammertoe which is causing irritation.  There is some hammering of the third digit as well.  Patient referred to orthopedist who specializes in foot problems such as Dr. Vieira.  In the meantime patient should use over-the-counter toe pads to    History of acute hepatitis 04/24/2022    May 9, 2022--patient seen in hospital discharge follow-up/transition of care.  I reviewed the documentation including the admission history and physical, hospital course, laboratory and radiographic studies, GI consultation, discharge summary and discharge medications.  I specifically reviewed the tissue pathology report which revealed moderate steatosis with moderate chronic portal inflammation a    History of colon polyps     History of COVID-19, August 12, 2021; May 17, 2022. 10/19/2021    May 17, 2022--patient again tested positive for COVID.  Ordered by gastroenterology nurse practitioner.  October 19, 2021--patient seen in follow-up and reports her symptoms totally resolved.  She wants to know when she should get her Covid booster vaccine.  I have suggested that she receive it approximately 3 months after initially testing positive.  She has an appointment in November 6, 2021 for    History of Hyperplastic polyps of stomach 06/09/2015 06/09/2015--EGD revealed Z line irregular, 35 cm from incisors. Biopsied. Small hiatal hernia. Gastritis. Biopsied. Bilious gastric fluid. Fluid aspiration performed. Normal duodenal bulb and second part of duodenum. Biopsied. Pathology revealed the antral biopsy revealed small intestinal mucosa with no pathologic diagnosis. Good preservation of villous architecture. Duodenum biopsy revealed large    Hyperlipidemia 07/17/2012 07/17/2012--treatment for hyperlipidemia begun.    Lumbar  disc herniation, L4-L5 08/07/2018 01/16/2019--patient seen in follow-up by Dr. Shukla.  She has seen the neurosurgeon who recommended conservative therapy with physical therapy.  Patient reports that has been extremely helpful.  Currently has no significant pain.  08/07/2018--patient seen in follow-up and reports her pain is much better after taking prednisone.  She is now down to half a pill per day and is almost off of it.  I rev    Lumbar scoliosis 08/07/2018 01/16/2019--patient seen in follow-up by Dr. Shukla.  She has seen the neurosurgeon who recommended conservative therapy with physical therapy.  Patient reports that has been extremely helpful.  Currently has no significant pain.  08/07/2018--patient seen in follow-up and reports her pain is much better after taking prednisone.  She is now down to half a pill per day and is almost off of it.  I rev    Menopausal state 07/13/2020    Multiple environmental allergies 01/30/2015 01/30/2015--allergy testing revealed positive reactivity to cat, dust mite, cockroach, mold spores, and grass pollen. Environmental control measures.    Osteopenia of multiple sites, 2/25/2022--lumbar spine 1.1.  Left femoral neck -1.0.  Right femoral neck -0.3. 04/01/2022 February 25, 2022--DEXA scan reveals lumbar spine T score 1.1.  Left femoral neck T score -1.0.  Right femoral neck T score -0.3.  Mild osteopenia.    Postmenopausal hormone replacement therapy 04/18/2016    Primary hypothyroidism 01/22/2020 January 22, 2020--TSH is elevated at 5.0.  Levothyroxine 50 mcg/day initiated.  Patient will follow-up with nonfasting lab work in about 6 to 8 weeks to reassess.  07/09/2018--routine follow-up.  TSH elevated slightly at 4.31.  Free T4 normal at 1.22.  Free T3 normal at 3.3.  Continued observation.  10/11/2017--thyroid function tests are normal.  09/30/2016--thyroid ultrasound reveals a tiny 2 mm     Primary osteoarthritis involving multiple joints 06/08/2022     Primary osteoarthritis of both hands 12/30/2013 05/12/2014--patient seen in followup and reports that the Vimovo has helped. Uric acid levels are normal at 4.9. C. reactive protein mildly elevated at 2.3. X-rays of the hands reveals radiocarpal, first carpometacarpal, first metacarpophalangeal, and interphalangeal joint degeneration. This process is symmetric. The interphalangeal joint of the left is affected to the greatest degree. There is right sided scapholunate dissociation as well as deformity of the scaphoid suggesting prior traumatic injury with healing. Soft tissues are satisfactory. Overall appearance is most consistent with osteoarthritis.   05/05/2014--patient presents and reports that she is having worsening right wrist pain primarily at the base of the thumb. No new injury. Bilateral x-rays of the wrists and hands ordered. Uric acid level ordered as well as a CRP. Vimovo 500/20 one by mouth twice a day. Follow up in one week.   03/18/2014--patient reports that her wrist symptoms have improved.   12/30/2013--patient reports a several mon    Primary osteoarthritis of both knees 04/05/2023    Primary osteoarthritis of both wrists 12/30/2013 05/12/2014--patient seen in followup and reports that the Vimovo has helped. Uric acid levels are normal at 4.9. C. reactive protein mildly elevated at 2.3. X-rays of the hands reveals radiocarpal, first carpometacarpal, first metacarpophalangeal, and interphalangeal joint degeneration. This process is symmetric. The interphalangeal joint of the left is affected to the greatest degree. There is right sided scapholunate dissociation as well as deformity of the scaphoid suggesting prior traumatic injury with healing. Soft tissues are satisfactory. Overall appearance is most consistent with osteoarthritis.   05/05/2014--patient presents and reports that she is having worsening right wrist pain primarily at the base of the thumb. No new injury. Bilateral x-rays of  the wrists and hands ordered. Uric acid level ordered as well as a CRP. Vimovo 500/20 one by mouth twice a day. Follow up in one week.   03/18/2014--patient reports that her wrist symptoms have improved.   12/30/2013--patient reports a several mon    Rotator cuff syndrome     Vitamin D deficiency 04/18/2016     Past Surgical History:   Procedure Laterality Date    COLONOSCOPY  03/17/2009 03/17/2009--colonoscopy revealed external hemorrhoids, torturous colon with a sigmoid stricture, sigmoid diverticulosis.    COLONOSCOPY N/A 03/10/2017    03/10/2017--colonoscopy revealed many small and large mouth diverticula in the sigmoid colon and descending colon.  Nonthrombosed external hemorrhoids and internal hemorrhoids noted.  Otherwise, normal colonoscopy.    COLONOSCOPY N/A 08/18/2022    Procedure: COLONOSCOPY TO 40CM WITH POLYPECTOMY (COLD);  Surgeon: Mario Ray MD;  Location: [facility] ENDOSCOPY;  Service: General;  Laterality: N/A;  PRE- POSITIVE COLOGUARD  POST- POLYP, HEMORRHOIDS    ENDOSCOPY N/A 08/18/2022    Procedure: ESOPHAGOGASTRODUODENOSCOPY WITH BIOPSIES AND COLD BIOPSY POLYPECTOMY;  Surgeon: Mario Ray MD;  Location: [facility] ENDOSCOPY;  Service: General;  Laterality: N/A;  PRE- ACID REFLUX  POST- GASTRITIS, GASTRIC POLYPS    ERCP WITH SPHINCTEROTOMY/PAPILLOTOMY  08/13/2014 08/13/2014--ERCP, endoscopic sphincterotomy and removal of common bile duct stones. 08/12/2014--laparoscopic cholecystectomy. This was complicated by retained common bile duct stones 2.    ESOPHAGOSCOPY / EGD  06/09/2015 06/09/2015--EGD revealed Z line irregular, 35 cm from incisors. Biopsied. Small hiatal hernia. Gastritis. Biopsied. Bilious gastric fluid. Fluid aspiration performed. Normal duodenal bulb and second part of duodenum. Biopsied. Pathology revealed the antral biopsy revealed small intestinal mucosa with no pathologic diagnosis. Good preservation of villous architecture. Duodenum biopsy  revealed large    EYE SURGERY  07/19/2018    Both Eyes; cataracts    FOOT SURGERY  April 11, 2022    Dr. Vieira - toe urgery    HAMMER TOE REPAIR Right 04/11/2022    Procedure: RIGHT SECOND AND THIRD HAMMERTOE REPAIRS;  Surgeon: Brandt Vieira MD;  Location: [facility] [place];  Service: Orthopedics;  Laterality: Right;    LAPAROSCOPIC CHOLECYSTECTOMY  08/12/2014 08/13/2014--ERCP, endoscopic sphincterotomy and removal of common bile duct stones. 08/12/2014--laparoscopic cholecystectomy. This was complicated by retained common bile duct stones 2.    ROTATOR CUFF REPAIR Left 07/10/2014    07/10/2014--left rotator cuff repair. 03/18/2014--patient seen in followup and reports that her range of motion has improved and her pain is not debilitating. She feels that she is making progress with physical therapy. Surgery scheduled 07/10/2014. 01/10/2014--patient was seen in evaluation by the orthopedist and he recommended conservative treatment consisting of physical therapy/rehabilitation.    ROTATOR CUFF REPAIR Right 08/03/2016 08/03/2016--arthroscopic right rotator cuff repair.  Debridement of degenerative anterior superior labral tear.    SUBTOTAL HYSTERECTOMY  1978    Partial hysterectomy 1978.    TRIGGER POINT INJECTION  2023    Gel in both knees      General Information       Row Name 07/18/24 1019          OT Time and Intention    Document Type evaluation  -AG     Mode of Treatment individual therapy;occupational therapy  -       Row Name 07/18/24 1019          General Information    Patient Profile Reviewed yes  -AG     Prior Level of Function independent:;ADL's  -AG     Existing Precautions/Restrictions non-weight bearing  RUE NWB  -AG     Barriers to Rehab none identified  -AG       Row Name 07/18/24 1019          Living Environment    People in Home spouse  -AG       Row Name 07/18/24 1019          Home Main Entrance    Number of Stairs, Main Entrance none  -AG       Row Name 07/18/24 1019          Safety  Issues, Functional Mobility    Impairments Affecting Function (Mobility) strength  -AG               User Key  (r) = Recorded By, (t) = Taken By, (c) = Cosigned By      Initials Name Provider Type    AG Sg Rao OT Occupational Therapist                     Mobility/ADL's       Row Name 07/18/24 1019          Bed Mobility    Bed Mobility supine-sit  -AG     Supine-Sit [place] (Bed Mobility) independent  -AG       Row Name 07/18/24 1019          Transfers    Transfers bed-chair transfer;sit-stand transfer  -AG       Row Name 07/18/24 1019          Bed-Chair Transfer    Bed-Chair [place] (Transfers) independent  -AG     Comment, (Bed-Chair Transfer) no AD  -AG       Row Name 07/18/24 1019          Sit-Stand Transfer    Sit-Stand [place] (Transfers) independent  -AG     Comment, (Sit-Stand Transfer) no AD  -AG       Row Name 07/18/24 1019          Functional Mobility    Functional Mobility- Comment short distance in room no AD  -AG       Row Name 07/18/24 1019          Activities of Daily Living    BADL Assessment/Intervention upper body dressing;lower body dressing;grooming;toileting  -AG       Row Name 07/18/24 1019          Upper Body Dressing Assessment/Training    [place] Level (Upper Body Dressing) upper body dressing skills;doff;don;front opening garment;pull-over garment;minimum assist (75% patient effort)  -AG     Position (Upper Body Dressing) edge of bed sitting  -AG       Row Name 07/18/24 1019          Lower Body Dressing Assessment/Training    [place] Level (Lower Body Dressing) lower body dressing skills;doff;don;pants/bottoms;shoes/slippers;socks;set up  -AG     Position (Lower Body Dressing) edge of bed sitting  -AG       Row Name 07/18/24 1019          Toileting Assessment/Training    [place] Level (Toileting) minimum assist (75% patient effort)  -AG     Comment, (Toileting) simulated  -AG       Row Name 07/18/24 1019          Grooming Assessment/Training     [place] Level (Grooming) grooming skills;hair care, combing/brushing;wash face, hands;set up  -AG     Position (Grooming) edge of bed sitting  -AG               User Key  (r) = Recorded By, (t) = Taken By, (c) = Cosigned By      Initials Name Provider Type    AG Sg Rao OT Occupational Therapist                   Obj/Interventions       Row Name 07/18/24 1020          Sensory Assessment (Somatosensory)    Sensory Assessment nerve block still intact to RUE  -AG       [facility] 07/18/24 1020          Vision Assessment/Intervention    Visual Impairment/Limitations WFL  -AG       Row Name 07/18/24 1020          Range of Motion Comprehensive    Comment, General Range of Motion Educated on RUE AROM/PROM exercises, WFL  -AG       Row Name 07/18/24 1020          Strength Comprehensive (MMT)    Comment, General Manual Muscle Testing (MMT) Assessment NT to AV WRIGHT WFL  -AG       Row Name 07/18/24 1020          Balance    Balance Assessment sitting static balance;sitting dynamic balance;standing static balance;standing dynamic balance  -AG     Static Sitting Balance independent  -AG     Dynamic Sitting Balance independent  -AG     Position, Sitting Balance unsupported  -AG     Static Standing Balance independent  -AG     Dynamic Standing Balance independent  -AG     Position/Device Used, Standing Balance unsupported  -AG     Balance Interventions standing;sitting  -AG               User Key  (r) = Recorded By, (t) = Taken By, (c) = Cosigned By      Initials Name Provider Type    AG Sg Rao, MARC Occupational Therapist                   Goals/Plan    No documentation.                  Clinical Impression       Row Name 07/18/24 1024          Pain Assessment    Pretreatment Pain Rating 0/10 - no pain  -AG     Posttreatment Pain Rating 0/10 - no pain  -AG       Row Name 07/18/24 1024          Plan of Care Review    Plan of Care Reviewed With patient;spouse  -AG     Progress no change  -AG     Outcome  Evaluation 77 y/o F presented to [facility] s/p right reverse total shoulder arthroplasty. Pt. prior to sx was independent with self care, living with spouse in a single story home. Currently pt. presents post op say 0, no c/o pain, nerve block intact through RUE, educated on lalitha dressing technique, donning/doffing sling and HEP to be performed. Pt. is aware of precautions and has good carryover. Pt. is to be d/c'd home with spouse and OT to sign off at this time.  -AG       Row Name 07/18/24 1024          Therapy Assessment/Plan (OT)    Criteria for Skilled Therapeutic Interventions Met (OT) no problems identified which require skilled intervention  -AG     Therapy Frequency (OT) evaluation only  -AG       Row Name 07/18/24 1024          Therapy Plan Review/Discharge Plan (OT)    Anticipated Discharge Disposition (OT) home with assist  -AG       Row Name 07/18/24 1024          Vital Signs    O2 Delivery Pre Treatment room air  -AG       [facility] Name 07/18/24 1024          Positioning and Restraints    Pre-Treatment Position in bed  -AG     Post Treatment Position chair  -AG     In Chair notified nsg;sitting;call light within reach;encouraged to call for assist;with family/caregiver  -AG               User Key  (r) = Recorded By, (t) = Taken By, (c) = Cosigned By      Initials Name Provider Type    Sg [place]hton, OT Occupational Therapist                   Outcome Measures       Row Name 07/18/24 1027          How much help from another is currently needed...    Putting on and taking off regular lower body clothing? 4  -AG     Bathing (including washing, rinsing, and drying) 3  -AG     Toileting (which includes using toilet bed pan or urinal) 3  -AG     Putting on and taking off regular upper body clothing 3  -AG     Taking care of personal grooming (such as brushing teeth) 4  -AG     Eating meals 4  -AG     AM-PAC 6 Clicks Score (OT) 21  -AG       [facility] Name 07/18/24 1027          Functional Assessment    Outcome  Measure Options AM-PAC 6 Clicks Daily Activity (OT)  -AG               User Key  (r) = Recorded By, (t) = Taken By, (c) = Cosigned By      Initials Name Provider Type    Sg Ashton OT Occupational Therapist                      OT Recommendation and Plan  Therapy Frequency (OT): evaluation only  Plan of Care Review  Plan of Care Reviewed With: patient, spouse  Progress: no change  Outcome Evaluation: 77 y/o F presented to [facility] s/p right reverse total shoulder arthroplasty. Pt. prior to sx was independent with self care, living with spouse in a single story home. Currently pt. presents post op say 0, no c/o pain, nerve block intact through RUE, educated on lalitha dressing technique, donning/doffing sling and HEP to be performed. Pt. is aware of precautions and has good carryover. Pt. is to be d/c'd home with spouse and OT to sign off at this time.     Time Calculation:   Evaluation Complexity (OT)  Review Occupational Profile/Medical/Therapy History Complexity: brief/low complexity  Assessment, Occupational Performance/Identification of Deficit Complexity: 1-3 performance deficits  Clinical Decision Making Complexity (OT): problem focused assessment/low complexity  Overall Complexity of Evaluation (OT): low complexity     Time Calculation- OT       Row Name 07/18/24 1028             Time Calculation- OT    OT Start Time 0936  -AG      OT Stop Time 0955  -AG      OT Time Calculation (min) 19 min  -AG      Total Timed Code Minutes- OT 10 minute(s)  -AG      OT Received On 07/18/24  -AG         Timed Charges    48190 - OT Self Care/Mgmt Minutes 10  -AG         Untimed Charges    OT Eval/Re-eval Minutes 9  -AG         Total Minutes    Timed Charges Total Minutes 10  -AG      Untimed Charges Total Minutes 9  -AG       Total Minutes 19  -AG                User Key  (r) = Recorded By, (t) = Taken By, (c) = Cosigned By      Initials Name Provider Type    Sg Ashton OT Occupational Therapist                   Therapy Charges for Today       Code Description Service Date Service Provider Modifiers Qty    [id number]  OT SELF CARE/MGMT/TRAIN EA 15 MIN 7/18/2024 Sg Rao, OT GO 1    [id number]  OT EVAL LOW COMPLEXITY 2 7/18/2024 Sg Rao, OT GO 1                 Sg Rao OT  7/18/2024

## 2024-07-18 NOTE — PLAN OF CARE
Goal Outcome Evaluation:  Plan of Care Reviewed With: patient, spouse        Progress: no change  Outcome Evaluation: 79 y/o F presented to [facility] s/p right reverse total shoulder arthroplasty. Pt. prior to sx was independent with self care, living with spouse in a single story home. Currently pt. presents post op say 0, no c/o pain, nerve block intact through RUE, educated on lalitha dressing technique, donning/doffing sling and HEP to be performed. Pt. is aware of precautions and has good carryover. Pt. is to be d/c'd home with spouse and OT to sign off at this time.      Anticipated Discharge Disposition (OT): home with assist

## 2024-07-18 NOTE — OP NOTE
Orthopaedic Operative Note    Facility: [facility]    Patient: Ashley Mata    Medical Record Number: [id number]    YOB: 1945    Dictating Surgeon: Ash Jimenez M.D.*    Primary Care Physician: Carloz Shukla MD    Date of Operation: 7/18/2024    Pre-Operative Diagnosis: Recurrent right rotator cuff tear with glenohumeral osteoarthritis    Post-Operative Diagnosis: Recurrent right rotator cuff tear with glenohumeral osteoarthritis    Procedure Performed:    1.  Right reverse total shoulder arthroplasty    2.  Tenodesis of long head of biceps tendon    Surgeon: Ash Jimenez MD     Assistant: Altagracia Del Angel whose assistance was critical for help with patient positioning, suctioning and irrigation, retraction, manipulation of the extremity for insertion of the implants, wound closure and application of the bandages.  Her assistance was critical to the success of this case.      Anesthesia: Regional followed by Gen.    Complications: None.     Estimated Blood Loss: Less than 50 mL.     Implants: 1.  Biomet size 9 micro comprehensive stem  2.  Medium augmented mini glenoid baseplate with one 6.5 mm central compression screw and 4 peripheral 4.75 mm locking screws  3.  Size 36 glenosphere  4.  +3 offset, standard thickness humeral tray with 36 mm standard thickness E1 humeral bearing    Specimens: * No orders in the log *    Brief Operative Indication:  Ms. Mata had a history of worsening right shoulder pain and dysfunction.  She had previously undergone a rotator cuff repair but this had failed and she had developed a recurrent tear with arthritis.  We had a thorough discussion regarding the risks, benefits and alternatives to an arthroplasty and nonsurgical management versus surgery.  I explained that surgical risks include infection, hematoma, hardware related complications including failure of fixation, loosening, fracture, persistent pain and/or loss of motion,  iatrogenic nerve and/or blood vessel injury resulting in permanent weakness, numbness or dysfunction, DVT, PE, positioning related neuropraxia, and anesthesia related complications resulting in death.  We discussed the indication for a reverse as opposed to a standard total shoulder and the risks inherent to the reverse including notching, glenoid loosening, instability, and traction related neuropraxia, any of which could result in persistent pain or problems requiring further surgery.  Last, we discussed the rehab and all that will be expected of the patient postoperatively to ensure an optimal outcome.  The patient voiced understanding of the risks, benefits, and alternative forms of treatment that were discussed and the patient consented to proceed.    Description of the procedure in detail:  The patient and operative site were identified in the preoperative holding area.  The surgical site was marked.  Adequate regional anesthesia was administered. She was then taken to the operating room.  The patient was placed on the operating table where adequate general anesthesia was administered. She was then repositioned into the modified beachchair position with the head and neck in neutral alignment.  All bony prominences were carefully padded and protected.    The right upper extremity was then prepped and draped in the standard, sterile fashion.  The extremity was cleaned with an alcohol solution.  A Hibiclens scrub was performed and then the extremity was prepped with 2 ChloraPrep preps.  I allowed this to dry for 3 minutes before the draping procedure was carried out.    A timeout was taken and preoperative antibiotics administered.  Tranexamic acid was administered at this time as well.  Following this, I began by fashioning an approximately 6 cm incision over the anterior aspect of the shoulder.  This was carried down through the skin and subcutaneous tissues.  Full-thickness medial and lateral skin flaps were  developed.  The interval between the deltoid and pectoralis was carefully identified and developed.  The underlying cephalic vein was dissected out and retracted laterally.  This structure was kept protected throughout the case.    The clavipectoral fascia was divided.  The strap muscles were retracted medially.  The biceps groove was identified.  The biceps tendon was carefully dissected out.  The intra-articular portion of the biceps was gone.  I could however palpate the biceps in the upper groove.  To prevent distal retraction, the tendon was tenodesed to the pectoralis tendon using multiple MaxBraid sutures which were tied using 6 throw surgeon's knots.    I then directed my attention to the shoulder.  As expected, there was a large recurrent tear of the rotator cuff involving the supraspinatus and infraspinatus.  There were loose sutures in the subacromial space which were debrided.  Her subscapularis was carefully tagged and released.  I left a small cuff of tissue on the lesser tuberosity for a later anatomic repair of this structure.   The humeral head was then completely exposed.  She did have advanced glenohumeral osteoarthritis with scattered full-thickness chondral loss diffusely about the head.  The periarticular osteophytes were carefully removed with a rongeur.    The cutting guide for the head cut was inserted.  This was set to 20° of retroversion which I judged off of the forearm.  With the guide in position, I demarcated the cut and then carried out the cut using an oscillating saw.  The cut portion of the bone was removed and taken to the back table for possible bone grafting later in the case, if needed.    Having completed the humeral sided preparations, I then directed my attention to the glenoid.  The axillary nerve was carefully dissected out and identified.  This structure was protected.  Retractors were carefully positioned along the anterior, posterior and inferior glenoid rim.  I  carefully exposed the caudal rim of the glenoid using the electrocautery.  The anterior, inferior and posterior glenoid labrum were then carefully debrided and removed along with the remaining biceps stump superiorly.  The centering guide for the baseplate was inserted.  The center pin for the baseplate was drilled in the center of the glenoid vault.  I then carefully reamed and prepared the glenoid in typical fashion, taking care to maintain appropriate inferior tilt for proper positioning of the baseplate.  I elected to use an augment to allow for inferior tilt with a minimum of reaming.  The medium augment fit best.    Once I had completed the reaming process and made sure that the reaming was adequate, the medium augmented baseplate was impacted into position.  This was secured with a single screw central compression screw which got great purchase.  The 4 peripheral locking screws were then placed without complication.  All 4 screws were confirmed to lock into the baseplate.  With the baseplate secured, I examined the remaining glenoid.  I did determine that a 36 standard glenosphere would fit best in this case.  The appropriate size implant was selected for use and then impacted.  I took care to make sure that the Coyle taper was fully engaged and that the implant was well-seated.  I used a right angle clamp to pass this beneath the implant and pull up.  When doing so, the entire scapula moved.  Once I was satisfied that this was well seated, I then directed my attention back to the humerus.    The humeral sided preparations were carried out in the typical fashion.  I reamed and broached the humerus up to a 9 micro which seemed to fit well.  The appropriate size implant was impacted into position, taking care to maintain appropriate retroversion as judged off of the forearm.  The implants seated well.  I then trialed off of the stem.  The +3 offset, standard thickness trial tray with a 36 mm standard thickness  trial bearing seemed to fit best.  This allowed for excellent motion and stability.  The trial component was removed and then the final component impacted.  Again, I took care to make sure that the Coyle taper was fully engaged before reducing it and carrying the shoulder through range of motion.    Again, the shoulder demonstrated excellent motion and stability.  I could fully elevate, abduct and externally rotate the shoulder without any impingement or limitation.  The shoulder demonstrated excellent motion and absolutely no instability.  There was good tension in the periarticular soft tissue structures.  At this point, I directed my attention to the subscapularis repair.  The arm was placed in approximately 30 degrees of external rotation.  The subscapularis was then anatomically repaired using multiple #2 MaxBraid sutures.  I then irrigated the wound with 500 mL of a Betadine-containing saline solution.  I then irrigated with 3 L of sterile saline via pulsatile lavage.  I made sure that we had good hemostasis.  The wound was sequentially closed in a layered fashion.  Vicryl was used to repair the subcutaneous tissues.  A running subcuticular Monocryl stitch was used to close the skin followed by Steri-strips.  Sterile dressings were applied.  The drapes were withdrawn. Her arm was placed in a sling. She was awakened and taken to the recovery room in good condition.    Ash Jimenez MD  07/18/24

## 2024-07-22 ENCOUNTER — TELEPHONE (OUTPATIENT)
Dept: ORTHOPEDIC SURGERY | Facility: HOSPITAL | Age: 79
End: 2024-07-22
Payer: MEDICARE

## 2024-07-22 NOTE — TELEPHONE ENCOUNTER
Post op day 4  Discharge Instructions:  Ask patient about his or her discharge instructions  ?  Patient confirmed understanding   []  Further instruction needed   What, if any, recommendations, teaching, or interventions did you provide? Click or tap here to enter text.  Health status:  Pain controlled Yes   Does have some discomfort, but pain is well controlled   Recommended interventions:  Yes  incision/dressing status   ?  Clean without redness, drainage, odor  []  Redness    []  Drainage - color Click or tap here to enter text.  []  Odor  LAUREN - Green light blinking Choose an item.  Difficulties urination No  Last BM 7/22/2024 (if no BM by day 3-recommend OTC suppository or fleets enema)  No issues   Medications:  ?Medications reviewed with patient/family/caregiver  Patient taking medications as prescribed?   Yes  If not taking medications as prescribed, note specific medicine(s) and reason for each:  Click or tap here to enter text.  Hospital Follow Up Plan:  Follow up Appointment with Orthopedic surgeon:  ?Has f/u appointment                []Scheduled f/u appointment  Home Care ordered at discharge?    No        Home Care started, or contact made?    No   If no, action taken: Total Shoulder   DME obtained/used in home?         Yes   Using IS  Choose an item.   Other information: Spoke with Mr. Mata at this time. He said Mrs. Mata is doing well. She had a rough night the first night and actually fell twice; but feels that was related to the anesthesia and that since has improved some. After the block wore off she had quite a bit of pain and required the pain medication regularly but now she is taking it about every 12 hours and sometimes just needs Tylenol. She is doing the exercises. She hasn't been in the sling much. Does have some swelling to her arm. Encouraged propping on a pillow. They are using the ice and doing the exercises. She tires easily. She is set to start OP PT on Friday. Things are going  well at this time. Mr. Mata doesn't have any other questions for me at this time. He has my contact information should they need anything.

## 2024-07-26 ENCOUNTER — TELEPHONE (OUTPATIENT)
Dept: OBSTETRICS AND GYNECOLOGY | Facility: CLINIC | Age: 79
End: 2024-07-26
Payer: MEDICARE

## 2024-07-26 NOTE — TELEPHONE ENCOUNTER
Called and stated she had shoulder surgery and has appt on 8/2/24. Advised to call back after clearance from her surgeon. Patient in agreement.

## 2024-07-26 NOTE — TELEPHONE ENCOUNTER
Caller: Darrius Mata    Relationship to patient: Emergency Contact    Best call back number: [phone] (home)       Patient is needing: PATIENTS  CALLED STATING PATIENT HAD A  REVERSE RIGHT SHOULDER REPLACEMENT ON 07/18 AND HAS A MMG COMING UP ON 08/02 AND SHE IS UNABLE TO STRETCH HER RIGHT ARM UP OR OUT FORWARD SO THEY ARE WONDERING IF SHE WILL NEED TO RESCHEDULE THIS. DETAILED VOICEMAIL CAN BE LEFT.

## 2024-07-31 ENCOUNTER — OFFICE VISIT (OUTPATIENT)
Dept: ORTHOPEDIC SURGERY | Facility: CLINIC | Age: 79
End: 2024-07-31
Payer: MEDICARE

## 2024-07-31 VITALS — WEIGHT: 126.8 LBS | BODY MASS INDEX: 21.13 KG/M2 | HEIGHT: 65 IN | TEMPERATURE: 98.6 F

## 2024-07-31 DIAGNOSIS — Z09 SURGERY FOLLOW-UP: ICD-10-CM

## 2024-07-31 DIAGNOSIS — Z96.611 STATUS POST REVERSE TOTAL REPLACEMENT OF RIGHT SHOULDER: Primary | ICD-10-CM

## 2024-07-31 PROCEDURE — 73030 X-RAY EXAM OF SHOULDER: CPT | Performed by: ORTHOPAEDIC SURGERY

## 2024-07-31 PROCEDURE — 1160F RVW MEDS BY RX/DR IN RCRD: CPT | Performed by: ORTHOPAEDIC SURGERY

## 2024-07-31 PROCEDURE — 99024 POSTOP FOLLOW-UP VISIT: CPT | Performed by: ORTHOPAEDIC SURGERY

## 2024-07-31 PROCEDURE — 1159F MED LIST DOCD IN RCRD: CPT | Performed by: ORTHOPAEDIC SURGERY

## 2024-07-31 NOTE — PROGRESS NOTES
"Aslhey Mata : 1945 MRN: [id number] DATE: 2024    DIAGNOSIS:  2 week follow up right shoulder arthroplasty      SUBJECTIVE:  Patient returns today for 2 week follow up of right shoulder replacement. Patient reports doing well with no unusual complaints.      OBJECTIVE:    Temp 98.6 °F (37 °C)   Ht 163.8 cm (64.5\")   Wt 57.5 kg (126 lb 12.8 oz)   LMP  (LMP Unknown)   BMI 21.43 kg/m²     Exam:  The incision is well approximated.  No erythema or drainage. Shoulder moves fluidly with pendulums.  The arm is soft and nontender.  Intact motor and sensory function in the lower arm and hand.  Palpable radial pulse.    DIAGNOSTIC STUDIES    Xrays: AP and scapular Y views of the right shoulder are ordered and reviewed for evaluation of the recent shoulder replacement.  The x-rays demonstrate a well positioned, well aligned replacement without complicating factors noted.  In comparison with previous films, there has been no change.    ASSESSMENT:  2 week follow up right shoulder replacement    PLAN:   1.  Continue PT per protocol  2.  Continue sling until next visit.  3.  Counseled patient about appropriate activity modifications and restrictions, including no driving at this point.  4.  We discussed the need for prophylactic antibiotics prior to dental appointments for 2 years following replacement surgery.  5.  Follow up in 4 weeks.       Ash Jimenez MD   Answers submitted by the patient for this visit:  Other (Submitted on 2024)  Please describe your symptoms.: Followup after reverse shoulder replacement.  Have you had these symptoms before?: Yes  How long have you been having these symptoms?: 5-7 days  Please list any medications you are currently taking for this condition.: Hydrocodone & Tylenol  Primary Reason for Visit (Submitted on 2024)  What is the primary reason for your visit?: Other    "

## 2024-08-05 ENCOUNTER — TELEPHONE (OUTPATIENT)
Dept: ORTHOPEDIC SURGERY | Facility: CLINIC | Age: 79
End: 2024-08-05
Payer: MEDICARE

## 2024-08-05 NOTE — TELEPHONE ENCOUNTER
Caller: DEEPTI    Relationship: SPOUSE    Best call back number: [phone]    What is the best time to reach you: ANY    Who are you requesting to speak with (clinical staff, provider,  specific staff member): CLINICAL        What was the call regarding: DENTAL APPT 8/12/24 NEEDS ANTIBIOTIC CALLED IN TO [facility] ON [address]- [phone]

## 2024-08-05 NOTE — TELEPHONE ENCOUNTER
Returned call to pt's spouse Bill.   He stated the dental visit was a regular cleaning. Informed him that our office protocol was that we do not want any routine dental visits for 3 months from the date of joint replacement surgery and that appt needs to be r/s.   If anything urgent comes up, please contact the office to discuss.

## 2024-08-15 ENCOUNTER — TELEPHONE (OUTPATIENT)
Dept: ORTHOPEDIC SURGERY | Facility: HOSPITAL | Age: 79
End: 2024-08-15
Payer: MEDICARE

## 2024-08-15 NOTE — TELEPHONE ENCOUNTER
Attempted to reach Ms. Mata to see how she has been doing since her RTSRA. Message left at this time.

## 2024-08-26 ENCOUNTER — OFFICE VISIT (OUTPATIENT)
Dept: INTERNAL MEDICINE | Facility: CLINIC | Age: 79
End: 2024-08-26
Payer: MEDICARE

## 2024-08-26 VITALS
OXYGEN SATURATION: 99 % | HEIGHT: 66 IN | BODY MASS INDEX: 20.89 KG/M2 | WEIGHT: 130 LBS | SYSTOLIC BLOOD PRESSURE: 112 MMHG | HEART RATE: 96 BPM | DIASTOLIC BLOOD PRESSURE: 70 MMHG | TEMPERATURE: 96.4 F

## 2024-08-26 DIAGNOSIS — R41.3 MEMORY LOSS: ICD-10-CM

## 2024-08-26 DIAGNOSIS — Z78.0 POSTMENOPAUSAL STATE: Chronic | ICD-10-CM

## 2024-08-26 DIAGNOSIS — Z86.16 HISTORY OF COVID-19: Chronic | ICD-10-CM

## 2024-08-26 DIAGNOSIS — Z83.719 FAMILY HISTORY OF COLONIC POLYPS: Chronic | ICD-10-CM

## 2024-08-26 DIAGNOSIS — M85.89 OSTEOPENIA OF MULTIPLE SITES: Chronic | ICD-10-CM

## 2024-08-26 DIAGNOSIS — E78.2 MIXED HYPERLIPIDEMIA: Chronic | ICD-10-CM

## 2024-08-26 DIAGNOSIS — M15.9 PRIMARY OSTEOARTHRITIS INVOLVING MULTIPLE JOINTS: Chronic | ICD-10-CM

## 2024-08-26 DIAGNOSIS — K21.9 GASTROESOPHAGEAL REFLUX DISEASE WITHOUT ESOPHAGITIS: Chronic | ICD-10-CM

## 2024-08-26 DIAGNOSIS — Z80.3 FAMILY HISTORY OF BREAST CANCER IN FIRST DEGREE RELATIVE: Chronic | ICD-10-CM

## 2024-08-26 DIAGNOSIS — Z91.09 MULTIPLE ENVIRONMENTAL ALLERGIES: ICD-10-CM

## 2024-08-26 DIAGNOSIS — R41.89 COGNITIVE IMPAIRMENT: Primary | ICD-10-CM

## 2024-08-26 DIAGNOSIS — E55.9 VITAMIN D DEFICIENCY: ICD-10-CM

## 2024-08-26 DIAGNOSIS — Z51.81 THERAPEUTIC DRUG MONITORING: ICD-10-CM

## 2024-08-26 DIAGNOSIS — E03.9 PRIMARY HYPOTHYROIDISM: ICD-10-CM

## 2024-08-26 DIAGNOSIS — Z86.010 HISTORY OF COLON POLYPS: Chronic | ICD-10-CM

## 2024-08-26 DIAGNOSIS — J30.1 CHRONIC SEASONAL ALLERGIC RHINITIS DUE TO POLLEN: ICD-10-CM

## 2024-08-26 DIAGNOSIS — I10 BENIGN ESSENTIAL HYPERTENSION: Chronic | ICD-10-CM

## 2024-08-26 DIAGNOSIS — R41.0 INTERMITTENT CONFUSION: ICD-10-CM

## 2024-08-26 PROCEDURE — 3078F DIAST BP <80 MM HG: CPT | Performed by: INTERNAL MEDICINE

## 2024-08-26 PROCEDURE — 1159F MED LIST DOCD IN RCRD: CPT | Performed by: INTERNAL MEDICINE

## 2024-08-26 PROCEDURE — 3074F SYST BP LT 130 MM HG: CPT | Performed by: INTERNAL MEDICINE

## 2024-08-26 PROCEDURE — 1160F RVW MEDS BY RX/DR IN RCRD: CPT | Performed by: INTERNAL MEDICINE

## 2024-08-26 PROCEDURE — 1125F AMNT PAIN NOTED PAIN PRSNT: CPT | Performed by: INTERNAL MEDICINE

## 2024-08-26 PROCEDURE — 99214 OFFICE O/P EST MOD 30 MIN: CPT | Performed by: INTERNAL MEDICINE

## 2024-08-26 RX ORDER — LEVOTHYROXINE SODIUM 50 UG/1
TABLET ORAL
Start: 2024-08-26

## 2024-08-26 RX ORDER — ATORVASTATIN CALCIUM 20 MG/1
TABLET, FILM COATED ORAL
Start: 2024-08-26

## 2024-08-26 RX ORDER — PHENOL 1.4 %
AEROSOL, SPRAY (ML) MUCOUS MEMBRANE
Start: 2024-08-26

## 2024-08-26 RX ORDER — ESTRADIOL 1 MG/1
TABLET ORAL
Start: 2024-08-26

## 2024-08-26 RX ORDER — FEXOFENADINE HCL AND PSEUDOEPHEDRINE HCL 180; 240 MG/1; MG/1
TABLET, EXTENDED RELEASE ORAL
Start: 2024-08-26

## 2024-08-26 RX ORDER — EZETIMIBE 10 MG/1
TABLET ORAL
Start: 2024-08-26

## 2024-08-26 NOTE — PROGRESS NOTES
08/26/2024    Patient Information  Ashley Mata                                                                                          [id number] [facility] [address]      1945  [unfilled]  There is no work phone number on file.    Chief Complaint:     Follow-up cognitive impairment, intermittent confusion, memory loss.    History of Present Illness:    Patient with a history of medical problems that are chronic as noted below in assessment and plan presents today for a follow-up.  I saw her several weeks ago at which time she was accompanied by her  and had complaints of memory loss and intermittent confusion.  I ordered a neuropsychiatric testing which apparently has not yet been done.  We also ordered an MRI of the brain which revealed atrophy and moderate small vessel ischemic disease.  No acute infarction chronic mucosal thickness of the sinuses noted.  Nothing acute.    Past medical history reviewed and updated were necessary including health maintenance parameters.  This reveals she needs a DEXA scan which I have ordered.  It also indicates she needs a colonoscopy but given findings of colonoscopy in 2022 and her age and overall clinical picture we are going to need to check with the endoscopist whether or not this is actually needed.    Review of Systems   Constitutional: Negative.   HENT: Negative.     Eyes: Negative.    Cardiovascular: Negative.    Respiratory: Negative.     Endocrine: Negative.    Hematologic/Lymphatic: Negative.    Skin: Negative.    Musculoskeletal:  Positive for arthritis and joint pain.   Gastrointestinal: Negative.    Genitourinary: Negative.    Neurological: Negative.    Psychiatric/Behavioral:  Positive for memory loss. Negative for altered mental status, depression, hallucinations, hypervigilance, substance abuse, suicidal ideas and thoughts of violence. The patient does not have insomnia.    Allergic/Immunologic: Negative.         Active Problems:    Patient Active Problem List   Diagnosis    Allergic rhinitis    Benign essential hypertension    Diverticulosis of colon    Gastroesophageal reflux disease without esophagitis    Hyperlipidemia    Multiple environmental allergies    Vitamin D deficiency    Therapeutic drug monitoring    Alopecia    Lumbar scoliosis    Primary hypothyroidism    Family history of breast cancer in first degree relative    Postmenopausal state    History of COVID-19, August 12, 2021; May 17, 2022.    Hammertoe of second toe of right foot    Family history of colonic polyps    Osteopenia of multiple sites, 2/25/2022--lumbar spine 1.1.  Left femoral neck -1.0.  Right femoral neck -0.3.    Primary osteoarthritis involving multiple joints    History of colon polyps, 8/18/2022--hyperplastic x1.    Right rotator cuff tear    Memory loss    Intermittent confusion    Cognitive impairment         Past Medical History:   Diagnosis Date    Allergic rhinitis 01/30/2015 11/13/2015--patient was evaluated by ENT and was started on generic Flonase and patient reports this has improved her symptoms quite a bit.   01/30/2015--allergy testing revealed positive reactivity to cat, dust mite, cockroach, mold spores, and grass pollen. Environmental control measures.    Alopecia 04/12/2017    Evaluated and treated by the dermatologist.    Benign essential hypertension 04/18/2016    Chronic pain of both knees 04/05/2023    Chronic toe pain, right foot 10/19/2021    October 19, 2021--patient reports a 1 year history of progressively worsening right toe pain that particular involves the second toe.  On exam she has obvious hammertoe which is causing irritation.  There is some hammering of the third digit as well.  Patient referred to orthopedist who specializes in foot problems such as Dr. Vieira.  In the meantime patient should use over-the-counter toe pads to    Diverticulosis of colon 03/17/2009    03/10/2017--colonoscopy revealed  many small and large mouth diverticula in the sigmoid colon and descending colon.  Nonthrombosed external hemorrhoids and internal hemorrhoids noted.  Otherwise, normal colonoscopy.  03/17/2009--colonoscopy revealed external hemorrhoids, torturous colon with a sigmoid stricture, sigmoid diverticulosis.    Family history of breast cancer in first degree relative 07/13/2020    Family history of colonic polyps 04/01/2022    Gastroesophageal reflux disease without esophagitis 03/17/2009 06/09/2015--EGD revealed Z line irregular, 35 cm from incisors. Biopsied. Small hiatal hernia. Gastritis. Biopsied. Bilious gastric fluid. Fluid aspiration performed. Normal duodenal bulb and second part of duodenum. Biopsied. Pathology revealed the antral biopsy revealed small intestinal mucosa with no pathologic diagnosis. Good preservation of villous architecture. Duodenum biopsy revealed largely antral type gastric mucosa with mild patchy superficial chronic gastritis. Gastroesophageal junction revealed squamous and glandular mucosa with mild chronic inflammation. Negative for intestinal metaplasia or dysplasia. There appeared to be mislabeling of the antral biopsies and duodenal biopsies.   04/17/2012--EGD revealed irregular Z line, hiatal hernia, gastritis, duodenitis.   03/17/2009--EGD revealed grade B. reflux esophagitis, hiatal hernia, gastritis, a few gastric polyps, duodenitis.    Hammertoe of right foot     Hammertoe of second toe of right foot 10/19/2021    October 19, 2021--patient reports a 1 year history of progressively worsening right toe pain that particular involves the second toe.  On exam she has obvious hammertoe which is causing irritation.  There is some hammering of the third digit as well.  Patient referred to orthopedist who specializes in foot problems such as Dr. Vieira.  In the meantime patient should use over-the-counter toe pads to    History of acute hepatitis 04/24/2022    May 9, 2022--patient seen in  hospital discharge follow-up/transition of care.  I reviewed the documentation including the admission history and physical, hospital course, laboratory and radiographic studies, GI consultation, discharge summary and discharge medications.  I specifically reviewed the tissue pathology report which revealed moderate steatosis with moderate chronic portal inflammation a    History of COVID-19, August 12, 2021; May 17, 2022. 10/19/2021    May 17, 2022--patient again tested positive for COVID.  Ordered by gastroenterology nurse practitioner.  October 19, 2021--patient seen in follow-up and reports her symptoms totally resolved.  She wants to know when she should get her Covid booster vaccine.  I have suggested that she receive it approximately 3 months after initially testing positive.  She has an appointment in November 6, 2021 for    History of Hyperplastic polyps of stomach 06/09/2015 06/09/2015--EGD revealed Z line irregular, 35 cm from incisors. Biopsied. Small hiatal hernia. Gastritis. Biopsied. Bilious gastric fluid. Fluid aspiration performed. Normal duodenal bulb and second part of duodenum. Biopsied. Pathology revealed the antral biopsy revealed small intestinal mucosa with no pathologic diagnosis. Good preservation of villous architecture. Duodenum biopsy revealed large    Hyperlipidemia 07/17/2012 07/17/2012--treatment for hyperlipidemia begun.    Lumbar disc herniation, L4-L5 08/07/2018 01/16/2019--patient seen in follow-up by Dr. Shukla.  She has seen the neurosurgeon who recommended conservative therapy with physical therapy.  Patient reports that has been extremely helpful.  Currently has no significant pain.  08/07/2018--patient seen in follow-up and reports her pain is much better after taking prednisone.  She is now down to half a pill per day and is almost off of it.  I rev    Lumbar scoliosis 08/07/2018 01/16/2019--patient seen in follow-up by Dr. Shukla.  She has seen the neurosurgeon  who recommended conservative therapy with physical therapy.  Patient reports that has been extremely helpful.  Currently has no significant pain.  08/07/2018--patient seen in follow-up and reports her pain is much better after taking prednisone.  She is now down to half a pill per day and is almost off of it.  I rev    Menopausal state 07/13/2020    Multiple environmental allergies 01/30/2015 01/30/2015--allergy testing revealed positive reactivity to cat, dust mite, cockroach, mold spores, and grass pollen. Environmental control measures.    Osteopenia of multiple sites, 2/25/2022--lumbar spine 1.1.  Left femoral neck -1.0.  Right femoral neck -0.3. 04/01/2022 February 25, 2022--DEXA scan reveals lumbar spine T score 1.1.  Left femoral neck T score -1.0.  Right femoral neck T score -0.3.  Mild osteopenia.    Postmenopausal hormone replacement therapy 04/18/2016    Primary hypothyroidism 01/22/2020 January 22, 2020--TSH is elevated at 5.0.  Levothyroxine 50 mcg/day initiated.  Patient will follow-up with nonfasting lab work in about 6 to 8 weeks to reassess.  07/09/2018--routine follow-up.  TSH elevated slightly at 4.31.  Free T4 normal at 1.22.  Free T3 normal at 3.3.  Continued observation.  10/11/2017--thyroid function tests are normal.  09/30/2016--thyroid ultrasound reveals a tiny 2 mm     Primary osteoarthritis involving multiple joints 06/08/2022    Primary osteoarthritis of both hands 12/30/2013 05/12/2014--patient seen in followup and reports that the Vimovo has helped. Uric acid levels are normal at 4.9. C. reactive protein mildly elevated at 2.3. X-rays of the hands reveals radiocarpal, first carpometacarpal, first metacarpophalangeal, and interphalangeal joint degeneration. This process is symmetric. The interphalangeal joint of the left is affected to the greatest degree. There is right sided scapholunate dissociation as well as deformity of the scaphoid suggesting prior traumatic injury  with healing. Soft tissues are satisfactory. Overall appearance is most consistent with osteoarthritis.   05/05/2014--patient presents and reports that she is having worsening right wrist pain primarily at the base of the thumb. No new injury. Bilateral x-rays of the wrists and hands ordered. Uric acid level ordered as well as a CRP. Vimovo 500/20 one by mouth twice a day. Follow up in one week.   03/18/2014--patient reports that her wrist symptoms have improved.   12/30/2013--patient reports a several mon    Primary osteoarthritis of both knees 04/05/2023    Primary osteoarthritis of both wrists 12/30/2013 05/12/2014--patient seen in followup and reports that the Vimovo has helped. Uric acid levels are normal at 4.9. C. reactive protein mildly elevated at 2.3. X-rays of the hands reveals radiocarpal, first carpometacarpal, first metacarpophalangeal, and interphalangeal joint degeneration. This process is symmetric. The interphalangeal joint of the left is affected to the greatest degree. There is right sided scapholunate dissociation as well as deformity of the scaphoid suggesting prior traumatic injury with healing. Soft tissues are satisfactory. Overall appearance is most consistent with osteoarthritis.   05/05/2014--patient presents and reports that she is having worsening right wrist pain primarily at the base of the thumb. No new injury. Bilateral x-rays of the wrists and hands ordered. Uric acid level ordered as well as a CRP. Vimovo 500/20 one by mouth twice a day. Follow up in one week.   03/18/2014--patient reports that her wrist symptoms have improved.   12/30/2013--patient reports a several mon    Vitamin D deficiency 04/18/2016         Past Surgical History:   Procedure Laterality Date    COLONOSCOPY  03/17/2009 03/17/2009--colonoscopy revealed external hemorrhoids, torturous colon with a sigmoid stricture, sigmoid diverticulosis.    COLONOSCOPY N/A 03/10/2017    03/10/2017--colonoscopy revealed  many small and large mouth diverticula in the sigmoid colon and descending colon.  Nonthrombosed external hemorrhoids and internal hemorrhoids noted.  Otherwise, normal colonoscopy.    COLONOSCOPY N/A 08/18/2022    Procedure: COLONOSCOPY TO 40CM WITH POLYPECTOMY (COLD);  Surgeon: Mario Ray MD;  Location: [facility] ENDOSCOPY;  Service: General;  Laterality: N/A;  PRE- POSITIVE COLOGUARD  POST- POLYP, HEMORRHOIDS    ENDOSCOPY N/A 08/18/2022    Procedure: ESOPHAGOGASTRODUODENOSCOPY WITH BIOPSIES AND COLD BIOPSY POLYPECTOMY;  Surgeon: Mario Ray MD;  Location: [facility] ENDOSCOPY;  Service: General;  Laterality: N/A;  PRE- ACID REFLUX  POST- GASTRITIS, GASTRIC POLYPS    ERCP WITH SPHINCTEROTOMY/PAPILLOTOMY  08/13/2014 08/13/2014--ERCP, endoscopic sphincterotomy and removal of common bile duct stones. 08/12/2014--laparoscopic cholecystectomy. This was complicated by retained common bile duct stones 2.    ESOPHAGOSCOPY / EGD  06/09/2015 06/09/2015--EGD revealed Z line irregular, 35 cm from incisors. Biopsied. Small hiatal hernia. Gastritis. Biopsied. Bilious gastric fluid. Fluid aspiration performed. Normal duodenal bulb and second part of duodenum. Biopsied. Pathology revealed the antral biopsy revealed small intestinal mucosa with no pathologic diagnosis. Good preservation of villous architecture. Duodenum biopsy revealed large    EYE SURGERY  07/19/2018    Both Eyes; cataracts    FOOT SURGERY  April 11, 2022    Dr. Vieira - toe urgery    HAMMER TOE REPAIR Right 04/11/2022    Procedure: RIGHT SECOND AND THIRD HAMMERTOE REPAIRS;  Surgeon: Brandt Vieira MD;  Location: [facility] OR [facility] [place];  Service: Orthopedics;  Laterality: Right;    JOINT REPLACEMENT  7/18/2024    RSR right shoulder    LAPAROSCOPIC CHOLECYSTECTOMY  08/12/2014 08/13/2014--ERCP, endoscopic sphincterotomy and removal of common bile duct stones. 08/12/2014--laparoscopic cholecystectomy. This was complicated by retained common  bile duct stones 2.    ROTATOR CUFF REPAIR Left 07/10/2014    07/10/2014--left rotator cuff repair. 03/18/2014--patient seen in followup and reports that her range of motion has improved and her pain is not debilitating. She feels that she is making progress with physical therapy. Surgery scheduled 07/10/2014. 01/10/2014--patient was seen in evaluation by the orthopedist and he recommended conservative treatment consisting of physical therapy/rehabilitation.    ROTATOR CUFF REPAIR Right 08/03/2016 08/03/2016--arthroscopic right rotator cuff repair.  Debridement of degenerative anterior superior labral tear.    SHOULDER SURGERY  2014 & 2016    Rotator Cuff Surgery both shoulders    SUBTOTAL HYSTERECTOMY  1978    Partial hysterectomy 1978.    TOTAL SHOULDER ARTHROPLASTY W/ DISTAL CLAVICLE EXCISION Right 07/18/2024    Procedure: Right Reverse Total Shoulder Arthroplasty;  Surgeon: Ash Jimenez MD;  [place] [facility] [place] [facility] [place]  Service: Orthopedics;  Laterality: Right;    TRIGGER POINT INJECTION  2023    Gel in both knees         Allergies   Allergen Reactions    Bupivacaine Other (See Comments)     Questionable allergy.  Patient had toe surgery and subsequently developed acute hepatitis.  Rare case reports of hepatitis induced by bupivacaine which was used during her surgery.           Current Outpatient Medications:     atorvastatin (LIPITOR) 20 MG tablet, TAKE 1 TABLET BY MOUTH EVERY DAY, Disp: , Rfl:     calcium carbonate (Calcium 600) 600 MG tablet, Take calcium plus vitamin D twice daily for low vitamin D and weak bones, Disp: , Rfl:     estradiol (ESTRACE) 1 MG tablet, Take 1 tablet (1 mg) daily as directed for postmenopausal state, Disp: , Rfl:     ezetimibe (ZETIA) 10 MG tablet, TAKE 1 TABLET BY MOUTH EVERY DAY, Disp: , Rfl:     fexofenadine-pseudoephedrine (ALLEGRA-D 24) 180-240 MG per 24 hr tablet, Take 1 p.o. daily for environmental allergies, Disp: , Rfl:     fluticasone (FLONASE) 50 MCG/ACT nasal  "spray, 2 sprays into the nostril(s) as directed by provider As Needed., Disp: , Rfl:     levothyroxine (SYNTHROID, LEVOTHROID) 50 MCG tablet, TAKE ONE TABLET BY MOUTH DAILY FOR LOW THYROID, Disp: , Rfl:     meloxicam (MOBIC) 15 MG tablet, Take 1 tablet by mouth Daily., Disp: 90 tablet, Rfl: 3    multivitamin (THERAGRAN) tablet tablet, Take 1 tablet by mouth Daily. HOLD ONE WEEK FOR SURGERY, Disp: , Rfl:     omeprazole OTC (PrilOSEC OTC) 20 MG EC tablet, Take 1 tablet by mouth Daily., Disp: , Rfl:       Family History   Problem Relation Age of Onset    Cancer Father         Lung Cancer    Early death Father         59 years old    Colon polyps Mother     Alzheimer's disease Mother     Hearing loss Mother     Osteoporosis Mother     Breast cancer Daughter 52    Malig Hyperthermia Neg Hx          Social History     Socioeconomic History    Marital status:     Number of children: 2    Years of education: BA    Highest education level: Associate degree: occupational, technical, or vocational program   Tobacco Use    Smoking status: Never     Passive exposure: Never    Smokeless tobacco: Never   Vaping Use    Vaping status: Never Used   Substance and Sexual Activity    Alcohol use: Yes     Alcohol/week: 2.0 standard drinks of alcohol     Types: 2 Glasses of wine per week     Comment: SOCIALLY    Drug use: No    Sexual activity: Not Currently     Partners: Male     Birth control/protection: Hysterectomy         Vitals:    08/26/24 1339   BP: 112/70   Pulse: 96   Temp: 96.4 °F (35.8 °C)   SpO2: 99%   Weight: 59 kg (130 lb)   Height: 168.8 cm (66.46\")        Body mass index is 20.69 kg/m².      Physical Exam:    General: Alert and oriented x 3.  No acute distress.  Normal affect.  HEENT: Pupils equal, round, reactive to light; extraocular movements intact; sclerae nonicteric; pharynx, ear canals and TMs normal.  Neck: Without JVD, thyromegaly, bruit, or adenopathy.  Lungs: Clear to auscultation in all fields.  Heart: " Regular rate and rhythm without murmur, rub, gallop, or click.  Abdomen: Soft, nontender, without hepatosplenomegaly or hernia.  Bowel sounds normal.  : Deferred.  Rectal: Deferred.  Extremities: Without clubbing, cyanosis, edema, or pulse deficit.  Neurologic: Intact without focal deficit.  Normal station and gait observed during ingress and egress from the examination room.  Skin: Without significant lesion.  Musculoskeletal: Unremarkable.    Lab/other results:    I reviewed the results of the MRI of the brain with the patient.  Shows age-appropriate atrophy and small vessel disease.  Nothing acute.    Also reviewed her lab work that was done about 2 months ago.    Assessment/Plan:     Diagnosis Plan   1. Cognitive impairment  NeuroPsych Testing    Ambulatory Referral to Neurology      2. Memory loss  NeuroPsych Testing    Ambulatory Referral to Neurology      3. Intermittent confusion  NeuroPsych Testing    Ambulatory Referral to Neurology      4. Benign essential hypertension  Urinalysis With Microscopic If Indicated (No Culture) - Urine, Clean Catch      5. Primary hypothyroidism  levothyroxine (SYNTHROID, LEVOTHROID) 50 MCG tablet      6. Hyperlipidemia  atorvastatin (LIPITOR) 20 MG tablet    ezetimibe (ZETIA) 10 MG tablet    CK    Comprehensive Metabolic Panel    NMR LipoProfile    TSH    T4, Free    T3, Free      7. Vitamin D deficiency  calcium carbonate (Calcium 600) 600 MG tablet    Vitamin D,25-Hydroxy      8. History of COVID-19, August 12, 2021; May 17, 2022.  SARS-CoV-2 Antibodies, Nucleocapsid (Natural Immunity)      9. Osteopenia of multiple sites, 2/25/2022--lumbar spine 1.1.  Left femoral neck -1.0.  Right femoral neck -0.3.  calcium carbonate (Calcium 600) 600 MG tablet    estradiol (ESTRACE) 1 MG tablet    DEXA Bone Density Axial      10. Postmenopausal state  estradiol (ESTRACE) 1 MG tablet    DEXA Bone Density Axial      11. Multiple environmental allergies  fexofenadine-pseudoephedrine  (ALLEGRA-D 24) 180-240 MG per 24 hr tablet      12. Allergic rhinitis  fexofenadine-pseudoephedrine (ALLEGRA-D 24) 180-240 MG per 24 hr tablet      13. History of colon polyps, 8/18/2022--hyperplastic x1.  Ambulatory Referral For Screening Colonoscopy      14. Family history of colonic polyps  Ambulatory Referral For Screening Colonoscopy      15. Gastroesophageal reflux disease without esophagitis        16. Family history of breast cancer in first degree relative        17. Primary osteoarthritis involving multiple joints        18. Therapeutic drug monitoring  CBC (No Diff)    Urinalysis With Microscopic If Indicated (No Culture) - Urine, Clean Catch        Patient with complaints of cognitive impairment/memory loss and intermittent confusion which does not seem severe but warrants evaluation with neuropsychiatric testing which I ordered previously but was never done for reasons not clear.  She did have the MRI of the brain which does not show anything acute.  There is no tumors or no obvious stroke.  She does have some cerebral atrophy which appears to be age-appropriate along with mild to moderate small vessel disease.  This was discussed at length.  She has primary hypothyroidism and hyperlipidemia that has been stable.  She also has osteopenia and needs a DEXA scan which I have ordered.  Her allergies seem to be controlled on the current regimen.  She has a history of colon polyps and needs a colonoscopy.  However, this is a difficult situation and that she has very torturous colon and there is some increased risk to doing the colonoscopy.  I have referred her back to Dr. Ray to come up with a game plan regarding this.  She has primary osteoarthritis of multiple joints and the symptoms seem to be tolerable with meloxicam.    Plan is as follows: No changes to current medical regimen.  Will set patient up for her subsequent Medicare wellness visit on or after November 27, 2024.  General surgery referral for  "consideration of colonoscopy.  Neurology consultation.  I have reordered the neuropsychiatric evaluation.  Recommend high-dose influenza vaccine towards the end of September.    This patient has a PCP that is the continuing focal point for all health care services, and the patient sees this physician to be evaluated for memory problems. The inherent complexity that this code () captures is not in the clinical condition itself, but rather the cognitition of the continued responsibility of being the focal point for all needed services for this patient.\"       Procedures        "

## 2024-08-28 ENCOUNTER — OFFICE VISIT (OUTPATIENT)
Dept: ORTHOPEDIC SURGERY | Facility: CLINIC | Age: 79
End: 2024-08-28
Payer: MEDICARE

## 2024-08-28 VITALS — HEIGHT: 65 IN | TEMPERATURE: 98.6 F | WEIGHT: 125.3 LBS | BODY MASS INDEX: 20.88 KG/M2

## 2024-08-28 DIAGNOSIS — Z96.611 STATUS POST REVERSE TOTAL REPLACEMENT OF RIGHT SHOULDER: Primary | ICD-10-CM

## 2024-08-28 DIAGNOSIS — Z09 SURGERY FOLLOW-UP: ICD-10-CM

## 2024-08-28 NOTE — PROGRESS NOTES
"Ashley Mata : 1945 MRN: [id number] DATE: 2024    DIAGNOSIS: 6 week follow up right shoulder arthroplasty      SUBJECTIVE:  Patient returns today for 6 week follow up of right shoulder replacement. Patient reports doing well with no unusual complaints.     OBJECTIVE:    Temp 98.6 °F (37 °C) (Temporal)   Ht 163.8 cm (64.5\")   Wt 56.8 kg (125 lb 4.8 oz)   LMP  (LMP Unknown)   BMI 21.18 kg/m²     Exam: The incision is well healed. No erythema or drainage. Shoulder moves fluidly with pendulums.  Motion is on track per protocol.  The arm is soft and nontender.  Good motor and sensory function.  Palpable distal pulses.     DIAGNOSTIC STUDIES    Xrays: AP and scapular Y views of the right shoulder are ordered and reviewed for evaluation of shoulder replacement.  They demonstrate a well positioned, well aligned replacement without complicating factors noted.  In comparison with previous films there has been no change.    ASSESSMENT:  6 week follow up right shoulder replacement    PLAN:   1.  Continue PT per protocol.  2.  Discontinue sling and begin working on progressing ROM as tolerated.  3.  Counseled patient about appropriate activity modifications and restrictions.  Released to drive at this point.  4.  We discussed the need for prophylactic antibiotics prior to dental appointments for 2 years following replacement surgery.  5.  Follow up in 6 weeks.     Ash Jimenez MD    2024    Answers submitted by the patient for this visit:  Other (Submitted on 2024)  Please describe your symptoms.: Follow-up after shoulder surgery  Have you had these symptoms before?: No  How long have you been having these symptoms?: Greater than 2 weeks  Primary Reason for Visit (Submitted on 2024)  What is the primary reason for your visit?: Other    "

## 2024-09-04 ENCOUNTER — OFFICE VISIT (OUTPATIENT)
Dept: OBSTETRICS AND GYNECOLOGY | Facility: CLINIC | Age: 79
End: 2024-09-04
Payer: MEDICARE

## 2024-09-04 ENCOUNTER — PROCEDURE VISIT (OUTPATIENT)
Dept: OBSTETRICS AND GYNECOLOGY | Facility: CLINIC | Age: 79
End: 2024-09-04
Payer: MEDICARE

## 2024-09-04 VITALS
HEART RATE: 71 BPM | SYSTOLIC BLOOD PRESSURE: 166 MMHG | BODY MASS INDEX: 21.66 KG/M2 | HEIGHT: 65 IN | WEIGHT: 130 LBS | DIASTOLIC BLOOD PRESSURE: 88 MMHG

## 2024-09-04 DIAGNOSIS — Z12.31 VISIT FOR SCREENING MAMMOGRAM: Primary | ICD-10-CM

## 2024-09-04 DIAGNOSIS — Z01.419 ENCOUNTER FOR ROUTINE GYNECOLOGIC EXAMINATION IN MEDICARE PATIENT: Primary | ICD-10-CM

## 2024-09-04 NOTE — PROGRESS NOTES
GYN Annual Exam     CC- Here for annual exam.  (Patient here for her annual today, mammo today, pap 2020 neg, dexa 2022, colon 2022)     Ashley Mata is a 78 y.o. female who presents for annual well woman exam. She is menopausal.  She is experiencing and/or reports no new or bothersome menopause symptoms.  She denies postmenopausal vaginal bleeding.  She denies abdominal pain, cough, and difficulty breathing.  Today, she wishes to specifically address just routine screening exam...  I explained to her that current ACOG guidelines state that screening Pap smears are no longer recommended after the age of 65, nor after hysterectomy for benign reasons.    OB History          8    Para   3    Term   3            AB   5    Living             SAB        IAB        Ectopic        Molar        Multiple        Live Births                    Current contraception: post menopausal status  History of abnormal Pap smear: no  Family history of uterine, colon or ovarian cancer: no  History of abnormal mammogram: no  Family history of breast cancer:   Last Pap : Several years ago  Last mammogram: 1 year ago and done today  Last colonoscopy:   Last DEXA: 2021 was normal and another 1 has been ordered by her primary care physician      Past Medical History:   Diagnosis Date    Allergic rhinitis 2015--patient was evaluated by ENT and was started on generic Flonase and patient reports this has improved her symptoms quite a bit.   2015--allergy testing revealed positive reactivity to cat, dust mite, cockroach, mold spores, and grass pollen. Environmental control measures.    Alopecia 2017    Evaluated and treated by the dermatologist.    Benign essential hypertension 2016    Chronic pain of both knees 2023    Chronic toe pain, right foot 10/19/2021    2021--patient reports a 1 year history of progressively worsening right toe pain that particular  involves the second toe.  On exam she has obvious hammertoe which is causing irritation.  There is some hammering of the third digit as well.  Patient referred to orthopedist who specializes in foot problems such as Dr. Vieira.  In the meantime patient should use over-the-counter toe pads to    Diverticulosis of colon 03/17/2009    03/10/2017--colonoscopy revealed many small and large mouth diverticula in the sigmoid colon and descending colon.  Nonthrombosed external hemorrhoids and internal hemorrhoids noted.  Otherwise, normal colonoscopy.  03/17/2009--colonoscopy revealed external hemorrhoids, torturous colon with a sigmoid stricture, sigmoid diverticulosis.    Family history of breast cancer in first degree relative 07/13/2020    Family history of colonic polyps 04/01/2022    Gastroesophageal reflux disease without esophagitis 03/17/2009 06/09/2015--EGD revealed Z line irregular, 35 cm from incisors. Biopsied. Small hiatal hernia. Gastritis. Biopsied. Bilious gastric fluid. Fluid aspiration performed. Normal duodenal bulb and second part of duodenum. Biopsied. Pathology revealed the antral biopsy revealed small intestinal mucosa with no pathologic diagnosis. Good preservation of villous architecture. Duodenum biopsy revealed largely antral type gastric mucosa with mild patchy superficial chronic gastritis. Gastroesophageal junction revealed squamous and glandular mucosa with mild chronic inflammation. Negative for intestinal metaplasia or dysplasia. There appeared to be mislabeling of the antral biopsies and duodenal biopsies.   04/17/2012--EGD revealed irregular Z line, hiatal hernia, gastritis, duodenitis.   03/17/2009--EGD revealed grade B. reflux esophagitis, hiatal hernia, gastritis, a few gastric polyps, duodenitis.    Hammertoe of right foot     Hammertoe of second toe of right foot 10/19/2021    October 19, 2021--patient reports a 1 year history of progressively worsening right toe pain that  particular involves the second toe.  On exam she has obvious hammertoe which is causing irritation.  There is some hammering of the third digit as well.  Patient referred to orthopedist who specializes in foot problems such as Dr. Vieira.  In the meantime patient should use over-the-counter toe pads to    History of acute hepatitis 04/24/2022    May 9, 2022--patient seen in hospital discharge follow-up/transition of care.  I reviewed the documentation including the admission history and physical, hospital course, laboratory and radiographic studies, GI consultation, discharge summary and discharge medications.  I specifically reviewed the tissue pathology report which revealed moderate steatosis with moderate chronic portal inflammation a    History of COVID-19, August 12, 2021; May 17, 2022. 10/19/2021    May 17, 2022--patient again tested positive for COVID.  Ordered by gastroenterology nurse practitioner.  October 19, 2021--patient seen in follow-up and reports her symptoms totally resolved.  She wants to know when she should get her Covid booster vaccine.  I have suggested that she receive it approximately 3 months after initially testing positive.  She has an appointment in November 6, 2021 for    History of Hyperplastic polyps of stomach 06/09/2015 06/09/2015--EGD revealed Z line irregular, 35 cm from incisors. Biopsied. Small hiatal hernia. Gastritis. Biopsied. Bilious gastric fluid. Fluid aspiration performed. Normal duodenal bulb and second part of duodenum. Biopsied. Pathology revealed the antral biopsy revealed small intestinal mucosa with no pathologic diagnosis. Good preservation of villous architecture. Duodenum biopsy revealed large    Hyperlipidemia 07/17/2012 07/17/2012--treatment for hyperlipidemia begun.    Lumbar disc herniation, L4-L5 08/07/2018 01/16/2019--patient seen in follow-up by Dr. Shukla.  She has seen the neurosurgeon who recommended conservative therapy with physical therapy.   Patient reports that has been extremely helpful.  Currently has no significant pain.  08/07/2018--patient seen in follow-up and reports her pain is much better after taking prednisone.  She is now down to half a pill per day and is almost off of it.  I rev    Lumbar scoliosis 08/07/2018 01/16/2019--patient seen in follow-up by Dr. Shukla.  She has seen the neurosurgeon who recommended conservative therapy with physical therapy.  Patient reports that has been extremely helpful.  Currently has no significant pain.  08/07/2018--patient seen in follow-up and reports her pain is much better after taking prednisone.  She is now down to half a pill per day and is almost off of it.  I rev    Menopausal state 07/13/2020    Multiple environmental allergies 01/30/2015 01/30/2015--allergy testing revealed positive reactivity to cat, dust mite, cockroach, mold spores, and grass pollen. Environmental control measures.    Osteopenia of multiple sites, 2/25/2022--lumbar spine 1.1.  Left femoral neck -1.0.  Right femoral neck -0.3. 04/01/2022 February 25, 2022--DEXA scan reveals lumbar spine T score 1.1.  Left femoral neck T score -1.0.  Right femoral neck T score -0.3.  Mild osteopenia.    Postmenopausal hormone replacement therapy 04/18/2016    Primary hypothyroidism 01/22/2020 January 22, 2020--TSH is elevated at 5.0.  Levothyroxine 50 mcg/day initiated.  Patient will follow-up with nonfasting lab work in about 6 to 8 weeks to reassess.  07/09/2018--routine follow-up.  TSH elevated slightly at 4.31.  Free T4 normal at 1.22.  Free T3 normal at 3.3.  Continued observation.  10/11/2017--thyroid function tests are normal.  09/30/2016--thyroid ultrasound reveals a tiny 2 mm     Primary osteoarthritis involving multiple joints 06/08/2022    Primary osteoarthritis of both hands 12/30/2013 05/12/2014--patient seen in followup and reports that the Vimovo has helped. Uric acid levels are normal at 4.9. C. reactive protein  mildly elevated at 2.3. X-rays of the hands reveals radiocarpal, first carpometacarpal, first metacarpophalangeal, and interphalangeal joint degeneration. This process is symmetric. The interphalangeal joint of the left is affected to the greatest degree. There is right sided scapholunate dissociation as well as deformity of the scaphoid suggesting prior traumatic injury with healing. Soft tissues are satisfactory. Overall appearance is most consistent with osteoarthritis.   05/05/2014--patient presents and reports that she is having worsening right wrist pain primarily at the base of the thumb. No new injury. Bilateral x-rays of the wrists and hands ordered. Uric acid level ordered as well as a CRP. Vimovo 500/20 one by mouth twice a day. Follow up in one week.   03/18/2014--patient reports that her wrist symptoms have improved.   12/30/2013--patient reports a several mon    Primary osteoarthritis of both knees 04/05/2023    Primary osteoarthritis of both wrists 12/30/2013 05/12/2014--patient seen in followup and reports that the Vimovo has helped. Uric acid levels are normal at 4.9. C. reactive protein mildly elevated at 2.3. X-rays of the hands reveals radiocarpal, first carpometacarpal, first metacarpophalangeal, and interphalangeal joint degeneration. This process is symmetric. The interphalangeal joint of the left is affected to the greatest degree. There is right sided scapholunate dissociation as well as deformity of the scaphoid suggesting prior traumatic injury with healing. Soft tissues are satisfactory. Overall appearance is most consistent with osteoarthritis.   05/05/2014--patient presents and reports that she is having worsening right wrist pain primarily at the base of the thumb. No new injury. Bilateral x-rays of the wrists and hands ordered. Uric acid level ordered as well as a CRP. Vimovo 500/20 one by mouth twice a day. Follow up in one week.   03/18/2014--patient reports that her wrist  symptoms have improved.   12/30/2013--patient reports a several mon    Rotator cuff syndrome     Vitamin D deficiency 04/18/2016       Past Surgical History:   Procedure Laterality Date    COLONOSCOPY  03/17/2009 03/17/2009--colonoscopy revealed external hemorrhoids, torturous colon with a sigmoid stricture, sigmoid diverticulosis.    COLONOSCOPY N/A 03/10/2017    03/10/2017--colonoscopy revealed many small and large mouth diverticula in the sigmoid colon and descending colon.  Nonthrombosed external hemorrhoids and internal hemorrhoids noted.  Otherwise, normal colonoscopy.    COLONOSCOPY N/A 08/18/2022    Procedure: COLONOSCOPY TO 40CM WITH POLYPECTOMY (COLD);  Surgeon: Mario Ray MD;  Location:  [facility];  Service: General;  Laterality: N/A;  PRE- POSITIVE COLOGUARD  POST- POLYP, HEMORRHOIDS    ENDOSCOPY N/A 08/18/2022    Procedure: ESOPHAGOGASTRODUODENOSCOPY WITH BIOPSIES AND COLD BIOPSY POLYPECTOMY;  Surgeon: Mario Ray MD;  [facility];  Service: General;  Laterality: N/A;  PRE- ACID REFLUX  POST- GASTRITIS, GASTRIC POLYPS    ERCP WITH SPHINCTEROTOMY/PAPILLOTOMY  08/13/2014 08/13/2014--ERCP, endoscopic sphincterotomy and removal of common bile duct stones. 08/12/2014--laparoscopic cholecystectomy. This was complicated by retained common bile duct stones 2.    ESOPHAGOSCOPY / EGD  06/09/2015 06/09/2015--EGD revealed Z line irregular, 35 cm from incisors. Biopsied. Small hiatal hernia. Gastritis. Biopsied. Bilious gastric fluid. Fluid aspiration performed. Normal duodenal bulb and second part of duodenum. Biopsied. Pathology revealed the antral biopsy revealed small intestinal mucosa with no pathologic diagnosis. Good preservation of villous architecture. Duodenum biopsy revealed large    EYE SURGERY  07/19/2018    Both Eyes; cataracts    FOOT SURGERY  April 11, 2022    Dr. Vieira - toe urgery    HAMMER TOE REPAIR Right 04/11/2022    Procedure: RIGHT  SECOND AND THIRD HAMMERTOE REPAIRS;  Surgeon: Brandt Vieira MD;  [place] [facility] [place];  Service: Orthopedics;  Laterality: Right;    JOINT REPLACEMENT  7/18/2024    RSR right shoulder    LAPAROSCOPIC CHOLECYSTECTOMY  08/12/2014 08/13/2014--ERCP, endoscopic sphincterotomy and removal of common bile duct stones. 08/12/2014--laparoscopic cholecystectomy. This was complicated by retained common bile duct stones 2.    ROTATOR CUFF REPAIR Left 07/10/2014    07/10/2014--left rotator cuff repair. 03/18/2014--patient seen in followup and reports that her range of motion has improved and her pain is not debilitating. She feels that she is making progress with physical therapy. Surgery scheduled 07/10/2014. 01/10/2014--patient was seen in evaluation by the orthopedist and he recommended conservative treatment consisting of physical therapy/rehabilitation.    ROTATOR CUFF REPAIR Right 08/03/2016 08/03/2016--arthroscopic right rotator cuff repair.  Debridement of degenerative anterior superior labral tear.    SHOULDER SURGERY  2014 & 2016    Rotator Cuff Surgery both shoulders    SUBTOTAL HYSTERECTOMY  1978    Partial hysterectomy 1978.    TOTAL SHOULDER ARTHROPLASTY W/ DISTAL CLAVICLE EXCISION Right 07/18/2024    Procedure: Right Reverse Total Shoulder Arthroplasty;  Surgeon: Ash Jimenez MD;  Location: [facility] [place];  Service: Orthopedics;  Laterality: Right;    TRIGGER POINT INJECTION  2023    Gel in both knees         Current Outpatient Medications:     atorvastatin (LIPITOR) 20 MG tablet, TAKE 1 TABLET BY MOUTH EVERY DAY, Disp: , Rfl:     calcium carbonate (Calcium 600) 600 MG tablet, Take calcium plus vitamin D twice daily for low vitamin D and weak bones, Disp: , Rfl:     estradiol (ESTRACE) 1 MG tablet, Take 1 tablet (1 mg) daily as directed for postmenopausal state, Disp: , Rfl:     ezetimibe (ZETIA) 10 MG tablet, TAKE 1 TABLET BY MOUTH EVERY DAY, Disp: , Rfl:     fexofenadine-pseudoephedrine (ALLEGRA-D  "24) 180-240 MG per 24 hr tablet, Take 1 p.o. daily for environmental allergies, Disp: , Rfl:     fluticasone (FLONASE) 50 MCG/ACT nasal spray, 2 sprays into the nostril(s) as directed by provider As Needed., Disp: , Rfl:     levothyroxine (SYNTHROID, LEVOTHROID) 50 MCG tablet, TAKE ONE TABLET BY MOUTH DAILY FOR LOW THYROID, Disp: , Rfl:     meloxicam (MOBIC) 15 MG tablet, Take 1 tablet by mouth Daily., Disp: 90 tablet, Rfl: 3    multivitamin (THERAGRAN) tablet tablet, Take 1 tablet by mouth Daily. HOLD ONE WEEK FOR SURGERY, Disp: , Rfl:     omeprazole OTC (PrilOSEC OTC) 20 MG EC tablet, Take 1 tablet by mouth Daily., Disp: , Rfl:     Allergies   Allergen Reactions    Bupivacaine Other (See Comments)     Questionable allergy.  Patient had toe surgery and subsequently developed acute hepatitis.  Rare case reports of hepatitis induced by bupivacaine which was used during her surgery.       Social History     Tobacco Use    Smoking status: Never     Passive exposure: Never    Smokeless tobacco: Never   Vaping Use    Vaping status: Never Used   Substance Use Topics    Alcohol use: Yes     Alcohol/week: 2.0 standard drinks of alcohol     Types: 2 Glasses of wine per week     Comment: SOCIALLY    Drug use: No       Family History   Problem Relation Age of Onset    Cancer Father         Lung Cancer    Early death Father         59 years old    Colon polyps Mother     Alzheimer's disease Mother     Hearing loss Mother     Osteoporosis Mother     Breast cancer Daughter 52    Malig Hyperthermia Neg Hx        Review of Systems   Constitutional:  Negative for fatigue and fever.   Genitourinary:  Negative for pelvic pain, urinary incontinence and vaginal bleeding.   Patient reports that she is not currently experiencing any symptoms of urinary incontinence.      /88   Pulse 71   Ht 163.8 cm (64.5\")   Wt 59 kg (130 lb)   LMP  (LMP Unknown)   BMI 21.97 kg/m²     Physical Exam  Constitutional:       Appearance: She is " normal weight.   Genitourinary:      Bladder and urethral meatus normal.      No lesions in the vagina.      Right Labia: No lesions.     Left Labia: No lesions.     No vaginal discharge, tenderness or bleeding.      No vaginal prolapse present.     Moderate vaginal atrophy present.       Right Adnexa: not tender, not full and no mass present.     Left Adnexa: not tender, not full and no mass present.     No cervical motion tenderness, discharge, friability or lesion.      Uterus is not enlarged, fixed or tender.      No uterine mass detected.     Uterus is midaxial.   Breasts:     Right: No mass, nipple discharge, skin change or tenderness.      Left: No mass, nipple discharge, skin change or tenderness.   Abdominal:      General: Abdomen is flat.      Palpations: Abdomen is soft. There is no mass.      Tenderness: There is no abdominal tenderness.   Neurological:      Mental Status: She is alert.   Vitals reviewed.             Assessment     1) GYN annual well woman exam.   2) normal menopausal exam.     Plan     1) Breast Health - Clinical breast exam & mammogram reviewed specifically American [employer] Society recommendations for screening specific to her, and Self breast awareness monthly  2) Pap -not indicated at her age  3) Smoking status-negative  4) Colon health - screening colonoscopy recommended if not up to date  5) Bone health - Weight bearing exercise, dietary calcium recommendations and vitamin D reviewed.   6) Encouraged to be wary of information obtained via social media and internet based on source and search.   7) Follow up prn and one year      Winston Gill MD   9/4/2024  11:23 EDT

## 2024-09-10 ENCOUNTER — TELEPHONE (OUTPATIENT)
Dept: INTERNAL MEDICINE | Facility: CLINIC | Age: 79
End: 2024-09-10
Payer: MEDICARE

## 2024-09-10 NOTE — TELEPHONE ENCOUNTER
Provider: DR MEI    Caller: DEEPTI MOSELEY    Relationship to Patient: SPOUSE    Phone Number: [phone]    Reason for Call: PATIENT'S  IS CALLING TO STATE PATIENT SAW A DR MARIANNE KAPADIA WITH [facility].  HE STATES SHE IS SCHEDULED FOR SOME FOLLOW UP TESTING ON 10/01/24.  HE STATES HE RECEIVED A CALL FROM [facility] NEUROLOGY WANTING TO SCHEDULE AN APPOINTMENT ON 09/30/24.  HE STATES HE WOULD LIKE TO CONTINUE WITH DR KAPADIA AT THIS POINT.  HE WANTS TO KNOW IF DR MEI IS OK WITH HIM CANCELING THE APPOINTMENT WITH [facility].    PLEASE ADVISE.

## 2024-09-20 ENCOUNTER — TELEPHONE (OUTPATIENT)
Dept: ORTHOPEDIC SURGERY | Facility: CLINIC | Age: 79
End: 2024-09-20
Payer: MEDICARE

## 2024-09-23 ENCOUNTER — HOSPITAL ENCOUNTER (OUTPATIENT)
Dept: BONE DENSITY | Facility: HOSPITAL | Age: 79
Discharge: HOME | End: 2024-09-23
Admitting: INTERNAL MEDICINE
Payer: MEDICARE

## 2024-09-23 ENCOUNTER — PREP FOR SURGERY (OUTPATIENT)
Dept: OTHER | Facility: HOSPITAL | Age: 79
End: 2024-09-23
Payer: MEDICARE

## 2024-09-23 DIAGNOSIS — Z86.010 HISTORY OF ADENOMATOUS POLYP OF COLON: Primary | ICD-10-CM

## 2024-09-23 PROCEDURE — 77080 DXA BONE DENSITY AXIAL: CPT

## 2024-09-25 ENCOUNTER — OFFICE VISIT (OUTPATIENT)
Dept: ORTHOPEDIC SURGERY | Facility: CLINIC | Age: 79
End: 2024-09-25
Payer: MEDICARE

## 2024-09-25 ENCOUNTER — HOSPITAL ENCOUNTER (OUTPATIENT)
Dept: CT IMAGING | Facility: HOSPITAL | Age: 79
Discharge: HOME | End: 2024-09-25
Admitting: NURSE PRACTITIONER
Payer: MEDICARE

## 2024-09-25 VITALS — WEIGHT: 125.8 LBS | BODY MASS INDEX: 21.48 KG/M2 | HEIGHT: 64 IN | TEMPERATURE: 98.6 F

## 2024-09-25 DIAGNOSIS — M25.511 ACUTE PAIN OF RIGHT SHOULDER: ICD-10-CM

## 2024-09-25 DIAGNOSIS — M25.511 ACUTE PAIN OF RIGHT SHOULDER: Primary | ICD-10-CM

## 2024-09-25 DIAGNOSIS — Z96.611 STATUS POST REVERSE TOTAL REPLACEMENT OF RIGHT SHOULDER: ICD-10-CM

## 2024-09-25 PROCEDURE — 99024 POSTOP FOLLOW-UP VISIT: CPT | Performed by: NURSE PRACTITIONER

## 2024-09-25 PROCEDURE — 1160F RVW MEDS BY RX/DR IN RCRD: CPT | Performed by: NURSE PRACTITIONER

## 2024-09-25 PROCEDURE — 73030 X-RAY EXAM OF SHOULDER: CPT | Performed by: NURSE PRACTITIONER

## 2024-09-25 PROCEDURE — 73200 CT UPPER EXTREMITY W/O DYE: CPT

## 2024-09-25 PROCEDURE — 1159F MED LIST DOCD IN RCRD: CPT | Performed by: NURSE PRACTITIONER

## 2024-09-27 ENCOUNTER — TELEPHONE (OUTPATIENT)
Dept: ORTHOPEDIC SURGERY | Facility: CLINIC | Age: 79
End: 2024-09-27
Payer: MEDICARE

## 2024-09-29 ENCOUNTER — HOSPITAL ENCOUNTER (OUTPATIENT)
Facility: HOSPITAL | Age: 79
Discharge: HOME | End: 2024-09-29
Attending: EMERGENCY MEDICINE | Admitting: EMERGENCY MEDICINE
Payer: MEDICARE

## 2024-09-29 ENCOUNTER — APPOINTMENT (OUTPATIENT)
Dept: GENERAL RADIOLOGY | Facility: HOSPITAL | Age: 79
End: 2024-09-29
Payer: MEDICARE

## 2024-09-29 VITALS
OXYGEN SATURATION: 99 % | DIASTOLIC BLOOD PRESSURE: 83 MMHG | SYSTOLIC BLOOD PRESSURE: 177 MMHG | HEART RATE: 70 BPM | RESPIRATION RATE: 18 BRPM | TEMPERATURE: 97.9 F

## 2024-09-29 DIAGNOSIS — M25.511 ACUTE PAIN OF RIGHT SHOULDER: Primary | ICD-10-CM

## 2024-09-29 PROCEDURE — G0463 HOSPITAL OUTPT CLINIC VISIT: HCPCS | Performed by: NURSE PRACTITIONER

## 2024-09-29 PROCEDURE — 63710000001 ONDANSETRON ODT 4 MG TABLET DISPERSIBLE: Performed by: NURSE PRACTITIONER

## 2024-09-29 PROCEDURE — 73030 X-RAY EXAM OF SHOULDER: CPT

## 2024-09-29 RX ORDER — ONDANSETRON 4 MG/1
4 TABLET, ORALLY DISINTEGRATING ORAL ONCE
Status: COMPLETED | OUTPATIENT
Start: 2024-09-29 | End: 2024-09-29

## 2024-09-29 RX ORDER — HYDROCODONE BITARTRATE AND ACETAMINOPHEN 7.5; 325 MG/1; MG/1
1 TABLET ORAL ONCE
Status: COMPLETED | OUTPATIENT
Start: 2024-09-29 | End: 2024-09-29

## 2024-09-29 RX ADMIN — HYDROCODONE BITARTRATE AND ACETAMINOPHEN 1 TABLET: 7.5; 325 TABLET ORAL at 12:56

## 2024-09-29 RX ADMIN — ONDANSETRON 4 MG: 4 TABLET, ORALLY DISINTEGRATING ORAL at 12:56

## 2024-09-29 NOTE — FSED PROVIDER NOTE
"        EMERGENCY DEPARTMENT ENCOUNTER    Room Number:  02/02  Date seen:  9/29/2024  Time seen: 12:28 EDT  PCP: Carloz Shukla MD  Historian: patient    Discussed/obtained information from independent historians: n/a    HPI:  Chief complaint:acute right shoulder pain  A complete HPI/ROS/PMH/PSH/SH/FH are unobtainable due to: n/a  Context:Ashley Mata is a 78 y.o. female with allergic rhinitis, hypertension, GERD and recent right shoulder surgery 7/18/24 who presents to the ED with c/o some lingering post operative right shoulder pain but this am she was pouring liquid from one bottle to another and felt a \"pop\" and has been having significant and worse pain since then.  She applied sling and presented here. Denies any falls.  Has tried tylenol at home.  Denies loss sensation.     External (non-ED) record review: Correspondence between patient and orthopedic surgery 2 days ago regarding right shoulder CT.  No periprosthetic fracture or lucency noticed on imaging.  There is a fluid collection in the subacromial subdeltoid space considered could be a postoperative seroma or hematoma.    Chronic or social conditions impacting care:n/a    ALLERGIES  Bupivacaine    PAST MEDICAL HISTORY  Active Ambulatory Problems     Diagnosis Date Noted    Allergic rhinitis 01/30/2015    Benign essential hypertension 04/18/2016    Diverticulosis of colon 03/17/2009    Gastroesophageal reflux disease without esophagitis 03/17/2009    Hyperlipidemia 07/17/2012    Multiple environmental allergies 01/30/2015    Vitamin D deficiency 04/18/2016    Therapeutic drug monitoring 04/11/2017    Alopecia 04/12/2017    Lumbar scoliosis 08/07/2018    Primary hypothyroidism 01/22/2020    Family history of breast cancer in first degree relative 07/13/2020    Postmenopausal state 07/13/2020    History of COVID-19, August 12, 2021; May 17, 2022. 10/19/2021    Hammertoe of second toe of right foot 10/19/2021    Family history of colonic polyps " 04/01/2022    Osteopenia of multiple sites, 2/25/2022--lumbar spine 1.1.  Left femoral neck -1.0.  Right femoral neck -0.3. 04/01/2022    Primary osteoarthritis involving multiple joints 06/08/2022    History of colon polyps, 8/18/2022--hyperplastic x1. 11/23/2022    Right rotator cuff tear 06/05/2024    Memory loss 06/06/2024    Intermittent confusion 06/06/2024    Cognitive impairment 06/06/2024     Resolved Ambulatory Problems     Diagnosis Date Noted    History of Hyperplastic polyps of stomach 04/18/2016    Primary osteoarthritis of both wrists 12/30/2013    Primary osteoarthritis of both hands 12/30/2013    Chronic insomnia 04/18/2016    Postmenopausal hormone replacement therapy 04/18/2016    Subclinical hypothyroidism 10/03/2014    History of abdominal pain 04/18/2016    History of URI (upper respiratory infection) 04/18/2016    History of Bicipital tendinitis of right shoulder 03/17/2015    History of Change in bowel habits 04/18/2016    History of Degeneration of cervical intervertebral disc 04/18/2016    History of bone density study 04/18/2016    History of diarrhea 04/18/2016    History of Doppler ultrasound of Artery: Carotid 04/18/2016    History of Zostavax administration 11/20/2014    History of chest x-ray 04/18/2016    History of mammogram 04/18/2016    History of Intramuscular hematoma, left 04/18/2016    History of Left rotator cuff tear 04/18/2016    History of Muscular aches 04/18/2016    History of pneumococcal vaccination 04/18/2016    History of Subdeltoid bursitis of right shoulder joint 04/20/2016    Right shoulder pain 04/20/2016    History of rotator cuff tear, right 07/01/2016    Diffuse arthralgia 04/09/2018    Lumbar back pain with radiculopathy affecting right lower extremity 07/09/2018    Lumbar disc herniation, L4-L5 08/07/2018    Persistent cough 03/18/2019    Acute bronchitis with bronchospasm 03/18/2019    Plantar wart of left foot 01/22/2020    Chronic foot pain, left  03/04/2020    Chronic toe pain, right foot 10/19/2021    Acute bronchitis with bronchospasm 04/01/2022    AUBREY (acute kidney injury) 04/24/2022    Hypokalemia 04/24/2022    Acidosis 04/24/2022    Jaundice 04/24/2022    History of acute hepatitis 04/24/2022    Hospital discharge follow-up 05/09/2022    Arthralgia of multiple joints 05/09/2022    Elevated liver enzymes 07/20/2022    Positive colorectal cancer screening using DNA-based stool test 08/11/2022    Onychomycosis of right great toe 12/07/2022    Herpes zoster without complication 04/03/2023    Primary osteoarthritis of both knees 04/05/2023    Chronic pain of both knees 04/05/2023    Diverticulitis of large intestine without perforation or abscess with bleeding 04/27/2023     Past Medical History:   Diagnosis Date    Hammertoe of right foot     Hyperlipidemia 07/17/2012    Menopausal state 07/13/2020    Rotator cuff syndrome        PAST SURGICAL HISTORY  Past Surgical History:   Procedure Laterality Date    COLONOSCOPY  03/17/2009 03/17/2009--colonoscopy revealed external hemorrhoids, torturous colon with a sigmoid stricture, sigmoid diverticulosis.    COLONOSCOPY N/A 03/10/2017    03/10/2017--colonoscopy revealed many small and large mouth diverticula in the sigmoid colon and descending colon.  Nonthrombosed external hemorrhoids and internal hemorrhoids noted.  Otherwise, normal colonoscopy.    COLONOSCOPY N/A 08/18/2022    Procedure: COLONOSCOPY TO 40CM WITH POLYPECTOMY (COLD);  Surgeon: Mario Ray MD;  Location: [facility] ENDOSCOPY;  Service: General;  Laterality: N/A;  PRE- POSITIVE COLOGUARD  POST- POLYP, HEMORRHOIDS    ENDOSCOPY N/A 08/18/2022    Procedure: ESOPHAGOGASTRODUODENOSCOPY WITH BIOPSIES AND COLD BIOPSY POLYPECTOMY;  Surgeon: Mario Ray MD;  Location: [facility] ENDOSCOPY;  Service: General;  Laterality: N/A;  PRE- ACID REFLUX  POST- GASTRITIS, GASTRIC POLYPS    ERCP WITH SPHINCTEROTOMY/PAPILLOTOMY  08/13/2014     08/13/2014--ERCP, endoscopic sphincterotomy and removal of common bile duct stones. 08/12/2014--laparoscopic cholecystectomy. This was complicated by retained common bile duct stones 2.    ESOPHAGOSCOPY / EGD  06/09/2015 06/09/2015--EGD revealed Z line irregular, 35 cm from incisors. Biopsied. Small hiatal hernia. Gastritis. Biopsied. Bilious gastric fluid. Fluid aspiration performed. Normal duodenal bulb and second part of duodenum. Biopsied. Pathology revealed the antral biopsy revealed small intestinal mucosa with no pathologic diagnosis. Good preservation of villous architecture. Duodenum biopsy revealed large    EYE SURGERY  07/19/2018    Both Eyes; cataracts    FOOT SURGERY  April 11, 2022    Dr. Vieira - toe urgery    HAMMER TOE REPAIR Right 04/11/2022    Procedure: RIGHT SECOND AND THIRD HAMMERTOE REPAIRS;  Surgeon: Brandt Vieira MD;  [facility]: [facility];  Service: Orthopedics;  Laterality: Right;    JOINT REPLACEMENT  7/18/2024    RSR right shoulder    LAPAROSCOPIC CHOLECYSTECTOMY  08/12/2014 08/13/2014--ERCP, endoscopic sphincterotomy and removal of common bile duct stones. 08/12/2014--laparoscopic cholecystectomy. This was complicated by retained common bile duct stones 2.    ROTATOR CUFF REPAIR Left 07/10/2014    07/10/2014--left rotator cuff repair. 03/18/2014--patient seen in followup and reports that her range of motion has improved and her pain is not debilitating. She feels that she is making progress with physical therapy. Surgery scheduled 07/10/2014. 01/10/2014--patient was seen in evaluation by the orthopedist and he recommended conservative treatment consisting of physical therapy/rehabilitation.    ROTATOR CUFF REPAIR Right 08/03/2016 08/03/2016--arthroscopic right rotator cuff repair.  Debridement of degenerative anterior superior labral tear.    SHOULDER SURGERY  2014 & 2016    Rotator Cuff Surgery both shoulders    SUBTOTAL HYSTERECTOMY  1978    Partial hysterectomy 1978.     TOTAL SHOULDER ARTHROPLASTY W/ DISTAL CLAVICLE EXCISION Right 07/18/2024    Procedure: Right Reverse Total Shoulder Arthroplasty;  Surgeon: Ash Jimenez MD;  [place] [facility] [place] [facility];  Service: Orthopedics;  Laterality: Right;    TRIGGER POINT INJECTION  2023    Gel in both knees       FAMILY HISTORY  Family History   Problem Relation Age of Onset    Colon polyps Mother     Alzheimer's disease Mother     Hearing loss Mother     Osteoporosis Mother     Cancer Father         Lung Cancer    Early death Father         59 years old    Breast cancer Daughter 52    Malig Hyperthermia Neg Hx        SOCIAL HISTORY  Social History     Socioeconomic History    Marital status:     Number of children: 2    Years of education: BA    Highest education level: Associate degree: occupational, technical, or vocational program   Tobacco Use    Smoking status: Never     Passive exposure: Never    Smokeless tobacco: Never   Vaping Use    Vaping status: Never Used   Substance and Sexual Activity    Alcohol use: Yes     Alcohol/week: 2.0 standard drinks of alcohol     Types: 2 Glasses of wine per week     Comment: SOCIALLY    Drug use: No    Sexual activity: Not Currently     Partners: Male     Birth control/protection: Hysterectomy       REVIEW OF SYSTEMS  Review of Systems    All systems reviewed and negative except for those discussed in HPI.     PHYSICAL EXAM    I have reviewed the triage vital signs and nursing notes.  Vitals:    09/29/24 1227   BP: 177/83   Pulse: 70   Resp: 18   Temp:    SpO2: 99%     Physical Exam    GENERAL: not distressed, well dressed and groomed  HENT: nares patent  EYES: no scleral icterus  NECK: no ROM limitations  CV: regular rhythm, regular rate  RESPIRATORY: normal effort  ABDOMEN: soft  : deferred  MUSCULOSKELETAL: no deformity, subtle right shoulder pain but no sign of obvious dropped shoulder or concern for dislocation.  Radial pulse intact.  No proximal humerus pain  NEURO: alert, moves  all extremities, follows commands  SKIN: warm, dry    RADIOLOGY RESULTS  XR Shoulder 2+ View Right    Result Date: 9/29/2024  XR SHOULDER 2+ VW RIGHT-   INDICATION: Felt pop. Pain.  COMPARISON: Right shoulder radiograph July 18, 2024  TECHNIQUE: 2 view right shoulder  FINDINGS:  Total shoulder arthroplasty. No lucency around the hardware. One of the superior glenoid screws appears just above the glenoid rim. No fracture. No bone lesion. No dislocation.      Stable total shoulder arthroplasty. No fracture or dislocation.  This report was finalized on 9/29/2024 1:00 PM by Dr. Carloz Pineda M.D on Workstation: [id number]        Ordered the above noted radiological studies.  Independently interpreted by me.  My findings will be discussed in the medical decision section below.     PROGRESS, DATA ANALYSIS, CONSULTS AND MEDICAL DECISION MAKING    Please note that this section constitutes my independent interpretation of clinical data including lab results, radiology, EKG's.  This constitutes my independent professional opinion regarding differential diagnosis and management of this patient.  It may include any factors such as history from outside sources, review of external records, social determinants of health, management of medications, response to those treatments, and discussions with other providers.    ED Course as of 09/29/24 1308   Sun Sep 29, 2024   1303 I viewed the right shoulder images in PACS.  My independent interpretation is no acute fracture or dislocation.  The hardware is intact.    [EW]      ED Course User Index  [EW] Ivy Oneal APRN     Orders placed during this visit:  Orders Placed This Encounter   Procedures    XR Shoulder 2+ View Right            Medical Decision Making  Problems Addressed:  Acute pain of right shoulder: complicated acute illness or injury    Amount and/or Complexity of Data Reviewed  Radiology: ordered.    Risk  Prescription drug management.      Patient with recent  right shoulder surgery July 18 of this year states she had her arms lifted in front of her and she was pouring a bottle of liquid into another bottle liquid and she felt a pop and had increasing right shoulder pain.  I considered dislocation, periprosthetic fracture, hardware issue.  I did give patient a pain pill here and had x-ray of the right shoulder which was unremarkable without any acute fracture, dislocation or problem with the hardware.  She will follow-up with her orthopedic surgeon, Dr. Jimenez      DIAGNOSIS  Final diagnoses:   Acute pain of right shoulder          Medication List      No changes were made to your prescriptions during this visit.         FOLLOW-UP  Ash Jimenez MD  [address] [address]  [phone]    Schedule an appointment as soon as possible for a visit           Latest Documented Vital Signs:  As of 13:08 EDT  BP- 177/83 HR- 70 Temp- 97.9 °F (36.6 °C) (Oral) O2 sat- 99%    Appropriate PPE utilized throughout this patient encounter to include mask, if indicated, per current protocol. Hand hygiene was performed before donning PPE and after removal when leaving the room.    Please note that portions of this were completed with a voice recognition program.     Note Disclaimer: At [facility], we believe that sharing information builds trust and better relationships. You are receiving this note because you are receiving care at [facility] or recently visited. It is possible you will see health information before a provider has talked with you about it. This kind of information can be easy to misunderstand. To help you fully understand what it means for your health, we urge you to discuss this note with your provider.

## 2024-09-29 NOTE — DISCHARGE INSTRUCTIONS
Home to rest today.    Tylenol as needed for pain    Use some ice on and off    Follow up with Dr. Jimenez.    Return Precautions    Although you are being discharged from the ED today, I encourage you to return for worsening symptoms.  Things can, and do, change such that treatment at home with medication may not be adequate.      Specifically, return for any of the following:    Chest pain, shortness of breath, pain or nausea and vomiting not controlled by medications provided.    Please make a follow up with your Primary Care Provider for a blood pressure recheck.

## 2024-10-01 ENCOUNTER — TELEPHONE (OUTPATIENT)
Dept: ORTHOPEDIC SURGERY | Facility: CLINIC | Age: 79
End: 2024-10-01
Payer: MEDICARE

## 2024-10-01 DIAGNOSIS — M25.511 ACUTE PAIN OF RIGHT SHOULDER: Primary | ICD-10-CM

## 2024-10-01 DIAGNOSIS — Z96.611 STATUS POST REVERSE TOTAL REPLACEMENT OF RIGHT SHOULDER: ICD-10-CM

## 2024-10-01 NOTE — TELEPHONE ENCOUNTER
Caller: CARRIE MOSELEY    Relationship to patient:  (ON VERBAL RELEASE OF INFO)    Best call back number: [phone]    Chief complaint:   CALLED FOR URGENT APPT. PATIENT FELT A POP IN HER RIGHT SHOULDER AND WENT TO  ED ON 9/29/24. ED NOTES AND X-RAYS IN EPIC. SAYS HER PAIN LEVEL WENT FROM A 3 TO A 20. ALSO, PATIENT IS ABOUT TO RUN OUT OF HYDROCODONE. [facility] IN [place]. PLEASE ADVISE.    Type of visit: F/U    Requested date: ASAP

## 2024-10-01 NOTE — TELEPHONE ENCOUNTER
I called pt spoke with  Darrius ( verbal confirmed) I advise of previous msg per TJS. Verbally understanding and will be awaiting call from [facility] tomorrow.

## 2024-10-01 NOTE — TELEPHONE ENCOUNTER
POST OP   7/18/24 RIGHT REVERSE TOTAL SHOULDER ARTHROPLASTY     [facility] OUT  Brigitte will return tomorrow 10/2  Please advise if patient should be seen today.

## 2024-10-02 RX ORDER — TRAMADOL HYDROCHLORIDE 50 MG/1
50 TABLET ORAL EVERY 6 HOURS PRN
Qty: 30 TABLET | Refills: 0 | Status: SHIPPED | OUTPATIENT
Start: 2024-10-02

## 2024-10-02 NOTE — TELEPHONE ENCOUNTER
I spoke to Ms. Mata.  She reports her pain has improved with rest, sling, and Norco.  I explained I cannot refill the Norco, however, I am happy to give her a short course of tramadol.  She agreed.  The risks were discussed.  I offered to still see her today, but she says she is fine to wait until her appointment with Dr. Jimenez on 10/9.  She may use ice and sling for comfort.  I instructed her to call if she has any other questions or concerns.

## 2024-10-09 ENCOUNTER — OFFICE VISIT (OUTPATIENT)
Dept: ORTHOPEDIC SURGERY | Facility: CLINIC | Age: 79
End: 2024-10-09
Payer: MEDICARE

## 2024-10-09 VITALS — TEMPERATURE: 98.6 F | WEIGHT: 125.8 LBS | HEIGHT: 64 IN | BODY MASS INDEX: 21.48 KG/M2

## 2024-10-09 DIAGNOSIS — Z96.611 STATUS POST REVERSE TOTAL REPLACEMENT OF RIGHT SHOULDER: Primary | ICD-10-CM

## 2024-10-09 DIAGNOSIS — Z09 SURGERY FOLLOW-UP: ICD-10-CM

## 2024-10-09 PROCEDURE — 1160F RVW MEDS BY RX/DR IN RCRD: CPT | Performed by: ORTHOPAEDIC SURGERY

## 2024-10-09 PROCEDURE — 99024 POSTOP FOLLOW-UP VISIT: CPT | Performed by: ORTHOPAEDIC SURGERY

## 2024-10-09 PROCEDURE — 1159F MED LIST DOCD IN RCRD: CPT | Performed by: ORTHOPAEDIC SURGERY

## 2024-10-09 PROCEDURE — 73030 X-RAY EXAM OF SHOULDER: CPT | Performed by: ORTHOPAEDIC SURGERY

## 2024-10-09 RX ORDER — CEPHALEXIN 500 MG/1
CAPSULE ORAL
Qty: 4 CAPSULE | Refills: 2 | Status: SHIPPED | OUTPATIENT
Start: 2024-10-09

## 2024-10-09 RX ORDER — SENNOSIDES 8.6 MG
650 CAPSULE ORAL EVERY 8 HOURS PRN
Qty: 60 TABLET | Refills: 1 | Status: SHIPPED | OUTPATIENT
Start: 2024-10-09

## 2024-10-09 NOTE — PROGRESS NOTES
"Ashley Mata : 1945 MRN: [id number] DATE: 10/9/2024    DIAGNOSIS: 3 month follow up right shoulder arthroplasty      SUBJECTIVE:  Patient returns today for 3 month follow up of right shoulder replacement.  She says she was doing great until a couple weeks ago when she reached and felt something pop in the shoulder.  She had severe pain at the time.  She went to the emergency room and had x-rays as well as a CT scan taken.  Both were reportedly negative.  She says she has rested the shoulder and it is starting to feel better.  She does report that there has been a significant drop-off in her motion and function.  Localizes most of her pain to the top of the shoulder.    OBJECTIVE:    Temp 98.6 °F (37 °C) (Temporal)   Ht 161.3 cm (63.5\")   Wt 57.1 kg (125 lb 12.8 oz)   LMP  (LMP Unknown)   BMI 21.93 kg/m²     Review of Systems negative except as above    Exam:  The incision is well healed. No effusion or increased warmth.  She is tender at the base of her acromion but there is no palpable defect or step-off.  Her passive motion is pretty good.  I can raise her up to about 140, abduct her about 80 and externally rotate her about 50.  Her active motion is much more limited.  She can really only elevate to about 50 and abduct about 40 without pain.  Her deltoid fires.  She has intact motor and sensation distally.  Brisk cap refill.    DIAGNOSTIC STUDIES    Xrays: AP, scapular Y and axillary views of the right shoulder are ordered and reviewed for evaluation of shoulder replacement. They demonstrate a well positioned, well aligned replacement without complicating factors noted.  In comparison with previous films there has been no change.    CT scan of the shoulder is reviewed.  There is no fracture.  She does have what appears to be a postop hematoma or seroma.  No obvious infection.    ASSESSMENT:  3 month follow up right shoulder replacement with suspected nondisplaced acromion fracture    PLAN:     I " have a strong suspicion of an occult acromion fracture despite the negative x-rays and CT scan.  We talked about appropriate activity modifications and restrictions.  I am fine with her continuing PT but I want her to focus only on range of motion and no strengthening yet.  I want to see her back in another 6 to 8 weeks for repeat x-rays.  I told her that if anything changes or function declines then I would want to see her back sooner.  She requested a refill of the Tylenol.  Risk of this medicine were discussed.  She says she continues to take the tramadol as needed.  I told her I will be happy to refill that in the future if needed..      Ash Jimenez MD    10/09/2024    Answers submitted by the patient for this visit:  Other (Submitted on 10/2/2024)  Please describe your symptoms.: Follow up after shoulder surgery  Have you had these symptoms before?: Yes  How long have you been having these symptoms?: Greater than 2 weeks  Please list any medications you are currently taking for this condition.: Tylenol  Please describe any probable cause for these symptoms. : Don't know  Primary Reason for Visit (Submitted on 10/2/2024)  What is the primary reason for your visit?: Problem Not Listed

## 2024-10-24 PROBLEM — Z86.0101 HISTORY OF ADENOMATOUS POLYP OF COLON: Status: ACTIVE | Noted: 2024-09-23

## 2024-10-30 ENCOUNTER — LAB (OUTPATIENT)
Dept: LAB | Facility: HOSPITAL | Age: 79
End: 2024-10-30
Payer: MEDICARE

## 2024-10-30 ENCOUNTER — OFFICE VISIT (OUTPATIENT)
Dept: NEUROLOGY | Facility: CLINIC | Age: 79
End: 2024-10-30
Payer: MEDICARE

## 2024-10-30 VITALS
HEART RATE: 72 BPM | BODY MASS INDEX: 21.68 KG/M2 | WEIGHT: 127 LBS | DIASTOLIC BLOOD PRESSURE: 82 MMHG | HEIGHT: 64 IN | SYSTOLIC BLOOD PRESSURE: 136 MMHG

## 2024-10-30 DIAGNOSIS — F51.8 ABNORMAL DREAMS: ICD-10-CM

## 2024-10-30 DIAGNOSIS — R06.83 SNORING: ICD-10-CM

## 2024-10-30 DIAGNOSIS — G31.84 MILD COGNITIVE IMPAIRMENT: Primary | ICD-10-CM

## 2024-10-30 DIAGNOSIS — G31.84 MILD COGNITIVE IMPAIRMENT: ICD-10-CM

## 2024-10-30 LAB
FOLATE SERPL-MCNC: >20 NG/ML (ref 4.78–24.2)
VIT B12 BLD-MCNC: 547 PG/ML (ref 211–946)

## 2024-10-30 PROCEDURE — 82607 VITAMIN B-12: CPT

## 2024-10-30 PROCEDURE — 83520 IMMUNOASSAY QUANT NOS NONAB: CPT

## 2024-10-30 PROCEDURE — 36415 COLL VENOUS BLD VENIPUNCTURE: CPT

## 2024-10-30 PROCEDURE — 82746 ASSAY OF FOLIC ACID SERUM: CPT

## 2024-10-30 RX ORDER — DONEPEZIL HYDROCHLORIDE 5 MG/1
5 TABLET, FILM COATED ORAL NIGHTLY
Qty: 21 TABLET | Refills: 0 | Status: SHIPPED | OUTPATIENT
Start: 2024-10-30 | End: 2024-11-20

## 2024-10-30 RX ORDER — DONEPEZIL HYDROCHLORIDE 10 MG/1
10 TABLET, FILM COATED ORAL NIGHTLY
Qty: 30 TABLET | Refills: 2 | Status: SHIPPED | OUTPATIENT
Start: 2024-10-30 | End: 2025-01-28

## 2024-10-30 NOTE — PROGRESS NOTES
[facility] NEUROLOGY         Date of Visit: 10/30/2024    Name: Ashley Mata    :  1945    PCP: Carloz Shukla MD    Visit Type: an initial evaluation         Subjective     Patient ID: Ashley is a 79 y.o. female.         History of Present Illness  I had the pleasure of seeing your patient for the first time today.  As you may know she is an 79-year-old female here today for initial evaluation for concerns of memory loss.  She is accompanied by her .  She was referred by her primary care physician.    History:    Patient does have history of hypothyroid, hypertension, hyperlipidemia, GERD, vitamin D deficiency, osteopenia, history of previous hepatitis.    Patient's  states that patient's memory symptoms began a couple of years ago after she underwent a surgery for hammertoe.  Postsurgery she developed what they believed was hepatitis with significantly elevated liver enzymes.  Since then patient has had a incline in her overall cognitive abilities.  He states there could have been some mild memory changes prior to this however they were not significantly noticeable.    Patient did end up following up with primary care physician about the symptoms.  He did end up ordering an MRI of the brain with and without contrast which showed evidence for microvascular changes and mild atrophy with no other significant abnormalities.  He also order neuropsychological testing which was completed at [facility] on 10/1/2024.  Testing revealed evidence for a nonamnestic mild cognitive impairment with deficits noted in processing speed and executive functioning however not severe enough to qualify for diagnosis of a neurocognitive disorder such as dementia at this time.  They did recommend repeat testing in 1 to 3 years.  In addition they recommended possible sleep testing as she is reporting some increased confusion upon awakening in the middle of the night as well as  some vivid dreams.  They also recommended potential driving evaluation due to the executive functioning difficulties.    Patient does have a family history of a mother with dementia in her early 90s, 2 aunts with dementia syndrome.  She denies any previous history of head injuries, chemotherapy.  No previous history of stroke.  No cardiovascular issues that she is aware of.    Current:    Patient currently has difficulty with word finding finding correct names for objects and people.  She has difficulty with managing her checkbook correctly which she was typically able to do very easily as she was a  for her career.  She occasionally has issues with medication confusion however  make sure to monitor these things.  She also has difficulty at times holding conversation stating that she will have difficulty putting thoughts together.  They noticed that complex decision making tends to be difficult for the patient.  She has occasionally left the stove on.  They deny difficulty in day-to-day activities such as bathing, dressing, toileting.  She has not had any hallucinations however does report very vivid and real dreams at nighttime even to the point where she has asked her  where the kids are even though she knows that they do not have kids.  He states that 1 night he was trying to help her as she was trying to put a trash bag in the trash can but did not need to be changed in the middle of the night.  This ended up causing a lot of frustration for them.  This has not been a frequent occurrence and this happened 8 months ago however this was something that was concerning for them.    Otherwise patient has good appetite.  She reports decent sleep.  They do report snoring at nighttime.  Patient denies feeling fatigued in the morning however  does report that she seems very sleepy and can fall asleep easily.  They deny any tremors, gait changes.  No numbness tingling or weakness.  No new  visual complaints.  She is not currently on any medication for memory management.  She has never been tested for vitamin B12 deficiency.  Her recent thyroid panel looked within normal limits.  No other new or worsening neurologic complaints at today's visit.      The following portions of the patient's history were reviewed and updated as appropriate: allergies, current medications, past family history, past medical history, past social history, past surgical history, and problem list.                 Review of Systems   Constitutional:  Negative for activity change, appetite change, fatigue and unexpected weight change.   HENT:  Negative for hearing loss and trouble swallowing.    Eyes:  Negative for visual disturbance.   Respiratory:  Negative for chest tightness and shortness of breath.    Cardiovascular:  Negative for palpitations.   Gastrointestinal:  Negative for constipation, diarrhea, nausea and vomiting.   Genitourinary:  Negative for decreased urine volume, difficulty urinating and frequency.   Musculoskeletal:  Negative for back pain, gait problem and neck pain.   Neurological:  Negative for tremors, syncope, facial asymmetry, speech difficulty, weakness and light-headedness.   Psychiatric/Behavioral:  Positive for confusion and sleep disturbance. Negative for agitation, behavioral problems, dysphoric mood and hallucinations. The patient is not nervous/anxious.             Current Medications:    Current Outpatient Medications   Medication Instructions    acetaminophen (TYLENOL 8 HOUR) 650 mg, Oral, Every 8 Hours PRN    atorvastatin (LIPITOR) 20 MG tablet TAKE 1 TABLET BY MOUTH EVERY DAY    donepezil (ARICEPT) 5 mg, Oral, Nightly    donepezil (ARICEPT) 10 mg, Oral, Nightly    estradiol (ESTRACE) 1 MG tablet Take 1 tablet (1 mg) daily as directed for postmenopausal state    ezetimibe (ZETIA) 10 MG tablet TAKE 1 TABLET BY MOUTH EVERY DAY    fexofenadine-pseudoephedrine (ALLEGRA-D 24) 180-240 MG per 24 hr  "tablet Take 1 p.o. daily for environmental allergies    fluticasone (FLONASE) 50 MCG/ACT nasal spray 2 sprays, As Needed    levothyroxine (SYNTHROID, LEVOTHROID) 50 MCG tablet TAKE ONE TABLET BY MOUTH DAILY FOR LOW THYROID    meloxicam (MOBIC) 15 mg, Oral, Daily    multivitamin (THERAGRAN) tablet tablet 1 tablet, Daily    omeprazole OTC (PRILOSEC OTC) 20 mg, Oral, Daily    traMADol (ULTRAM) 50 mg, Oral, Every 6 Hours PRN          /82   Pulse 72   Ht 161.3 cm (63.5\")   Wt 57.6 kg (127 lb)   BMI 22.14 kg/m²                Objective     Neurological Exam  Mental Status  Awake and alert. Oriented to person, place and time. Speech is normal. Language is fluent with no aphasia.    Cranial Nerves  CN II: Visual fields full to confrontation.  CN III, IV, VI: Extraocular movements intact bilaterally. Normal lids and orbits bilaterally. Pupils equal round and reactive to light bilaterally.  CN V: Facial sensation is normal.  CN VII: Full and symmetric facial movement.  CN IX, X: Palate elevates symmetrically  CN XI: Shoulder shrug strength is normal.  CN XII: Tongue midline without atrophy or fasciculations.    Motor  Normal muscle bulk throughout. Normal muscle tone. No abnormal involuntary movements. Strength is 5/5 throughout all four extremities.    Sensory  Sensation is intact to light touch, pinprick, vibration and proprioception in all four extremities.    Reflexes  Deep tendon reflexes are 2+ and symmetric in all four extremities.    Coordination    Finger-to-nose, rapid alternating movements and heel-to-shin normal bilaterally without dysmetria.    Gait  Normal casual, toe, heel and tandem gait.      Physical Exam  Constitutional:       General: She is awake.      Appearance: Normal appearance. She is normal weight.   Eyes:      General: Lids are normal.      Extraocular Movements: Extraocular movements intact.      Pupils: Pupils are equal, round, and reactive to light.   Pulmonary:      Effort: Pulmonary " effort is normal.   Skin:     General: Skin is warm.   Neurological:      Mental Status: She is alert.      Motor: Motor strength is normal.     Coordination: Coordination is intact.      Deep Tendon Reflexes: Reflexes are normal and symmetric.   Psychiatric:         Mood and Affect: Mood normal.         Speech: Speech normal.         Behavior: Behavior normal.                     Assessment & Plan     Diagnoses and all orders for this visit:    1. Mild cognitive impairment (Primary)  -     donepezil (ARICEPT) 5 MG tablet; Take 1 tablet by mouth Every Night for 21 days.  Dispense: 21 tablet; Refill: 0  -     donepezil (ARICEPT) 10 MG tablet; Take 1 tablet by mouth Every Night for 90 days.  Dispense: 30 tablet; Refill: 2  -     ATN Profile; Future  -     Vitamin B12; Future  -     Folate; Future    2. Snoring  -     Ambulatory Referral to Sleep Medicine    3. Abnormal dreams  -     Ambulatory Referral to Sleep Medicine       At this time we did go over the results of the MRI and neuropsychological testing as well as the diagnosis of mild cognitive impairment.    We did decide to go forward with testing for Alzheimer's diseases patient does have the diagnosis of mild cognitive impairment to screen for any genetic component to this.    In addition we will check a vitamin B12 and folate level today as this has not been checked in the past.    I would also like to go ahead and refer patient for sleep medicine evaluation due to the snoring and vivid dreams to make sure there is no underlying sleep pathology that could explain some of the worsening cognitive abilities.    We talked about melatonin as a potential option to be helpful.    In addition I would like to go ahead and start patient on donepezil for mild cognitive impairment 5 mg nightly for 3 weeks and then increasing to 10 mg.    Plan for follow-up in 2 months or sooner if needed.  Total of 60 minutes was spent with patient discussing diagnosis, prognosis, plan  of care, reviewing previous medical records and testing.            Radha BINGHAM    Neurology    [facility]    Phone: [phone]    10/30/2024 , 14:20 EDT

## 2024-10-31 ENCOUNTER — PATIENT ROUNDING (BHMG ONLY) (OUTPATIENT)
Dept: NEUROLOGY | Facility: CLINIC | Age: 79
End: 2024-10-31
Payer: MEDICARE

## 2024-11-01 ENCOUNTER — OFFICE VISIT (OUTPATIENT)
Dept: SURGERY | Facility: CLINIC | Age: 79
End: 2024-11-01
Payer: MEDICARE

## 2024-11-01 VITALS
WEIGHT: 127 LBS | DIASTOLIC BLOOD PRESSURE: 82 MMHG | BODY MASS INDEX: 21.68 KG/M2 | OXYGEN SATURATION: 99 % | HEIGHT: 64 IN | HEART RATE: 77 BPM | SYSTOLIC BLOOD PRESSURE: 158 MMHG

## 2024-11-01 DIAGNOSIS — K59.00 CONSTIPATION, UNSPECIFIED CONSTIPATION TYPE: Primary | ICD-10-CM

## 2024-11-01 PROCEDURE — 3077F SYST BP >= 140 MM HG: CPT | Performed by: SURGERY

## 2024-11-01 PROCEDURE — 99213 OFFICE O/P EST LOW 20 MIN: CPT | Performed by: SURGERY

## 2024-11-01 PROCEDURE — 1159F MED LIST DOCD IN RCRD: CPT | Performed by: SURGERY

## 2024-11-01 PROCEDURE — 1160F RVW MEDS BY RX/DR IN RCRD: CPT | Performed by: SURGERY

## 2024-11-01 PROCEDURE — 3079F DIAST BP 80-89 MM HG: CPT | Performed by: SURGERY

## 2024-11-01 RX ORDER — PHENOL 1.4 %
600 AEROSOL, SPRAY (ML) MUCOUS MEMBRANE DAILY
COMMUNITY

## 2024-11-01 NOTE — H&P (VIEW-ONLY)
"CC: Change in bowel habits     HPI: Patient is a very pleasant 79-year-old female that is here today for follow-up for change in bowel habits.  She noticed that her stool has been changed on caliber as well as color.  He has been going on for the past several months.  She reports sometimes she has a normal stool that was followed by liquid stool.  She denies any bleeding but reports she may have darkening of the stool in several different areas.  She denies any abdominal pain nausea or vomiting.  She reports mild constipation.  She has history of positive Cologuard.  She underwent attempted colonoscopy in 2022 that was done by me but was unable to be completed.  She underwent barium enema that showed a very redundant colon but no evidence of masses.  Patient is concerned about having colon cancer.  She denies any recent weight loss     PMH: Allergic rhinitis, hypertension, hyperlipidemia, vitamin D deficiency, primary hypothyroidism, diverticulosis, scoliosis, memory loss     Pertinent abdominal operations:   Subtotal hysterectomy 1978  Colonoscopy 2009  Laparoscopic cholecystectomy followed by ERCP with sphincterotomy 2014  Upper endoscopy 6/9/2015  Colonoscopy 2017  Incomplete colonoscopy and upper endoscopy 2022    MEDS: Reviewed and reconciled in epic     ALL: Bupivacaine    FH and SH:   Mother with colon polyps  Father with lung cancer  Daughter with breast cancer     ROS:   As per HPI     PE:   Vitals:    11/01/24 1218   BP: 158/82   Pulse: 77   SpO2: 99%   Weight: 57.6 kg (127 lb)   Height: 161.3 cm (63.5\")     Body mass index is 22.14 kg/m².   Alert and oriented ×3, no acute distress.  Head is normocephalic and atraumatic.  Neck is supple   Breathing comfortable  Abdomen soft, nontender nondistended, no organomegaly  BMI is within normal parameters. No other follow-up for BMI required.    Diagnostic studies:   Barium enema 9/21/2022:  CONCLUSION:  1. No annular lesion or fixed intraluminal filling defect is " seen, there  was some retained mobile fecal material.  2. The sigmoid is tortuous and there is extensive diverticulosis. There  was spasm and irritability with luminal irregularity. This segment was  difficult to assess. No significant delay in transit of barium through  this region which appeared to eventually open to a normal caliber.    Colonoscopy 8/18/2022: Redundant sigmoid colon, rectosigmoid colon polyp, pathology report hyperplastic polyp    Labs 6/5/2024: Normal CBC     Assessment and plan    The patient is a very pleasant 79-year-old female with change in bowel habits mostly constipation.  She has not been any fiber supplementation.  She has history of incomplete colonoscopy and a very redundant sigmoid colon.  I think that her issues with bowel movements are likely related to her very redundant and tortuous colon.  Discussed with the patient about further step.  I think she should start fiber supplementation with Metamucil daily.  She is already scheduled for colonoscopy and we will reattempt although I discussed with her that it is unlikely that we are going to be able to pass the sigmoid colon due to tortuosity.  Hemoglobin is stable.  There is no signs of colon cancer or any malignancy at the moment.  She understood       Mario Ray MD  General, Minimally Invasive and Endoscopic Surgery  [facility]     [address], [address]  [place], [place], [address]  P: [phone]  F: [phone]

## 2024-11-01 NOTE — PROGRESS NOTES
"CC: Change in bowel habits     HPI: Patient is a very pleasant 79-year-old female that is here today for follow-up for change in bowel habits.  She noticed that her stool has been changed on caliber as well as color.  He has been going on for the past several months.  She reports sometimes she has a normal stool that was followed by liquid stool.  She denies any bleeding but reports she may have darkening of the stool in several different areas.  She denies any abdominal pain nausea or vomiting.  She reports mild constipation.  She has history of positive Cologuard.  She underwent attempted colonoscopy in 2022 that was done by me but was unable to be completed.  She underwent barium enema that showed a very redundant colon but no evidence of masses.  Patient is concerned about having colon cancer.  She denies any recent weight loss     PMH: Allergic rhinitis, hypertension, hyperlipidemia, vitamin D deficiency, primary hypothyroidism, diverticulosis, scoliosis, memory loss     Pertinent abdominal operations:   Subtotal hysterectomy 1978  Colonoscopy 2009  Laparoscopic cholecystectomy followed by ERCP with sphincterotomy 2014  Upper endoscopy 6/9/2015  Colonoscopy 2017  Incomplete colonoscopy and upper endoscopy 2022    MEDS: Reviewed and reconciled in epic     ALL: Bupivacaine    FH and SH:   Mother with colon polyps  Father with lung cancer  Daughter with breast cancer     ROS:   As per HPI     PE:   Vitals:    11/01/24 1218   BP: 158/82   Pulse: 77   SpO2: 99%   Weight: 57.6 kg (127 lb)   Height: 161.3 cm (63.5\")     Body mass index is 22.14 kg/m².   Alert and oriented ×3, no acute distress.  Head is normocephalic and atraumatic.  Neck is supple   Breathing comfortable  Abdomen soft, nontender nondistended, no organomegaly  BMI is within normal parameters. No other follow-up for BMI required.    Diagnostic studies:   Barium enema 9/21/2022:  CONCLUSION:  1. No annular lesion or fixed intraluminal filling defect is " seen, there  was some retained mobile fecal material.  2. The sigmoid is tortuous and there is extensive diverticulosis. There  was spasm and irritability with luminal irregularity. This segment was  difficult to assess. No significant delay in transit of barium through  this region which appeared to eventually open to a normal caliber.    Colonoscopy 8/18/2022: Redundant sigmoid colon, rectosigmoid colon polyp, pathology report hyperplastic polyp    Labs 6/5/2024: Normal CBC     Assessment and plan    The patient is a very pleasant 79-year-old female with change in bowel habits mostly constipation.  She has not been any fiber supplementation.  She has history of incomplete colonoscopy and a very redundant sigmoid colon.  I think that her issues with bowel movements are likely related to her very redundant and tortuous colon.  Discussed with the patient about further step.  I think she should start fiber supplementation with Metamucil daily.  She is already scheduled for colonoscopy and we will reattempt although I discussed with her that it is unlikely that we are going to be able to pass the sigmoid colon due to tortuosity.  Hemoglobin is stable.  There is no signs of colon cancer or any malignancy at the moment.  She understood       Mario Ray MD  General, Minimally Invasive and Endoscopic Surgery  [facility]     [address], [address]  [place], [place], [address]  P: [phone]  F: [phone]

## 2024-11-01 NOTE — PATIENT INSTRUCTIONS
A high fiber diet with plenty of fluids (up to 8 glasses of water daily) is suggested to relieve these symptoms.  Metamucil, 1 tablespoon once or twice daily can be used to keep bowels regular if needed.

## 2024-11-05 LAB
A -- BETA-AMYLOID 42/40 RATIO: 0.11
AL BETA40 PLAS-MCNC: 138.04 PG/ML
AL BETA42 PLAS-MCNC: 14.9 PG/ML
ATN SUMMARY1: NORMAL
INFORMATION:: NORMAL
N -- NFL, PLASMA: 6.41 PG/ML (ref 0–11.55)
T -- P-TAU181: 0.8 PG/ML (ref 0–0.97)

## 2024-11-11 DIAGNOSIS — N39.44 NOCTURNAL ENURESIS: ICD-10-CM

## 2024-11-11 DIAGNOSIS — R39.15 URINARY URGENCY: Primary | ICD-10-CM

## 2024-11-11 DIAGNOSIS — R39.15 URINARY URGENCY: ICD-10-CM

## 2024-11-14 DIAGNOSIS — B37.49 CANDIDURIA: Primary | ICD-10-CM

## 2024-11-14 LAB
APPEARANCE UR: ABNORMAL
BACTERIA UR CULT: ABNORMAL
BILIRUB UR QL STRIP: NEGATIVE
COLOR UR: YELLOW
GLUCOSE UR QL STRIP: NEGATIVE
HGB UR QL STRIP: NEGATIVE
KETONES UR QL STRIP: NEGATIVE
LEUKOCYTE ESTERASE UR QL STRIP: NEGATIVE
MICRO URNS: ABNORMAL
NITRITE UR QL STRIP: NEGATIVE
PH UR STRIP: 5.5 [PH] (ref 5–7.5)
PROT UR QL STRIP: ABNORMAL
SP GR UR STRIP: 1.02 (ref 1–1.03)
UROBILINOGEN UR STRIP-MCNC: 0.2 MG/DL (ref 0.2–1)

## 2024-11-14 RX ORDER — FLUCONAZOLE 200 MG/1
TABLET ORAL
Qty: 5 TABLET | Refills: 0 | Status: SHIPPED | OUTPATIENT
Start: 2024-11-14

## 2024-11-20 ENCOUNTER — OFFICE VISIT (OUTPATIENT)
Facility: HOSPITAL | Age: 79
End: 2024-11-20
Payer: MEDICARE

## 2024-11-20 ENCOUNTER — TELEPHONE (OUTPATIENT)
Dept: INTERNAL MEDICINE | Facility: CLINIC | Age: 79
End: 2024-11-20

## 2024-11-20 VITALS — WEIGHT: 126.2 LBS | HEIGHT: 64 IN | OXYGEN SATURATION: 98 % | HEART RATE: 76 BPM | BODY MASS INDEX: 21.54 KG/M2

## 2024-11-20 DIAGNOSIS — I10 BENIGN ESSENTIAL HYPERTENSION: Primary | Chronic | ICD-10-CM

## 2024-11-20 DIAGNOSIS — R41.89 COGNITIVE IMPAIRMENT: ICD-10-CM

## 2024-11-20 DIAGNOSIS — F51.3 SLEEP WALKING: ICD-10-CM

## 2024-11-20 DIAGNOSIS — G47.52 DREAM ENACTMENT BEHAVIOR: ICD-10-CM

## 2024-11-20 DIAGNOSIS — R41.3 MEMORY LOSS: ICD-10-CM

## 2024-11-20 DIAGNOSIS — G47.33 OSA (OBSTRUCTIVE SLEEP APNEA): ICD-10-CM

## 2024-11-20 PROCEDURE — G0463 HOSPITAL OUTPT CLINIC VISIT: HCPCS

## 2024-11-20 NOTE — TELEPHONE ENCOUNTER
PATIENT CALLED NEEDING TO SEE DR MEI BEFORE HER APPOINTMENT ON 12/3/24  SHE COULD NOT ELABORATE DUE TO BEING IN A [employer]    PLEASE CALL AND ADVISE [phone]

## 2024-11-20 NOTE — PROGRESS NOTES
Patient Care Team:  Carloz Shukla MD as PCP - General (Internal Medicine)  Estefania Christina MD, MPH as Consulting Physician (Sleep Medicine)        History of Present Illness  The patient presents for evaluation of sleep issues. She is accompanied by her .    She reports experiencing unusual dreams, which have become less frequent recently. She describes a recurring dream involving a large house with a tower room and a staircase, which she experienced nightly for 2 to 3 months in 1982 or 1983. Since then, she has had dreams, but not as frequently or as vividly. Her  notes that she has been having difficulty distinguishing between dreams and reality upon waking in the middle of the night, a problem that has not occurred in the past few weeks since starting new medication. He also mentions that she sometimes takes actions based on her dreams, such as putting a plastic bag in the trash can at 2:00 or 3:00 in the morning. She has not become violent, but occasionally struggles to differentiate between sleep and wakefulness. She reports no history of sleepwalking as a child. Her  reports that she snores lightly, but he cannot confirm if she stops breathing during sleep. She has not had a dream in the past 3 weeks, but previously had 1 or 2 per month. She feels well-rested upon waking and generally feels better in the morning than at bedtime. She reports no tremors or shaking. She cannot recall the last time she felt confused, but estimates it was about a month ago.    She does not get heartburn because she takes medication for it. She used to get it mostly in the evening before she started taking the medication.       [place]: 14    Data Reviewed: Reviewed her medical chart and sleep questionnaire  Note from the referring provider contained this excerpt:  Patient currently has difficulty with word finding finding correct names for objects and people.  She has difficulty with managing her  checkbook correctly which she was typically able to do very easily as she was a  for her career.  She occasionally has issues with medication confusion however  make sure to monitor these things.  She also has difficulty at times holding conversation stating that she will have difficulty putting thoughts together.  They noticed that complex decision making tends to be difficult for the patient.  She has occasionally left the stove on.  They deny difficulty in day-to-day activities such as bathing, dressing, toileting.  She has not had any hallucinations however does report very vivid and real dreams at nighttime even to the point where she has asked her  where the kids are even though she knows that they do not have kids.  He states that 1 night he was trying to help her as she was trying to put a trash bag in the trash can but did not need to be changed in the middle of the night.  This ended up causing a lot of frustration for them.  This has not been a frequent occurrence and this happened 8 months ago however this was something that was concerning for them.         PMH:  Past Medical History:   Diagnosis Date    Allergic rhinitis 01/30/2015 11/13/2015--patient was evaluated by ENT and was started on generic Flonase and patient reports this has improved her symptoms quite a bit.   01/30/2015--allergy testing revealed positive reactivity to cat, dust mite, cockroach, mold spores, and grass pollen. Environmental control measures.    Alopecia 04/12/2017    Evaluated and treated by the dermatologist.    Benign essential hypertension 04/18/2016    Chronic pain of both knees 04/05/2023    Chronic toe pain, right foot 10/19/2021    October 19, 2021--patient reports a 1 year history of progressively worsening right toe pain that particular involves the second toe.  On exam she has obvious hammertoe which is causing irritation.  There is some hammering of the third digit as well.  Patient  referred to orthopedist who specializes in foot problems such as Dr. Vieira.  In the meantime patient should use over-the-counter toe pads to    Diverticulosis of colon 03/17/2009    03/10/2017--colonoscopy revealed many small and large mouth diverticula in the sigmoid colon and descending colon.  Nonthrombosed external hemorrhoids and internal hemorrhoids noted.  Otherwise, normal colonoscopy.  03/17/2009--colonoscopy revealed external hemorrhoids, torturous colon with a sigmoid stricture, sigmoid diverticulosis.    Family history of breast cancer in first degree relative 07/13/2020    Family history of colonic polyps 04/01/2022    Gastroesophageal reflux disease without esophagitis 03/17/2009 06/09/2015--EGD revealed Z line irregular, 35 cm from incisors. Biopsied. Small hiatal hernia. Gastritis. Biopsied. Bilious gastric fluid. Fluid aspiration performed. Normal duodenal bulb and second part of duodenum. Biopsied. Pathology revealed the antral biopsy revealed small intestinal mucosa with no pathologic diagnosis. Good preservation of villous architecture. Duodenum biopsy revealed largely antral type gastric mucosa with mild patchy superficial chronic gastritis. Gastroesophageal junction revealed squamous and glandular mucosa with mild chronic inflammation. Negative for intestinal metaplasia or dysplasia. There appeared to be mislabeling of the antral biopsies and duodenal biopsies.   04/17/2012--EGD revealed irregular Z line, hiatal hernia, gastritis, duodenitis.   03/17/2009--EGD revealed grade B. reflux esophagitis, hiatal hernia, gastritis, a few gastric polyps, duodenitis.    Hammertoe of right foot     Hammertoe of second toe of right foot 10/19/2021    October 19, 2021--patient reports a 1 year history of progressively worsening right toe pain that particular involves the second toe.  On exam she has obvious hammertoe which is causing irritation.  There is some hammering of the third digit as well.   Patient referred to orthopedist who specializes in foot problems such as Dr. Vieira.  In the meantime patient should use over-the-counter toe pads to    History of acute hepatitis 04/24/2022    May 9, 2022--patient seen in hospital discharge follow-up/transition of care.  I reviewed the documentation including the admission history and physical, hospital course, laboratory and radiographic studies, GI consultation, discharge summary and discharge medications.  I specifically reviewed the tissue pathology report which revealed moderate steatosis with moderate chronic portal inflammation a    History of COVID-19, August 12, 2021; May 17, 2022. 10/19/2021    May 17, 2022--patient again tested positive for COVID.  Ordered by gastroenterology nurse practitioner.  October 19, 2021--patient seen in follow-up and reports her symptoms totally resolved.  She wants to know when she should get her Covid booster vaccine.  I have suggested that she receive it approximately 3 months after initially testing positive.  She has an appointment in November 6, 2021 for    History of Hyperplastic polyps of stomach 06/09/2015 06/09/2015--EGD revealed Z line irregular, 35 cm from incisors. Biopsied. Small hiatal hernia. Gastritis. Biopsied. Bilious gastric fluid. Fluid aspiration performed. Normal duodenal bulb and second part of duodenum. Biopsied. Pathology revealed the antral biopsy revealed small intestinal mucosa with no pathologic diagnosis. Good preservation of villous architecture. Duodenum biopsy revealed large    Hyperlipidemia 07/17/2012 07/17/2012--treatment for hyperlipidemia begun.    Lumbar disc herniation, L4-L5 08/07/2018 01/16/2019--patient seen in follow-up by Dr. Shukla.  She has seen the neurosurgeon who recommended conservative therapy with physical therapy.  Patient reports that has been extremely helpful.  Currently has no significant pain.  08/07/2018--patient seen in follow-up and reports her pain is much  better after taking prednisone.  She is now down to half a pill per day and is almost off of it.  I rev    Lumbar scoliosis 08/07/2018 01/16/2019--patient seen in follow-up by Dr. Shukla.  She has seen the neurosurgeon who recommended conservative therapy with physical therapy.  Patient reports that has been extremely helpful.  Currently has no significant pain.  08/07/2018--patient seen in follow-up and reports her pain is much better after taking prednisone.  She is now down to half a pill per day and is almost off of it.  I rev    Memory loss May, 2022    Menopausal state 07/13/2020    Multiple environmental allergies 01/30/2015 01/30/2015--allergy testing revealed positive reactivity to cat, dust mite, cockroach, mold spores, and grass pollen. Environmental control measures.    Osteopenia of multiple sites, 2/25/2022--lumbar spine 1.1.  Left femoral neck -1.0.  Right femoral neck -0.3. 04/01/2022 February 25, 2022--DEXA scan reveals lumbar spine T score 1.1.  Left femoral neck T score -1.0.  Right femoral neck T score -0.3.  Mild osteopenia.    Postmenopausal hormone replacement therapy 04/18/2016    Primary hypothyroidism 01/22/2020 January 22, 2020--TSH is elevated at 5.0.  Levothyroxine 50 mcg/day initiated.  Patient will follow-up with nonfasting lab work in about 6 to 8 weeks to reassess.  07/09/2018--routine follow-up.  TSH elevated slightly at 4.31.  Free T4 normal at 1.22.  Free T3 normal at 3.3.  Continued observation.  10/11/2017--thyroid function tests are normal.  09/30/2016--thyroid ultrasound reveals a tiny 2 mm     Primary osteoarthritis involving multiple joints 06/08/2022    Primary osteoarthritis of both hands 12/30/2013 05/12/2014--patient seen in followup and reports that the Vimovo has helped. Uric acid levels are normal at 4.9. C. reactive protein mildly elevated at 2.3. X-rays of the hands reveals radiocarpal, first carpometacarpal, first metacarpophalangeal, and  interphalangeal joint degeneration. This process is symmetric. The interphalangeal joint of the left is affected to the greatest degree. There is right sided scapholunate dissociation as well as deformity of the scaphoid suggesting prior traumatic injury with healing. Soft tissues are satisfactory. Overall appearance is most consistent with osteoarthritis.   05/05/2014--patient presents and reports that she is having worsening right wrist pain primarily at the base of the thumb. No new injury. Bilateral x-rays of the wrists and hands ordered. Uric acid level ordered as well as a CRP. Vimovo 500/20 one by mouth twice a day. Follow up in one week.   03/18/2014--patient reports that her wrist symptoms have improved.   12/30/2013--patient reports a several mon    Primary osteoarthritis of both knees 04/05/2023    Primary osteoarthritis of both wrists 12/30/2013 05/12/2014--patient seen in followup and reports that the Vimovo has helped. Uric acid levels are normal at 4.9. C. reactive protein mildly elevated at 2.3. X-rays of the hands reveals radiocarpal, first carpometacarpal, first metacarpophalangeal, and interphalangeal joint degeneration. This process is symmetric. The interphalangeal joint of the left is affected to the greatest degree. There is right sided scapholunate dissociation as well as deformity of the scaphoid suggesting prior traumatic injury with healing. Soft tissues are satisfactory. Overall appearance is most consistent with osteoarthritis.   05/05/2014--patient presents and reports that she is having worsening right wrist pain primarily at the base of the thumb. No new injury. Bilateral x-rays of the wrists and hands ordered. Uric acid level ordered as well as a CRP. Vimovo 500/20 one by mouth twice a day. Follow up in one week.   03/18/2014--patient reports that her wrist symptoms have improved.   12/30/2013--patient reports a several mon    Rotator cuff syndrome     Shingles     Vitamin D  "deficiency 04/18/2016        Allergies:  Bupivacaine     Medication Review:   Current Outpatient Medications on File Prior to Visit   Medication Sig Dispense Refill    acetaminophen (Tylenol 8 Hour) 650 MG 8 hr tablet Take 1 tablet by mouth Every 8 (Eight) Hours As Needed for Mild Pain. 60 tablet 1    atorvastatin (LIPITOR) 20 MG tablet TAKE 1 TABLET BY MOUTH EVERY DAY      calcium carbonate (OS-CHRIS) 600 MG tablet Take 1 tablet by mouth Daily.      donepezil (ARICEPT) 10 MG tablet Take 1 tablet by mouth Every Night for 90 days. 30 tablet 2    donepezil (ARICEPT) 5 MG tablet Take 1 tablet by mouth Every Night for 21 days. 21 tablet 0    estradiol (ESTRACE) 1 MG tablet Take 1 tablet (1 mg) daily as directed for postmenopausal state      ezetimibe (ZETIA) 10 MG tablet TAKE 1 TABLET BY MOUTH EVERY DAY      fexofenadine-pseudoephedrine (ALLEGRA-D 24) 180-240 MG per 24 hr tablet Take 1 p.o. daily for environmental allergies      fluconazole (DIFLUCAN) 200 MG tablet Take 1 p.o. daily until gone for fungus in urine 5 tablet 0    fluticasone (FLONASE) 50 MCG/ACT nasal spray Administer 2 sprays into the nostril(s) as directed by provider As Needed.      levothyroxine (SYNTHROID, LEVOTHROID) 50 MCG tablet TAKE ONE TABLET BY MOUTH DAILY FOR LOW THYROID      meloxicam (MOBIC) 15 MG tablet Take 1 tablet by mouth Daily. 90 tablet 3    multivitamin (THERAGRAN) tablet tablet Take 1 tablet by mouth Daily.      omeprazole OTC (PrilOSEC OTC) 20 MG EC tablet Take 1 tablet by mouth Daily.      traMADol (ULTRAM) 50 MG tablet Take 1 tablet by mouth Every 6 (Six) Hours As Needed for Moderate Pain. 30 tablet 0     No current facility-administered medications on file prior to visit.         Vital Signs:    Vitals:    11/20/24 1404   Pulse: 76   SpO2: 98%   Weight: 57.2 kg (126 lb 3.2 oz)   Height: 161.3 cm (63.5\")        Body mass index is 22 kg/m².     .BMIFOLLOWUP  BMI is within normal parameters. No other follow-up for BMI " required.      Physical Exam:    Constitutional:  Well developed 79 y.o. female that appears in no apparent distress.  Awake & oriented times 3.  Normal mood with normal recent and remote memory and normal judgement.  Eyes:  Conjunctivae normal.  Oropharynx: Moist mucous membranes without exudate and Mallampati 2  Neck: Trachea midline  Respiratory: Effort is not labored  Cardiovascular: Radial pulse regular  Musculoskeletal: Gait appears normal, no digital clubbing evident, no pre-tibial edema        Impression:   Encounter Diagnoses   Name Primary?    Benign essential hypertension Yes    Memory loss     Cognitive impairment     Sleep walking     Dream enactment behavior     RONAK (obstructive sleep apnea)       Patient's BMI is Body mass index is 22 kg/m².        Assessment & Plan  1. Sleep issues.  The patient reports experiencing confusion between dreams and reality upon waking, sometimes leading to actions such as placing a plastic bag in a trash can. These episodes occur about one or two times a month and have not happened in the past three weeks. There is no history of sleepwalking or significant snoring, and she feels well-rested in the morning. A sleep study will be ordered to investigate potential REM behavior disorder and sleep apnea. The study will be conducted at the [facility]. If her symptoms do not persist, she may opt for a home test to check for apnea instead.    2. Heartburn.  The patient experiences heartburn, which is well-controlled with medication. She is advised to continue her current medication regimen.         The patient should practice good sleep hygiene measures.      Weight loss might be beneficial in this patient who has a Body mass index is 22 kg/m².      Pathophysiology of RONAK described to the patient.  Cardiovascular complications of untreated RONAK also reviewed.      The patient was cautioned about the dangers of drowsy driving.    Patient or patient representative verbalized  consent for the use of Ambient Listening during the visit with  Carmelo Christina MD for chart documentation. 11/20/2024  14:29 EST     Carmelo Christina MD  Sleep Medicine  11/20/24  14:23 EST

## 2024-11-21 ENCOUNTER — TELEPHONE (OUTPATIENT)
Dept: INTERNAL MEDICINE | Facility: CLINIC | Age: 79
End: 2024-11-21
Payer: MEDICARE

## 2024-11-25 ENCOUNTER — TELEPHONE (OUTPATIENT)
Dept: NEUROLOGY | Facility: CLINIC | Age: 79
End: 2024-11-25

## 2024-11-25 ENCOUNTER — OFFICE VISIT (OUTPATIENT)
Dept: ORTHOPEDIC SURGERY | Facility: CLINIC | Age: 79
End: 2024-11-25
Payer: MEDICARE

## 2024-11-25 VITALS — BODY MASS INDEX: 20.99 KG/M2 | HEIGHT: 65 IN | TEMPERATURE: 98.3 F | WEIGHT: 126 LBS

## 2024-11-25 DIAGNOSIS — Z09 SURGERY FOLLOW-UP: ICD-10-CM

## 2024-11-25 DIAGNOSIS — Z96.611 STATUS POST REVERSE TOTAL REPLACEMENT OF RIGHT SHOULDER: Primary | ICD-10-CM

## 2024-11-25 PROCEDURE — 99212 OFFICE O/P EST SF 10 MIN: CPT | Performed by: ORTHOPAEDIC SURGERY

## 2024-11-25 PROCEDURE — 73030 X-RAY EXAM OF SHOULDER: CPT | Performed by: ORTHOPAEDIC SURGERY

## 2024-11-25 PROCEDURE — 1159F MED LIST DOCD IN RCRD: CPT | Performed by: ORTHOPAEDIC SURGERY

## 2024-11-25 PROCEDURE — 1160F RVW MEDS BY RX/DR IN RCRD: CPT | Performed by: ORTHOPAEDIC SURGERY

## 2024-11-25 NOTE — PROGRESS NOTES
"CC:  Follow up right shoulder    Ms. Mata is seen today in follow-up for her right shoulder.  She has been going to PT.  She feels like she is making progress.  She says that she is no longer having any pain in the shoulder.  She says the only time really bothers her is if she lays on her side.  She says she feels like her range of motion is \"overall pretty good\".  Denies any complaints or concerns.    Right shoulder: She now has a palpable step-off at the base of the acromion.  She is not significantly tender in this area.  Her motion is tremendously better than last visit.  She can now forward elevate to 160, abduct over 100, externally rotate about 50 and internally rotate to her back pocket.  Resisted abduction and elevation are both mildly uncomfortable but not exquisitely so.    AP, scapular Y and axillary views of the right shoulder are ordered and reviewed.  These are compared to previous x-rays.  She has an acromion fracture which is poorly visualized.  There does appear to be some callus.    Assessment: Displaced acromion fracture status post right reverse total shoulder arthroplasty    Plan: Unfortunately, it appears that we were right about her having an acromion fracture.  In the interim since I last saw her it looks like this has displaced.  Despite the displacement, she seems to be doing remarkably well.  We talked about the natural history of this condition.  I explained that this is a very difficult problem to manage.  She seems to be doing remarkably well at this point despite the displacement.  I am inclined to continue to rehab her and give this some time.  If she plateaus then we may have to consider open reduction, internal fixation but I told her that my own experience with that procedure has been that the results are very unpredictable.  If we can get her to a point where she could live with it as is, that would certainly be the best option.  She seems to be doing great with her range of " motion and she is not having any pain whatsoever.  I suspect that strengthening is really going to be difficult for her but we will just have to take that as it comes.  I will see her back in another 6 weeks to check her progress.  She is in agreement with this plan.    Ash Jimenez MD

## 2024-11-25 NOTE — TELEPHONE ENCOUNTER
Caller: Darrius Mata    Relationship: Emergency Contact    Best call back number: [phone]    Which medication are you concerned about: DONEPEZIL 10 MG    Who prescribed you this medication: ADWOA TOLEDO APRN    When did you start taking this medication:  OVER A MONTH AGO    What are your concerns: PATIENT HAS HAD LOOSE STOOLS FOR THE LAST 3 WEEKS.  CALLING TO SEE IF THIS IS FROM THE MEDICATION    How long have you had these concerns: 3 WEEKS    STATED PATIENT IS SCHEDULED FOR A COLONOSCOPY FOR TOMORROW (11-26-24)  CALLING TO SEE IF PATIENT SHOULD HAVE THE COLONOSCOPY TOMORROW.      PLEASE CALL & ADVISE

## 2024-11-26 ENCOUNTER — ANESTHESIA EVENT (OUTPATIENT)
Dept: GASTROENTEROLOGY | Facility: HOSPITAL | Age: 79
End: 2024-11-26
Payer: MEDICARE

## 2024-11-26 ENCOUNTER — HOSPITAL ENCOUNTER (OUTPATIENT)
Facility: HOSPITAL | Age: 79
Setting detail: HOSPITAL OUTPATIENT SURGERY
End: 2024-11-26
Attending: SURGERY | Admitting: SURGERY
Payer: MEDICARE

## 2024-11-26 ENCOUNTER — APPOINTMENT (OUTPATIENT)
Dept: CT IMAGING | Facility: HOSPITAL | Age: 79
End: 2024-11-26
Payer: MEDICARE

## 2024-11-26 ENCOUNTER — HOSPITAL ENCOUNTER (EMERGENCY)
Facility: HOSPITAL | Age: 79
Discharge: HOME | End: 2024-11-26
Attending: EMERGENCY MEDICINE | Admitting: EMERGENCY MEDICINE
Payer: MEDICARE

## 2024-11-26 ENCOUNTER — ANESTHESIA (OUTPATIENT)
Dept: GASTROENTEROLOGY | Facility: HOSPITAL | Age: 79
End: 2024-11-26
Payer: MEDICARE

## 2024-11-26 VITALS
DIASTOLIC BLOOD PRESSURE: 107 MMHG | OXYGEN SATURATION: 99 % | BODY MASS INDEX: 20.67 KG/M2 | SYSTOLIC BLOOD PRESSURE: 123 MMHG | WEIGHT: 122.3 LBS | RESPIRATION RATE: 20 BRPM | HEART RATE: 49 BPM

## 2024-11-26 VITALS
OXYGEN SATURATION: 96 % | DIASTOLIC BLOOD PRESSURE: 96 MMHG | RESPIRATION RATE: 14 BRPM | HEART RATE: 89 BPM | SYSTOLIC BLOOD PRESSURE: 150 MMHG | TEMPERATURE: 96 F

## 2024-11-26 DIAGNOSIS — R55 SYNCOPE, UNSPECIFIED SYNCOPE TYPE: Primary | ICD-10-CM

## 2024-11-26 LAB
ALBUMIN SERPL-MCNC: 3.8 G/DL (ref 3.5–5.2)
ALBUMIN/GLOB SERPL: 1.3 G/DL
ALP SERPL-CCNC: 114 U/L (ref 39–117)
ALT SERPL W P-5'-P-CCNC: 25 U/L (ref 1–33)
ANION GAP SERPL CALCULATED.3IONS-SCNC: 11.7 MMOL/L (ref 5–15)
AST SERPL-CCNC: 40 U/L (ref 1–32)
BASOPHILS # BLD AUTO: 0.09 10*3/MM3 (ref 0–0.2)
BASOPHILS NFR BLD AUTO: 0.9 % (ref 0–1.5)
BILIRUB SERPL-MCNC: 0.3 MG/DL (ref 0–1.2)
BUN SERPL-MCNC: 14 MG/DL (ref 8–23)
BUN/CREAT SERPL: 17.9 (ref 7–25)
CALCIUM SPEC-SCNC: 9.3 MG/DL (ref 8.6–10.5)
CHLORIDE SERPL-SCNC: 104 MMOL/L (ref 98–107)
CO2 SERPL-SCNC: 21.3 MMOL/L (ref 22–29)
CREAT SERPL-MCNC: 0.78 MG/DL (ref 0.57–1)
DEPRECATED RDW RBC AUTO: 41.5 FL (ref 37–54)
EGFRCR SERPLBLD CKD-EPI 2021: 76.4 ML/MIN/1.73
EOSINOPHIL # BLD AUTO: 0.39 10*3/MM3 (ref 0–0.4)
EOSINOPHIL NFR BLD AUTO: 4 % (ref 0.3–6.2)
ERYTHROCYTE [DISTWIDTH] IN BLOOD BY AUTOMATED COUNT: 12.6 % (ref 12.3–15.4)
GEN 5 2HR TROPONIN T REFLEX: 6 NG/L
GLOBULIN UR ELPH-MCNC: 3 GM/DL
GLUCOSE BLDC GLUCOMTR-MCNC: 107 MG/DL (ref 70–130)
GLUCOSE SERPL-MCNC: 111 MG/DL (ref 65–99)
HCT VFR BLD AUTO: 45.2 % (ref 34–46.6)
HGB BLD-MCNC: 14.3 G/DL (ref 12–15.9)
IMM GRANULOCYTES # BLD AUTO: 0.04 10*3/MM3 (ref 0–0.05)
IMM GRANULOCYTES NFR BLD AUTO: 0.4 % (ref 0–0.5)
LYMPHOCYTES # BLD AUTO: 4.55 10*3/MM3 (ref 0.7–3.1)
LYMPHOCYTES NFR BLD AUTO: 46.8 % (ref 19.6–45.3)
MCH RBC QN AUTO: 28.7 PG (ref 26.6–33)
MCHC RBC AUTO-ENTMCNC: 31.6 G/DL (ref 31.5–35.7)
MCV RBC AUTO: 90.8 FL (ref 79–97)
MONOCYTES # BLD AUTO: 0.39 10*3/MM3 (ref 0.1–0.9)
MONOCYTES NFR BLD AUTO: 4 % (ref 5–12)
NEUTROPHILS NFR BLD AUTO: 4.27 10*3/MM3 (ref 1.7–7)
NEUTROPHILS NFR BLD AUTO: 43.9 % (ref 42.7–76)
NRBC BLD AUTO-RTO: 0 /100 WBC (ref 0–0.2)
PLAT MORPH BLD: NORMAL
PLATELET # BLD AUTO: 401 10*3/MM3 (ref 140–450)
PMV BLD AUTO: 9.9 FL (ref 6–12)
POIKILOCYTOSIS BLD QL SMEAR: NORMAL
POTASSIUM SERPL-SCNC: 3.6 MMOL/L (ref 3.5–5.2)
PROT SERPL-MCNC: 6.8 G/DL (ref 6–8.5)
QT INTERVAL: 495 MS
QTC INTERVAL: 461 MS
RBC # BLD AUTO: 4.98 10*6/MM3 (ref 3.77–5.28)
SODIUM SERPL-SCNC: 137 MMOL/L (ref 136–145)
TROPONIN T DELTA: NORMAL
TROPONIN T SERPL HS-MCNC: <6 NG/L
WBC MORPH BLD: NORMAL
WBC NRBC COR # BLD AUTO: 9.73 10*3/MM3 (ref 3.4–10.8)

## 2024-11-26 PROCEDURE — 93005 ELECTROCARDIOGRAM TRACING: CPT | Performed by: EMERGENCY MEDICINE

## 2024-11-26 PROCEDURE — 25010000002 PROPOFOL 10 MG/ML EMULSION: Performed by: REGISTERED NURSE

## 2024-11-26 PROCEDURE — 94799 UNLISTED PULMONARY SVC/PX: CPT

## 2024-11-26 PROCEDURE — 84484 ASSAY OF TROPONIN QUANT: CPT | Performed by: EMERGENCY MEDICINE

## 2024-11-26 PROCEDURE — 74176 CT ABD & PELVIS W/O CONTRAST: CPT

## 2024-11-26 PROCEDURE — 85025 COMPLETE CBC W/AUTO DIFF WBC: CPT | Performed by: EMERGENCY MEDICINE

## 2024-11-26 PROCEDURE — 85007 BL SMEAR W/DIFF WBC COUNT: CPT | Performed by: EMERGENCY MEDICINE

## 2024-11-26 PROCEDURE — 94761 N-INVAS EAR/PLS OXIMETRY MLT: CPT

## 2024-11-26 PROCEDURE — 80053 COMPREHEN METABOLIC PANEL: CPT | Performed by: EMERGENCY MEDICINE

## 2024-11-26 PROCEDURE — 99284 EMERGENCY DEPT VISIT MOD MDM: CPT

## 2024-11-26 PROCEDURE — 82948 REAGENT STRIP/BLOOD GLUCOSE: CPT

## 2024-11-26 PROCEDURE — 25810000003 LACTATED RINGERS SOLUTION: Performed by: EMERGENCY MEDICINE

## 2024-11-26 PROCEDURE — 25810000003 LACTATED RINGERS PER 1000 ML: Performed by: SURGERY

## 2024-11-26 PROCEDURE — 36415 COLL VENOUS BLD VENIPUNCTURE: CPT

## 2024-11-26 RX ORDER — PROPOFOL 10 MG/ML
VIAL (ML) INTRAVENOUS AS NEEDED
Status: DISCONTINUED | OUTPATIENT
Start: 2024-11-26 | End: 2024-11-26 | Stop reason: SURG

## 2024-11-26 RX ORDER — SODIUM CHLORIDE, SODIUM LACTATE, POTASSIUM CHLORIDE, CALCIUM CHLORIDE 600; 310; 30; 20 MG/100ML; MG/100ML; MG/100ML; MG/100ML
30 INJECTION, SOLUTION INTRAVENOUS CONTINUOUS
Status: DISCONTINUED | OUTPATIENT
Start: 2024-11-26 | End: 2024-11-26 | Stop reason: HOSPADM

## 2024-11-26 RX ADMIN — PROPOFOL 60 MG: 10 INJECTION, EMULSION INTRAVENOUS at 09:14

## 2024-11-26 RX ADMIN — SODIUM CHLORIDE, POTASSIUM CHLORIDE, SODIUM LACTATE AND CALCIUM CHLORIDE 1000 ML: 600; 310; 30; 20 INJECTION, SOLUTION INTRAVENOUS at 11:15

## 2024-11-26 RX ADMIN — PROPOFOL 140 MCG/KG/MIN: 10 INJECTION, EMULSION INTRAVENOUS at 09:14

## 2024-11-26 RX ADMIN — SODIUM CHLORIDE, SODIUM LACTATE, POTASSIUM CHLORIDE, CALCIUM CHLORIDE 30 ML/HR: 20; 30; 600; 310 INJECTION, SOLUTION INTRAVENOUS at 08:19

## 2024-11-26 NOTE — ANESTHESIA PREPROCEDURE EVALUATION
Anesthesia Evaluation     Patient summary reviewed   NPO Solid Status: > 8 hours  NPO Liquid Status: > 2 hours           Airway   Mallampati: I  TM distance: >3 FB  Neck ROM: full  Dental      Comment: Denies any chipped, cracked, or loose teeth     Pulmonary - normal exam   (-) COPD, asthma, sleep apnea, not a smoker  Cardiovascular - normal exam    (+) hypertension, hyperlipidemia  (-) past MI, CAD, dysrhythmias, angina      Neuro/Psych  (-) seizures, TIA, CVA  GI/Hepatic/Renal/Endo    (+) GERD, thyroid problem hypothyroidism  (-) liver disease, no renal disease, diabetes    Musculoskeletal     Abdominal    Substance History      OB/GYN          Other   arthritis,                       Anesthesia Plan    ASA 3     MAC       Anesthetic plan, risks, benefits, and alternatives have been provided, discussed and informed consent has been obtained with: patient.    Plan discussed with CRNA and attending.        CODE STATUS:

## 2024-11-26 NOTE — SIGNIFICANT NOTE
Rapid response/EMA called for patient in endoscopy. Pt post procedure, was being assisted with getting dressed by David FONG, pt noted to have brief loss of conciousness. Returned to stretcher, heart rate 40s, SB, 127/100, O2 sats 95% on room air. Decision made to take pt to ER for further evaluation. Pt alert and oriented x4, c/o dizziness.  at bedside, transported to ED room 32.

## 2024-11-26 NOTE — ANESTHESIA POSTPROCEDURE EVALUATION
Patient: Ashley Mata    Procedure Summary       Date: 11/26/24 Room / Location: [facility] ENDOSCOPY 4 / [facility] ENDOSCOPY    Anesthesia Start: 0908 Anesthesia Stop: 0944    Procedure: COLONOSCOPY TO CECUM Diagnosis:       History of adenomatous polyp of colon      (History of adenomatous polyp of colon [Z86.010])    Surgeons: Mario Ray MD Provider: Ricky Azevedo MD    Anesthesia Type: MAC ASA Status: 3            Anesthesia Type: MAC    Vitals  Vitals Value Taken Time   /107 11/26/24 1029   Temp     Pulse 50 11/26/24 1036   Resp 20 11/26/24 1028   SpO2 99 % 11/26/24 1036   Vitals shown include unfiled device data.        Post Anesthesia Care and Evaluation      Comments: Patient had vasovagal syncopal event after changing into her street clothes. Initially unresponsive but then recovered consciousness after 30 seconds or so. ---------------------            11/26/24                1028      ---------------------   BP:     (!) 123/107   Pulse:    (!) 49      Resp:       20        SpO2:       99%      ---------------------   Vitals above after regaining consciousness. Code blue called but then deescalated to rapid response. New IV was placed and fluids were started wide open. Rapid response team arrived and took patient to ED.

## 2024-11-26 NOTE — NURSING NOTE
The patient was finished with her procedure. Her  had helped her get dressed and pulled the curtain for us to put her in a wheelchair. Once the patient was placed in the wheelchair, the patient passed out. Help came, and we were able to get her back in the bed,  and call a rapid response and hooked her up to all the monitors. A non rebreather was placed on patient. Heart rate found low. Rapid response team took patient to ER.She was alert and oriented at that time

## 2024-11-26 NOTE — DISCHARGE INSTRUCTIONS
For the next 24 hours patient needs to be with a responsible adult.    For 24 hours DO NOT drive, operate machinery, appliances, drink alcohol, make important decisions or sign legal documents.    Start with a light or bland diet if you are feeling sick to your stomach otherwise advance to regular diet as tolerated.    Follow recommendations on procedure report if provided by your doctor.    Call Dr Ray for problems [phone]    Problems may include but not limited to: large amounts of bleeding, trouble breathing, repeated vomiting, severe unrelieved pain, fever or chills.

## 2024-11-26 NOTE — TELEPHONE ENCOUNTER
Yes should continue with colonoscopy but also discontinue donepezil as this could be medication related.

## 2024-11-26 NOTE — ED PROVIDER NOTES
EMERGENCY DEPARTMENT ENCOUNTER  Room Number:  32/32  PCP: Carloz Shukla MD  Independent Historians: Patient and Family      HPI:  Chief Complaint: had concerns including Syncope (After colonoscopy).     A complete HPI/ROS/PMH/PSH/SH/FH are unobtainable due to: None    Chronic or social conditions impacting patient care (Social Determinants of Health): None      Context: Ashley Mata is a 79 y.o. female with a medical history of hyperlipidemia, GERD who presents to the ED c/o acute syncopal episode.  The patient was brought from endoscopy for a syncopal episode.  Nursing reports that the patient had finished her an endoscopy and was getting dressed when she had a brief loss of consciousness.  The patient had a routine colonoscopy today.  She denies any chest pain or shortness of breath.  She does not remember the event.  She did receive sedation for the endoscopy.  She does not remember anything that occurred today.  Family at the bedside reports that she underwent typical colonoscopy prep.  She has never had a syncopal episode in the past.  She denies any chest pain or shortness of breath.  Reports abdominal discomfort.  IV fluids were initiated prior to the patient's arrival in the emergency room.      Review of prior external notes (non-ED) -and- Review of prior external test results outside of this encounter:  Note dated today notes that the patient had a syncopal episode after the endoscopy when she was changing    Prescription drug monitoring program review:         PAST MEDICAL HISTORY  Active Ambulatory Problems     Diagnosis Date Noted    Allergic rhinitis 01/30/2015    Benign essential hypertension 04/18/2016    Diverticulosis of colon 03/17/2009    Gastroesophageal reflux disease without esophagitis 03/17/2009    Hyperlipidemia 07/17/2012    Multiple environmental allergies 01/30/2015    Vitamin D deficiency 04/18/2016    Therapeutic drug monitoring 04/11/2017    Alopecia 04/12/2017    Lumbar  scoliosis 08/07/2018    Primary hypothyroidism 01/22/2020    Family history of breast cancer in first degree relative 07/13/2020    Postmenopausal state 07/13/2020    History of COVID-19, August 12, 2021; May 17, 2022. 10/19/2021    Hammertoe of second toe of right foot 10/19/2021    Family history of colonic polyps 04/01/2022    Osteopenia of multiple sites, 2/25/2022--lumbar spine 1.1.  Left femoral neck -1.0.  Right femoral neck -0.3. 04/01/2022    Primary osteoarthritis involving multiple joints 06/08/2022    History of colon polyps, 8/18/2022--hyperplastic x1. 11/23/2022    Right rotator cuff tear 06/05/2024    Memory loss 06/06/2024    Intermittent confusion 06/06/2024    Cognitive impairment 06/06/2024    History of adenomatous polyp of colon 09/23/2024    Urinary urgency 11/11/2024    Nocturnal enuresis 11/11/2024    Dream enactment behavior 11/20/2024    Sleep walking 11/20/2024     Resolved Ambulatory Problems     Diagnosis Date Noted    History of Hyperplastic polyps of stomach 04/18/2016    Primary osteoarthritis of both wrists 12/30/2013    Primary osteoarthritis of both hands 12/30/2013    Chronic insomnia 04/18/2016    Postmenopausal hormone replacement therapy 04/18/2016    Subclinical hypothyroidism 10/03/2014    History of abdominal pain 04/18/2016    History of URI (upper respiratory infection) 04/18/2016    History of Bicipital tendinitis of right shoulder 03/17/2015    History of Change in bowel habits 04/18/2016    History of Degeneration of cervical intervertebral disc 04/18/2016    History of bone density study 04/18/2016    History of diarrhea 04/18/2016    History of Doppler ultrasound of Artery: Carotid 04/18/2016    History of Zostavax administration 11/20/2014    History of chest x-ray 04/18/2016    History of mammogram 04/18/2016    History of Intramuscular hematoma, left 04/18/2016    History of Left rotator cuff tear 04/18/2016    History of Muscular aches 04/18/2016    History of  pneumococcal vaccination 04/18/2016    History of Subdeltoid bursitis of right shoulder joint 04/20/2016    Right shoulder pain 04/20/2016    History of rotator cuff tear, right 07/01/2016    Diffuse arthralgia 04/09/2018    Lumbar back pain with radiculopathy affecting right lower extremity 07/09/2018    Lumbar disc herniation, L4-L5 08/07/2018    Persistent cough 03/18/2019    Acute bronchitis with bronchospasm 03/18/2019    Plantar wart of left foot 01/22/2020    Chronic foot pain, left 03/04/2020    Chronic toe pain, right foot 10/19/2021    Acute bronchitis with bronchospasm 04/01/2022    AUBREY (acute kidney injury) 04/24/2022    Hypokalemia 04/24/2022    Acidosis 04/24/2022    Jaundice 04/24/2022    History of acute hepatitis 04/24/2022    Hospital discharge follow-up 05/09/2022    Arthralgia of multiple joints 05/09/2022    Elevated liver enzymes 07/20/2022    Positive colorectal cancer screening using DNA-based stool test 08/11/2022    Onychomycosis of right great toe 12/07/2022    Herpes zoster without complication 04/03/2023    Primary osteoarthritis of both knees 04/05/2023    Chronic pain of both knees 04/05/2023    Diverticulitis of large intestine without perforation or abscess with bleeding 04/27/2023     Past Medical History:   Diagnosis Date    Hammertoe of right foot     Hyperlipidemia 07/17/2012    Menopausal state 07/13/2020    Rotator cuff syndrome     Shingles          PAST SURGICAL HISTORY  Past Surgical History:   Procedure Laterality Date    COLONOSCOPY  03/17/2009 03/17/2009--colonoscopy revealed external hemorrhoids, torturous colon with a sigmoid stricture, sigmoid diverticulosis.    COLONOSCOPY N/A 03/10/2017    03/10/2017--colonoscopy revealed many small and large mouth diverticula in the sigmoid colon and descending colon.  Nonthrombosed external hemorrhoids and internal hemorrhoids noted.  Otherwise, normal colonoscopy.    COLONOSCOPY N/A 08/18/2022    Procedure: COLONOSCOPY TO 40CM  WITH POLYPECTOMY (COLD);  Surgeon: Mario Ray MD;  Location: [facility] ENDOSCOPY;  Service: General;  Laterality: N/A;  PRE- POSITIVE COLOGUARD  POST- POLYP, HEMORRHOIDS    ENDOSCOPY N/A 08/18/2022    Procedure: ESOPHAGOGASTRODUODENOSCOPY WITH BIOPSIES AND COLD BIOPSY POLYPECTOMY;  Surgeon: Mario Ray MD;  Location: [facility] ENDOSCOPY;  Service: General;  Laterality: N/A;  PRE- ACID REFLUX  POST- GASTRITIS, GASTRIC POLYPS    ERCP WITH SPHINCTEROTOMY/PAPILLOTOMY  08/13/2014 08/13/2014--ERCP, endoscopic sphincterotomy and removal of common bile duct stones. 08/12/2014--laparoscopic cholecystectomy. This was complicated by retained common bile duct stones 2.    ESOPHAGOSCOPY / EGD  06/09/2015 06/09/2015--EGD revealed Z line irregular, 35 cm from incisors. Biopsied. Small hiatal hernia. Gastritis. Biopsied. Bilious gastric fluid. Fluid aspiration performed. Normal duodenal bulb and second part of duodenum. Biopsied. Pathology revealed the antral biopsy revealed small intestinal mucosa with no pathologic diagnosis. Good preservation of villous architecture. Duodenum biopsy revealed large    EYE SURGERY  07/19/2018    Both Eyes; cataracts    FOOT SURGERY  April 11, 2022    Dr. Vieira - toe [facility]    HAMMER TOE REPAIR Right 04/11/2022    Procedure: RIGHT SECOND AND THIRD HAMMERTOE REPAIRS;  Surgeon: Brandt Vieira MD;  [place] [facility] [place] [facility] [place]  Service: Orthopedics;  Laterality: Right;    JOINT REPLACEMENT  7/18/2024    RSR right shoulder    LAPAROSCOPIC CHOLECYSTECTOMY  08/12/2014 08/13/2014--ERCP, endoscopic sphincterotomy and removal of common bile duct stones. 08/12/2014--laparoscopic cholecystectomy. This was complicated by retained common bile duct stones 2.    ROTATOR CUFF REPAIR Left 07/10/2014    07/10/2014--left rotator cuff repair. 03/18/2014--patient seen in followup and reports that her range of motion has improved and her pain is not debilitating. She feels that she is  making progress with physical therapy. Surgery scheduled 07/10/2014. 01/10/2014--patient was seen in evaluation by the orthopedist and he recommended conservative treatment consisting of physical therapy/rehabilitation.    ROTATOR CUFF REPAIR Right 08/03/2016 08/03/2016--arthroscopic right rotator cuff repair.  Debridement of degenerative anterior superior labral tear.    SHOULDER SURGERY  2014 & 2016    Rotator Cuff Surgery both shoulders    SUBTOTAL HYSTERECTOMY  1978    Partial hysterectomy 1978.    TOTAL SHOULDER ARTHROPLASTY W/ DISTAL CLAVICLE EXCISION Right 07/18/2024    Procedure: Right Reverse Total Shoulder Arthroplasty;  Surgeon: Ash Jimenez MD;  [place] [facility] [place] [facility] [place]  Service: [place]opedics;  Laterality: Right;    TRIGGER POINT INJECTION  2023    Gel in both knees         FAMILY HISTORY  Family History   Problem Relation Age of Onset    Colon polyps Mother     Alzheimer's disease Mother     Hearing loss Mother     Osteoporosis Mother     Dementia Mother     Cancer Father         Lung Cancer    Early death Father         59 years old    Breast cancer Daughter 52    Dementia Maternal Aunt     Malig Hyperthermia Neg Hx          SOCIAL HISTORY  Social History     Socioeconomic History    Marital status:     Number of children: 2    Years of education: BA    Highest education level: Associate degree: occupational, technical, or vocational program   Tobacco Use    Smoking status: Never     Passive exposure: Never    Smokeless tobacco: Never   Vaping Use    Vaping status: Never Used   Substance and Sexual Activity    Alcohol use: Yes     Alcohol/week: 2.0 standard drinks of alcohol     Types: 2 Glasses of wine per week     Comment: SOCIALLY    Drug use: No    Sexual activity: Not Currently     Partners: Male     Birth control/protection: Hysterectomy         ALLERGIES  Bupivacaine      REVIEW OF SYSTEMS  Review of Systems  Included in HPI  All systems reviewed and negative except for those  discussed in HPI.      PHYSICAL EXAM    I have reviewed the triage vital signs and nursing notes.    ED Triage Vitals   Temp Heart Rate Resp BP SpO2   -- 11/26/24 1045 11/26/24 1045 11/26/24 1045 11/26/24 1030    58 14 135/82 98 %      Temp src Heart Rate Source Patient Position BP Location FiO2 (%)   -- 11/26/24 1045 11/26/24 1101 -- --    Monitor Lying         Physical Exam  GENERAL: Awake, alert, no acute distress  SKIN: Warm, dry  HENT: Normocephalic, atraumatic  EYES: no scleral icterus  CV: regular rhythm, regular rate  RESPIRATORY: normal effort, lungs clear  ABDOMEN: soft, nontender, nondistended  MUSCULOSKELETAL: no deformity  NEURO: alert, moves all extremities, follows commands            LAB RESULTS  Recent Results (from the past 24 hours)   POC Glucose Once    Collection Time: 11/26/24 10:29 AM    Specimen: Blood   Result Value Ref Range    Glucose 107 70 - 130 mg/dL   Comprehensive Metabolic Panel    Collection Time: 11/26/24 10:57 AM    Specimen: Blood   Result Value Ref Range    Glucose 111 (H) 65 - 99 mg/dL    BUN 14 8 - 23 mg/dL    Creatinine 0.78 0.57 - 1.00 mg/dL    Sodium 137 136 - 145 mmol/L    Potassium 3.6 3.5 - 5.2 mmol/L    Chloride 104 98 - 107 mmol/L    CO2 21.3 (L) 22.0 - 29.0 mmol/L    Calcium 9.3 8.6 - 10.5 mg/dL    Total Protein 6.8 6.0 - 8.5 g/dL    Albumin 3.8 3.5 - 5.2 g/dL    ALT (SGPT) 25 1 - 33 U/L    AST (SGOT) 40 (H) 1 - 32 U/L    Alkaline Phosphatase 114 39 - 117 U/L    Total Bilirubin 0.3 0.0 - 1.2 mg/dL    Globulin 3.0 gm/dL    A/G Ratio 1.3 g/dL    BUN/Creatinine Ratio 17.9 7.0 - 25.0    Anion Gap 11.7 5.0 - 15.0 mmol/L    eGFR 76.4 >60.0 mL/min/1.73   CBC Auto Differential    Collection Time: 11/26/24 10:57 AM    Specimen: Blood   Result Value Ref Range    WBC 9.73 3.40 - 10.80 10*3/mm3    RBC 4.98 3.77 - 5.28 10*6/mm3    Hemoglobin 14.3 12.0 - 15.9 g/dL    Hematocrit 45.2 34.0 - 46.6 %    MCV 90.8 79.0 - 97.0 fL    MCH 28.7 26.6 - 33.0 pg    MCHC 31.6 31.5 - 35.7 g/dL     RDW 12.6 12.3 - 15.4 %    RDW-SD 41.5 37.0 - 54.0 fl    MPV 9.9 6.0 - 12.0 fL    Platelets 401 140 - 450 10*3/mm3    Neutrophil % 43.9 42.7 - 76.0 %    Lymphocyte % 46.8 (H) 19.6 - 45.3 %    Monocyte % 4.0 (L) 5.0 - 12.0 %    Eosinophil % 4.0 0.3 - 6.2 %    Basophil % 0.9 0.0 - 1.5 %    Immature Grans % 0.4 0.0 - 0.5 %    Neutrophils, Absolute 4.27 1.70 - 7.00 10*3/mm3    Lymphocytes, Absolute 4.55 (H) 0.70 - 3.10 10*3/mm3    Monocytes, Absolute 0.39 0.10 - 0.90 10*3/mm3    Eosinophils, Absolute 0.39 0.00 - 0.40 10*3/mm3    Basophils, Absolute 0.09 0.00 - 0.20 10*3/mm3    Immature Grans, Absolute 0.04 0.00 - 0.05 10*3/mm3    nRBC 0.0 0.0 - 0.2 /100 WBC   High Sensitivity Troponin T    Collection Time: 11/26/24 10:57 AM    Specimen: Blood   Result Value Ref Range    HS Troponin T <6 <14 ng/L   Scan Slide    Collection Time: 11/26/24 10:57 AM    Specimen: Blood   Result Value Ref Range    Poikilocytes Mod/2+ None Seen    WBC Morphology Normal Normal    Platelet Morphology Normal Normal   ECG 12 Lead Syncope    Collection Time: 11/26/24 11:00 AM   Result Value Ref Range    QT Interval 495 ms    QTC Interval 461 ms   High Sensitivity Troponin T 2Hr    Collection Time: 11/26/24  1:02 PM    Specimen: Blood   Result Value Ref Range    HS Troponin T 6 <14 ng/L    Troponin T Delta           RADIOLOGY  CT Abdomen Pelvis Without Contrast    Result Date: 11/26/2024  CT ABDOMEN AND PELVIS WITHOUT CONTRAST  HISTORY: 79 years of age, female.  Colonoscopy today, syncope, abdominal pain.  TECHNIQUE:  CT includes axial imaging from the lung bases to the trochanters without intravenous contrast and with use of oral contrast. Data reconstructed in coronal and sagittal planes. Radiation dose reduction techniques were utilized, including automated exposure control and exposure modulation based on body size.  COMPARISON: CT abdomen with IV contrast 04/26/2022, CT abdomen and pelvis 04/24/2022.  FINDINGS: Lung bases appear clear and there  is no basilar effusion.  Liver, spleen, adrenal glands, and pancreas exhibit normal noncontrasted CT appearance. Previous cholecystectomy.  Kidneys appear within normal limits for noncontrasted exam and there is no hydronephrosis.  There is moderate generalized gas distention of the colon. Patient underwent colonoscopy earlier the same date. There is mild wall thickening involving the sigmoid colon and there is perisigmoid stranding/inflammation. Extensive sigmoid diverticulosis is present and findings are suspicious for diverticulitis. No extraluminal air or abscess is demonstrated. No gianni enlargement in the abdomen or pelvis.  Scoliotic curvature of the lumbar spine with multilevel degenerative disc disease.      1. There is mild stranding within the left pelvis surrounding the thickened adjacent mild sigmoid colon wall thickening and this may represent diverticulitis.  Extensive sigmoid diverticulosis is present. There is no evidence for peridiverticular abscess or extraluminal air. There is generalized gas-distention of the colon with colonic air-fluid levels related to colonoscopy performed same date. 2. Scoliotic curvature of the lumbar spine with multilevel degenerative disc disease.  Radiation dose reduction techniques were utilized, including automated exposure control and exposure modulation based on body size.          MEDICATIONS GIVEN IN ER  Medications   lactated ringers bolus 1,000 mL (1,000 mL Intravenous New Bag 11/26/24 1115)         ORDERS PLACED DURING THIS VISIT:  Orders Placed This Encounter   Procedures    CT Abdomen Pelvis Without Contrast    Comprehensive Metabolic Panel    CBC Auto Differential    High Sensitivity Troponin T    Scan Slide    High Sensitivity Troponin T 2Hr    Orthostatic Vitals    Ambulate patient    ECG 12 Lead Syncope    CBC & Differential         OUTPATIENT MEDICATION MANAGEMENT:  No current Epic-ordered facility-administered medications on file.     Current Outpatient  Medications Ordered in Epic   Medication Sig Dispense Refill    acetaminophen (Tylenol 8 Hour) 650 MG 8 hr tablet Take 1 tablet by mouth Every 8 (Eight) Hours As Needed for Mild Pain. 60 tablet 1    atorvastatin (LIPITOR) 20 MG tablet TAKE 1 TABLET BY MOUTH EVERY DAY      calcium carbonate (OS-CHRIS) 600 MG tablet Take 1 tablet by mouth Daily.      estradiol (ESTRACE) 1 MG tablet Take 1 tablet (1 mg) daily as directed for postmenopausal state      ezetimibe (ZETIA) 10 MG tablet TAKE 1 TABLET BY MOUTH EVERY DAY      fexofenadine-pseudoephedrine (ALLEGRA-D 24) 180-240 MG per 24 hr tablet Take 1 p.o. daily for environmental allergies      fluticasone (FLONASE) 50 MCG/ACT nasal spray Administer 2 sprays into the nostril(s) as directed by provider As Needed.      levothyroxine (SYNTHROID, LEVOTHROID) 50 MCG tablet TAKE ONE TABLET BY MOUTH DAILY FOR LOW THYROID      meloxicam (MOBIC) 15 MG tablet Take 1 tablet by mouth Daily. 90 tablet 3    multivitamin (THERAGRAN) tablet tablet Take 1 tablet by mouth Daily.      omeprazole OTC (PrilOSEC OTC) 20 MG EC tablet Take 1 tablet by mouth Daily.      traMADol (ULTRAM) 50 MG tablet Take 1 tablet by mouth Every 6 (Six) Hours As Needed for Moderate Pain. 30 tablet 0         PROCEDURES  Procedures            PROGRESS, DATA ANALYSIS, CONSULTS, AND MEDICAL DECISION MAKING  All labs have been independently interpreted by me.  All radiology studies have been reviewed by me. All EKG's have been independently viewed and interpreted by me.  Discussion below represents my analysis of pertinent findings related to patient's condition, differential diagnosis, treatment plan and final disposition.    Differential diagnosis includes but is not limited to arrhythmia, dehydration, orthostasis, perforated bowel, acute coronary syndrome, acute aortic syndrome, dehydration.    Clinical Scores:                                     ED Course as of 11/26/24 1357   Tue Nov 26, 2024   1104 Patient was unable  to complete orthostatics.  She was too lightheaded to stay standing [TR]   1109 EKG          EKG time: 1100  Rhythm/Rate: Normal sinus, rate 52  P waves and VA: Normal P, normal VA  QRS, axis: QRS, normal axis  ST and T waves: No acute    Independently Interpreted by me  Not significantly changed compared to prior 7/5/2024   [TR]   1229 I reviewed the workup and findings with the patient and family.  She reports she is feeling better from a syncope standpoint.  She does report diffuse abdominal pain.  This is likely gas from the colonoscopy however she reports she is fairly uncomfortable.  Plan CT imaging.  She is actually asking to be discharged home at this point but she is agreeable to stay for second troponin, IV fluids, CT imaging. [TR]   1347 CT Abdomen Pelvis Without Contrast  My independent interpretation of the imaging study is gaseous distention, no free air [TR]   1348 Delta troponin is normal [TR]   1352 Patient CT scan shows some mild stranding and the radiologist feels it may represent diverticulitis.  Given that she just had a colonoscopy I suspect that this is more related to her colonic prep and not related to an acute infection.  She has no infectious symptoms. [TR]   1356 Reviewe the workup and findings with the patient and family.  They are anxious for discharge home.  She reports she feels significantly better.  She has been ambulatory about the room without difficulty.  I do think her syncopal episode was secondary to dehydration. [TR]      ED Course User Index  [TR] Jacky Gómez MD             AS OF 13:57 EST VITALS:    BP - 119/97  HR - 79  TEMP - 96 °F (35.6 °C) (Tympanic)  O2 SATS - 98%    COMPLEXITY OF CARE  Admission was considered but after careful review of the patient's presentation, physical examination, diagnostic results, and response to treatment the patient may be safely discharged with outpatient follow-up.      DIAGNOSIS  Final diagnoses:   Syncope, unspecified syncope type          DISPOSITION  ED Disposition       ED Disposition   Discharge    Condition   Stable    Comment   --                Please note that portions of this document were completed with a voice recognition program.    Note Disclaimer: At [facility], we believe that sharing information builds trust and better relationships. You are receiving this note because you recently visited [facility]. It is possible you will see health information before a provider has talked with you about it. This kind of information can be easy to misunderstand. To help you fully understand what it means for your health, we urge you to discuss this note with your provider.         Jacky Gómez MD  11/26/24 3504

## 2025-01-03 ENCOUNTER — OFFICE VISIT (OUTPATIENT)
Dept: INTERNAL MEDICINE | Facility: CLINIC | Age: 80
End: 2025-01-03
Payer: MEDICARE

## 2025-01-03 VITALS
TEMPERATURE: 98.4 F | HEIGHT: 64 IN | RESPIRATION RATE: 16 BRPM | DIASTOLIC BLOOD PRESSURE: 78 MMHG | SYSTOLIC BLOOD PRESSURE: 140 MMHG | HEART RATE: 74 BPM | BODY MASS INDEX: 21.68 KG/M2 | OXYGEN SATURATION: 98 % | WEIGHT: 127 LBS

## 2025-01-03 DIAGNOSIS — F41.8 DEPRESSION WITH ANXIETY: Primary | ICD-10-CM

## 2025-01-03 PROBLEM — F51.3 SLEEP WALKING: Status: RESOLVED | Noted: 2024-11-20 | Resolved: 2025-01-03

## 2025-01-03 PROBLEM — R39.15 URINARY URGENCY: Status: RESOLVED | Noted: 2024-11-11 | Resolved: 2025-01-03

## 2025-01-03 PROBLEM — R41.0 INTERMITTENT CONFUSION: Status: RESOLVED | Noted: 2024-06-06 | Resolved: 2025-01-03

## 2025-01-03 PROBLEM — R41.89 COGNITIVE IMPAIRMENT: Status: RESOLVED | Noted: 2024-06-06 | Resolved: 2025-01-03

## 2025-01-03 PROBLEM — G47.52 DREAM ENACTMENT BEHAVIOR: Status: RESOLVED | Noted: 2024-11-20 | Resolved: 2025-01-03

## 2025-01-03 PROBLEM — N39.44 NOCTURNAL ENURESIS: Status: RESOLVED | Noted: 2024-11-11 | Resolved: 2025-01-03

## 2025-01-03 PROBLEM — Z86.0101 HISTORY OF ADENOMATOUS POLYP OF COLON: Status: RESOLVED | Noted: 2024-09-23 | Resolved: 2025-01-03

## 2025-01-03 PROBLEM — M75.101 RIGHT ROTATOR CUFF TEAR: Status: RESOLVED | Noted: 2024-06-05 | Resolved: 2025-01-03

## 2025-01-03 PROBLEM — R41.3 MEMORY LOSS: Chronic | Status: ACTIVE | Noted: 2024-06-06

## 2025-01-03 PROCEDURE — 1125F AMNT PAIN NOTED PAIN PRSNT: CPT | Performed by: INTERNAL MEDICINE

## 2025-01-03 PROCEDURE — 3077F SYST BP >= 140 MM HG: CPT | Performed by: INTERNAL MEDICINE

## 2025-01-03 PROCEDURE — 3078F DIAST BP <80 MM HG: CPT | Performed by: INTERNAL MEDICINE

## 2025-01-03 PROCEDURE — 99214 OFFICE O/P EST MOD 30 MIN: CPT | Performed by: INTERNAL MEDICINE

## 2025-01-03 NOTE — PROGRESS NOTES
01/03/2025    Patient Information  Ashley Mata                                                                                          [id number] [facility] [id number]      1945  [unfilled]  There is no work phone number on file.    Chief Complaint:     Concerns for depression and anxiety.    History of Present Illness:    Patient has never had a history of depression or anxiety to any degree presents with complaints/concerns of some depressive symptoms due to some situational factors including her 's health but I will go into detail here.  She would like to do something about this if at all possible.  Her past medical history reviewed and updated were necessary.    Review of Systems   Constitutional: Negative.   HENT: Negative.     Eyes: Negative.    Cardiovascular: Negative.    Respiratory: Negative.     Endocrine: Negative.    Hematologic/Lymphatic: Negative.    Skin: Negative.    Musculoskeletal: Negative.    Gastrointestinal: Negative.    Genitourinary: Negative.    Neurological: Negative.    Psychiatric/Behavioral:  Positive for depression and memory loss. Negative for altered mental status, hallucinations, hypervigilance, substance abuse, suicidal ideas and thoughts of violence. The patient has insomnia and is nervous/anxious.    Allergic/Immunologic: Negative.        Active Problems:    Patient Active Problem List   Diagnosis    Allergic rhinitis    Benign essential hypertension    Diverticulosis of colon    Gastroesophageal reflux disease without esophagitis    Hyperlipidemia    Multiple environmental allergies    Vitamin D deficiency    Therapeutic drug monitoring    Alopecia    Lumbar scoliosis    Primary hypothyroidism    Family history of breast cancer in first degree relative    Postmenopausal state    History of COVID-19, August 12, 2021; May 17, 2022.    Hammertoe of second toe of right foot    Family history of colonic polyps    Osteopenia of multiple  sites, 2/25/2022--lumbar spine 1.1.  Left femoral neck -1.0.  Right femoral neck -0.3.    Primary osteoarthritis involving multiple joints    History of colon polyps, 8/18/2022--hyperplastic x1.    Memory loss    Depression with anxiety         Past Medical History:   Diagnosis Date    Allergic rhinitis 01/30/2015 11/13/2015--patient was evaluated by ENT and was started on generic Flonase and patient reports this has improved her symptoms quite a bit.   01/30/2015--allergy testing revealed positive reactivity to cat, dust mite, cockroach, mold spores, and grass pollen. Environmental control measures.    Alopecia 04/12/2017    Evaluated and treated by the dermatologist.    Benign essential hypertension 04/18/2016    Chronic pain of both knees 04/05/2023    Chronic toe pain, right foot 10/19/2021    October 19, 2021--patient reports a 1 year history of progressively worsening right toe pain that particular involves the second toe.  On exam she has obvious hammertoe which is causing irritation.  There is some hammering of the third digit as well.  Patient referred to orthopedist who specializes in foot problems such as Dr. Vieira.  In the meantime patient should use over-the-counter toe pads to    Diverticulosis of colon 03/17/2009    03/10/2017--colonoscopy revealed many small and large mouth diverticula in the sigmoid colon and descending colon.  Nonthrombosed external hemorrhoids and internal hemorrhoids noted.  Otherwise, normal colonoscopy.  03/17/2009--colonoscopy revealed external hemorrhoids, torturous colon with a sigmoid stricture, sigmoid diverticulosis.    Family history of breast cancer in first degree relative 07/13/2020    Family history of colonic polyps 04/01/2022    Gastroesophageal reflux disease without esophagitis 03/17/2009 06/09/2015--EGD revealed Z line irregular, 35 cm from incisors. Biopsied. Small hiatal hernia. Gastritis. Biopsied. Bilious gastric fluid. Fluid aspiration performed.  Normal duodenal bulb and second part of duodenum. Biopsied. Pathology revealed the antral biopsy revealed small intestinal mucosa with no pathologic diagnosis. Good preservation of villous architecture. Duodenum biopsy revealed largely antral type gastric mucosa with mild patchy superficial chronic gastritis. Gastroesophageal junction revealed squamous and glandular mucosa with mild chronic inflammation. Negative for intestinal metaplasia or dysplasia. There appeared to be mislabeling of the antral biopsies and duodenal biopsies.   04/17/2012--EGD revealed irregular Z line, hiatal hernia, gastritis, duodenitis.   03/17/2009--EGD revealed grade B. reflux esophagitis, hiatal hernia, gastritis, a few gastric polyps, duodenitis.    Hammertoe of right foot     Hammertoe of second toe of right foot 10/19/2021    October 19, 2021--patient reports a 1 year history of progressively worsening right toe pain that particular involves the second toe.  On exam she has obvious hammertoe which is causing irritation.  There is some hammering of the third digit as well.  Patient referred to orthopedist who specializes in foot problems such as Dr. Vieira.  In the meantime patient should use over-the-counter toe pads to    History of acute hepatitis 04/24/2022    May 9, 2022--patient seen in hospital discharge follow-up/transition of care.  I reviewed the documentation including the admission history and physical, hospital course, laboratory and radiographic studies, GI consultation, discharge summary and discharge medications.  I specifically reviewed the tissue pathology report which revealed moderate steatosis with moderate chronic portal inflammation a    History of COVID-19, August 12, 2021; May 17, 2022. 10/19/2021    May 17, 2022--patient again tested positive for COVID.  Ordered by gastroenterology nurse practitioner.  October 19, 2021--patient seen in follow-up and reports her symptoms totally resolved.  She wants to know when  she should get her Covid booster vaccine.  I have suggested that she receive it approximately 3 months after initially testing positive.  She has an appointment in November 6, 2021 for    History of Hyperplastic polyps of stomach 06/09/2015 06/09/2015--EGD revealed Z line irregular, 35 cm from incisors. Biopsied. Small hiatal hernia. Gastritis. Biopsied. Bilious gastric fluid. Fluid aspiration performed. Normal duodenal bulb and second part of duodenum. Biopsied. Pathology revealed the antral biopsy revealed small intestinal mucosa with no pathologic diagnosis. Good preservation of villous architecture. Duodenum biopsy revealed large    Hyperlipidemia 07/17/2012 07/17/2012--treatment for hyperlipidemia begun.    Lumbar disc herniation, L4-L5 08/07/2018 01/16/2019--patient seen in follow-up by Dr. Shukla.  She has seen the neurosurgeon who recommended conservative therapy with physical therapy.  Patient reports that has been extremely helpful.  Currently has no significant pain.  08/07/2018--patient seen in follow-up and reports her pain is much better after taking prednisone.  She is now down to half a pill per day and is almost off of it.  I rev    Lumbar scoliosis 08/07/2018 01/16/2019--patient seen in follow-up by Dr. Shukla.  She has seen the neurosurgeon who recommended conservative therapy with physical therapy.  Patient reports that has been extremely helpful.  Currently has no significant pain.  08/07/2018--patient seen in follow-up and reports her pain is much better after taking prednisone.  She is now down to half a pill per day and is almost off of it.  I rev    Menopausal state 07/13/2020    Multiple environmental allergies 01/30/2015 01/30/2015--allergy testing revealed positive reactivity to cat, dust mite, cockroach, mold spores, and grass pollen. Environmental control measures.    Osteopenia of multiple sites, 2/25/2022--lumbar spine 1.1.  Left femoral neck -1.0.  Right femoral neck  -0.3. 04/01/2022 February 25, 2022--DEXA scan reveals lumbar spine T score 1.1.  Left femoral neck T score -1.0.  Right femoral neck T score -0.3.  Mild osteopenia.    Postmenopausal hormone replacement therapy 04/18/2016    Primary hypothyroidism 01/22/2020 January 22, 2020--TSH is elevated at 5.0.  Levothyroxine 50 mcg/day initiated.  Patient will follow-up with nonfasting lab work in about 6 to 8 weeks to reassess.  07/09/2018--routine follow-up.  TSH elevated slightly at 4.31.  Free T4 normal at 1.22.  Free T3 normal at 3.3.  Continued observation.  10/11/2017--thyroid function tests are normal.  09/30/2016--thyroid ultrasound reveals a tiny 2 mm     Primary osteoarthritis involving multiple joints 06/08/2022    Primary osteoarthritis of both hands 12/30/2013 05/12/2014--patient seen in followup and reports that the Vimovo has helped. Uric acid levels are normal at 4.9. C. reactive protein mildly elevated at 2.3. X-rays of the hands reveals radiocarpal, first carpometacarpal, first metacarpophalangeal, and interphalangeal joint degeneration. This process is symmetric. The interphalangeal joint of the left is affected to the greatest degree. There is right sided scapholunate dissociation as well as deformity of the scaphoid suggesting prior traumatic injury with healing. Soft tissues are satisfactory. Overall appearance is most consistent with osteoarthritis.   05/05/2014--patient presents and reports that she is having worsening right wrist pain primarily at the base of the thumb. No new injury. Bilateral x-rays of the wrists and hands ordered. Uric acid level ordered as well as a CRP. Vimovo 500/20 one by mouth twice a day. Follow up in one week.   03/18/2014--patient reports that her wrist symptoms have improved.   12/30/2013--patient reports a several mon    Primary osteoarthritis of both knees 04/05/2023    Primary osteoarthritis of both wrists 12/30/2013 05/12/2014--patient seen in followup and  reports that the Vimovo has helped. Uric acid levels are normal at 4.9. C. reactive protein mildly elevated at 2.3. X-rays of the hands reveals radiocarpal, first carpometacarpal, first metacarpophalangeal, and interphalangeal joint degeneration. This process is symmetric. The interphalangeal joint of the left is affected to the greatest degree. There is right sided scapholunate dissociation as well as deformity of the scaphoid suggesting prior traumatic injury with healing. Soft tissues are satisfactory. Overall appearance is most consistent with osteoarthritis.   05/05/2014--patient presents and reports that she is having worsening right wrist pain primarily at the base of the thumb. No new injury. Bilateral x-rays of the wrists and hands ordered. Uric acid level ordered as well as a CRP. Vimovo 500/20 one by mouth twice a day. Follow up in one week.   03/18/2014--patient reports that her wrist symptoms have improved.   12/30/2013--patient reports a several mon    Vitamin D deficiency 04/18/2016         Past Surgical History:   Procedure Laterality Date    COLONOSCOPY  03/17/2009 03/17/2009--colonoscopy revealed external hemorrhoids, torturous colon with a sigmoid stricture, sigmoid diverticulosis.    COLONOSCOPY N/A 03/10/2017    03/10/2017--colonoscopy revealed many small and large mouth diverticula in the sigmoid colon and descending colon.  Nonthrombosed external hemorrhoids and internal hemorrhoids noted.  Otherwise, normal colonoscopy.    COLONOSCOPY N/A 08/18/2022    Procedure: COLONOSCOPY TO 40CM WITH POLYPECTOMY (COLD);  Surgeon: Mario Ray MD;  Location: [facility] ENDOSCOPY;  Service: General;  Laterality: N/A;  PRE- POSITIVE COLOGUARD  POST- POLYP, HEMORRHOIDS    COLONOSCOPY N/A 11/26/2024    Procedure: COLONOSCOPY TO CECUM;  Surgeon: Mario Ray MD;  Location: [facility] ENDOSCOPY;  Service: General;  Laterality: N/A;  PRE- CHANGE IN BOWEL HABITS  POST- DIVERTICULOSIS,  HEMORRHOIDS    ENDOSCOPY N/A 08/18/2022    Procedure: ESOPHAGOGASTRODUODENOSCOPY WITH BIOPSIES AND COLD BIOPSY POLYPECTOMY;  Surgeon: Mario Ray MD;  Location: [facility] ENDOSCOPY;  Service: General;  Laterality: N/A;  PRE- ACID REFLUX  POST- GASTRITIS, GASTRIC POLYPS    ERCP WITH SPHINCTEROTOMY/PAPILLOTOMY  08/13/2014 08/13/2014--ERCP, endoscopic sphincterotomy and removal of common bile duct stones. 08/12/2014--laparoscopic cholecystectomy. This was complicated by retained common bile duct stones 2.    ESOPHAGOSCOPY / EGD  06/09/2015 06/09/2015--EGD revealed Z line irregular, 35 cm from incisors. Biopsied. Small hiatal hernia. Gastritis. Biopsied. Bilious gastric fluid. Fluid aspiration performed. Normal duodenal bulb and second part of duodenum. Biopsied. Pathology revealed the antral biopsy revealed small intestinal mucosa with no pathologic diagnosis. Good preservation of villous architecture. Duodenum biopsy revealed large    EYE SURGERY  07/19/2018    Both Eyes; cataracts    FOOT SURGERY  April 11, 2022    Dr. Vieira - toe urgery    HAMMER TOE REPAIR Right 04/11/2022    Procedure: RIGHT SECOND AND THIRD HAMMERTOE REPAIRS;  Surgeon: Brandt Vieira MD;  [place]  [facility] [place] [facility] [place]  Service: Orthopedics;  Laterality: Right;    JOINT REPLACEMENT  7/18/2024    RSR right shoulder    LAPAROSCOPIC CHOLECYSTECTOMY  08/12/2014 08/13/2014--ERCP, endoscopic sphincterotomy and removal of common bile duct stones. 08/12/2014--laparoscopic cholecystectomy. This was complicated by retained common bile duct stones 2.    ROTATOR CUFF REPAIR Left 07/10/2014    07/10/2014--left rotator cuff repair. 03/18/2014--patient seen in followup and reports that her range of motion has improved and her pain is not debilitating. She feels that she is making progress with physical therapy. Surgery scheduled 07/10/2014. 01/10/2014--patient was seen in evaluation by the orthopedist and he recommended conservative treatment  consisting of physical therapy/rehabilitation.    ROTATOR CUFF REPAIR Right 08/03/2016 08/03/2016--arthroscopic right rotator cuff repair.  Debridement of degenerative anterior superior labral tear.    SHOULDER SURGERY  2014 & 2016    Rotator Cuff Surgery both shoulders    SUBTOTAL HYSTERECTOMY  1978    Partial hysterectomy 1978.    TOTAL SHOULDER ARTHROPLASTY W/ DISTAL CLAVICLE EXCISION Right 07/18/2024    Procedure: Right Reverse Total Shoulder Arthroplasty;  Surgeon: Ash Jimenez MD;  Location: [facility] [place];  Service: Orthopedics;  Laterality: Right;    TRIGGER POINT INJECTION  2023    Gel in both knees         Allergies   Allergen Reactions    Bupivacaine Other (See Comments)     Questionable allergy.  Patient had toe surgery and subsequently developed acute hepatitis.  Rare case reports of hepatitis induced by bupivacaine which was used during her surgery.           Current Outpatient Medications:     acetaminophen (Tylenol 8 Hour) 650 MG 8 hr tablet, Take 1 tablet by mouth Every 8 (Eight) Hours As Needed for Mild Pain., Disp: 60 tablet, Rfl: 1    atorvastatin (LIPITOR) 20 MG tablet, TAKE 1 TABLET BY MOUTH EVERY DAY, Disp: , Rfl:     calcium carbonate (OS-CHRIS) 600 MG tablet, Take 1 tablet by mouth Daily., Disp: , Rfl:     estradiol (ESTRACE) 1 MG tablet, Take 1 tablet (1 mg) daily as directed for postmenopausal state, Disp: , Rfl:     ezetimibe (ZETIA) 10 MG tablet, TAKE 1 TABLET BY MOUTH EVERY DAY, Disp: , Rfl:     fexofenadine-pseudoephedrine (ALLEGRA-D 24) 180-240 MG per 24 hr tablet, Take 1 p.o. daily for environmental allergies, Disp: , Rfl:     fluticasone (FLONASE) 50 MCG/ACT nasal spray, Administer 2 sprays into the nostril(s) as directed by provider As Needed., Disp: , Rfl:     levothyroxine (SYNTHROID, LEVOTHROID) 50 MCG tablet, TAKE ONE TABLET BY MOUTH DAILY FOR LOW THYROID, Disp: , Rfl:     meloxicam (MOBIC) 15 MG tablet, Take 1 tablet by mouth Daily., Disp: 90 tablet, Rfl: 3     "multivitamin (THERAGRAN) tablet tablet, Take 1 tablet by mouth Daily., Disp: , Rfl:     omeprazole OTC (PrilOSEC OTC) 20 MG EC tablet, Take 1 tablet by mouth Daily., Disp: , Rfl:     traMADol (ULTRAM) 50 MG tablet, Take 1 tablet by mouth Every 6 (Six) Hours As Needed for Moderate Pain., Disp: 30 tablet, Rfl: 0      Family History   Problem Relation Age of Onset    Colon polyps Mother     Alzheimer's disease Mother     Hearing loss Mother     Osteoporosis Mother     Dementia Mother     Cancer Father         Lung Cancer    Early death Father         59 years old    Breast cancer Daughter 52    Dementia Maternal Aunt     Malig Hyperthermia Neg Hx          Social History     Socioeconomic History    Marital status:     Number of children: 2    Years of education: BA    Highest education level: Associate degree: occupational, technical, or vocational program   Tobacco Use    Smoking status: Never     Passive exposure: Never    Smokeless tobacco: Never   Vaping Use    Vaping status: Never Used   Substance and Sexual Activity    Alcohol use: Yes     Alcohol/week: 2.0 standard drinks of alcohol     Types: 2 Glasses of wine per week     Comment: SOCIALLY    Drug use: No    Sexual activity: Not Currently     Partners: Male     Birth control/protection: Hysterectomy         Vitals:    01/03/25 1114   BP: 140/78   Pulse: 74   Resp: 16   Temp: 98.4 °F (36.9 °C)   TempSrc: Oral   SpO2: 98%   Weight: 57.6 kg (127 lb)   Height: 163.8 cm (64.49\")        Body mass index is 21.47 kg/m².      Physical Exam:    General: Alert and oriented x 3.  No acute distress.  Normal affect.  HEENT: Pupils equal, round, reactive to light; extraocular movements intact; sclerae nonicteric; pharynx, ear canals and TMs normal.  Neck: Without JVD, thyromegaly, bruit, or adenopathy.  Lungs: Clear to auscultation in all fields.  Heart: Regular rate and rhythm without murmur, rub, gallop, or click.  Abdomen: Soft, nontender, without " hepatosplenomegaly or hernia.  Bowel sounds normal.  : Deferred.  Rectal: Deferred.  Extremities: Without clubbing, cyanosis, edema, or pulse deficit.  Neurologic: Intact without focal deficit.  Normal station and gait observed during ingress and egress from the examination room.  Skin: Without significant lesion.  Musculoskeletal: Unremarkable.    Lab/other results:      Assessment/Plan:     Diagnosis Plan   1. Depression with anxiety  [facility] - Swab,        Patient with depression and anxiety is somewhat situational and I think she would benefit from mild antidepressant and something to help with the anxiety but I would like to do GeneSight testing first.    Plan is as follows: [facility]ight testing ordered.  Patient has a follow-up appointment soon and we will review the results and consider initiation of the mild antidepressant at that time.    Addendum: As it turns out patient did take Zoloft/sertraline some years ago and did not tolerate it well.      Procedures

## 2025-01-07 ENCOUNTER — TELEMEDICINE (OUTPATIENT)
Dept: NEUROLOGY | Facility: CLINIC | Age: 80
End: 2025-01-07
Payer: MEDICARE

## 2025-01-07 DIAGNOSIS — R06.83 SNORING: ICD-10-CM

## 2025-01-07 DIAGNOSIS — G31.84 MILD COGNITIVE IMPAIRMENT: Primary | ICD-10-CM

## 2025-01-07 DIAGNOSIS — F51.8 ABNORMAL DREAMS: ICD-10-CM

## 2025-01-07 PROCEDURE — 1159F MED LIST DOCD IN RCRD: CPT | Performed by: NURSE PRACTITIONER

## 2025-01-07 PROCEDURE — 1160F RVW MEDS BY RX/DR IN RCRD: CPT | Performed by: NURSE PRACTITIONER

## 2025-01-07 PROCEDURE — 99214 OFFICE O/P EST MOD 30 MIN: CPT | Performed by: NURSE PRACTITIONER

## 2025-01-07 RX ORDER — MEMANTINE HYDROCHLORIDE 10 MG/1
10 TABLET ORAL 2 TIMES DAILY
Qty: 60 TABLET | Refills: 2 | Status: SHIPPED | OUTPATIENT
Start: 2025-01-07 | End: 2025-04-07

## 2025-01-07 RX ORDER — MEMANTINE HYDROCHLORIDE 5 MG/1
TABLET ORAL
Qty: 70 TABLET | Refills: 0 | Status: SHIPPED | OUTPATIENT
Start: 2025-01-07

## 2025-01-07 NOTE — PROGRESS NOTES
[facility] NEUROLOGY         Date of Visit: 2025    Name: Ashley Mata    :  1945    PCP: Carloz Shukla MD    Visit Type: an initial evaluation         Subjective     Patient ID: Ashley is a 79 y.o. female.         History of Present Illness  Ashley Mata was located at home and I was located at [facility] [place] for this telemedicine/telephone encounter. We utilized Video Options: MyChart/Zoom for the encounter and Ashley Mata and I were able to hear [and see] each other simultaneously in real time. I introduced myself and verified Ashley Mata identity. I explained how the telemedicine visit will occur. I advised Ashley Mata that technology-related delays and breaches of privacy are potential risks associated with conducting the encounter via telemedicine.    I also advised Ashley Mata that at any point she may terminate the telemedicine encounter and withdraw her consent for receiving care via telemedicine without affecting her ability to receive future care from us, and that I may also terminate the telemedicine encounter if I determine that an in-person visit is more appropriate for the condition[s] for which treatment is sought.    Patient was advised that this face to face encounter would function as a telemedicine/telehealth encounter and would be billed as such including coinsurance and deductible.    Having covered these considerations, Ashley Mata verbally acknowledged them and gave consent for the use of telemedicine in her care.  Visit started at 2:28 PM and completed at 2:40 PM for total duration of 12 minutes.    I had the pleasure of seeing your patient today.  As you may know she is an 79-year-old female here today for i follow-up for concerns of memory loss.  She is accompanied by her .  She was referred by her primary care physician.    History:    Patient does have history of hypothyroid, hypertension,  hyperlipidemia, GERD, vitamin D deficiency, osteopenia, history of previous hepatitis.    Patient's  states that patient's memory symptoms began a couple of years ago after she underwent a surgery for hammertoe.  Postsurgery she developed what they believed was hepatitis with significantly elevated liver enzymes.  Since then patient has had a incline in her overall cognitive abilities.  He states there could have been some mild memory changes prior to this however they were not significantly noticeable.    Patient did end up following up with primary care physician about the symptoms.  He did end up ordering an MRI of the brain with and without contrast which showed evidence for microvascular changes and mild atrophy with no other significant abnormalities.  He also order neuropsychological testing which was completed at [facility] on 10/1/2024.  Testing revealed evidence for a nonamnestic mild cognitive impairment with deficits noted in processing speed and executive functioning however not severe enough to qualify for diagnosis of a neurocognitive disorder such as dementia at this time.  They did recommend repeat testing in 1 to 3 years.  In addition they recommended possible sleep testing as she is reporting some increased confusion upon awakening in the middle of the night as well as some vivid dreams.  They also recommended potential driving evaluation due to the executive functioning difficulties.    Patient does have a family history of a mother with dementia in her early 90s, 2 aunts with dementia syndrome.  She denies any previous history of head injuries, chemotherapy.  No previous history of stroke.  No cardiovascular issues that she is aware of.    Current:    At last appointment we did prescribe patient donepezil for treatment of mild cognitive impairment.  We also ordered a vitamin B12 level and an RADHA profile to be completed.  Patient's vitamin B12 level came back in the 500s.  ATN  profile was negative for Alzheimer's.  Patient did try on the donepezil however this caused significant diarrhea and stomach complaints for her so she ended up discontinuing the medication.    We also referred patient for sleep medicine evaluation due to the vivid dreams, sleep disturbance.  Patient did see Dr. Christina and was taking melatonin for some time.  She has been doing better in regards to sleep and has discontinued the melatonin.  They still report fairly good sleep with no confusion upon awakening and no additional vivid dreams.  She is scheduled for an in lab sleep study for the end of January which she is trying to decide if she would like to go forward with.    Overall patient and  state that that she has not had any new or worsening memory symptoms.  Her ability to perform instrumental and activities of daily living remains stable.  She has not had any hallucinations, changes in gait, tremor, difficulty swallowing.  No other new or worsening neurologic complaints at today's visit.      The following portions of the patient's history were reviewed and updated as appropriate: allergies, current medications, past family history, past medical history, past social history, past surgical history, and problem list.                 Review of Systems   Constitutional:  Negative for activity change, appetite change, fatigue and unexpected weight change.   HENT:  Negative for hearing loss and trouble swallowing.    Eyes:  Negative for visual disturbance.   Respiratory:  Negative for chest tightness and shortness of breath.    Cardiovascular:  Negative for palpitations.   Gastrointestinal:  Negative for constipation, diarrhea, nausea and vomiting.   Genitourinary:  Negative for decreased urine volume, difficulty urinating and frequency.   Musculoskeletal:  Negative for back pain, gait problem and neck pain.   Neurological:  Negative for tremors, syncope, facial asymmetry, speech difficulty, weakness and  light-headedness.   Psychiatric/Behavioral:  Positive for confusion and sleep disturbance. Negative for agitation, behavioral problems, dysphoric mood and hallucinations. The patient is not nervous/anxious.             Current Medications:    Current Outpatient Medications   Medication Instructions    acetaminophen (TYLENOL 8 HOUR) 650 mg, Oral, Every 8 Hours PRN    atorvastatin (LIPITOR) 20 MG tablet TAKE 1 TABLET BY MOUTH EVERY DAY    calcium carbonate (OS-CHRIS) 600 mg, Daily    estradiol (ESTRACE) 1 MG tablet Take 1 tablet (1 mg) daily as directed for postmenopausal state    ezetimibe (ZETIA) 10 MG tablet TAKE 1 TABLET BY MOUTH EVERY DAY    fexofenadine-pseudoephedrine (ALLEGRA-D 24) 180-240 MG per 24 hr tablet Take 1 p.o. daily for environmental allergies    fluticasone (FLONASE) 50 MCG/ACT nasal spray 2 sprays, As Needed    levothyroxine (SYNTHROID, LEVOTHROID) 50 MCG tablet TAKE ONE TABLET BY MOUTH DAILY FOR LOW THYROID    meloxicam (MOBIC) 15 mg, Oral, Daily    memantine (NAMENDA) 5 MG tablet 1 tab daily for 1 week, then 1 tab twice daily for 1 week, then 2 tabs in the morning and 1 tab at night for 1 week, then 2 tabs twice daily    memantine (NAMENDA) 10 mg, Oral, 2 Times Daily    multivitamin (THERAGRAN) tablet tablet 1 tablet, Daily    omeprazole OTC (PRILOSEC OTC) 20 mg, Oral, Daily    traMADol (ULTRAM) 50 mg, Oral, Every 6 Hours PRN          There were no vitals taken for this visit.               Objective     Neurological Exam  Mental Status  Awake, alert and oriented to person, place and time. Oriented to person, place and time. Recent and remote memory are intact. Speech is normal. Language is fluent with no aphasia.    Cranial Nerves  CN III, IV, VI: Extraocular movements intact bilaterally.  CN VII: Full and symmetric facial movement.    Motor  Normal muscle bulk throughout. No abnormal involuntary movements.    Coordination    Finger-to-nose, rapid alternating movements and heel-to-shin normal  bilaterally without dysmetria.    Gait  Normal casual, toe, heel and tandem gait.      Physical Exam  Constitutional:       Appearance: Normal appearance. She is normal weight.   Eyes:      Extraocular Movements: Extraocular movements intact.   Pulmonary:      Effort: Pulmonary effort is normal.   Neurological:      Coordination: Coordination is intact.   Psychiatric:         Mood and Affect: Mood normal.         Speech: Speech normal.         Behavior: Behavior normal.                     Assessment & Plan     Diagnoses and all orders for this visit:    1. Mild cognitive impairment (Primary)  -     memantine (NAMENDA) 5 MG tablet; 1 tab daily for 1 week, then 1 tab twice daily for 1 week, then 2 tabs in the morning and 1 tab at night for 1 week, then 2 tabs twice daily  Dispense: 70 tablet; Refill: 0  -     memantine (NAMENDA) 10 MG tablet; Take 1 tablet by mouth 2 (Two) Times a Day for 90 days.  Dispense: 60 tablet; Refill: 2    2. Snoring    3. Abnormal dreams         At this time I would like to try patient on memantine for treatment of the mild cognitive impairment as she did not tolerate donepezil due to stomach complaints.    I did encourage her to consider going forward with the sleep study as sleep apnea and REM sleep disorder could potentially worsen long-term memory complaints.  She is still reporting snoring.    In the meantime I told her they could start back on melatonin if she begins to have new or worsening sleep issues since it seems that it was helpful.    Follow-up in 2 months or sooner if needed.            Radha BINGHAM    Neurology    [facility] [place]    Phone: [phone]    1/7/2025 , 14:43 EST

## 2025-01-10 DIAGNOSIS — E03.9 PRIMARY HYPOTHYROIDISM: ICD-10-CM

## 2025-01-10 RX ORDER — LEVOTHYROXINE SODIUM 50 UG/1
TABLET ORAL
Qty: 90 TABLET | Refills: 3 | Status: SHIPPED | OUTPATIENT
Start: 2025-01-10

## 2025-01-13 ENCOUNTER — OFFICE VISIT (OUTPATIENT)
Dept: ORTHOPEDIC SURGERY | Facility: CLINIC | Age: 80
End: 2025-01-13
Payer: MEDICARE

## 2025-01-13 ENCOUNTER — TELEPHONE (OUTPATIENT)
Dept: ORTHOPEDIC SURGERY | Facility: CLINIC | Age: 80
End: 2025-01-13

## 2025-01-13 VITALS — TEMPERATURE: 98.4 F | WEIGHT: 129.2 LBS | HEIGHT: 65 IN | BODY MASS INDEX: 21.52 KG/M2

## 2025-01-13 DIAGNOSIS — Z96.611 STATUS POST REVERSE TOTAL REPLACEMENT OF RIGHT SHOULDER: Primary | ICD-10-CM

## 2025-01-13 DIAGNOSIS — Z09 SURGERY FOLLOW-UP: ICD-10-CM

## 2025-01-13 PROCEDURE — 99212 OFFICE O/P EST SF 10 MIN: CPT | Performed by: ORTHOPAEDIC SURGERY

## 2025-01-13 PROCEDURE — 73030 X-RAY EXAM OF SHOULDER: CPT | Performed by: ORTHOPAEDIC SURGERY

## 2025-01-13 PROCEDURE — 1159F MED LIST DOCD IN RCRD: CPT | Performed by: ORTHOPAEDIC SURGERY

## 2025-01-13 PROCEDURE — 1160F RVW MEDS BY RX/DR IN RCRD: CPT | Performed by: ORTHOPAEDIC SURGERY

## 2025-01-13 RX ORDER — DONEPEZIL HYDROCHLORIDE 5 MG/1
5 TABLET, FILM COATED ORAL NIGHTLY
COMMUNITY

## 2025-01-13 NOTE — PROGRESS NOTES
CC:  Follow up right shoulder    Ms. Mata follows up for her right shoulder.  She says she is doing fine.  She is able to do pretty much everything that she wants to do on a daily basis.  She is not having pain.  Her only complaint is that the shoulder is still weak.  She request permission to work a little more aggressively with her PT.    Right shoulder: I can feel a palpable defect at the acromion.  She has prominence of the scapular spine.  The skin is not threatened.  She is completely nontender in this area.  Her motion is good.  She can raise her arm all the way overhead.  She can abduct about 90.  Internal rotation is limited to about her back pocket but her external rotation is about 50.  She is still weak with both abduction and forward elevation although both are well-tolerated.    AP, scapular Y and axillary views right shoulder are ordered and reviewed to evaluate her implants.  These are compared to previous x-rays.  Implants appear well-fixed.  The displaced acromion fracture is again visualized.    Assessment: 6-month status post right reverse total shoulder arthroplasty with displaced acromion fracture    Plan: As per her request, I released her to resume PT without restriction.  I do think that as she starts to progress her strengthening, she may start to get some pain from the nonunion site.  I encouraged her to listen to her body and progress very slowly.  Overall though, she seems to be doing okay and so I recommend we continue to manage this conservatively.  Antibiotic prophylaxis recommendations were discussed.  I want to see her back in 6 months.    Ash Jimenez MD

## 2025-01-13 NOTE — TELEPHONE ENCOUNTER
Provider: DR AVERY     Caller: DEEPTI    Relationship to Patient:      Phone Number: [phone]    Reason for Call: PATIENT SEEN TODAY 01/13/2025  Rt. Shoulder - RTSA / SX 7/18/24     BILL STATES HE SPOKE TO SOMEONE ASKING WHERE PATIENT WENT FOR PHYSICAL THERAPY - DEEPTI STATES PATIENT HAS GONE TO PHYSICAL THERAPY SINCE SX IN JULY AT [facility] [address] [place] [address]  PHONE [phone]

## 2025-01-15 ENCOUNTER — TELEPHONE (OUTPATIENT)
Dept: INTERNAL MEDICINE | Facility: CLINIC | Age: 80
End: 2025-01-15

## 2025-01-15 ENCOUNTER — OFFICE VISIT (OUTPATIENT)
Dept: INTERNAL MEDICINE | Facility: CLINIC | Age: 80
End: 2025-01-15
Payer: MEDICARE

## 2025-01-15 VITALS
HEIGHT: 64 IN | SYSTOLIC BLOOD PRESSURE: 140 MMHG | WEIGHT: 130 LBS | OXYGEN SATURATION: 98 % | HEART RATE: 70 BPM | BODY MASS INDEX: 22.2 KG/M2 | TEMPERATURE: 98 F | DIASTOLIC BLOOD PRESSURE: 80 MMHG | RESPIRATION RATE: 16 BRPM

## 2025-01-15 DIAGNOSIS — E55.9 VITAMIN D DEFICIENCY: Chronic | ICD-10-CM

## 2025-01-15 DIAGNOSIS — E03.9 PRIMARY HYPOTHYROIDISM: Chronic | ICD-10-CM

## 2025-01-15 DIAGNOSIS — Z80.3 FAMILY HISTORY OF BREAST CANCER IN FIRST DEGREE RELATIVE: Chronic | ICD-10-CM

## 2025-01-15 DIAGNOSIS — Z51.81 THERAPEUTIC DRUG MONITORING: ICD-10-CM

## 2025-01-15 DIAGNOSIS — M85.89 OSTEOPENIA OF MULTIPLE SITES: Chronic | ICD-10-CM

## 2025-01-15 DIAGNOSIS — R41.3 MEMORY LOSS: Chronic | ICD-10-CM

## 2025-01-15 DIAGNOSIS — Z00.00 ENCOUNTER FOR SUBSEQUENT ANNUAL WELLNESS VISIT (AWV) IN MEDICARE PATIENT: Primary | ICD-10-CM

## 2025-01-15 DIAGNOSIS — M15.0 PRIMARY OSTEOARTHRITIS INVOLVING MULTIPLE JOINTS: Chronic | ICD-10-CM

## 2025-01-15 DIAGNOSIS — K21.9 GASTROESOPHAGEAL REFLUX DISEASE WITHOUT ESOPHAGITIS: Chronic | ICD-10-CM

## 2025-01-15 DIAGNOSIS — I10 BENIGN ESSENTIAL HYPERTENSION: Chronic | ICD-10-CM

## 2025-01-15 DIAGNOSIS — Z83.719 FAMILY HISTORY OF COLONIC POLYPS: Chronic | ICD-10-CM

## 2025-01-15 DIAGNOSIS — E78.2 MIXED HYPERLIPIDEMIA: Chronic | ICD-10-CM

## 2025-01-15 DIAGNOSIS — Z78.0 POSTMENOPAUSAL STATE: Chronic | ICD-10-CM

## 2025-01-15 DIAGNOSIS — F41.8 DEPRESSION WITH ANXIETY: Chronic | ICD-10-CM

## 2025-01-15 DIAGNOSIS — Z86.16 HISTORY OF COVID-19: Chronic | ICD-10-CM

## 2025-01-15 DIAGNOSIS — Z86.0100 HISTORY OF COLON POLYPS: Chronic | ICD-10-CM

## 2025-01-15 DIAGNOSIS — Z91.09 MULTIPLE ENVIRONMENTAL ALLERGIES: Chronic | ICD-10-CM

## 2025-01-15 PROCEDURE — 1170F FXNL STATUS ASSESSED: CPT | Performed by: INTERNAL MEDICINE

## 2025-01-15 PROCEDURE — 1126F AMNT PAIN NOTED NONE PRSNT: CPT | Performed by: INTERNAL MEDICINE

## 2025-01-15 PROCEDURE — 3079F DIAST BP 80-89 MM HG: CPT | Performed by: INTERNAL MEDICINE

## 2025-01-15 PROCEDURE — G0439 PPPS, SUBSEQ VISIT: HCPCS | Performed by: INTERNAL MEDICINE

## 2025-01-15 PROCEDURE — 3077F SYST BP >= 140 MM HG: CPT | Performed by: INTERNAL MEDICINE

## 2025-01-15 RX ORDER — CITALOPRAM HYDROBROMIDE 10 MG/1
TABLET ORAL
Qty: 90 TABLET | Refills: 1 | Status: SHIPPED | OUTPATIENT
Start: 2025-01-15 | End: 2025-01-15 | Stop reason: SDUPTHER

## 2025-01-15 RX ORDER — CITALOPRAM HYDROBROMIDE 10 MG/1
TABLET ORAL
Qty: 90 TABLET | Refills: 1 | Status: SHIPPED | OUTPATIENT
Start: 2025-01-15

## 2025-01-15 NOTE — ASSESSMENT & PLAN NOTE
Orders:    citalopram (CeleXA) 10 MG tablet; Cancel this prescription that was just sent by mistake.

## 2025-01-15 NOTE — PROGRESS NOTES
Subjective   The ABCs of the Annual Wellness Visit  Medicare Wellness Visit      Ashley Mata is a 79 y.o. patient who presents for a Medicare Wellness Visit.    The following portions of the patient's history were reviewed and   updated as appropriate: allergies, current medications, past family history, past medical history, past social history, past surgical history, and problem list.    Compared to one year ago, the patient's physical   health is the same.  Compared to one year ago, the patient's mental   health is worse.    Recent Hospitalizations:  This patient has had a [facility] admission record on file within the last 365 days.  Current Medical Providers:  Patient Care Team:  Carloz Shukla MD as PCP - General (Internal Medicine)    Outpatient Medications Prior to Visit   Medication Sig Dispense Refill    acetaminophen (Tylenol 8 Hour) 650 MG 8 hr tablet Take 1 tablet by mouth Every 8 (Eight) Hours As Needed for Mild Pain. 60 tablet 1    atorvastatin (LIPITOR) 20 MG tablet TAKE 1 TABLET BY MOUTH EVERY DAY      calcium carbonate (OS-CHRIS) 600 MG tablet Take 1 tablet by mouth Daily.      donepezil (ARICEPT) 5 MG tablet Take 1 tablet by mouth Every Night.      estradiol (ESTRACE) 1 MG tablet Take 1 tablet (1 mg) daily as directed for postmenopausal state      ezetimibe (ZETIA) 10 MG tablet TAKE 1 TABLET BY MOUTH EVERY DAY      fexofenadine-pseudoephedrine (ALLEGRA-D 24) 180-240 MG per 24 hr tablet Take 1 p.o. daily for environmental allergies      fluticasone (FLONASE) 50 MCG/ACT nasal spray Administer 2 sprays into the nostril(s) as directed by provider As Needed.      levothyroxine (SYNTHROID, LEVOTHROID) 50 MCG tablet TAKE 1 TABLET BY MOUTH DAILY FOR LOW THYROID 90 tablet 3    meloxicam (MOBIC) 15 MG tablet Take 1 tablet by mouth Daily. 90 tablet 3    memantine (NAMENDA) 10 MG tablet Take 1 tablet by mouth 2 (Two) Times a Day for 90 days. 60 tablet 2    memantine (NAMENDA) 5 MG tablet 1 tab  daily for 1 week, then 1 tab twice daily for 1 week, then 2 tabs in the morning and 1 tab at night for 1 week, then 2 tabs twice daily 70 tablet 0    multivitamin (THERAGRAN) tablet tablet Take 1 tablet by mouth Daily.      omeprazole OTC (PrilOSEC OTC) 20 MG EC tablet Take 1 tablet by mouth Daily.      traMADol (ULTRAM) 50 MG tablet Take 1 tablet by mouth Every 6 (Six) Hours As Needed for Moderate Pain. 30 tablet 0     No facility-administered medications prior to visit.     No opioid medication identified on active medication list. I have reviewed chart for other potential  high risk medication/s and harmful drug interactions in the elderly.      Aspirin is not on active medication list.  Aspirin use is not indicated based on review of current medical condition/s. Risk of harm outweighs potential benefits.  .    Patient Active Problem List   Diagnosis    Allergic rhinitis    Benign essential hypertension    Diverticulosis of colon    Gastroesophageal reflux disease without esophagitis    Hyperlipidemia    Multiple environmental allergies    Vitamin D deficiency    Therapeutic drug monitoring    Alopecia    Lumbar scoliosis    Primary hypothyroidism    Family history of breast cancer in first degree relative    Postmenopausal state    History of COVID-19, August 12, 2021; May 17, 2022.    Hammertoe of second toe of right foot    Family history of colonic polyps    Osteopenia of multiple sites, 2/25/2022--lumbar spine 1.1.  Left femoral neck -1.0.  Right femoral neck -0.3.    Primary osteoarthritis involving multiple joints    History of colon polyps, 8/18/2022--hyperplastic x1.    Memory loss    Depression with anxiety     Advance Care Planning Advance Directive is on file.  ACP discussion was held with the patient during this visit. Patient has an advance directive in EMR which is still valid.             Objective   Vitals:    01/15/25 1120   BP: 140/80   Pulse: 70   Resp: 16   Temp: 98 °F (36.7 °C)   TempSrc:  "Oral   SpO2: 98%   Weight: 59 kg (130 lb)   Height: 163.8 cm (64.49\")       Estimated body mass index is 21.98 kg/m² as calculated from the following:    Height as of this encounter: 163.8 cm (64.49\").    Weight as of this encounter: 59 kg (130 lb).    BMI is within normal parameters. No other follow-up for BMI required.           Does the patient have evidence of cognitive impairment? Yes  Lab Results   Component Value Date    CHLPL 174 2025    TRIG 135 2025       CBC normal.  CK normal at 50.  CMP normal.  Total cholesterol 174, triglycerides 135, LDL particle #738, HDL particle #50.2.  Thyroid function tests are normal.  SARS antibodies are positive.  Vitamin D normal at 52.7.  Urinalysis normal.                                                                                        Health  Risk Assessment    Smoking Status:  Social History     Tobacco Use   Smoking Status Never    Passive exposure: Never   Smokeless Tobacco Never     Alcohol Consumption:  Social History     Substance and Sexual Activity   Alcohol Use Yes    Alcohol/week: 2.0 standard drinks of alcohol    Types: 2 Glasses of wine per week    Comment: SOCIALLY       Fall Risk Screen  STEADI Fall Risk Assessment was completed, and patient is at LOW risk for falls.Assessment completed on:1/15/2025    Depression Screening   Little interest or pleasure in doing things? Not at all   Feeling down, depressed, or hopeless? Not at all   PHQ-2 Total Score 0      Health Habits and Functional and Cognitive Screenin/15/2025    11:28 AM   Functional & Cognitive Status   Do you have difficulty preparing food and eating? No   Do you have difficulty bathing yourself, getting dressed or grooming yourself? No   Do you have difficulty using the toilet? No   Do you have difficulty moving around from place to place? No   Do you have trouble with steps or getting out of a bed or a chair? No   Current Diet Well Balanced Diet   Dental Exam Up to date "   Eye Exam Up to date   Exercise (times per week) 0 times per week   Current Exercises Include No Regular Exercise   Do you need help using the phone?  No   Are you deaf or do you have serious difficulty hearing?  No   Do you need help to go to places out of walking distance? No   Do you need help shopping? No   Do you need help preparing meals?  No   Do you need help with housework?  No   Do you need help with laundry? No   Do you need help taking your medications? No   Do you need help managing money? No   Do you ever drive or ride in a car without wearing a seat belt? No   Have you felt unusual stress, anger or loneliness in the last month? No   Who do you live with? Spouse   If you need help, do you have trouble finding someone available to you? No   Have you been bothered in the last four weeks by sexual problems? No   Do you have difficulty concentrating, remembering or making decisions? Yes           Age-appropriate Screening Schedule:  Refer to the list below for future screening recommendations based on patient's age, sex and/or medical conditions. Orders for these recommended tests are listed in the plan section. The patient has been provided with a written plan.    Health Maintenance List  Health Maintenance   Topic Date Due    ANNUAL WELLNESS VISIT  11/27/2024    COLORECTAL CANCER SCREENING  11/26/2025    LIPID PANEL  01/08/2026    DXA SCAN  09/23/2026    TDAP/TD VACCINES (2 - Td or Tdap) 04/12/2027    RSV Vaccine - Adults  Completed    INFLUENZA VACCINE  Completed    Pneumococcal Vaccine 65+  Completed    COVID-19 Vaccine  Discontinued    MAMMOGRAM  Discontinued    ZOSTER VACCINE  Discontinued                                                                                                                                                CMS Preventative Services Quick Reference  Risk Factors Identified During Encounter  Depression/Dysphoria: Prescribed new antidepressant medication treatment. Follow up  visit planned.  Immunizations Discussed/Encouraged:  Patient up-to-date on immunizations    The above risks/problems have been discussed with the patient.  Pertinent information has been shared with the patient in the After Visit Summary.  An After Visit Summary and PPPS were made available to the patient.    Follow Up:   Next Medicare Wellness visit to be scheduled in 1 year.     Assessment & Plan  Encounter for subsequent annual wellness visit (AWV) in Medicare patient         Primary hypothyroidism         Benign essential hypertension           Hyperlipidemia            Vitamin D deficiency         History of COVID-19, August 12, 2021; May 17, 2022.         Postmenopausal state         Osteopenia of multiple sites, 2/25/2022--lumbar spine 1.1.  Left femoral neck -1.0.  Right femoral neck -0.3.         Primary osteoarthritis involving multiple joints         History of colon polyps, 8/18/2022--hyperplastic x1.         Memory loss         Depression with anxiety      Orders:    citalopram (CeleXA) 10 MG tablet; Cancel this prescription that was just sent by mistake.    Family history of colonic polyps         Family history of breast cancer in first degree relative         Multiple environmental allergies         Gastroesophageal reflux disease without esophagitis         Therapeutic drug monitoring              Follow Up:   No follow-ups on file.

## 2025-01-17 ENCOUNTER — APPOINTMENT (OUTPATIENT)
Dept: GENERAL RADIOLOGY | Facility: HOSPITAL | Age: 80
End: 2025-01-17
Payer: MEDICARE

## 2025-01-17 ENCOUNTER — APPOINTMENT (OUTPATIENT)
Dept: CT IMAGING | Facility: HOSPITAL | Age: 80
End: 2025-01-17
Payer: MEDICARE

## 2025-01-17 ENCOUNTER — HOSPITAL ENCOUNTER (EMERGENCY)
Facility: HOSPITAL | Age: 80
Discharge: HOME | End: 2025-01-17
Attending: EMERGENCY MEDICINE
Payer: MEDICARE

## 2025-01-17 VITALS
HEART RATE: 70 BPM | OXYGEN SATURATION: 97 % | SYSTOLIC BLOOD PRESSURE: 165 MMHG | RESPIRATION RATE: 16 BRPM | TEMPERATURE: 97.4 F | DIASTOLIC BLOOD PRESSURE: 94 MMHG

## 2025-01-17 DIAGNOSIS — M25.562 BILATERAL CHRONIC KNEE PAIN: ICD-10-CM

## 2025-01-17 DIAGNOSIS — M17.0 OSTEOARTHRITIS OF BOTH KNEES, UNSPECIFIED OSTEOARTHRITIS TYPE: ICD-10-CM

## 2025-01-17 DIAGNOSIS — R55 VASOVAGAL SYNCOPE: Primary | ICD-10-CM

## 2025-01-17 DIAGNOSIS — M25.561 BILATERAL CHRONIC KNEE PAIN: ICD-10-CM

## 2025-01-17 DIAGNOSIS — G89.29 BILATERAL CHRONIC KNEE PAIN: ICD-10-CM

## 2025-01-17 LAB
ALBUMIN SERPL-MCNC: 4 G/DL (ref 3.5–5.2)
ALBUMIN/GLOB SERPL: 1.1 G/DL
ALP SERPL-CCNC: 95 U/L (ref 39–117)
ALT SERPL W P-5'-P-CCNC: 20 U/L (ref 1–33)
ANION GAP SERPL CALCULATED.3IONS-SCNC: 10.6 MMOL/L (ref 5–15)
AST SERPL-CCNC: 28 U/L (ref 1–32)
BASOPHILS # BLD AUTO: 0.05 10*3/MM3 (ref 0–0.2)
BASOPHILS NFR BLD AUTO: 0.4 % (ref 0–1.5)
BILIRUB SERPL-MCNC: 0.7 MG/DL (ref 0–1.2)
BUN SERPL-MCNC: 17 MG/DL (ref 8–23)
BUN/CREAT SERPL: 18.9 (ref 7–25)
CALCIUM SPEC-SCNC: 9.4 MG/DL (ref 8.6–10.5)
CHLORIDE SERPL-SCNC: 105 MMOL/L (ref 98–107)
CO2 SERPL-SCNC: 26.4 MMOL/L (ref 22–29)
CREAT SERPL-MCNC: 0.9 MG/DL (ref 0.57–1)
DEPRECATED RDW RBC AUTO: 46.3 FL (ref 37–54)
EGFRCR SERPLBLD CKD-EPI 2021: 64.4 ML/MIN/1.73
EOSINOPHIL # BLD AUTO: 0.1 10*3/MM3 (ref 0–0.4)
EOSINOPHIL NFR BLD AUTO: 0.7 % (ref 0.3–6.2)
ERYTHROCYTE [DISTWIDTH] IN BLOOD BY AUTOMATED COUNT: 13.6 % (ref 12.3–15.4)
GEN 5 1HR TROPONIN T REFLEX: <6 NG/L
GLOBULIN UR ELPH-MCNC: 3.5 GM/DL
GLUCOSE SERPL-MCNC: 99 MG/DL (ref 65–99)
HCT VFR BLD AUTO: 44.4 % (ref 34–46.6)
HGB BLD-MCNC: 14.2 G/DL (ref 12–15.9)
HOLD SPECIMEN: NORMAL
HOLD SPECIMEN: NORMAL
IMM GRANULOCYTES # BLD AUTO: 0.04 10*3/MM3 (ref 0–0.05)
IMM GRANULOCYTES NFR BLD AUTO: 0.3 % (ref 0–0.5)
LYMPHOCYTES # BLD AUTO: 2.19 10*3/MM3 (ref 0.7–3.1)
LYMPHOCYTES NFR BLD AUTO: 15.8 % (ref 19.6–45.3)
MAGNESIUM SERPL-MCNC: 2.2 MG/DL (ref 1.6–2.4)
MCH RBC QN AUTO: 29.5 PG (ref 26.6–33)
MCHC RBC AUTO-ENTMCNC: 32 G/DL (ref 31.5–35.7)
MCV RBC AUTO: 92.3 FL (ref 79–97)
MONOCYTES # BLD AUTO: 0.54 10*3/MM3 (ref 0.1–0.9)
MONOCYTES NFR BLD AUTO: 3.9 % (ref 5–12)
NEUTROPHILS NFR BLD AUTO: 10.93 10*3/MM3 (ref 1.7–7)
NEUTROPHILS NFR BLD AUTO: 78.9 % (ref 42.7–76)
NRBC BLD AUTO-RTO: 0 /100 WBC (ref 0–0.2)
PLATELET # BLD AUTO: 307 10*3/MM3 (ref 140–450)
PMV BLD AUTO: 9.3 FL (ref 6–12)
POTASSIUM SERPL-SCNC: 4.4 MMOL/L (ref 3.5–5.2)
PROT SERPL-MCNC: 7.5 G/DL (ref 6–8.5)
QT INTERVAL: 402 MS
QTC INTERVAL: 431 MS
RBC # BLD AUTO: 4.81 10*6/MM3 (ref 3.77–5.28)
SODIUM SERPL-SCNC: 142 MMOL/L (ref 136–145)
TROPONIN T NUMERIC DELTA: NORMAL
TROPONIN T SERPL HS-MCNC: <6 NG/L
WBC NRBC COR # BLD AUTO: 13.85 10*3/MM3 (ref 3.4–10.8)
WHOLE BLOOD HOLD COAG: NORMAL
WHOLE BLOOD HOLD SPECIMEN: NORMAL

## 2025-01-17 PROCEDURE — 73562 X-RAY EXAM OF KNEE 3: CPT

## 2025-01-17 PROCEDURE — 36415 COLL VENOUS BLD VENIPUNCTURE: CPT

## 2025-01-17 PROCEDURE — 80053 COMPREHEN METABOLIC PANEL: CPT | Performed by: EMERGENCY MEDICINE

## 2025-01-17 PROCEDURE — 83735 ASSAY OF MAGNESIUM: CPT | Performed by: EMERGENCY MEDICINE

## 2025-01-17 PROCEDURE — 93005 ELECTROCARDIOGRAM TRACING: CPT | Performed by: EMERGENCY MEDICINE

## 2025-01-17 PROCEDURE — 93005 ELECTROCARDIOGRAM TRACING: CPT

## 2025-01-17 PROCEDURE — 99284 EMERGENCY DEPT VISIT MOD MDM: CPT

## 2025-01-17 PROCEDURE — 84484 ASSAY OF TROPONIN QUANT: CPT | Performed by: EMERGENCY MEDICINE

## 2025-01-17 PROCEDURE — 93010 ELECTROCARDIOGRAM REPORT: CPT | Performed by: INTERNAL MEDICINE

## 2025-01-17 PROCEDURE — 70450 CT HEAD/BRAIN W/O DYE: CPT

## 2025-01-17 PROCEDURE — 85025 COMPLETE CBC W/AUTO DIFF WBC: CPT | Performed by: EMERGENCY MEDICINE

## 2025-01-17 RX ORDER — SODIUM CHLORIDE 0.9 % (FLUSH) 0.9 %
10 SYRINGE (ML) INJECTION AS NEEDED
Status: DISCONTINUED | OUTPATIENT
Start: 2025-01-17 | End: 2025-01-17 | Stop reason: HOSPADM

## 2025-01-17 NOTE — ED PROVIDER NOTES
EMERGENCY DEPARTMENT ENCOUNTER  Room Number:  20/20  PCP: Carloz Shukla MD  Independent Historians: Patient and Family -patient's       HPI:  Chief Complaint: had concerns including Knee Pain and Syncope.     A complete HPI/ROS/PMH/PSH/SH/FH are unobtainable due to: None    Chronic or social conditions impacting patient care (Social Determinants of Health): None      Context: Ashley Mata is a 79 y.o. female with a medical history of hypertension, hypothyroidism, osteopenia, hyperlipidemia and arthritis who presents to the ED c/o acute severe right knee pain as well as a syncopal episode at home.  Patient says that this morning when she woke up, she was trying to get out of bed and she had an exacerbation of severe pain in her right knee.  She has chronic pain in both knees and has received gel injections from her knee specialist in the outpatient setting.  She denies any recent falls or injuries to the knees.  However she says that she could not get her knee to straighten and she could not ambulate or bear weight safely.  Her  was trying to help her from the bedroom to another area in the house and as he was assisting her while she was using crutches, she became increasingly weak and her eyes rolled backward and she suddenly lost consciousness.  He had to lower her to the ground.  Patient says she does not remember exactly what happened.  She denies having had any chest pain or difficulties breathing though.  She did not hit her head.  She denies any other areas of pain and her  says that she did not sustain any injuries when he lowered her to the ground.      Review of prior external notes (non-ED) -and- Review of prior external test results outside of this encounter: I independently reviewed the most recent internal medicine progress note from January 15, 2025.  Patient had subsequent annual wellness visit and Medicare patient.  Multiple chronic problems were addressed including  hypothyroidism, hypertension, hyperlipidemia, vitamin D deficiency and osteopenia.  I also reviewed the most recent orthopedic surgery progress note from January 13, 2025.  She had follow-up for right shoulder arthroplasty at that time.  No remark of knee pain problem was addressed at that visit.    Prescription drug monitoring program review: LEONARD reviewed by Isaiah Armstrong MD       PAST MEDICAL HISTORY  Active Ambulatory Problems     Diagnosis Date Noted    Allergic rhinitis 01/30/2015    Benign essential hypertension 04/18/2016    Diverticulosis of colon 03/17/2009    Gastroesophageal reflux disease without esophagitis 03/17/2009    Hyperlipidemia 07/17/2012    Multiple environmental allergies 01/30/2015    Vitamin D deficiency 04/18/2016    Therapeutic drug monitoring 04/11/2017    Alopecia 04/12/2017    Lumbar scoliosis 08/07/2018    Primary hypothyroidism 01/22/2020    Family history of breast cancer in first degree relative 07/13/2020    Postmenopausal state 07/13/2020    History of COVID-19, August 12, 2021; May 17, 2022. 10/19/2021    Hammertoe of second toe of right foot 10/19/2021    Family history of colonic polyps 04/01/2022    Osteopenia of multiple sites, 2/25/2022--lumbar spine 1.1.  Left femoral neck -1.0.  Right femoral neck -0.3. 04/01/2022    Primary osteoarthritis involving multiple joints 06/08/2022    History of colon polyps, 8/18/2022--hyperplastic x1. 11/23/2022    Memory loss 06/06/2024    Depression with anxiety 01/03/2025     Resolved Ambulatory Problems     Diagnosis Date Noted    History of Hyperplastic polyps of stomach 04/18/2016    Primary osteoarthritis of both wrists 12/30/2013    Primary osteoarthritis of both hands 12/30/2013    Chronic insomnia 04/18/2016    Postmenopausal hormone replacement therapy 04/18/2016    Subclinical hypothyroidism 10/03/2014    History of abdominal pain 04/18/2016    History of URI (upper respiratory infection) 04/18/2016    History of Bicipital  tendinitis of right shoulder 03/17/2015    History of Change in bowel habits 04/18/2016    History of Degeneration of cervical intervertebral disc 04/18/2016    History of bone density study 04/18/2016    History of diarrhea 04/18/2016    History of Doppler ultrasound of Artery: Carotid 04/18/2016    History of Zostavax administration 11/20/2014    History of chest x-ray 04/18/2016    History of mammogram 04/18/2016    History of Intramuscular hematoma, left 04/18/2016    History of Left rotator cuff tear 04/18/2016    History of Muscular aches 04/18/2016    History of pneumococcal vaccination 04/18/2016    History of Subdeltoid bursitis of right shoulder joint 04/20/2016    Right shoulder pain 04/20/2016    History of rotator cuff tear, right 07/01/2016    Diffuse arthralgia 04/09/2018    Lumbar back pain with radiculopathy affecting right lower extremity 07/09/2018    Lumbar disc herniation, L4-L5 08/07/2018    Persistent cough 03/18/2019    Acute bronchitis with bronchospasm 03/18/2019    Plantar wart of left foot 01/22/2020    Chronic foot pain, left 03/04/2020    Chronic toe pain, right foot 10/19/2021    Acute bronchitis with bronchospasm 04/01/2022    AUBREY (acute kidney injury) 04/24/2022    Hypokalemia 04/24/2022    Acidosis 04/24/2022    Jaundice 04/24/2022    History of acute hepatitis 04/24/2022    Hospital discharge follow-up 05/09/2022    Arthralgia of multiple joints 05/09/2022    Elevated liver enzymes 07/20/2022    Positive colorectal cancer screening using DNA-based stool test 08/11/2022    Onychomycosis of right great toe 12/07/2022    Herpes zoster without complication 04/03/2023    Primary osteoarthritis of both knees 04/05/2023    Chronic pain of both knees 04/05/2023    Diverticulitis of large intestine without perforation or abscess with bleeding 04/27/2023    Right rotator cuff tear 06/05/2024    Intermittent confusion 06/06/2024    Cognitive impairment 06/06/2024    History of adenomatous  polyp of colon 09/23/2024    Urinary urgency 11/11/2024    Nocturnal enuresis 11/11/2024    Dream enactment behavior 11/20/2024    Sleep walking 11/20/2024     Past Medical History:   Diagnosis Date    Hammertoe of right foot     Hyperlipidemia 07/17/2012    Menopausal state 07/13/2020         PAST SURGICAL HISTORY  Past Surgical History:   Procedure Laterality Date    COLONOSCOPY  03/17/2009 03/17/2009--colonoscopy revealed external hemorrhoids, torturous colon with a sigmoid stricture, sigmoid diverticulosis.    COLONOSCOPY N/A 03/10/2017    03/10/2017--colonoscopy revealed many small and large mouth diverticula in the sigmoid colon and descending colon.  Nonthrombosed external hemorrhoids and internal hemorrhoids noted.  Otherwise, normal colonoscopy.    COLONOSCOPY N/A 08/18/2022    Procedure: COLONOSCOPY TO 40CM WITH POLYPECTOMY (COLD);  Surgeon: Mario Ray MD;  Location: [facility] ENDOSCOPY;  Service: General;  Laterality: N/A;  PRE- POSITIVE COLOGUARD  POST- POLYP, HEMORRHOIDS    COLONOSCOPY N/A 11/26/2024    Procedure: COLONOSCOPY TO CECUM;  Surgeon: Mario Ray MD;  Location[facility] ENDOSCOPY;  Service: General;  Laterality: N/A;  PRE- CHANGE IN BOWEL HABITS  POST- DIVERTICULOSIS, HEMORRHOIDS    ENDOSCOPY N/A 08/18/2022    Procedure: ESOPHAGOGASTRODUODENOSCOPY WITH BIOPSIES AND COLD BIOPSY POLYPECTOMY;  Surgeon: Mario Ray MD;  [facility] END[facility]COPY;  Service: General;  Laterality: N/A;  PRE- ACID REFLUX  POST- GASTRITIS, GASTRIC POLYPS    ERCP WITH SPHINCTEROTOMY/PAPILLOTOMY  08/13/2014 08/13/2014--ERCP, endoscopic sphincterotomy and removal of common bile duct stones. 08/12/2014--laparoscopic cholecystectomy. This was complicated by retained common bile duct stones 2.    ESOPHAGOSCOPY / EGD  06/09/2015 06/09/2015--EGD revealed Z line irregular, 35 cm from incisors. Biopsied. Small hiatal hernia. Gastritis. Biopsied. Bilious gastric fluid. Fluid  aspiration performed. Normal duodenal bulb and second part of duodenum. Biopsied. Pathology revealed the antral biopsy revealed small intestinal mucosa with no pathologic diagnosis. Good preservation of villous architecture. Duodenum biopsy revealed large    EYE SURGERY  07/19/2018    Both Eyes; cataracts    FOOT SURGERY  April 11, 2022    Dr. Vieira - toe urgery    HAMMER TOE REPAIR Right 04/11/2022    Procedure: RIGHT SECOND AND THIRD HAMMERTOE REPAIRS;  Surgeon: Brandt Vieira MD;  Location: [facility];  Service: Orthopedics;  Laterality: Right;    JOINT REPLACEMENT  7/18/2024    RSR right shoulder    LAPAROSCOPIC CHOLECYSTECTOMY  08/12/2014 08/13/2014--ERCP, endoscopic sphincterotomy and removal of common bile duct stones. 08/12/2014--laparoscopic cholecystectomy. This was complicated by retained common bile duct stones 2.    ROTATOR CUFF REPAIR Left 07/10/2014    07/10/2014--left rotator cuff repair. 03/18/2014--patient seen in followup and reports that her range of motion has improved and her pain is not debilitating. She feels that she is making progress with physical therapy. Surgery scheduled 07/10/2014. 01/10/2014--patient was seen in evaluation by the orthopedist and he recommended conservative treatment consisting of physical therapy/rehabilitation.    ROTATOR CUFF REPAIR Right 08/03/2016 08/03/2016--arthroscopic right rotator cuff repair.  Debridement of degenerative anterior superior labral tear.    SHOULDER SURGERY  2014 & 2016    Rotator Cuff Surgery both shoulders    SUBTOTAL HYSTERECTOMY  1978    Partial hysterectomy 1978.    TOTAL SHOULDER ARTHROPLASTY W/ DISTAL CLAVICLE EXCISION Right 07/18/2024    Procedure: Right Reverse Total Shoulder Arthroplasty;  Surgeon: Ash Jimenez MD;  Location: [facility];  Service: Orthopedics;  Laterality: Right;    TRIGGER POINT INJECTION  2023    Gel in both knees         FAMILY HISTORY  Family History   Problem Relation Age of Onset    Colon  polyps Mother     Alzheimer's disease Mother     Hearing loss Mother     Osteoporosis Mother     Dementia Mother     Cancer Father         Lung Cancer    Early death Father         59 years old    Breast cancer Daughter 52    Dementia Maternal Aunt     Malig Hyperthermia Neg Hx          SOCIAL HISTORY  Social History     Socioeconomic History    Marital status:     Number of children: 2    Years of education: BA    Highest education level: Associate degree: occupational, technical, or vocational program   Tobacco Use    Smoking status: Never     Passive exposure: Never    Smokeless tobacco: Never   Vaping Use    Vaping status: Never Used   Substance and Sexual Activity    Alcohol use: Yes     Alcohol/week: 2.0 standard drinks of alcohol     Types: 2 Glasses of wine per week     Comment: SOCIALLY    Drug use: No    Sexual activity: Not Currently     Partners: Male     Birth control/protection: Hysterectomy         ALLERGIES  Bupivacaine      REVIEW OF SYSTEMS  Review of Systems  Included in HPI  All systems reviewed and negative except for those discussed in HPI.      PHYSICAL EXAM    I have reviewed the triage vital signs and nursing notes.    ED Triage Vitals   Temp Heart Rate Resp BP SpO2   01/17/25 1056 01/17/25 1056 01/17/25 1056 01/17/25 1143 01/17/25 1056   97.4 °F (36.3 °C) 72 16 165/94 97 %      Temp src Heart Rate Source Patient Position BP Location FiO2 (%)   01/17/25 1056 01/17/25 1056 -- -- --   Tympanic Monitor          Physical Exam  GENERAL: alert, no acute distress  SKIN: Warm, dry, no rashes  HENT: Normocephalic, atraumatic, moist mucous membrane  EYES: no scleral icterus, normal conjunctivae  CV: regular rhythm, regular rate, no murmurs  RESPIRATORY: normal effort, lungs clear bilaterally  ABDOMEN: soft, nondistended, nontender  MUSCULOSKELETAL: no deformity.  Mild tenderness circumferentially to the bilateral knees is notable.  Patient has complete passive range of motion for flexion and  extension of the left knee with only mild discomfort.  She has tolerance of passive flexion range of motion testing to the right knee up to about 30 degrees.  There are no clicks or pops palpable or audible.  There is no induration or erythema of either knee.  The bilateral lower legs and feet and ankles are warm and well-perfused.  NEURO: alert, moves all extremities, follows commands      LAB RESULTS  Recent Results (from the past 24 hours)   ECG 12 Lead ED Triage Standing Order; Syncope    Collection Time: 01/17/25 10:58 AM   Result Value Ref Range    QT Interval 402 ms    QTC Interval 431 ms   Comprehensive Metabolic Panel    Collection Time: 01/17/25 11:51 AM    Specimen: Blood   Result Value Ref Range    Glucose 99 65 - 99 mg/dL    BUN 17 8 - 23 mg/dL    Creatinine 0.90 0.57 - 1.00 mg/dL    Sodium 142 136 - 145 mmol/L    Potassium 4.4 3.5 - 5.2 mmol/L    Chloride 105 98 - 107 mmol/L    CO2 26.4 22.0 - 29.0 mmol/L    Calcium 9.4 8.6 - 10.5 mg/dL    Total Protein 7.5 6.0 - 8.5 g/dL    Albumin 4.0 3.5 - 5.2 g/dL    ALT (SGPT) 20 1 - 33 U/L    AST (SGOT) 28 1 - 32 U/L    Alkaline Phosphatase 95 39 - 117 U/L    Total Bilirubin 0.7 0.0 - 1.2 mg/dL    Globulin 3.5 gm/dL    A/G Ratio 1.1 g/dL    BUN/Creatinine Ratio 18.9 7.0 - 25.0    Anion Gap 10.6 5.0 - 15.0 mmol/L    eGFR 64.4 >60.0 mL/min/1.73   Magnesium    Collection Time: 01/17/25 11:51 AM    Specimen: Blood   Result Value Ref Range    Magnesium 2.2 1.6 - 2.4 mg/dL   High Sensitivity Troponin T    Collection Time: 01/17/25 11:51 AM    Specimen: Blood   Result Value Ref Range    HS Troponin T <6 <14 ng/L   Green Top (Gel)    Collection Time: 01/17/25 11:51 AM   Result Value Ref Range    Extra Tube Hold for add-ons.    Lavender Top    Collection Time: 01/17/25 11:51 AM   Result Value Ref Range    Extra Tube hold for add-on    Gold Top - SST    Collection Time: 01/17/25 11:51 AM   Result Value Ref Range    Extra Tube Hold for add-ons.    Light Blue Top     Collection Time: 01/17/25 11:51 AM   Result Value Ref Range    Extra Tube Hold for add-ons.    CBC Auto Differential    Collection Time: 01/17/25 11:51 AM    Specimen: Blood   Result Value Ref Range    WBC 13.85 (H) 3.40 - 10.80 10*3/mm3    RBC 4.81 3.77 - 5.28 10*6/mm3    Hemoglobin 14.2 12.0 - 15.9 g/dL    Hematocrit 44.4 34.0 - 46.6 %    MCV 92.3 79.0 - 97.0 fL    MCH 29.5 26.6 - 33.0 pg    MCHC 32.0 31.5 - 35.7 g/dL    RDW 13.6 12.3 - 15.4 %    RDW-SD 46.3 37.0 - 54.0 fl    MPV 9.3 6.0 - 12.0 fL    Platelets 307 140 - 450 10*3/mm3    Neutrophil % 78.9 (H) 42.7 - 76.0 %    Lymphocyte % 15.8 (L) 19.6 - 45.3 %    Monocyte % 3.9 (L) 5.0 - 12.0 %    Eosinophil % 0.7 0.3 - 6.2 %    Basophil % 0.4 0.0 - 1.5 %    Immature Grans % 0.3 0.0 - 0.5 %    Neutrophils, Absolute 10.93 (H) 1.70 - 7.00 10*3/mm3    Lymphocytes, Absolute 2.19 0.70 - 3.10 10*3/mm3    Monocytes, Absolute 0.54 0.10 - 0.90 10*3/mm3    Eosinophils, Absolute 0.10 0.00 - 0.40 10*3/mm3    Basophils, Absolute 0.05 0.00 - 0.20 10*3/mm3    Immature Grans, Absolute 0.04 0.00 - 0.05 10*3/mm3    nRBC 0.0 0.0 - 0.2 /100 WBC   High Sensitivity Troponin T 1Hr    Collection Time: 01/17/25  1:04 PM    Specimen: Blood   Result Value Ref Range    HS Troponin T <6 <14 ng/L    Troponin T Numeric Delta           RADIOLOGY  CT Head Without Contrast    Result Date: 1/17/2025  CT HEAD WO CONTRAST-  HISTORY:  syncope  COMPARISON: MRI brain 6/27/2024  FINDINGS: There is diffuse atrophy, age-appropriate. Moderate to severe small vessel ischemic disease is noted. There is no evidence of hemorrhage, hydrocephalus or of acute infarction. There is prominence of the cavernous ICA on the left there is complete opacification of the sphenoid sinus laterally to the left and the walls of the sphenoid sinus are sclerotic suggesting chronic sinusitis.      No acute process is identified. Further evaluation could be performed with MRI examination of the brain.      Radiation dose reduction  techniques were utilized, including automated exposure modulation based on body size.  This report was finalized on 1/17/2025 12:50 PM by Dr. Francisco Javier Guan M.D on Workstation: [id number]      XR Knee 3 View Bilateral    Result Date: 1/17/2025  XR KNEE 3 VW BILATERAL-  INDICATIONS: Pain, no injury.  TECHNIQUE: 7 views including the bilateral knees  COMPARISON: None available  FINDINGS:  No acute fracture, erosion, or dislocation is identified. Mild bilateral medial tibiofemoral joint space narrowing. Bilateral patellofemoral joint space narrowing is seen, more prominent medially on the left and laterally on the right. Minimal knee effusions. Follow-up/further evaluation can be obtained as indications persist.       As described.    This report was finalized on 1/17/2025 12:20 PM by Dr. Brandt Myers M.D on Workstation: [id number]         MEDICATIONS GIVEN IN ER  Medications - No data to display        ORDERS PLACED DURING THIS VISIT:  Orders Placed This Encounter   Procedures    [facility] D[facility] 12.  Standard Walker Folding; Prevents Accomplishing MRADLs; Able to Safely Use Equipment; Mobility Deficit Can Be Sufficiently Resolved By Use of Equipment    CT Head Without Contrast    XR Knee 3 View Bilateral    Agency Draw    Comprehensive Metabolic Panel    Magnesium    High Sensitivity Troponin T    CBC Auto Differential    High Sensitivity Troponin T 1Hr    Undress & Gown    Continuous Pulse Oximetry    Vital Signs    ECG 12 Lead ED Triage Standing Order; Syncope    CBC & Differential    Green Top (Gel)    Lavender Top    Gold Top - SST    Light Blue Top         OUTPATIENT MEDICATION MANAGEMENT:  No current Epic-ordered facility-administered medications on file.     Current Outpatient Medications Ordered in Epic   Medication Sig Dispense Refill    acetaminophen (Tylenol 8 Hour) 650 MG 8 hr tablet Take 1 tablet by mouth Every 8 (Eight) Hours As Needed for Mild Pain. 60 tablet 1    atorvastatin (LIPITOR) 20  MG tablet TAKE 1 TABLET BY MOUTH EVERY DAY      calcium carbonate (OS-CHRIS) 600 MG tablet Take 1 tablet by mouth Daily.      citalopram (CeleXA) 10 MG tablet Cancel this prescription that was just sent by mistake. 90 tablet 1    donepezil (ARICEPT) 5 MG tablet Take 1 tablet by mouth Every Night.      estradiol (ESTRACE) 1 MG tablet Take 1 tablet (1 mg) daily as directed for postmenopausal state      ezetimibe (ZETIA) 10 MG tablet TAKE 1 TABLET BY MOUTH EVERY DAY      fexofenadine-pseudoephedrine (ALLEGRA-D 24) 180-240 MG per 24 hr tablet Take 1 p.o. daily for environmental allergies      fluticasone (FLONASE) 50 MCG/ACT nasal spray Administer 2 sprays into the nostril(s) as directed by provider As Needed.      levothyroxine (SYNTHROID, LEVOTHROID) 50 MCG tablet TAKE 1 TABLET BY MOUTH DAILY FOR LOW THYROID 90 tablet 3    meloxicam (MOBIC) 15 MG tablet Take 1 tablet by mouth Daily. 90 tablet 3    memantine (NAMENDA) 10 MG tablet Take 1 tablet by mouth 2 (Two) Times a Day for 90 days. 60 tablet 2    memantine (NAMENDA) 5 MG tablet 1 tab daily for 1 week, then 1 tab twice daily for 1 week, then 2 tabs in the morning and 1 tab at night for 1 week, then 2 tabs twice daily 70 tablet 0    multivitamin (THERAGRAN) tablet tablet Take 1 tablet by mouth Daily.      omeprazole OTC (PrilOSEC OTC) 20 MG EC tablet Take 1 tablet by mouth Daily.           PROCEDURES  Procedures        PROGRESS, DATA ANALYSIS, CONSULTS, AND MEDICAL DECISION MAKING  All labs have been independently interpreted by me.  All radiology studies have been reviewed by me. All EKG's have been independently viewed and interpreted by me.  Discussion below represents my analysis of pertinent findings related to patient's condition, differential diagnosis, treatment plan and final disposition.    Differential diagnosis includes but is not limited to hypoglycemia, seizure, stroke, vasovagal syncope, arrhythmia, dehydration, arthritis.    Clinical Scores:              "      ED Course as of 01/17/25 2029 Fri Jan 17, 2025   1145 EKG         EKG time/Interp time: 1058/1100  Rhythm/Rate: Sinus rhythm, 69 bpm  P waves and WA: Present, 161 ms, normal interval  QRS, axis: 102 ms, narrow complex, normal axis  ST and T waves: No acute ischemic changes are present.  Independently interpreted by me contemporaneously with treatment   [FIFI]   1150 Patient looks rather well at this time.  She has no somatic complaints apart from her bilateral knee pain right now.  I suspect that she had a vasovagal episode in the context of her knee pain this morning.  I do not suspect an acute cardiac or vascular or pulmonary emergency.  We will proceed with typical workup while we monitor her vital signs on telemetry. [FIFI]   1357 WBC(!): 13.85 [FIFI]   1358 HS Troponin T: <6 [FIFI]   1358 Magnesium: 2.2 [FIFI]   1358 HS Troponin T: <6 [FIFI]   1400 I independently interpreted the Head CT w/o Contrast and my findings are: No acute hemorrhage, no midline shift   [FIFI]   1400 I independently interpreted the x-ray of the bilateral knees and my findings are: No acute fracture or dislocation injury present. [FIFI]   1400 Patient was able to ambulate around the hallway with assistance of a walker here.  She did very well, in fact.  I do not think she needs to be admitted at this time.  I think she is safe for discharge home.  I will instruct her to follow-up promptly with her PCP and with her orthopedist for further ongoing care needs.  I will prescribe a walker for her to use at home as well.  Will discuss with her and her  the usual \"return to ER\" instructions prior to discharge. [FIFI]      ED Course User Index  [FIFI] Isaiah Armstrong MD             AS OF 20:29 EST VITALS:    BP - 165/94  HR - 70  TEMP - 97.4 °F (36.3 °C) (Tympanic)  O2 SATS - 97%    COMPLEXITY OF CARE  Admission was considered but after careful review of the patient's presentation, physical examination, diagnostic results, and response to treatment " the patient may be safely discharged with outpatient follow-up.      DIAGNOSIS  Final diagnoses:   Vasovagal syncope   Bilateral chronic knee pain   Osteoarthritis of both knees, unspecified osteoarthritis type         DISPOSITION  ED Disposition       ED Disposition   Discharge    Condition   Stable    Comment   --                Please note that portions of this document were completed with a voice recognition program.    Note Disclaimer: At [facility], we believe that sharing information builds trust and better relationships. You are receiving this note because you recently visited [facility]. It is possible you will see health information before a provider has talked with you about it. This kind of information can be easy to misunderstand. To help you fully understand what it means for your health, we urge you to discuss this note with your provider.         Isaiah Armstrong MD  01/17/25 2029

## 2025-01-17 NOTE — DISCHARGE INSTRUCTIONS
Rest when needed.  Use the walker for assistance with ambulation.  Recommend prompt follow-up with your orthopedics doctor for further evaluation and treatment options.  Please return to the emergency room for any worsening symptoms or concerns.

## 2025-01-17 NOTE — DISCHARGE PLACEMENT REQUEST
"Bhavya Moseley (79 y.o. Female)       Date of Birth   1945    Social Security Number       Address   [address] [place] [address]    Home Phone   [phone]    MRN   [id number]       Restoration   Holiness    Marital Status                               Admission Date   1/17/25    Admission Type   Emergency    Admitting Provider       Attending Provider   Isaiah Armstrong MD    Department, Room/Bed   [facility] EMERGENCY DEPARTMENT, 20/20       Discharge Date       Discharge Disposition       Discharge Destination                                 Attending Provider: Isaiah Armstrong MD    Allergies: Bupivacaine    Isolation: None   Infection: None   Code Status: Prior    Ht: 163.8 cm (64.49\")   Wt: 59 kg (130 lb)    Admission Cmt: None   Principal Problem: None                  Active Insurance as of 1/17/2025       Primary Coverage       Payor Plan Insurance Group Employer/Plan Group    MEDICARE MEDICARE A & B        Payor Plan Address Payor Plan Phone Number Payor Plan Fax Number Effective Dates    [address] [phone]  10/1/2008 - None Entered    [facility] [address]         Subscriber Name Subscriber Birth Date Member ID       BHAVYA MOSELEY 1945 [id number]               Secondary Coverage       Payor Plan Insurance Group Employer/Plan Group    [employer] [place] [employer] [place] [employer]       Payor Plan Address Payor Plan Phone Number Payor Plan Fax Number Effective Dates    [address] [place] [address] [phone]  11/1/2018 - None Entered    [place] [address]         Subscriber Name Subscriber Birth Date Member ID       BHAVYA MOSELEY 1945 [id number]                     Emergency Contacts        (Rel.) Home Phone Work Phone Mobile Phone    Darrius Moseley (Spouse) [phone] -- [phone]                "

## 2025-01-17 NOTE — CASE MANAGEMENT/SOCIAL WORK
Discharge Planning Assessment  [facility]     Patient Name: Ashley Mata  MRN: [id number]  Today's Date: 1/17/2025    Admit Date: 1/17/2025    Plan: Patient provided with rolling walker from [employer], per MD order. MAGGIE Collado RN   Discharge Needs Assessment    No documentation.                  Discharge Plan       Row Name 01/17/25 1411       Plan    Plan Patient provided with rolling walker from [employer], per MD order. MAGGIE Collado RN                  Continued Care and Services - Admitted Since 1/17/2025       Durable Medical Equipment       Service Provider Request Status Services Address Phone Fax Patient Preferred    [employer] [facility] [place] Pending - Request Sent -- [facility] [facility] [address] [address] [phone] --       Internal Comment last updated by Carloz Linder, RN 1/17/2025 1411    Patient supplied with rolling walker from [employer].                                Demographic Summary    No documentation.                  Functional Status    No documentation.                  Psychosocial    No documentation.                  Abuse/Neglect    No documentation.                  Legal    No documentation.                  Substance Abuse    No documentation.                  Patient Forms    No documentation.                     Carloz Linder, HETAL

## 2025-01-24 NOTE — H&P
History & Physical       Patient: Ashley Mata    Date of Admission: 04/11/2022    YOB: 1945    Medical Record Number: [id number]    Attending Physician: Brandt Vieira MD        Chief Complaints: right 2nd and 3rd hammertoes      History of Present Illness: 76 y.o. female presents with right 2nd and 3rd hammertoes. Onset of symptoms was several years.  Symptoms are associated with pain and difficulty with shoewear.  Symptoms are aggravated by activity.   Symptoms improve with rest. Patient is now being admitted to the services of Brandt Vieira MD for further evaluation and treatment.     No Known Allergies    Medications:     Home Medications:  No current facility-administered medications on file prior to encounter.     Current Outpatient Medications on File Prior to Encounter   Medication Sig   • atorvastatin (LIPITOR) 80 MG tablet TAKE ONE TABLET BY MOUTH EVERY NIGHT AT BEDTIME   • Biotin 1000 MCG chewable tablet Chew 3,000 mg.   • Coenzyme Q10 (COQ10) 400 MG capsule Take 1 capsule by mouth Daily. HOLD FOR SURGERY   • fexofenadine-pseudoephedrine (ALLEGRA-D 24) 180-240 MG per 24 hr tablet Take 1 tablet by mouth Daily. (Patient taking differently: Take 1 tablet by mouth Daily As Needed for Allergies.)   • levothyroxine (SYNTHROID, LEVOTHROID) 50 MCG tablet TAKE ONE TABLET BY MOUTH DAILY FOR LOW THYROID   • lisinopril-hydrochlorothiazide (PRINZIDE,ZESTORETIC) 10-12.5 MG per tablet TAKE ONE TABLET BY MOUTH EVERY MORNING   • meloxicam (MOBIC) 15 MG tablet TAKE ONE TABLET BY MOUTH DAILY FOR ARTHRITIS (Patient taking differently: HOLD FOR SURGERY PER MD INSTRUCTIONS)   • multivitamin (THERAGRAN) tablet tablet Take 1 tablet by mouth Daily. HOLD FOR SURGERY   • omeprazole OTC (PRILOSEC OTC) 20 MG EC tablet Take 1 tablet by mouth Daily. (Patient taking differently: Take 20 mg by mouth As Needed.)   • vitamin C (ASCORBIC ACID) 250 MG tablet Take 250 mg by mouth Daily.     Current  ----- Message from Sharla sent at 1/23/2025  9:32 AM CST -----  Who Called: Elizabeth Monteiro    Preferred Method of Contact: Phone Call  Patient's Preferred Phone Number on File: [phone]   Best Call Back Number, if different:  Additional Information: pt is pregnant and needs a sooner appt before feb 10 and needs to get in   Medications:  Scheduled Meds:  Continuous Infusions:No current facility-administered medications for this encounter.    PRN Meds:.       Past Medical History:   Diagnosis Date   • Allergic rhinitis 01/30/2015 11/13/2015--patient was evaluated by ENT and was started on generic Flonase and patient reports this has improved her symptoms quite a bit.   01/30/2015--allergy testing revealed positive reactivity to cat, dust mite, cockroach, mold spores, and grass pollen. Environmental control measures.   • Alopecia 04/12/2017    Evaluated and treated by the dermatologist.   • Benign essential hypertension 04/18/2016   • Chronic toe pain, right foot 10/19/2021    October 19, 2021--patient reports a 1 year history of progressively worsening right toe pain that particular involves the second toe.  On exam she has obvious hammertoe which is causing irritation.  There is some hammering of the third digit as well.  Patient referred to orthopedist who specializes in foot problems such as Dr. Vieira.  In the meantime patient should use over-the-counter toe pads to   • Diverticulosis of colon 03/17/2009    03/10/2017--colonoscopy revealed many small and large mouth diverticula in the sigmoid colon and descending colon.  Nonthrombosed external hemorrhoids and internal hemorrhoids noted.  Otherwise, normal colonoscopy.  03/17/2009--colonoscopy revealed external hemorrhoids, torturous colon with a sigmoid stricture, sigmoid diverticulosis.   • Family history of breast cancer in first degree relative 07/13/2020   • Gastroesophageal reflux disease without esophagitis 03/17/2009 06/09/2015--EGD revealed Z line irregular, 35 cm from incisors. Biopsied. Small hiatal hernia. Gastritis. Biopsied. Bilious gastric fluid. Fluid aspiration performed. Normal duodenal bulb and second part of duodenum. Biopsied. Pathology revealed the antral biopsy revealed small intestinal mucosa with no pathologic diagnosis. Good preservation of villous  architecture. Duodenum biopsy revealed largely antral type gastric mucosa with mild patchy superficial chronic gastritis. Gastroesophageal junction revealed squamous and glandular mucosa with mild chronic inflammation. Negative for intestinal metaplasia or dysplasia. There appeared to be mislabeling of the antral biopsies and duodenal biopsies.   04/17/2012--EGD revealed irregular Z line, hiatal hernia, gastritis, duodenitis.   03/17/2009--EGD revealed grade B. reflux esophagitis, hiatal hernia, gastritis, a few gastric polyps, duodenitis.   • Hammertoe of right foot    • Hammertoe of second toe of right foot 10/19/2021    October 19, 2021--patient reports a 1 year history of progressively worsening right toe pain that particular involves the second toe.  On exam she has obvious hammertoe which is causing irritation.  There is some hammering of the third digit as well.  Patient referred to orthopedist who specializes in foot problems such as Dr. Vieira.  In the meantime patient should use over-the-counter toe pads to   • History of COVID-19 10/19/2021    October 19, 2021--patient seen in follow-up and reports her symptoms totally resolved.  She wants to know when she should get her Covid booster vaccine.  I have suggested that she receive it approximately 3 months after initially testing positive.  She has an appointment in November 6, 2021 for the booster injection and I think that will be fine.  August 12, 2021--patient tested positive for COVID   • History of Hyperplastic polyps of stomach 06/09/2015 06/09/2015--EGD revealed Z line irregular, 35 cm from incisors. Biopsied. Small hiatal hernia. Gastritis. Biopsied. Bilious gastric fluid. Fluid aspiration performed. Normal duodenal bulb and second part of duodenum. Biopsied. Pathology revealed the antral biopsy revealed small intestinal mucosa with no pathologic diagnosis. Good preservation of villous architecture. Duodenum biopsy revealed large   •  Hyperlipidemia 07/17/2012 07/17/2012--treatment for hyperlipidemia begun.   • Lumbar disc herniation, L4-L5 08/07/2018 01/16/2019--patient seen in follow-up by Dr. Shukla.  She has seen the neurosurgeon who recommended conservative therapy with physical therapy.  Patient reports that has been extremely helpful.  Currently has no significant pain.  08/07/2018--patient seen in follow-up and reports her pain is much better after taking prednisone.  She is now down to half a pill per day and is almost off of it.  I rev   • Lumbar scoliosis 08/07/2018 01/16/2019--patient seen in follow-up by Dr. Shukla.  She has seen the neurosurgeon who recommended conservative therapy with physical therapy.  Patient reports that has been extremely helpful.  Currently has no significant pain.  08/07/2018--patient seen in follow-up and reports her pain is much better after taking prednisone.  She is now down to half a pill per day and is almost off of it.  I rev   • Menopausal state 07/13/2020   • Multiple environmental allergies 01/30/2015 01/30/2015--allergy testing revealed positive reactivity to cat, dust mite, cockroach, mold spores, and grass pollen. Environmental control measures.   • Osteopenia of multiple sites, 2/25/2022--lumbar spine 1.1.  Left femoral neck -1.0.  Right femoral neck -0.3. 04/01/2022 February 25, 2022--DEXA scan reveals lumbar spine T score 1.1.  Left femoral neck T score -1.0.  Right femoral neck T score -0.3.  Mild osteopenia.   • Postmenopausal hormone replacement therapy 04/18/2016   • Primary hypothyroidism 01/22/2020 January 22, 2020--TSH is elevated at 5.0.  Levothyroxine 50 mcg/day initiated.  Patient will follow-up with nonfasting lab work in about 6 to 8 weeks to reassess.  07/09/2018--routine follow-up.  TSH elevated slightly at 4.31.  Free T4 normal at 1.22.  Free T3 normal at 3.3.  Continued observation.  10/11/2017--thyroid function tests are normal.  09/30/2016--thyroid ultrasound  reveals a tiny 2 mm    • Primary osteoarthritis of both hands 12/30/2013 05/12/2014--patient seen in followup and reports that the Vimovo has helped. Uric acid levels are normal at 4.9. C. reactive protein mildly elevated at 2.3. X-rays of the hands reveals radiocarpal, first carpometacarpal, first metacarpophalangeal, and interphalangeal joint degeneration. This process is symmetric. The interphalangeal joint of the left is affected to the greatest degree. There is right sided scapholunate dissociation as well as deformity of the scaphoid suggesting prior traumatic injury with healing. Soft tissues are satisfactory. Overall appearance is most consistent with osteoarthritis.   05/05/2014--patient presents and reports that she is having worsening right wrist pain primarily at the base of the thumb. No new injury. Bilateral x-rays of the wrists and hands ordered. Uric acid level ordered as well as a CRP. Vimovo 500/20 one by mouth twice a day. Follow up in one week.   03/18/2014--patient reports that her wrist symptoms have improved.   12/30/2013--patient reports a several mon   • Primary osteoarthritis of both wrists 12/30/2013 05/12/2014--patient seen in followup and reports that the Vimovo has helped. Uric acid levels are normal at 4.9. C. reactive protein mildly elevated at 2.3. X-rays of the hands reveals radiocarpal, first carpometacarpal, first metacarpophalangeal, and interphalangeal joint degeneration. This process is symmetric. The interphalangeal joint of the left is affected to the greatest degree. There is right sided scapholunate dissociation as well as deformity of the scaphoid suggesting prior traumatic injury with healing. Soft tissues are satisfactory. Overall appearance is most consistent with osteoarthritis.   05/05/2014--patient presents and reports that she is having worsening right wrist pain primarily at the base of the thumb. No new injury. Bilateral x-rays of the wrists and hands  ordered. Uric acid level ordered as well as a CRP. Vimovo 500/20 one by mouth twice a day. Follow up in one week.   03/18/2014--patient reports that her wrist symptoms have improved.   12/30/2013--patient reports a several mon   • URI (upper respiratory infection)    • Vitamin D deficiency 04/18/2016        Past Surgical History:   Procedure Laterality Date   • COLONOSCOPY  03/17/2009 03/17/2009--colonoscopy revealed external hemorrhoids, torturous colon with a sigmoid stricture, sigmoid diverticulosis.   • COLONOSCOPY N/A 3/10/2017    03/10/2017--colonoscopy revealed many small and large mouth diverticula in the sigmoid colon and descending colon.  Nonthrombosed external hemorrhoids and internal hemorrhoids noted.  Otherwise, normal colonoscopy.   • ERCP WITH SPHINCTEROTOMY/PAPILLOTOMY  08/13/2014 08/13/2014--ERCP, endoscopic sphincterotomy and removal of common bile duct stones. 08/12/2014--laparoscopic cholecystectomy. This was complicated by retained common bile duct stones 2.   • ESOPHAGOSCOPY / EGD  06/09/2015 06/09/2015--EGD revealed Z line irregular, 35 cm from incisors. Biopsied. Small hiatal hernia. Gastritis. Biopsied. Bilious gastric fluid. Fluid aspiration performed. Normal duodenal bulb and second part of duodenum. Biopsied. Pathology revealed the antral biopsy revealed small intestinal mucosa with no pathologic diagnosis. Good preservation of villous architecture. Duodenum biopsy revealed large   • LAPAROSCOPIC CHOLECYSTECTOMY  08/12/2014 08/13/2014--ERCP, endoscopic sphincterotomy and removal of common bile duct stones. 08/12/2014--laparoscopic cholecystectomy. This was complicated by retained common bile duct stones 2.   • ROTATOR CUFF REPAIR Left 07/10/2014    07/10/2014--left rotator cuff repair. 03/18/2014--patient seen in followup and reports that her range of motion has improved and her pain is not debilitating. She feels that she is making progress with physical therapy. Surgery  scheduled 07/10/2014. 01/10/2014--patient was seen in evaluation by the orthopedist and he recommended conservative treatment consisting of physical therapy/rehabilitation.   • ROTATOR CUFF REPAIR Right 08/03/2016 08/03/2016--arthroscopic right rotator cuff repair.  Debridement of degenerative anterior superior labral tear.   • SUBTOTAL HYSTERECTOMY  1978    Partial hysterectomy 1978.        Social History     Occupational History   • Occupation:  - [employer]   Tobacco Use   • Smoking status: Never Smoker   • Smokeless tobacco: Never Used   Vaping Use   • Vaping Use: Never used   Substance and Sexual Activity   • Alcohol use: Yes     Comment: Socially   • Drug use: No   • Sexual activity: Yes     Partners: Male      Social History     Social History Narrative   • Not on file        Family History   Problem Relation Age of Onset   • Colon polyps Mother    • Alzheimer's disease Mother    • Other Father         Hodgkin's Disease   • Cancer Father         Lung Cancer   • Breast cancer Daughter 52   • Malig Hyperthermia Neg Hx          Review of Systems:   HEENT: Patient denies any headaches, vision changes, change in hearing, or tinnitus, Patient denies any rhinorrhea,epistaxis, sinus pain, mouth or dental problems, sore throat or hoarseness, or dysphagia  Pulmonary: Patient denies any cough, congestion, SOA, or wheezing  Cardiovascular: Patient denies any chest pain, dyspnea, palpitations, weakness, intolerance of exercise, varicosities, swelling of extremities, known murmur  Gastrointestinal:  Patient denies nausea, vomiting, diarrhea, constipation, loss  of appetite, change in appetite, dysphagia, gas, heartburn, melena, change in bowel habits, use of laxatives or other drugs to alter the function of the gastrointestinal tract.  Genital/Urinary: Patient denies dysuria, change in color of urine, change in frequency of urination, pain with urgency, incontinence, retention, or  nocturia.  Musculoskeletal: With the exception of the involved extremity, the patient denies increased warmth; redness; or swelling of joints; limitation of function; deformity; crepitation: pain in a joint or an extremity, the neck, or the back, especially with movement.  Neurological: Patient denies dizziness, tremor, ataxia, difficulty in speaking, change in speech, paresthesia, loss of sensation, seizures, syncope, changes in memory.  Endocrine system: Patient denies tremors, palpitations, intolerance of heat or cold, polyuria, polydipsia, polyphagia, diaphoresis, exophthalmos, or goiter.  Psychological: Patient denies thoughts/plans or harming self or other; depression,  insomnia, night terrors, raheem, memory loss, disorientation.  Skin: Patient denies any bruising, rashes, discoloration, pruritus, wounds, or changes in the hair or nails.  Hematopoietic: Patient denies history of spontaneous or excessive bleeding, epistaxis, hematuria, melena, fatigue, enlarged or tender lymph nodes, pallor, history of anemia.    Physical Exam: 76 y.o. female  General Appearance:    Alert, cooperative, in no acute distress                    There were no vitals filed for this visit.     Head:    Normocephalic, without obvious abnormality, atraumatic   Eyes:            Lids and lashes normal, conjunctivae and sclerae normal, no   icterus, no pallor, corneas clear, PERRLA   Ears:    Ears appear intact with no abnormalities noted   Throat:   No oral lesions, no thrush, oral mucosa moist   Neck:   No adenopathy, supple, trachea midline, no thyromegaly, no   carotid bruit, no JVD   Back:     No kyphosis present, no scoliosis present, no skin lesions,      erythema or scars, no tenderness to percussion or                   palpation,   range of motion normal   Lungs:     Clear to auscultation,respirations regular, even and                  unlabored    Heart:    Regular rhythm and normal rate, normal S1 and S2, no            murmur,  no gallop, no rub, no click   Chest Wall:    No abnormalities observed   Abdomen:     Normal bowel sounds, no masses, no organomegaly, soft        non-tender, non-distended, no guarding, no rebound                tenderness   Rectal:     Deferred   Extremities:   Tenderness over right  . Moves all extremities well, no edema,   no cyanosis, no redness   Pulses:   Pulses palpable and equal bilaterally   Skin:   No bleeding, bruising or rash   Lymph nodes:   No palpable adenopathy   Neurologic:   Cranial nerves 2 - 12 grossly intact, sensation intact, DTR       present and equal bilaterally           Diagnostic Tests:  Results from last 7 days   Lab Units 04/08/22  0724   WBC 10*3/mm3 14.32*   HEMOGLOBIN g/dL 12.5   HEMATOCRIT % 37.7   PLATELETS 10*3/mm3 334     Results from last 7 days   Lab Units 04/08/22  0724   SODIUM mmol/L 141   POTASSIUM mmol/L 4.1   CHLORIDE mmol/L 102   CO2 mmol/L 29.8*   BUN mg/dL 29*   CREATININE mg/dL 0.82   GLUCOSE mg/dL 88   CALCIUM mg/dL 9.8         No results found for: CRYSTAL]  No results found for: CULTURE]  No results found for: URICACID]    No results found.    Assessment:  Patient Active Problem List   Diagnosis   • Allergic rhinitis   • Benign essential hypertension   • Diverticulosis of colon   • Gastroesophageal reflux disease without esophagitis   • Hyperlipidemia   • Multiple environmental allergies   • Primary osteoarthritis of both wrists   • Primary osteoarthritis of both hands   • Postmenopausal hormone replacement therapy   • Vitamin D deficiency   • Therapeutic drug monitoring   • Alopecia   • Lumbar scoliosis   • Lumbar disc herniation, L4-L5   • Primary hypothyroidism   • Family history of breast cancer in first degree relative   • Menopausal state   • History of COVID-19   • Hammertoe of second toe of right foot   • Chronic toe pain, right foot   • Family history of colonic polyps   • Osteopenia of multiple sites, 2/25/2022--lumbar spine 1.1.  Left femoral neck -1.0.   Right femoral neck -0.3.   • Acute bronchitis with bronchospasm           Plan:  The patient voiced understanding of the risks, benefits, and alternative forms of treatment that were discussed. All questions were answered and the patient consents to proceed with the procedure as planned.        Discharge Plan: today to home      Date: 4/11/2022    Brandt Vieira MD

## 2025-02-14 DIAGNOSIS — M25.50 ARTHRALGIA OF MULTIPLE JOINTS: Chronic | ICD-10-CM

## 2025-02-14 DIAGNOSIS — M15.0 PRIMARY OSTEOARTHRITIS INVOLVING MULTIPLE JOINTS: Chronic | ICD-10-CM

## 2025-02-14 RX ORDER — MELOXICAM 15 MG/1
15 TABLET ORAL DAILY
Qty: 90 TABLET | Refills: 3 | Status: SHIPPED | OUTPATIENT
Start: 2025-02-14

## 2025-02-21 DIAGNOSIS — E78.2 MIXED HYPERLIPIDEMIA: Chronic | ICD-10-CM

## 2025-02-21 RX ORDER — EZETIMIBE 10 MG/1
TABLET ORAL
Qty: 90 TABLET | Refills: 1 | Status: SHIPPED | OUTPATIENT
Start: 2025-02-21

## 2025-02-28 DIAGNOSIS — E78.2 MIXED HYPERLIPIDEMIA: Chronic | ICD-10-CM

## 2025-03-03 RX ORDER — ATORVASTATIN CALCIUM 20 MG/1
TABLET, FILM COATED ORAL
Qty: 90 TABLET | Refills: 3 | Status: SHIPPED | OUTPATIENT
Start: 2025-03-03

## 2025-03-10 ENCOUNTER — OFFICE VISIT (OUTPATIENT)
Dept: NEUROLOGY | Facility: CLINIC | Age: 80
End: 2025-03-10
Payer: MEDICARE

## 2025-03-10 VITALS
WEIGHT: 131 LBS | SYSTOLIC BLOOD PRESSURE: 162 MMHG | BODY MASS INDEX: 22.36 KG/M2 | OXYGEN SATURATION: 95 % | DIASTOLIC BLOOD PRESSURE: 94 MMHG | HEART RATE: 82 BPM | HEIGHT: 64 IN

## 2025-03-10 DIAGNOSIS — G31.84 MILD COGNITIVE IMPAIRMENT: Primary | ICD-10-CM

## 2025-03-10 PROCEDURE — 3080F DIAST BP >= 90 MM HG: CPT | Performed by: NURSE PRACTITIONER

## 2025-03-10 PROCEDURE — 1160F RVW MEDS BY RX/DR IN RCRD: CPT | Performed by: NURSE PRACTITIONER

## 2025-03-10 PROCEDURE — 99214 OFFICE O/P EST MOD 30 MIN: CPT | Performed by: NURSE PRACTITIONER

## 2025-03-10 PROCEDURE — 3077F SYST BP >= 140 MM HG: CPT | Performed by: NURSE PRACTITIONER

## 2025-03-10 PROCEDURE — 1159F MED LIST DOCD IN RCRD: CPT | Performed by: NURSE PRACTITIONER

## 2025-03-10 NOTE — PROGRESS NOTES
[facility] NEUROLOGY         Date of Visit: 3/10/2025    Name: Ashley Maat    :  1945    PCP: Carloz Shukla MD    Visit Type: follow-up         Subjective     Patient ID: Ashley is a 79 y.o. female.         History of Present Illness  I had the pleasure of seeing your patient today.  As you may know she is an 79-year-old female here today for  follow-up for concerns of memory loss.  She is accompanied by her .  She was referred by her primary care physician.    History:    Patient does have history of hypothyroid, hypertension, hyperlipidemia, GERD, vitamin D deficiency, osteopenia, history of previous hepatitis.    Patient's  states that patient's memory symptoms began a couple of years ago after she underwent a surgery for hammertoe.  Postsurgery she developed what they believed was hepatitis with significantly elevated liver enzymes.  Since then patient has had a incline in her overall cognitive abilities.  He states there could have been some mild memory changes prior to this however they were not significantly noticeable.    Patient did end up following up with primary care physician about the symptoms.  He did end up ordering an MRI of the brain with and without contrast which showed evidence for microvascular changes and mild atrophy with no other significant abnormalities.  He also order [facility]ological [facility]  Testing revealed evidence for a nonamnestic mild cognitive impairment with deficits noted in processing speed and executive functioning however not severe enough to qualify for diagnosis of a neurocognitive disorder such as dementia at this time.  They did recommend repeat testing in 1 to 3 years.  In addition they recommended possible sleep testing as she is reporting some increased confusion upon awakening in the middle of the night as well as some vivid dreams.  They also recommended  potential driving evaluation due to the executive functioning difficulties.    Patient does have a family history of a mother with dementia in her early 90s, 2 aunts with dementia syndrome.  She denies any previous history of head injuries, chemotherapy.  No previous history of stroke.  No cardiovascular issues that she is aware of.    At last visit we did end up starting patient on memantine for treatment of memory loss that she could not tolerate donepezil due to stomach complaints.  She also had a ATN and B12 level checked.  ATN was negative for Alzheimer's.  B12 came back in the 500s.  She also saw Dr. Christina sleep medicine was started on melatonin for sleep issues and ordered a sleep study.  Patient decided to forego sleep study.  Melatonin was helpful for helping her sleep better.    Current:    Patient and her  state that she has done well so far on the memantine.  She has not had any side effect issues and is up to the 10 mg twice daily.  They state that memory wise she is doing approximately the same.  They deny any significant change to her day-to-day activities.  She is still having difficulties with instrumental activities such as balancing checkbook and complex tasks.  Her  also notes that she gets a little more easily frustrated and seems to worry a lot.  He also feels that she is not quite as talkative as she used to be.  Patient states that she does worry mainly about what will happen if something happens to one of the 2 of them.  She states that she does not feel depressed, sad, anxious.  She does not feel that mood is a problem for her.   denies any kind of anger outbursts.  They state that overall they seem to be adjusting to these changes.  They report that she is sleeping well.  He was wanting to know if these are normal.  They deny any other new or worsening neurologic symptoms at today's visit.      The following portions of the patient's history were reviewed and updated  as appropriate: allergies, current medications, past family history, past medical history, past social history, past surgical history, and problem list.                 Review of Systems   Constitutional:  Negative for activity change, appetite change and unexpected weight change.   HENT:  Negative for hearing loss and trouble swallowing.    Eyes:  Negative for visual disturbance.   Respiratory:  Negative for chest tightness and shortness of breath.    Cardiovascular:  Negative for palpitations.   Gastrointestinal:  Negative for constipation and diarrhea.   Genitourinary:  Negative for decreased urine volume, difficulty urinating and frequency.   Musculoskeletal:  Negative for back pain and gait problem.   Neurological:  Negative for tremors, syncope, facial asymmetry, speech difficulty and light-headedness.   Psychiatric/Behavioral:  Positive for confusion and sleep disturbance. Negative for agitation, behavioral problems, dysphoric mood and hallucinations. The patient is not nervous/anxious.             Current Medications:    Current Outpatient Medications   Medication Instructions    acetaminophen (TYLENOL 8 HOUR) 650 mg, Oral, Every 8 Hours PRN    atorvastatin (LIPITOR) 20 MG tablet TAKE 1 TABLET BY MOUTH DAILY    calcium carbonate (OS-CHRIS) 600 mg, Daily    estradiol (ESTRACE) 1 MG tablet Take 1 tablet (1 mg) daily as directed for postmenopausal state    ezetimibe (ZETIA) 10 MG tablet TAKE 1 TABLET BY MOUTH DAILY    fexofenadine-pseudoephedrine (ALLEGRA-D 24) 180-240 MG per 24 hr tablet Take 1 p.o. daily for environmental allergies    fluticasone (FLONASE) 50 MCG/ACT nasal spray 2 sprays, As Needed    levothyroxine (SYNTHROID, LEVOTHROID) 50 MCG tablet TAKE 1 TABLET BY MOUTH DAILY FOR LOW THYROID    meloxicam (MOBIC) 15 mg, Oral, Daily    memantine (NAMENDA) 10 mg, Oral, 2 Times Daily    multivitamin (THERAGRAN) tablet tablet 1 tablet, Daily    omeprazole OTC (PRILOSEC OTC) 20 mg, Oral, Daily          /94  "  Pulse 82   Ht 163.8 cm (64.49\")   Wt 59.4 kg (131 lb)   SpO2 95%   BMI 22.15 kg/m²                Objective     Neurological Exam  Mental Status  Awake, alert and oriented to person, place and time. Oriented to person, place and time. Recent and remote memory are intact. Speech is normal. Language is fluent with no aphasia.    Cranial Nerves  CN III, IV, VI: Extraocular movements intact bilaterally.  CN VII: Full and symmetric facial movement.    Motor  Normal muscle bulk throughout. No abnormal involuntary movements.    Coordination    Finger-to-nose, rapid alternating movements and heel-to-shin normal bilaterally without dysmetria.    Gait  Normal casual, toe, heel and tandem gait.      Physical Exam  Constitutional:       Appearance: Normal appearance. She is normal weight.   Eyes:      Extraocular Movements: Extraocular movements intact.   Pulmonary:      Effort: Pulmonary effort is normal.   Neurological:      Coordination: Coordination is intact.   Psychiatric:         Mood and Affect: Mood normal.         Speech: Speech normal.         Behavior: Behavior normal.                     Assessment & Plan     Diagnoses and all orders for this visit:    1. Mild cognitive impairment (Primary)      At this time we will continue on memantine for treatment of the mild cognitive impairment.    She will continue to manage sleep issues with over-the-counter melatonin.    I did discuss with him that mood changes can be associated with memory disorders.  If they are needing any additional help with this we could trial a low-dose of the Lexapro.  They state that they feel they are okay for now.    They were mentioning some concerns in regard to long-term living situation such as independent, assisted, nursing living.  They have toward a couple places but are needing more recommendations and help.  I did end up providing them with the phone number and brochure for Senior home transitions.    Follow-up in 6 months or " sooner if needed.    Total of 30 minutes was spent with patient and  discussing diagnosis, prognosis, care planning and future planning activities and recommendations.                 Radha BINGHAM    Neurology    [facility] [place]    Phone: [phone]    3/10/2025 , 15:01 EDT

## 2025-03-17 ENCOUNTER — TELEPHONE (OUTPATIENT)
Dept: ORTHOPEDIC SURGERY | Facility: CLINIC | Age: 80
End: 2025-03-17

## 2025-03-17 NOTE — TELEPHONE ENCOUNTER
Caller: BHAVYA MOSELEY     Relationship to patient: PATIENT    Best call back number: [phone]    Chief complaint: Rt. Shoulder - RTSA / SX 7/18     Type of visit: FOLLOW UP    Requested date: ASAP      If rescheduling, when is the original appointment: 04/30/2025     Additional notes:PATIENT CURRENTLY IN PHYSICAL THERAPY AND SHE FEELS SHE CAN FINISH PHYSICAL THERAPY [facility]ON[facility] IF SHE CAN COME IN AND SEE [facility]EMIO [facility] [place] OR [facility]

## 2025-03-31 ENCOUNTER — OFFICE VISIT (OUTPATIENT)
Dept: ORTHOPEDIC SURGERY | Facility: CLINIC | Age: 80
End: 2025-03-31
Payer: MEDICARE

## 2025-03-31 VITALS — WEIGHT: 129.2 LBS | BODY MASS INDEX: 21.52 KG/M2 | TEMPERATURE: 97.7 F | HEIGHT: 65 IN

## 2025-03-31 DIAGNOSIS — Z96.611 STATUS POST REVERSE TOTAL REPLACEMENT OF RIGHT SHOULDER: Primary | ICD-10-CM

## 2025-03-31 PROCEDURE — 1160F RVW MEDS BY RX/DR IN RCRD: CPT | Performed by: NURSE PRACTITIONER

## 2025-03-31 PROCEDURE — 1159F MED LIST DOCD IN RCRD: CPT | Performed by: NURSE PRACTITIONER

## 2025-03-31 PROCEDURE — 99212 OFFICE O/P EST SF 10 MIN: CPT | Performed by: NURSE PRACTITIONER

## 2025-04-02 NOTE — PROGRESS NOTES
"CC:  Follow up right shoulder    Ms. Mata comes in today for right shoulder follow-up.  Her  has accompanied her today.  She says she is doing fine.  She has made improvements with physical therapy.  She feels she can discontinue this treatment.  Denies any new issues or concerns.      Vitals:    03/31/25 1309   Temp: 97.7 °F (36.5 °C)   TempSrc: Temporal   Weight: 58.6 kg (129 lb 3.2 oz)   Height: 165.1 cm (65\")     Exam:  Right shoulder: Skin is benign.  There is a palpable defect at the acromion.  She has a prominence of the scapular spine.  Skin over these areas appears healthy.  No areas of focal tenderness to deep palpation.  Shoulder motion is: 170° FE, 50° ER, 170° abduction, and IR to S3.  She struggles somewhat with abduction.  Persistent weakness with resisted forward elevation and abduction, but no pain.  Sensation is intact throughout the arm.  Palpable radial pulse.  Brisk capillary refill.    Imaging:  None taken    Assessment: 8 months status post right reverse total shoulder arthroplasty with displaced acromion fracture    Plan: I think she could benefit from continued physical therapy for strengthening.  I encouraged her to ask her therapist for strengthening exercises which she may perform at home as well.  I encouraged her to let us know if she develops any pain with the strengthening exercises.  Patient to continue antibiotic prophylaxis for the shoulder replacement.  She will keep her upcoming appointment in July with Dr. Jimenez for reevaluation.  Both she and her  verbalized understanding of all we discussed and agree with this plan.    Brigitte Cordon, APRN    03/31/2025    "

## 2025-04-11 DIAGNOSIS — G31.84 MILD COGNITIVE IMPAIRMENT: ICD-10-CM

## 2025-04-11 RX ORDER — MEMANTINE HYDROCHLORIDE 10 MG/1
10 TABLET ORAL 2 TIMES DAILY
Qty: 60 TABLET | Refills: 2 | Status: SHIPPED | OUTPATIENT
Start: 2025-04-11

## 2025-04-23 ENCOUNTER — OFFICE VISIT (OUTPATIENT)
Dept: INTERNAL MEDICINE | Facility: CLINIC | Age: 80
End: 2025-04-23
Payer: MEDICARE

## 2025-04-23 VITALS
DIASTOLIC BLOOD PRESSURE: 78 MMHG | RESPIRATION RATE: 16 BRPM | TEMPERATURE: 97.4 F | OXYGEN SATURATION: 97 % | HEART RATE: 74 BPM | HEIGHT: 65 IN | BODY MASS INDEX: 21.66 KG/M2 | WEIGHT: 130 LBS | SYSTOLIC BLOOD PRESSURE: 130 MMHG

## 2025-04-23 DIAGNOSIS — G89.29 CHRONIC PAIN OF BOTH KNEES: ICD-10-CM

## 2025-04-23 DIAGNOSIS — M85.89 OSTEOPENIA OF MULTIPLE SITES: Chronic | ICD-10-CM

## 2025-04-23 DIAGNOSIS — Z86.0100 HISTORY OF COLON POLYPS: Chronic | ICD-10-CM

## 2025-04-23 DIAGNOSIS — Z78.0 POSTMENOPAUSAL STATE: Chronic | ICD-10-CM

## 2025-04-23 DIAGNOSIS — E78.2 MIXED HYPERLIPIDEMIA: Chronic | ICD-10-CM

## 2025-04-23 DIAGNOSIS — M25.562 CHRONIC PAIN OF BOTH KNEES: ICD-10-CM

## 2025-04-23 DIAGNOSIS — Z91.09 MULTIPLE ENVIRONMENTAL ALLERGIES: Chronic | ICD-10-CM

## 2025-04-23 DIAGNOSIS — J30.1 CHRONIC SEASONAL ALLERGIC RHINITIS DUE TO POLLEN: Chronic | ICD-10-CM

## 2025-04-23 DIAGNOSIS — K21.9 GASTROESOPHAGEAL REFLUX DISEASE WITHOUT ESOPHAGITIS: Chronic | ICD-10-CM

## 2025-04-23 DIAGNOSIS — Z51.81 THERAPEUTIC DRUG MONITORING: ICD-10-CM

## 2025-04-23 DIAGNOSIS — Z86.16 HISTORY OF COVID-19: Chronic | ICD-10-CM

## 2025-04-23 DIAGNOSIS — I10 BENIGN ESSENTIAL HYPERTENSION: Primary | Chronic | ICD-10-CM

## 2025-04-23 DIAGNOSIS — F41.8 DEPRESSION WITH ANXIETY: Chronic | ICD-10-CM

## 2025-04-23 DIAGNOSIS — R41.3 MEMORY LOSS: Chronic | ICD-10-CM

## 2025-04-23 DIAGNOSIS — M15.0 PRIMARY OSTEOARTHRITIS INVOLVING MULTIPLE JOINTS: Chronic | ICD-10-CM

## 2025-04-23 DIAGNOSIS — E55.9 VITAMIN D DEFICIENCY: Chronic | ICD-10-CM

## 2025-04-23 DIAGNOSIS — M25.561 CHRONIC PAIN OF BOTH KNEES: ICD-10-CM

## 2025-04-23 DIAGNOSIS — E03.9 PRIMARY HYPOTHYROIDISM: Chronic | ICD-10-CM

## 2025-04-23 DIAGNOSIS — Z80.3 FAMILY HISTORY OF BREAST CANCER IN FIRST DEGREE RELATIVE: Chronic | ICD-10-CM

## 2025-04-23 NOTE — PROGRESS NOTES
04/23/2025    Patient Information  Ashley Mata                                                                                          [id number] [facility] [address]      1945  [unfilled]  There is no work phone number on file.    Chief Complaint:     Follow-up chronic medical problems.    History of Present Illness:    Patient with multiple chronic medical problems as noted below in assessment and plan presents today for a follow-up.  Patient was supposed to have lab work prior to this visit but did not have it.  See below.  No new acute complaints.  Past medical history reviewed and updated were necessary including health maintenance parameters.  This reveals she is currently up-to-date or else accounted for.    Review of Systems   Constitutional: Negative.   HENT: Negative.     Eyes: Negative.    Cardiovascular: Negative.    Respiratory: Negative.     Endocrine: Negative.    Hematologic/Lymphatic: Negative.    Skin: Negative.    Musculoskeletal:  Positive for arthritis and joint pain.   Gastrointestinal: Negative.    Genitourinary: Negative.    Neurological: Negative.    Psychiatric/Behavioral:  Positive for memory loss.    Allergic/Immunologic: Negative.        Active Problems:    Patient Active Problem List   Diagnosis    Allergic rhinitis    Benign essential hypertension    Diverticulosis of colon    Gastroesophageal reflux disease without esophagitis    Hyperlipidemia    Multiple environmental allergies    Vitamin D deficiency    Therapeutic drug monitoring    Alopecia    Lumbar scoliosis    Primary hypothyroidism    Family history of breast cancer in first degree relative    Postmenopausal state    History of COVID-19, August 12, 2021; May 17, 2022.    Hammertoe of second toe of right foot    Family history of colonic polyps    Osteopenia of multiple sites, 2/25/2022--lumbar spine 1.1.  Left femoral neck -1.0.  Right femoral neck -0.3.    Primary osteoarthritis  involving multiple joints    History of colon polyps, 8/18/2022--hyperplastic x1.    Memory loss    Depression with anxiety    Chronic pain of both knees         Past Medical History:   Diagnosis Date    Allergic rhinitis 01/30/2015 11/13/2015--patient was evaluated by ENT and was started on generic Flonase and patient reports this has improved her symptoms quite a bit.   01/30/2015--allergy testing revealed positive reactivity to cat, dust mite, cockroach, mold spores, and grass pollen. Environmental control measures.    Alopecia 04/12/2017    Evaluated and treated by the dermatologist.    Benign essential hypertension 04/18/2016    Chronic pain of both knees 04/05/2023    Chronic toe pain, right foot 10/19/2021    October 19, 2021--patient reports a 1 year history of progressively worsening right toe pain that particular involves the second toe.  On exam she has obvious hammertoe which is causing irritation.  There is some hammering of the third digit as well.  Patient referred to orthopedist who specializes in foot problems such as Dr. Vieira.  In the meantime patient should use over-the-counter toe pads to    Colon polyp 2022 Benign    Depression with anxiety 01/03/2025    Diverticulosis of colon 03/17/2009    03/10/2017--colonoscopy revealed many small and large mouth diverticula in the sigmoid colon and descending colon.  Nonthrombosed external hemorrhoids and internal hemorrhoids noted.  Otherwise, normal colonoscopy.  03/17/2009--colonoscopy revealed external hemorrhoids, torturous colon with a sigmoid stricture, sigmoid diverticulosis.    Family history of breast cancer in first degree relative 07/13/2020    Family history of colonic polyps 04/01/2022    Gastroesophageal reflux disease without esophagitis 03/17/2009 06/09/2015--EGD revealed Z line irregular, 35 cm from incisors. Biopsied. Small hiatal hernia. Gastritis. Biopsied. Bilious gastric fluid. Fluid aspiration performed. Normal duodenal bulb  and second part of duodenum. Biopsied. Pathology revealed the antral biopsy revealed small intestinal mucosa with no pathologic diagnosis. Good preservation of villous architecture. Duodenum biopsy revealed largely antral type gastric mucosa with mild patchy superficial chronic gastritis. Gastroesophageal junction revealed squamous and glandular mucosa with mild chronic inflammation. Negative for intestinal metaplasia or dysplasia. There appeared to be mislabeling of the antral biopsies and duodenal biopsies.   04/17/2012--EGD revealed irregular Z line, hiatal hernia, gastritis, duodenitis.   03/17/2009--EGD revealed grade B. reflux esophagitis, hiatal hernia, gastritis, a few gastric polyps, duodenitis.    Hammertoe of right foot     Hammertoe of second toe of right foot 10/19/2021    October 19, 2021--patient reports a 1 year history of progressively worsening right toe pain that particular involves the second toe.  On exam she has obvious hammertoe which is causing irritation.  There is some hammering of the third digit as well.  Patient referred to orthopedist who specializes in foot problems such as Dr. Vieira.  In the meantime patient should use over-the-counter toe pads to    History of acute hepatitis 04/24/2022    May 9, 2022--patient seen in hospital discharge follow-up/transition of care.  I reviewed the documentation including the admission history and physical, hospital course, laboratory and radiographic studies, GI consultation, discharge summary and discharge medications.  I specifically reviewed the tissue pathology report which revealed moderate steatosis with moderate chronic portal inflammation a    History of COVID-19, August 12, 2021; May 17, 2022. 10/19/2021    May 17, 2022--patient again tested positive for COVID.  Ordered by gastroenterology nurse practitioner.  October 19, 2021--patient seen in follow-up and reports her symptoms totally resolved.  She wants to know when she should get her  Covid booster vaccine.  I have suggested that she receive it approximately 3 months after initially testing positive.  She has an appointment in November 6, 2021 for    History of Hyperplastic polyps of stomach 06/09/2015 06/09/2015--EGD revealed Z line irregular, 35 cm from incisors. Biopsied. Small hiatal hernia. Gastritis. Biopsied. Bilious gastric fluid. Fluid aspiration performed. Normal duodenal bulb and second part of duodenum. Biopsied. Pathology revealed the antral biopsy revealed small intestinal mucosa with no pathologic diagnosis. Good preservation of villous architecture. Duodenum biopsy revealed large    Hyperlipidemia 07/17/2012 07/17/2012--treatment for hyperlipidemia begun.    Lumbar disc herniation, L4-L5 08/07/2018 01/16/2019--patient seen in follow-up by Dr. Shukla.  She has seen the neurosurgeon who recommended conservative therapy with physical therapy.  Patient reports that has been extremely helpful.  Currently has no significant pain.  08/07/2018--patient seen in follow-up and reports her pain is much better after taking prednisone.  She is now down to half a pill per day and is almost off of it.  I rev    Lumbar scoliosis 08/07/2018 01/16/2019--patient seen in follow-up by Dr. Shukla.  She has seen the neurosurgeon who recommended conservative therapy with physical therapy.  Patient reports that has been extremely helpful.  Currently has no significant pain.  08/07/2018--patient seen in follow-up and reports her pain is much better after taking prednisone.  She is now down to half a pill per day and is almost off of it.  I rev    Menopausal state 07/13/2020    Multiple environmental allergies 01/30/2015 01/30/2015--allergy testing revealed positive reactivity to cat, dust mite, cockroach, mold spores, and grass pollen. Environmental control measures.    Osteopenia of multiple sites, 2/25/2022--lumbar spine 1.1.  Left femoral neck -1.0.  Right femoral neck -0.3. 04/01/2022     February 25, 2022--DEXA scan reveals lumbar spine T score 1.1.  Left femoral neck T score -1.0.  Right femoral neck T score -0.3.  Mild osteopenia.    Postmenopausal hormone replacement therapy 04/18/2016    Primary hypothyroidism 01/22/2020 January 22, 2020--TSH is elevated at 5.0.  Levothyroxine 50 mcg/day initiated.  Patient will follow-up with nonfasting lab work in about 6 to 8 weeks to reassess.  07/09/2018--routine follow-up.  TSH elevated slightly at 4.31.  Free T4 normal at 1.22.  Free T3 normal at 3.3.  Continued observation.  10/11/2017--thyroid function tests are normal.  09/30/2016--thyroid ultrasound reveals a tiny 2 mm     Primary osteoarthritis involving multiple joints 06/08/2022    Primary osteoarthritis of both hands 12/30/2013 05/12/2014--patient seen in followup and reports that the Vimovo has helped. Uric acid levels are normal at 4.9. C. reactive protein mildly elevated at 2.3. X-rays of the hands reveals radiocarpal, first carpometacarpal, first metacarpophalangeal, and interphalangeal joint degeneration. This process is symmetric. The interphalangeal joint of the left is affected to the greatest degree. There is right sided scapholunate dissociation as well as deformity of the scaphoid suggesting prior traumatic injury with healing. Soft tissues are satisfactory. Overall appearance is most consistent with osteoarthritis.   05/05/2014--patient presents and reports that she is having worsening right wrist pain primarily at the base of the thumb. No new injury. Bilateral x-rays of the wrists and hands ordered. Uric acid level ordered as well as a CRP. Vimovo 500/20 one by mouth twice a day. Follow up in one week.   03/18/2014--patient reports that her wrist symptoms have improved.   12/30/2013--patient reports a several mon    Primary osteoarthritis of both knees 04/05/2023    Primary osteoarthritis of both wrists 12/30/2013 05/12/2014--patient seen in followup and reports that the  Vimovo has helped. Uric acid levels are normal at 4.9. C. reactive protein mildly elevated at 2.3. X-rays of the hands reveals radiocarpal, first carpometacarpal, first metacarpophalangeal, and interphalangeal joint degeneration. This process is symmetric. The interphalangeal joint of the left is affected to the greatest degree. There is right sided scapholunate dissociation as well as deformity of the scaphoid suggesting prior traumatic injury with healing. Soft tissues are satisfactory. Overall appearance is most consistent with osteoarthritis.   05/05/2014--patient presents and reports that she is having worsening right wrist pain primarily at the base of the thumb. No new injury. Bilateral x-rays of the wrists and hands ordered. Uric acid level ordered as well as a CRP. Vimovo 500/20 one by mouth twice a day. Follow up in one week.   03/18/2014--patient reports that her wrist symptoms have improved.   12/30/2013--patient reports a several mon    Rotator cuff syndrome     Vitamin D deficiency 04/18/2016         Past Surgical History:   Procedure Laterality Date    COLONOSCOPY  03/17/2009 03/17/2009--colonoscopy revealed external hemorrhoids, torturous colon with a sigmoid stricture, sigmoid diverticulosis.    COLONOSCOPY N/A 03/10/2017    03/10/2017--colonoscopy revealed many small and large mouth diverticula in the sigmoid colon and descending colon.  Nonthrombosed external hemorrhoids and internal hemorrhoids noted.  Otherwise, normal colonoscopy.    COLONOSCOPY N/A 08/18/2022    Procedure: COLONOSCOPY TO 40CM WITH POLYPECTOMY (COLD);  Surgeon: Mario Ray MD;  Location: [facility] ENDOSCOPY;  Service: General;  Laterality: N/A;  PRE- POSITIVE COLOGUARD  POST- POLYP, HEMORRHOIDS    COLONOSCOPY N/A 11/26/2024    Procedure: COLONOSCOPY TO CECUM;  Surgeon: Mario Ray MD;  Location: [facility] ENDOSCOPY;  Service: General;  Laterality: N/A;  PRE- CHANGE IN BOWEL HABITS  POST- DIVERTICULOSIS,  HEMORRHOIDS    ENDOSCOPY N/A 08/18/2022    Procedure: ESOPHAGOGASTRODUODENOSCOPY WITH BIOPSIES AND COLD BIOPSY POLYPECTOMY;  Surgeon: Mario Ray MD;  Location: [facility] ENDOSCOPY;  Service: General;  Laterality: N/A;  PRE- ACID REFLUX  POST- GASTRITIS, GASTRIC POLYPS    ERCP WITH SPHINCTEROTOMY/PAPILLOTOMY  08/13/2014 08/13/2014--ERCP, endoscopic sphincterotomy and removal of common bile duct stones. 08/12/2014--laparoscopic cholecystectomy. This was complicated by retained common bile duct stones 2.    ESOPHAGOSCOPY / EGD  06/09/2015 06/09/2015--EGD revealed Z line irregular, 35 cm from incisors. Biopsied. Small hiatal hernia. Gastritis. Biopsied. Bilious gastric fluid. Fluid aspiration performed. Normal duodenal bulb and second part of duodenum. Biopsied. Pathology revealed the antral biopsy revealed small intestinal mucosa with no pathologic diagnosis. Good preservation of villous architecture. Duodenum biopsy revealed large    EYE SURGERY  07/19/2018    Both Eyes; cataracts    FOOT SURGERY  April 11, 2022    Dr. Vieira - toe urgery    HAMMER TOE REPAIR Right 04/11/2022    Procedure: RIGHT SECOND AND THIRD HAMMERTOE REPAIRS;  Surgeon: Brandt Vieira MD;  [place]  [facility] [place] [facility] [place]  Service: Orthopedics;  Laterality: Right;    JOINT REPLACEMENT  7/18/2024    RSR right shoulder    LAPAROSCOPIC CHOLECYSTECTOMY  08/12/2014 08/13/2014--ERCP, endoscopic sphincterotomy and removal of common bile duct stones. 08/12/2014--laparoscopic cholecystectomy. This was complicated by retained common bile duct stones 2.    ROTATOR CUFF REPAIR Left 07/10/2014    07/10/2014--left rotator cuff repair. 03/18/2014--patient seen in followup and reports that her range of motion has improved and her pain is not debilitating. She feels that she is making progress with physical therapy. Surgery scheduled 07/10/2014. 01/10/2014--patient was seen in evaluation by the orthopedist and he recommended conservative treatment  consisting of physical therapy/rehabilitation.    ROTATOR CUFF REPAIR Right 08/03/2016 08/03/2016--arthroscopic right rotator cuff repair.  Debridement of degenerative anterior superior labral tear.    SHOULDER SURGERY  2014 & 2016    Rotator Cuff Surgery both shoulders    SUBTOTAL HYSTERECTOMY  1978    Partial hysterectomy 1978.    TOTAL SHOULDER ARTHROPLASTY W/ DISTAL CLAVICLE EXCISION Right 07/18/2024    Procedure: Right Reverse Total Shoulder Arthroplasty;  Surgeon: Ash Jimenez MD;  [facility] [place];  Service: Orthopedics;  Laterality: Right;    TRIGGER POINT INJECTION  2023    Gel in both knees         Allergies   Allergen Reactions    Bupivacaine Other (See Comments)     Questionable allergy.  Patient had toe surgery and subsequently developed acute hepatitis.  Rare case reports of hepatitis induced by bupivacaine which was used during her surgery.           Current Outpatient Medications:     acetaminophen (Tylenol 8 Hour) 650 MG 8 hr tablet, Take 1 tablet by mouth Every 8 (Eight) Hours As Needed for Mild Pain., Disp: 60 tablet, Rfl: 1    atorvastatin (LIPITOR) 20 MG tablet, TAKE 1 TABLET BY MOUTH DAILY, Disp: 90 tablet, Rfl: 3    calcium carbonate (OS-CHRIS) 600 MG tablet, Take 1 tablet by mouth Daily., Disp: , Rfl:     estradiol (ESTRACE) 1 MG tablet, Take 1 tablet (1 mg) daily as directed for postmenopausal state, Disp: , Rfl:     ezetimibe (ZETIA) 10 MG tablet, TAKE 1 TABLET BY MOUTH DAILY, Disp: 90 tablet, Rfl: 1    fexofenadine-pseudoephedrine (ALLEGRA-D 24) 180-240 MG per 24 hr tablet, Take 1 p.o. daily for environmental allergies, Disp: , Rfl:     fluticasone (FLONASE) 50 MCG/ACT nasal spray, Administer 2 sprays into the nostril(s) as directed by provider As Needed., Disp: , Rfl:     levothyroxine (SYNTHROID, LEVOTHROID) 50 MCG tablet, TAKE 1 TABLET BY MOUTH DAILY FOR LOW THYROID, Disp: 90 tablet, Rfl: 3    meloxicam (MOBIC) 15 MG tablet, TAKE 1 TABLET BY MOUTH DAILY, Disp: 90 tablet,  "Rfl: 3    memantine (NAMENDA) 10 MG tablet, TAKE 1 TABLET BY MOUTH 2 TIMES A DAY, Disp: 60 tablet, Rfl: 2    multivitamin (THERAGRAN) tablet tablet, Take 1 tablet by mouth Daily., Disp: , Rfl:     omeprazole OTC (PrilOSEC OTC) 20 MG EC tablet, Take 1 tablet by mouth Daily., Disp: , Rfl:       Family History   Problem Relation Age of Onset    Colon polyps Mother     Alzheimer's disease Mother     Hearing loss Mother     Osteoporosis Mother     Dementia Mother     Cancer Father         Lung Cancer    Early death Father         59 years old    Breast cancer Daughter 52    Dementia Maternal Aunt     Malig Hyperthermia Neg Hx          Social History     Socioeconomic History    Marital status:     Number of children: 2    Years of education: BA    Highest education level: Associate degree: occupational, technical, or vocational program   Tobacco Use    Smoking status: Never     Passive exposure: Never    Smokeless tobacco: Never   Vaping Use    Vaping status: Never Used   Substance and Sexual Activity    Alcohol use: Yes     Alcohol/week: 2.0 standard drinks of alcohol     Types: 2 Glasses of wine per week     Comment: SOCIALLY    Drug use: No    Sexual activity: Not Currently     Partners: Male     Birth control/protection: Hysterectomy         Vitals:    04/23/25 1316   BP: 130/78   Pulse: 74   Resp: 16   Temp: 97.4 °F (36.3 °C)   TempSrc: Oral   SpO2: 97%   Weight: 59 kg (130 lb)   Height: 165.1 cm (65\")        Body mass index is 21.63 kg/m².      Physical Exam:    General: Alert and oriented x 3 today.  No acute distress.  Normal affect.  HEENT: Pupils equal, round, reactive to light; extraocular movements intact; sclerae nonicteric; pharynx, ear canals and TMs normal.  Neck: Without JVD, thyromegaly, bruit, or adenopathy.  Lungs: Clear to auscultation in all fields.  Heart: Regular rate and rhythm without murmur, rub, gallop, or click.  Abdomen: Soft, nontender, without hepatosplenomegaly or hernia.  Bowel " sounds normal.  : Deferred.  Rectal: Deferred.  Extremities: Without clubbing, cyanosis, edema, or pulse deficit.  Neurologic: Intact without focal deficit.  Normal station and gait observed during ingress and egress from the examination room.  Skin: Without significant lesion.  Musculoskeletal: Unremarkable.    Lab/other results:      Assessment/Plan:     Diagnosis Plan   1. Benign essential hypertension  Comprehensive Metabolic Panel    Comprehensive Metabolic Panel      2. Hyperlipidemia  CK    Comprehensive Metabolic Panel    NMR LipoProfile    CK    Comprehensive Metabolic Panel    NMR LipoProfile      3. Primary hypothyroidism  TSH    T4, Free    T3, Free    TSH    T4, Free    T3, Free      4. History of COVID-19, August 12, 2021; May 17, 2022.  SARS-CoV-2 Antibodies, Nucleocapsid (Natural Immunity)      5. Gastroesophageal reflux disease without esophagitis        6. Multiple environmental allergies        7. Allergic rhinitis        8. Vitamin D deficiency  Vitamin D,25-Hydroxy    Vitamin D,25-Hydroxy      9. Family history of breast cancer in first degree relative        10. Postmenopausal state        11. Osteopenia of multiple sites, 2/25/2022--lumbar spine 1.1.  Left femoral neck -1.0.  Right femoral neck -0.3.  Vitamin D,25-Hydroxy      12. Primary osteoarthritis involving multiple joints        13. History of colon polyps, 8/18/2022--hyperplastic x1.        14. Memory loss        15. Depression with anxiety        16. Therapeutic drug monitoring  CBC (No Diff)    CBC (No Diff)    Urinalysis With Microscopic If Indicated (No Culture) - Urine, Clean Catch      17. Chronic pain of both knees          Patient has hypertension which appears to be in control.  Hyperlipidemia cannot be assessed because patient did not have her blood work.  Also her thyroid cannot be assessed due to lack of thyroid function tests.  She has history of COVID-19 in the past and has had no residual.  Her antibodies have been  positive.  Esophageal reflux controlled with omeprazole.  Environmental allergies controlled with Flonase and Allegra.  She is on Lipitor for her cholesterol.  Vitamin D needs to be assessed particularly given her osteopenia and postmenopausal state.  She has primary osteoarthritis of multiple joints and her symptoms are tolerable.  She has been evaluated and treated by the neurologist for her memory loss.  She has a history of colon polyps and is up-to-date on her colonoscopy.  Her depression and anxiety seem to be controlled without medication at this time.    Plan is as follows: Will have patient get blood work and I will follow-up on the phone for the results and possible further instructions.  Given the overall clinical picture, I will have patient follow-up in about 6 months with lab prior or follow-up as needed.        Procedures

## 2025-04-25 LAB
25(OH)D3+25(OH)D2 SERPL-MCNC: 51.5 NG/ML (ref 30–100)
ALBUMIN SERPL-MCNC: 4.2 G/DL (ref 3.7–4.7)
ALP SERPL-CCNC: 97 IU/L (ref 44–121)
ALT SERPL-CCNC: 16 IU/L (ref 0–32)
AST SERPL-CCNC: 25 IU/L (ref 0–40)
BILIRUB SERPL-MCNC: 0.3 MG/DL (ref 0–1.2)
BUN SERPL-MCNC: 24 MG/DL (ref 8–27)
BUN/CREAT SERPL: 28 (ref 12–28)
CALCIUM SERPL-MCNC: 9.3 MG/DL (ref 8.7–10.3)
CHLORIDE SERPL-SCNC: 102 MMOL/L (ref 96–106)
CHOLEST SERPL-MCNC: 155 MG/DL (ref 100–199)
CK SERPL-CCNC: 52 U/L (ref 26–161)
CO2 SERPL-SCNC: 23 MMOL/L (ref 20–29)
CREAT SERPL-MCNC: 0.87 MG/DL (ref 0.57–1)
EGFRCR SERPLBLD CKD-EPI 2021: 67 ML/MIN/1.73
ERYTHROCYTE [DISTWIDTH] IN BLOOD BY AUTOMATED COUNT: 12.7 % (ref 11.7–15.4)
GLOBULIN SER CALC-MCNC: 2.4 G/DL (ref 1.5–4.5)
GLUCOSE SERPL-MCNC: 106 MG/DL (ref 70–99)
HCT VFR BLD AUTO: 42.8 % (ref 34–46.6)
HDL SERPL-SCNC: 44.8 UMOL/L
HDLC SERPL-MCNC: 82 MG/DL
HGB BLD-MCNC: 13.9 G/DL (ref 11.1–15.9)
LDL SERPL QN: 21.2 NM
LDL SERPL-SCNC: 549 NMOL/L
LDL SMALL SERPL-SCNC: <90 NMOL/L
LDLC SERPL CALC-MCNC: 39 MG/DL (ref 0–99)
MCH RBC QN AUTO: 30.3 PG (ref 26.6–33)
MCHC RBC AUTO-ENTMCNC: 32.5 G/DL (ref 31.5–35.7)
MCV RBC AUTO: 93 FL (ref 79–97)
PLATELET # BLD AUTO: 273 X10E3/UL (ref 150–450)
POTASSIUM SERPL-SCNC: 4.4 MMOL/L (ref 3.5–5.2)
PROT SERPL-MCNC: 6.6 G/DL (ref 6–8.5)
RBC # BLD AUTO: 4.58 X10E6/UL (ref 3.77–5.28)
SODIUM SERPL-SCNC: 140 MMOL/L (ref 134–144)
T3FREE SERPL-MCNC: 2.5 PG/ML (ref 2–4.4)
T4 FREE SERPL-MCNC: 1.29 NG/DL (ref 0.82–1.77)
TRIGL SERPL-MCNC: 225 MG/DL (ref 0–149)
TSH SERPL DL<=0.005 MIU/L-ACNC: 1.99 UIU/ML (ref 0.45–4.5)
WBC # BLD AUTO: 8.3 X10E3/UL (ref 3.4–10.8)

## 2025-05-16 ENCOUNTER — HOSPITAL ENCOUNTER (OUTPATIENT)
Facility: HOSPITAL | Age: 80
Discharge: HOME | End: 2025-05-16
Attending: EMERGENCY MEDICINE
Payer: MEDICARE

## 2025-05-16 ENCOUNTER — APPOINTMENT (OUTPATIENT)
Dept: GENERAL RADIOLOGY | Facility: HOSPITAL | Age: 80
End: 2025-05-16
Payer: MEDICARE

## 2025-05-16 VITALS
RESPIRATION RATE: 18 BRPM | HEART RATE: 93 BPM | TEMPERATURE: 97.8 F | DIASTOLIC BLOOD PRESSURE: 95 MMHG | SYSTOLIC BLOOD PRESSURE: 168 MMHG | OXYGEN SATURATION: 96 %

## 2025-05-16 DIAGNOSIS — M17.0 PRIMARY OSTEOARTHRITIS OF BOTH KNEES: Primary | ICD-10-CM

## 2025-05-16 PROCEDURE — 73562 X-RAY EXAM OF KNEE 3: CPT

## 2025-05-16 PROCEDURE — G0463 HOSPITAL OUTPT CLINIC VISIT: HCPCS | Performed by: EMERGENCY MEDICINE

## 2025-05-16 PROCEDURE — 99213 OFFICE O/P EST LOW 20 MIN: CPT | Performed by: EMERGENCY MEDICINE

## 2025-05-16 RX ORDER — NABUMETONE 500 MG/1
500 TABLET, FILM COATED ORAL 2 TIMES DAILY PRN
Qty: 20 TABLET | Refills: 0 | Status: SHIPPED | OUTPATIENT
Start: 2025-05-16

## 2025-05-16 NOTE — DISCHARGE INSTRUCTIONS
Weight bearing as tolerated  Return ED fever, swelling, increased pain, worse condition, any other concerns

## 2025-05-16 NOTE — [facility] PROVIDER NOTE
Subjective   History of Present Illness  78yo female pmh significant htn/hyperlipidemia/hypothyroid/mdd/oa/chronic knee pain, presents ED c/o 2d hx increased right posterior knee pain/joint giving way causing patient to fall against wall yesterday.  ROS otherwise noncontributory.    History provided by:  Patient  Knee Pain      Review of Systems   Constitutional: Negative.    HENT: Negative.     Eyes: Negative.    Respiratory: Negative.     Cardiovascular: Negative.    Gastrointestinal: Negative.    Musculoskeletal:  Positive for arthralgias.   All other systems reviewed and are negative.      Past Medical History:   Diagnosis Date    Allergic rhinitis 01/30/2015 11/13/2015--patient was evaluated by ENT and was started on generic Flonase and patient reports this has improved her symptoms quite a bit.   01/30/2015--allergy testing revealed positive reactivity to cat, dust mite, cockroach, mold spores, and grass pollen. Environmental control measures.    Alopecia 04/12/2017    Evaluated and treated by the dermatologist.    Benign essential hypertension 04/18/2016    Chronic pain of both knees 04/05/2023    Chronic toe pain, right foot 10/19/2021    October 19, 2021--patient reports a 1 year history of progressively worsening right toe pain that particular involves the second toe.  On exam she has obvious hammertoe which is causing irritation.  There is some hammering of the third digit as well.  Patient referred to orthopedist who specializes in foot problems such as Dr. Vieira.  In the meantime patient should use over-the-counter toe pads to    Colon polyp 2022 Benign    Depression with anxiety 01/03/2025    Diverticulosis of colon 03/17/2009    03/10/2017--colonoscopy revealed many small and large mouth diverticula in the sigmoid colon and descending colon.  Nonthrombosed external hemorrhoids and internal hemorrhoids noted.  Otherwise, normal colonoscopy.  03/17/2009--colonoscopy revealed external hemorrhoids,  torturous colon with a sigmoid stricture, sigmoid diverticulosis.    Family history of breast cancer in first degree relative 07/13/2020    Family history of colonic polyps 04/01/2022    Gastroesophageal reflux disease without esophagitis 03/17/2009 06/09/2015--EGD revealed Z line irregular, 35 cm from incisors. Biopsied. Small hiatal hernia. Gastritis. Biopsied. Bilious gastric fluid. Fluid aspiration performed. Normal duodenal bulb and second part of duodenum. Biopsied. Pathology revealed the antral biopsy revealed small intestinal mucosa with no pathologic diagnosis. Good preservation of villous architecture. Duodenum biopsy revealed largely antral type gastric mucosa with mild patchy superficial chronic gastritis. Gastroesophageal junction revealed squamous and glandular mucosa with mild chronic inflammation. Negative for intestinal metaplasia or dysplasia. There appeared to be mislabeling of the antral biopsies and duodenal biopsies.   04/17/2012--EGD revealed irregular Z line, hiatal hernia, gastritis, duodenitis.   03/17/2009--EGD revealed grade B. reflux esophagitis, hiatal hernia, gastritis, a few gastric polyps, duodenitis.    Hammertoe of right foot     Hammertoe of second toe of right foot 10/19/2021    October 19, 2021--patient reports a 1 year history of progressively worsening right toe pain that particular involves the second toe.  On exam she has obvious hammertoe which is causing irritation.  There is some hammering of the third digit as well.  Patient referred to orthopedist who specializes in foot problems such as Dr. Vieira.  In the meantime patient should use over-the-counter toe pads to    History of acute hepatitis 04/24/2022    May 9, 2022--patient seen in hospital discharge follow-up/transition of care.  I reviewed the documentation including the admission history and physical, hospital course, laboratory and radiographic studies, GI consultation, discharge summary and discharge  medications.  I specifically reviewed the tissue pathology report which revealed moderate steatosis with moderate chronic portal inflammation a    History of COVID-19, August 12, 2021; May 17, 2022. 10/19/2021    May 17, 2022--patient again tested positive for COVID.  Ordered by gastroenterology nurse practitioner.  October 19, 2021--patient seen in follow-up and reports her symptoms totally resolved.  She wants to know when she should get her Covid booster vaccine.  I have suggested that she receive it approximately 3 months after initially testing positive.  She has an appointment in November 6, 2021 for    History of Hyperplastic polyps of stomach 06/09/2015 06/09/2015--EGD revealed Z line irregular, 35 cm from incisors. Biopsied. Small hiatal hernia. Gastritis. Biopsied. Bilious gastric fluid. Fluid aspiration performed. Normal duodenal bulb and second part of duodenum. Biopsied. Pathology revealed the antral biopsy revealed small intestinal mucosa with no pathologic diagnosis. Good preservation of villous architecture. Duodenum biopsy revealed large    Hyperlipidemia 07/17/2012 07/17/2012--treatment for hyperlipidemia begun.    Lumbar disc herniation, L4-L5 08/07/2018 01/16/2019--patient seen in follow-up by Dr. Shukla.  She has seen the neurosurgeon who recommended conservative therapy with physical therapy.  Patient reports that has been extremely helpful.  Currently has no significant pain.  08/07/2018--patient seen in follow-up and reports her pain is much better after taking prednisone.  She is now down to half a pill per day and is almost off of it.  I rev    Lumbar scoliosis 08/07/2018 01/16/2019--patient seen in follow-up by Dr. Shukla.  She has seen the neurosurgeon who recommended conservative therapy with physical therapy.  Patient reports that has been extremely helpful.  Currently has no significant pain.  08/07/2018--patient seen in follow-up and reports her pain is much better after  taking prednisone.  She is now down to half a pill per day and is almost off of it.  I rev    Menopausal state 07/13/2020    Multiple environmental allergies 01/30/2015 01/30/2015--allergy testing revealed positive reactivity to cat, dust mite, cockroach, mold spores, and grass pollen. Environmental control measures.    Osteopenia of multiple sites, 2/25/2022--lumbar spine 1.1.  Left femoral neck -1.0.  Right femoral neck -0.3. 04/01/2022 February 25, 2022--DEXA scan reveals lumbar spine T score 1.1.  Left femoral neck T score -1.0.  Right femoral neck T score -0.3.  Mild osteopenia.    Postmenopausal hormone replacement therapy 04/18/2016    Primary hypothyroidism 01/22/2020 January 22, 2020--TSH is elevated at 5.0.  Levothyroxine 50 mcg/day initiated.  Patient will follow-up with nonfasting lab work in about 6 to 8 weeks to reassess.  07/09/2018--routine follow-up.  TSH elevated slightly at 4.31.  Free T4 normal at 1.22.  Free T3 normal at 3.3.  Continued observation.  10/11/2017--thyroid function tests are normal.  09/30/2016--thyroid ultrasound reveals a tiny 2 mm     Primary osteoarthritis involving multiple joints 06/08/2022    Primary osteoarthritis of both hands 12/30/2013 05/12/2014--patient seen in followup and reports that the Vimovo has helped. Uric acid levels are normal at 4.9. C. reactive protein mildly elevated at 2.3. X-rays of the hands reveals radiocarpal, first carpometacarpal, first metacarpophalangeal, and interphalangeal joint degeneration. This process is symmetric. The interphalangeal joint of the left is affected to the greatest degree. There is right sided scapholunate dissociation as well as deformity of the scaphoid suggesting prior traumatic injury with healing. Soft tissues are satisfactory. Overall appearance is most consistent with osteoarthritis.   05/05/2014--patient presents and reports that she is having worsening right wrist pain primarily at the base of the thumb.  No new injury. Bilateral x-rays of the wrists and hands ordered. Uric acid level ordered as well as a CRP. Vimovo 500/20 one by mouth twice a day. Follow up in one week.   03/18/2014--patient reports that her wrist symptoms have improved.   12/30/2013--patient reports a several mon    Primary osteoarthritis of both knees 04/05/2023    Primary osteoarthritis of both wrists 12/30/2013 05/12/2014--patient seen in followup and reports that the Vimovo has helped. Uric acid levels are normal at 4.9. C. reactive protein mildly elevated at 2.3. X-rays of the hands reveals radiocarpal, first carpometacarpal, first metacarpophalangeal, and interphalangeal joint degeneration. This process is symmetric. The interphalangeal joint of the left is affected to the greatest degree. There is right sided scapholunate dissociation as well as deformity of the scaphoid suggesting prior traumatic injury with healing. Soft tissues are satisfactory. Overall appearance is most consistent with osteoarthritis.   05/05/2014--patient presents and reports that she is having worsening right wrist pain primarily at the base of the thumb. No new injury. Bilateral x-rays of the wrists and hands ordered. Uric acid level ordered as well as a CRP. Vimovo 500/20 one by mouth twice a day. Follow up in one week.   03/18/2014--patient reports that her wrist symptoms have improved.   12/30/2013--patient reports a several mon    Rotator cuff syndrome     Vitamin D deficiency 04/18/2016       Allergies   Allergen Reactions    Bupivacaine Other (See Comments)     Questionable allergy.  Patient had toe surgery and subsequently developed acute hepatitis.  Rare case reports of hepatitis induced by bupivacaine which was used during her surgery.       Past Surgical History:   Procedure Laterality Date    COLONOSCOPY  03/17/2009 03/17/2009--colonoscopy revealed external hemorrhoids, torturous colon with a sigmoid stricture, sigmoid diverticulosis.    COLONOSCOPY  N/A 03/10/2017    03/10/2017--colonoscopy revealed many small and large mouth diverticula in the sigmoid colon and descending colon.  Nonthrombosed external hemorrhoids and internal hemorrhoids noted.  Otherwise, normal colonoscopy.    COLONOSCOPY N/A 08/18/2022    Procedure: COLONOSCOPY TO 40CM WITH POLYPECTOMY (COLD);  Surgeon: Mario Ray MD;  Location: [facility] ENDOSCOPY;  Service: General;  Laterality: N/A;  PRE- POSITIVE COLOGUARD  POST- POLYP, HEMORRHOIDS    COLONOSCOPY N/A 11/26/2024    Procedure: COLONOSCOPY TO CECUM;  Surgeon: Mario Ray MD;  Location: [facility] ENDOSCOPY;  Service: General;  Laterality: N/A;  PRE- CHANGE IN BOWEL HABITS  POST- DIVERTICULOSIS, HEMORRHOIDS    ENDOSCOPY N/A 08/18/2022    Procedure: ESOPHAGOGASTRODUODENOSCOPY WITH BIOPSIES AND COLD BIOPSY POLYPECTOMY;  Surgeon: Mario Ray MD;  Location: [facility] ENDOSCOPY;  Service: General;  Laterality: N/A;  PRE- ACID REFLUX  POST- GASTRITIS, GASTRIC POLYPS    ERCP WITH SPHINCTEROTOMY/PAPILLOTOMY  08/13/2014 08/13/2014--ERCP, endoscopic sphincterotomy and removal of common bile duct stones. 08/12/2014--laparoscopic cholecystectomy. This was complicated by retained common bile duct stones 2.    ESOPHAGOSCOPY / EGD  06/09/2015 06/09/2015--EGD revealed Z line irregular, 35 cm from incisors. Biopsied. Small hiatal hernia. Gastritis. Biopsied. Bilious gastric fluid. Fluid aspiration performed. Normal duodenal bulb and second part of duodenum. Biopsied. Pathology revealed the antral biopsy revealed small intestinal mucosa with no pathologic diagnosis. Good preservation of villous architecture. Duodenum biopsy revealed large    EYE SURGERY  07/19/2018    Both Eyes; cataracts    FOOT SURGERY  April 11, 2022    Dr. Vieira - toe urgery    HAMMER TOE REPAIR Right 04/11/2022    Procedure: RIGHT SECOND AND THIRD HAMMERTOE REPAIRS;  Surgeon: Brandt Vieira MD;  [place] [facility] [place] [facility] [place]  Service: Orthopedics;   Laterality: Right;    JOINT REPLACEMENT  7/18/2024    RSR right shoulder    LAPAROSCOPIC CHOLECYSTECTOMY  08/12/2014 08/13/2014--ERCP, endoscopic sphincterotomy and removal of common bile duct stones. 08/12/2014--laparoscopic cholecystectomy. This was complicated by retained common bile duct stones 2.    ROTATOR CUFF REPAIR Left 07/10/2014    07/10/2014--left rotator cuff repair. 03/18/2014--patient seen in followup and reports that her range of motion has improved and her pain is not debilitating. She feels that she is making progress with physical therapy. Surgery scheduled 07/10/2014. 01/10/2014--patient was seen in evaluation by the orthopedist and he recommended conservative treatment consisting of physical therapy/rehabilitation.    ROTATOR CUFF REPAIR Right 08/03/2016 08/03/2016--arthroscopic right rotator cuff repair.  Debridement of degenerative anterior superior labral tear.    SHOULDER SURGERY  2014 & 2016    Rotator Cuff Surgery both shoulders    SUBTOTAL HYSTERECTOMY  1978    Partial hysterectomy 1978.    TOTAL SHOULDER ARTHROPLASTY W/ DISTAL CLAVICLE EXCISION Right 07/18/2024    Procedure: Right Reverse Total Shoulder Arthroplasty;  Surgeon: Ash Jimenez MD;  Location: [facility] [place];  Service: Orthopedics;  Laterality: Right;    TRIGGER POINT INJECTION  2023    Gel in both knees       Family History   Problem Relation Age of Onset    Colon polyps Mother     Alzheimer's disease Mother     Hearing loss Mother     Osteoporosis Mother     Dementia Mother     Cancer Father         Lung Cancer    Early death Father         59 years old    Breast cancer Daughter 52    Dementia Maternal Aunt     Malig Hyperthermia Neg Hx        Social History     Socioeconomic History    Marital status:     Number of children: 2    Years of education: BA    Highest education level: Associate degree: occupational, technical, or vocational program   Tobacco Use    Smoking status: Never     Passive exposure:  Never    Smokeless tobacco: Never   Vaping Use    Vaping status: Never Used   Substance and Sexual Activity    Alcohol use: Yes     Alcohol/week: 2.0 standard drinks of alcohol     Types: 2 Glasses of wine per week     Comment: SOCIALLY    Drug use: No    Sexual activity: Not Currently     Partners: Male     Birth control/protection: Hysterectomy           Objective   Physical Exam  Vitals and nursing note reviewed.   Constitutional:       Appearance: Normal appearance.   HENT:      Head: Normocephalic and atraumatic.      Right Ear: External ear normal.      Left Ear: External ear normal.      Nose: Nose normal.      Mouth/Throat:      Mouth: Mucous membranes are moist.      Pharynx: Oropharynx is clear.   Eyes:      Pupils: Pupils are equal, round, and reactive to light.   Cardiovascular:      Rate and Rhythm: Normal rate and regular rhythm.      Pulses: Normal pulses.      Heart sounds: Normal heart sounds. No murmur heard.     No friction rub. No gallop.   Pulmonary:      Effort: Pulmonary effort is normal. No respiratory distress.      Breath sounds: Normal breath sounds. No wheezing, rhonchi or rales.   Abdominal:      General: Abdomen is flat. Bowel sounds are normal. There is no distension.      Palpations: Abdomen is soft.      Tenderness: There is no abdominal tenderness. There is no guarding or rebound.   Musculoskeletal:         General: No swelling, tenderness, deformity or signs of injury.      Cervical back: Normal range of motion and neck supple. No rigidity.      Right knee: Normal. No swelling, deformity, effusion, erythema, lacerations, bony tenderness or crepitus. Normal range of motion. No tenderness. No LCL laxity, MCL laxity or ACL laxity. Normal patellar mobility. Normal pulse.      Instability Tests: Anterior Lachman test negative.      Right lower leg: No edema.      Left lower leg: No edema.   Lymphadenopathy:      Cervical: No cervical adenopathy.   Skin:     General: Skin is warm and  dry.   Neurological:      General: No focal deficit present.      Mental Status: She is alert and oriented to person, place, and time.      GCS: GCS eye subscore is 4. GCS verbal subscore is 5. GCS motor subscore is 6.         Procedures           ED Course      XR Knee 3 View Right  XR Knee 3 View Right, XR Knee 3 View Left  Result Date: 5/16/2025  Narrative: XR KNEE 3 VW RIGHT-, XR KNEE 3 VW LEFT-  INDICATIONS: Pain  TECHNIQUE: 3 VIEWS OF EACH KNEE  COMPARISON: None available  FINDINGS:  Mild to moderate degenerative spurring is seen in the knees. Chondral calcifications are present bilaterally. No acute fracture, erosion, or dislocation is identified. Patellofemoral joint space narrowing is apparent, especially on the right. Minimal right knee effusion is suggested. No significant left knee effusion is noted. In addition to bilateral fabella, some calcified density is apparent in the popliteal fossa region, right more than left, could for example of the debris in popliteal cysts, although nonspecific. Follow-up/further evaluation can be obtained as indications persist.      Impression:  As described.    This report was finalized on 5/16/2025 5:13 PM by Dr. Brandt Myers M.D on Workstation: [id number]                                           Medical Decision Making  Problems Addressed:  Primary osteoarthritis of both knees: complicated acute illness or injury    Amount and/or Complexity of Data Reviewed  Radiology: ordered.    Risk  Prescription drug management.        Final diagnoses:   Primary osteoarthritis of both knees       ED Disposition  ED Disposition       ED Disposition   Discharge    Condition   Good    Comment   --               Dany Zepeda MD  [address] [facility] [id number] [phone]    In 3 days           Medication List        New Prescriptions      nabumetone 500 MG tablet  Commonly known as: RELAFEN  Take 1 tablet by mouth 2 (Two) Times a Day As Needed for Mild Pain.             Stop      meloxicam 15 MG tablet  Commonly known as: MOBIC               Where to Get Your Medications        These medications were sent to [facility] [id number] - [place], [place] - [address] [facility]  [facility] [address] - [phone]  - [phone] FX  [address], [facility] [address]      Phone: [phone]   nabumetone 500 MG tablet

## 2025-05-20 DIAGNOSIS — M85.89 OSTEOPENIA OF MULTIPLE SITES: Chronic | ICD-10-CM

## 2025-05-20 DIAGNOSIS — Z78.0 POSTMENOPAUSAL STATE: Chronic | ICD-10-CM

## 2025-05-21 DIAGNOSIS — M25.561 CHRONIC PAIN OF RIGHT KNEE: ICD-10-CM

## 2025-05-21 DIAGNOSIS — G89.29 CHRONIC PAIN OF RIGHT KNEE: ICD-10-CM

## 2025-05-21 DIAGNOSIS — M23.91 INTERNAL DERANGEMENT OF RIGHT KNEE: Primary | ICD-10-CM

## 2025-05-21 RX ORDER — ESTRADIOL 1 MG/1
TABLET ORAL
Qty: 90 TABLET | Refills: 1 | Status: SHIPPED | OUTPATIENT
Start: 2025-05-21

## 2025-06-20 ENCOUNTER — HOSPITAL ENCOUNTER (OUTPATIENT)
Dept: MRI IMAGING | Facility: HOSPITAL | Age: 80
Discharge: HOME | End: 2025-06-20
Payer: MEDICARE

## 2025-06-20 DIAGNOSIS — M25.561 CHRONIC PAIN OF RIGHT KNEE: ICD-10-CM

## 2025-06-20 DIAGNOSIS — M23.91 INTERNAL DERANGEMENT OF RIGHT KNEE: ICD-10-CM

## 2025-06-20 DIAGNOSIS — G89.29 CHRONIC PAIN OF RIGHT KNEE: ICD-10-CM

## 2025-06-20 PROCEDURE — 73721 MRI JNT OF LWR EXTRE W/O DYE: CPT

## 2025-06-20 PROCEDURE — 76014 MR SFTY IMPLT&/FB ASMT STF 1: CPT

## 2025-06-25 ENCOUNTER — OFFICE VISIT (OUTPATIENT)
Dept: ORTHOPEDIC SURGERY | Facility: CLINIC | Age: 80
End: 2025-06-25
Payer: MEDICARE

## 2025-06-25 VITALS — TEMPERATURE: 97.6 F | HEIGHT: 65 IN | BODY MASS INDEX: 20.99 KG/M2 | WEIGHT: 126 LBS

## 2025-06-25 DIAGNOSIS — M17.10 ARTHRITIS OF KNEE: Primary | ICD-10-CM

## 2025-06-25 RX ADMIN — LIDOCAINE HYDROCHLORIDE 2 ML: 10 INJECTION, SOLUTION EPIDURAL; INFILTRATION; INTRACAUDAL; PERINEURAL at 14:01

## 2025-06-25 RX ADMIN — METHYLPREDNISOLONE ACETATE 80 MG: 80 INJECTION, SUSPENSION INTRA-ARTICULAR; INTRALESIONAL; INTRAMUSCULAR; SOFT TISSUE at 14:01

## 2025-06-25 RX ADMIN — METHYLPREDNISOLONE ACETATE 1 ML: 80 INJECTION, SUSPENSION INTRA-ARTICULAR; INTRALESIONAL; INTRAMUSCULAR; SOFT TISSUE at 14:01

## 2025-06-25 NOTE — PROGRESS NOTES
Patient:Ashley Mata    YOB: 1945    Medical Record Number:[id number]    Chief Complaints:  Bilateral knee pain    History of Present Illness:     79 y.o. female patient who presents for a complaint of bilateral knee pain.  She cannot recall any specific inciting event or factor.  This has been a longstanding issue for her, slowly getting worse.  Both knees bother her but the right is a little worse.  Pain is moderate, constant and aching.  No mechanical symptoms or instability.    Allergies   Allergen Reactions    Bupivacaine Other (See Comments)     Questionable allergy.  Patient had toe surgery and subsequently developed acute hepatitis.  Rare case reports of hepatitis induced by bupivacaine which was used during her surgery.       Current Outpatient Medications:     acetaminophen (Tylenol 8 Hour) 650 MG 8 hr tablet, Take 1 tablet by mouth Every 8 (Eight) Hours As Needed for Mild Pain., Disp: 60 tablet, Rfl: 1    atorvastatin (LIPITOR) 20 MG tablet, TAKE 1 TABLET BY MOUTH DAILY, Disp: 90 tablet, Rfl: 3    calcium carbonate (OS-CHRIS) 600 MG tablet, Take 1 tablet by mouth Daily., Disp: , Rfl:     estradiol (ESTRACE) 1 MG tablet, TAKE 1 TABLET BY MOUTH DAILY AS DIRECTED FOR POSTMENOPAUSAL STATE, Disp: 90 tablet, Rfl: 1    ezetimibe (ZETIA) 10 MG tablet, TAKE 1 TABLET BY MOUTH DAILY, Disp: 90 tablet, Rfl: 1    fexofenadine-pseudoephedrine (ALLEGRA-D 24) 180-240 MG per 24 hr tablet, Take 1 p.o. daily for environmental allergies, Disp: , Rfl:     fluticasone (FLONASE) 50 MCG/ACT nasal spray, Administer 2 sprays into the nostril(s) as directed by provider As Needed., Disp: , Rfl:     levothyroxine (SYNTHROID, LEVOTHROID) 50 MCG tablet, TAKE 1 TABLET BY MOUTH DAILY FOR LOW THYROID, Disp: 90 tablet, Rfl: 3    memantine (NAMENDA) 10 MG tablet, TAKE 1 TABLET BY MOUTH 2 TIMES A DAY, Disp: 60 tablet, Rfl: 2    multivitamin (THERAGRAN) tablet tablet, Take 1 tablet by mouth Daily., Disp: , Rfl:     nabumetone  (RELAFEN) 500 MG tablet, Take 1 tablet by mouth 2 (Two) Times a Day As Needed for Mild Pain., Disp: 20 tablet, Rfl: 0    omeprazole OTC (PrilOSEC OTC) 20 MG EC tablet, Take 1 tablet by mouth Daily., Disp: , Rfl:     Past Medical History:   Diagnosis Date    Allergic rhinitis 01/30/2015 11/13/2015--patient was evaluated by ENT and was started on generic Flonase and patient reports this has improved her symptoms quite a bit.   01/30/2015--allergy testing revealed positive reactivity to cat, dust mite, cockroach, mold spores, and grass pollen. Environmental control measures.    Alopecia 04/12/2017    Evaluated and treated by the dermatologist.    Benign essential hypertension 04/18/2016    Chronic pain of both knees 04/05/2023    Chronic toe pain, right foot 10/19/2021    October 19, 2021--patient reports a 1 year history of progressively worsening right toe pain that particular involves the second toe.  On exam she has obvious hammertoe which is causing irritation.  There is some hammering of the third digit as well.  Patient referred to orthopedist who specializes in foot problems such as Dr. Vieira.  In the meantime patient should use over-the-counter toe pads to    Colon polyp 2022 Benign    Depression with anxiety 01/03/2025    Diverticulosis of colon 03/17/2009    03/10/2017--colonoscopy revealed many small and large mouth diverticula in the sigmoid colon and descending colon.  Nonthrombosed external hemorrhoids and internal hemorrhoids noted.  Otherwise, normal colonoscopy.  03/17/2009--colonoscopy revealed external hemorrhoids, torturous colon with a sigmoid stricture, sigmoid diverticulosis.    Family history of breast cancer in first degree relative 07/13/2020    Family history of colonic polyps 04/01/2022    Gastroesophageal reflux disease without esophagitis 03/17/2009 06/09/2015--EGD revealed Z line irregular, 35 cm from incisors. Biopsied. Small hiatal hernia. Gastritis. Biopsied. Bilious gastric  fluid. Fluid aspiration performed. Normal duodenal bulb and second part of duodenum. Biopsied. Pathology revealed the antral biopsy revealed small intestinal mucosa with no pathologic diagnosis. Good preservation of villous architecture. Duodenum biopsy revealed largely antral type gastric mucosa with mild patchy superficial chronic gastritis. Gastroesophageal junction revealed squamous and glandular mucosa with mild chronic inflammation. Negative for intestinal metaplasia or dysplasia. There appeared to be mislabeling of the antral biopsies and duodenal biopsies.   04/17/2012--EGD revealed irregular Z line, hiatal hernia, gastritis, duodenitis.   03/17/2009--EGD revealed grade B. reflux esophagitis, hiatal hernia, gastritis, a few gastric polyps, duodenitis.    Hammertoe of right foot     Hammertoe of second toe of right foot 10/19/2021    October 19, 2021--patient reports a 1 year history of progressively worsening right toe pain that particular involves the second toe.  On exam she has obvious hammertoe which is causing irritation.  There is some hammering of the third digit as well.  Patient referred to orthopedist who specializes in foot problems such as Dr. Vieira.  In the meantime patient should use over-the-counter toe pads to    History of acute hepatitis 04/24/2022    May 9, 2022--patient seen in hospital discharge follow-up/transition of care.  I reviewed the documentation including the admission history and physical, hospital course, laboratory and radiographic studies, GI consultation, discharge summary and discharge medications.  I specifically reviewed the tissue pathology report which revealed moderate steatosis with moderate chronic portal inflammation a    History of COVID-19, August 12, 2021; May 17, 2022. 10/19/2021    May 17, 2022--patient again tested positive for COVID.  Ordered by gastroenterology nurse practitioner.  October 19, 2021--patient seen in follow-up and reports her symptoms totally  resolved.  She wants to know when she should get her Covid booster vaccine.  I have suggested that she receive it approximately 3 months after initially testing positive.  She has an appointment in November 6, 2021 for    History of Hyperplastic polyps of stomach 06/09/2015 06/09/2015--EGD revealed Z line irregular, 35 cm from incisors. Biopsied. Small hiatal hernia. Gastritis. Biopsied. Bilious gastric fluid. Fluid aspiration performed. Normal duodenal bulb and second part of duodenum. Biopsied. Pathology revealed the antral biopsy revealed small intestinal mucosa with no pathologic diagnosis. Good preservation of villous architecture. Duodenum biopsy revealed large    Hyperlipidemia 07/17/2012 07/17/2012--treatment for hyperlipidemia begun.    Lumbar disc herniation, L4-L5 08/07/2018 01/16/2019--patient seen in follow-up by Dr. Shukla.  She has seen the neurosurgeon who recommended conservative therapy with physical therapy.  Patient reports that has been extremely helpful.  Currently has no significant pain.  08/07/2018--patient seen in follow-up and reports her pain is much better after taking prednisone.  She is now down to half a pill per day and is almost off of it.  I rev    Lumbar scoliosis 08/07/2018 01/16/2019--patient seen in follow-up by Dr. Shukla.  She has seen the neurosurgeon who recommended conservative therapy with physical therapy.  Patient reports that has been extremely helpful.  Currently has no significant pain.  08/07/2018--patient seen in follow-up and reports her pain is much better after taking prednisone.  She is now down to half a pill per day and is almost off of it.  I rev    Menopausal state 07/13/2020    Multiple environmental allergies 01/30/2015 01/30/2015--allergy testing revealed positive reactivity to cat, dust mite, cockroach, mold spores, and grass pollen. Environmental control measures.    Osteopenia of multiple sites, 2/25/2022--lumbar spine 1.1.  Left femoral  neck -1.0.  Right femoral neck -0.3. 04/01/2022 February 25, 2022--DEXA scan reveals lumbar spine T score 1.1.  Left femoral neck T score -1.0.  Right femoral neck T score -0.3.  Mild osteopenia.    Postmenopausal hormone replacement therapy 04/18/2016    Primary hypothyroidism 01/22/2020 January 22, 2020--TSH is elevated at 5.0.  Levothyroxine 50 mcg/day initiated.  Patient will follow-up with nonfasting lab work in about 6 to 8 weeks to reassess.  07/09/2018--routine follow-up.  TSH elevated slightly at 4.31.  Free T4 normal at 1.22.  Free T3 normal at 3.3.  Continued observation.  10/11/2017--thyroid function tests are normal.  09/30/2016--thyroid ultrasound reveals a tiny 2 mm     Primary osteoarthritis involving multiple joints 06/08/2022    Primary osteoarthritis of both hands 12/30/2013 05/12/2014--patient seen in followup and reports that the Vimovo has helped. Uric acid levels are normal at 4.9. C. reactive protein mildly elevated at 2.3. X-rays of the hands reveals radiocarpal, first carpometacarpal, first metacarpophalangeal, and interphalangeal joint degeneration. This process is symmetric. The interphalangeal joint of the left is affected to the greatest degree. There is right sided scapholunate dissociation as well as deformity of the scaphoid suggesting prior traumatic injury with healing. Soft tissues are satisfactory. Overall appearance is most consistent with osteoarthritis.   05/05/2014--patient presents and reports that she is having worsening right wrist pain primarily at the base of the thumb. No new injury. Bilateral x-rays of the wrists and hands ordered. Uric acid level ordered as well as a CRP. Vimovo 500/20 one by mouth twice a day. Follow up in one week.   03/18/2014--patient reports that her wrist symptoms have improved.   12/30/2013--patient reports a several mon    Primary osteoarthritis of both knees 04/05/2023    Primary osteoarthritis of both wrists 12/30/2013     05/12/2014--patient seen in followup and reports that the Vimovo has helped. Uric acid levels are normal at 4.9. C. reactive protein mildly elevated at 2.3. X-rays of the hands reveals radiocarpal, first carpometacarpal, first metacarpophalangeal, and interphalangeal joint degeneration. This process is symmetric. The interphalangeal joint of the left is affected to the greatest degree. There is right sided scapholunate dissociation as well as deformity of the scaphoid suggesting prior traumatic injury with healing. Soft tissues are satisfactory. Overall appearance is most consistent with osteoarthritis.   05/05/2014--patient presents and reports that she is having worsening right wrist pain primarily at the base of the thumb. No new injury. Bilateral x-rays of the wrists and hands ordered. Uric acid level ordered as well as a CRP. Vimovo 500/20 one by mouth twice a day. Follow up in one week.   03/18/2014--patient reports that her wrist symptoms have improved.   12/30/2013--patient reports a several mon    Rotator cuff syndrome     Vitamin D deficiency 04/18/2016       Past Surgical History:   Procedure Laterality Date    COLONOSCOPY  03/17/2009 03/17/2009--colonoscopy revealed external hemorrhoids, torturous colon with a sigmoid stricture, sigmoid diverticulosis.    COLONOSCOPY N/A 03/10/2017    03/10/2017--colonoscopy revealed many small and large mouth diverticula in the sigmoid colon and descending colon.  Nonthrombosed external hemorrhoids and internal hemorrhoids noted.  Otherwise, normal colonoscopy.    COLONOSCOPY N/A 08/18/2022    Procedure: COLONOSCOPY TO 40CM WITH POLYPECTOMY (COLD);  Surgeon: Mario Ray MD;  Location: [facility] ENDOSCOPY;  Service: General;  Laterality: N/A;  PRE- POSITIVE COLOGUARD  POST- POLYP, HEMORRHOIDS    COLONOSCOPY N/A 11/26/2024    Procedure: COLONOSCOPY TO CECUM;  Surgeon: Mario Ray MD;  Location: [facility] ENDOSCOPY;  Service: General;  Laterality:  N/A;  PRE- CHANGE IN BOWEL HABITS  POST- DIVERTICULOSIS, HEMORRHOIDS    ENDOSCOPY N/A 08/18/2022    Procedure: ESOPHAGOGASTRODUODENOSCOPY WITH BIOPSIES AND COLD BIOPSY POLYPECTOMY;  Surgeon: Mario Ray MD;  Location: [facility] ENDOSCOPY;  Service: General;  Laterality: N/A;  PRE- ACID REFLUX  POST- GASTRITIS, GASTRIC POLYPS    ERCP WITH SPHINCTEROTOMY/PAPILLOTOMY  08/13/2014 08/13/2014--ERCP, endoscopic sphincterotomy and removal of common bile duct stones. 08/12/2014--laparoscopic cholecystectomy. This was complicated by retained common bile duct stones 2.    ESOPHAGOSCOPY / EGD  06/09/2015 06/09/2015--EGD revealed Z line irregular, 35 cm from incisors. Biopsied. Small hiatal hernia. Gastritis. Biopsied. Bilious gastric fluid. Fluid aspiration performed. Normal duodenal bulb and second part of duodenum. Biopsied. Pathology revealed the antral biopsy revealed small intestinal mucosa with no pathologic diagnosis. Good preservation of villous architecture. Duodenum biopsy revealed large    EYE SURGERY  07/19/2018    Both Eyes; cataracts    FOOT SURGERY  April 11, 2022    Dr. Vieira - toe urgery    HAMMER TOE REPAIR Right 04/11/2022    Procedure: RIGHT SECOND AND THIRD HAMMERTOE REPAIRS;  Surgeon: Brandt Vieira MD;  Location: [facility] OR [facility];  Service: Orthopedics;  Laterality: Right;    JOINT REPLACEMENT  7/18/2024    RSR right shoulder    LAPAROSCOPIC CHOLECYSTECTOMY  08/12/2014 08/13/2014--ERCP, endoscopic sphincterotomy and removal of common bile duct stones. 08/12/2014--laparoscopic cholecystectomy. This was complicated by retained common bile duct stones 2.    ROTATOR CUFF REPAIR Left 07/10/2014    07/10/2014--left rotator cuff repair. 03/18/2014--patient seen in followup and reports that her range of motion has improved and her pain is not debilitating. She feels that she is making progress with physical therapy. Surgery scheduled 07/10/2014. 01/10/2014--patient was seen in evaluation by the  orthopedist and he recommended conservative treatment consisting of physical therapy/rehabilitation.    ROTATOR CUFF REPAIR Right 08/03/2016 08/03/2016--arthroscopic right rotator cuff repair.  Debridement of degenerative anterior superior labral tear.    SHOULDER SURGERY  2014 & 2016    Rotator Cuff Surgery both shoulders    SUBTOTAL HYSTERECTOMY  1978    Partial hysterectomy 1978.    TOTAL SHOULDER ARTHROPLASTY W/ DISTAL CLAVICLE EXCISION Right 07/18/2024    Procedure: Right Reverse Total Shoulder Arthroplasty;  Surgeon: Ash Jimenez MD;  [place] [facility] [place] [facility];  Service: Orthopedics;  Laterality: Right;    TRIGGER POINT INJECTION  2023    Gel in both knees       Social History     Occupational History    Occupation:  - [employer]   Tobacco Use    Smoking status: Never     Passive exposure: Never    Smokeless tobacco: Never   Vaping Use    Vaping status: Never Used   Substance and Sexual Activity    Alcohol use: Yes     Alcohol/week: 2.0 standard drinks of alcohol     Types: 2 Glasses of wine per week     Comment: SOCIALLY    Drug use: No    Sexual activity: Not Currently     Partners: Male     Birth control/protection: Hysterectomy      Social History     Social History Narrative    Not on file       Family History   Problem Relation Age of Onset    Colon polyps Mother     Alzheimer's disease Mother     Hearing loss Mother     Osteoporosis Mother     Dementia Mother     Cancer Father         Lung Cancer    Early death Father         59 years old    Breast cancer Daughter 52    Dementia Maternal Aunt     Malig Hyperthermia Neg Hx        Review of Systems:      Constitutional: Denies fever, shaking or chills   Eyes: Denies change in visual acuity   HEENT: Denies nasal congestion or sore throat   Respiratory: Denies cough or shortness of breath   Cardiovascular: Denies chest pain or edema  Endocrine: Denies tremors, palpitations, intolerance of heat or cold, polyuria, polydipsia.  GI:  "Denies abdominal pain, nausea, vomiting, bloody stools or diarrhea  : Denies frequency, urgency, incontinence, retention, or nocturia.  Musculoskeletal: Denies numbness, tingling or loss of motor function except as above  Integument: Denies rash, lesion or ulceration   Neurologic: Denies headache or focal weakness, deficits  Heme: Denies spontaneous or excessive bleeding, epistaxis, hematuria, melena, fatigue, enlarged or tender lymph nodes.      All other pertinent positives and negatives as noted above in HPI.    Physical Exam:79 y.o. female  Vitals:    06/25/25 1302   Temp: 97.6 °F (36.4 °C)   TempSrc: Temporal   Weight: 57.2 kg (126 lb)   Height: 163.8 cm (64.5\")     General:  Patient is awake and alert.  Appears in no acute distress or discomfort.    Psych:  Affect and demeanor are appropriate.    Extremities: Both knees were examined.  Her exam is fairly symmetric.  Skin is benign.  Trace effusion on the right.  No effusion on the left.  Moderate bilateral patellofemoral tenderness with anterior crepitus with range of motion.  Her motion is good.  She can extend fully and flex to about 120 bilaterally.  No instability.  Intact motor and sensory function throughout the legs and feet.  Brisk capillary refill distally.    Imaging: Outside x-rays including AP, oblique and lateral views of both knees are reviewed on the [employer] system.  I have independently interpreted the images.  She appears to have advanced patellofemoral compartment osteoarthritis bilaterally.  There is no merchant's view but the appearance is consistent with bone-on-bone arthritis.  She appears to have moderate tibiofemoral degenerative changes as well.    Assessment/Plan:  Bilateral knee osteoarthritis    We discussed the natural history of this condition and her options.  She acknowledged understanding of this information.  She elected to try bilateral knee injections today.  The risk, benefits and alternatives were discussed including the " elevated risk with dual injections.  She acknowledged understanding and consented.  The injections were performed as described below.  She will follow-up as needed.    Ash Jimenez MD    Large Joint Arthrocentesis: R knee  Date/Time: 6/25/2025 2:01 PM  Consent given by: patient  Site marked: site marked  Timeout: Immediately prior to procedure a time out was called to verify the correct patient, procedure, equipment, support staff and site/side marked as required   Supporting Documentation  Indications: pain   Procedure Details  Location: knee - R knee  Preparation: Patient was prepped and draped in the usual sterile fashion  Needle gauge: 21G.  Approach: anterolateral  Medications administered: 80 mg methylPREDNISolone acetate 80 MG/ML; 2 mL lidocaine PF 1% 1 %  Patient tolerance: patient tolerated the procedure well with no immediate complications      Large Joint Arthrocentesis: L knee  Date/Time: 6/25/2025 2:01 PM  Consent given by: patient  Site marked: site marked  Timeout: Immediately prior to procedure a time out was called to verify the correct patient, procedure, equipment, support staff and site/side marked as required   Supporting Documentation  Indications: pain   Procedure Details  Location: knee - L knee  Preparation: Patient was prepped and draped in the usual sterile fashion  Needle gauge: 21G.  Approach: anterolateral  Medications administered: 1 mL methylPREDNISolone acetate 80 MG/ML; 2 mL lidocaine PF 1% 1 %  Patient tolerance: patient tolerated the procedure well with no immediate complications      06/25/2025

## 2025-06-26 RX ORDER — LIDOCAINE HYDROCHLORIDE 10 MG/ML
2 INJECTION, SOLUTION EPIDURAL; INFILTRATION; INTRACAUDAL; PERINEURAL
Status: COMPLETED | OUTPATIENT
Start: 2025-06-25 | End: 2025-06-25

## 2025-06-26 RX ORDER — METHYLPREDNISOLONE ACETATE 80 MG/ML
80 INJECTION, SUSPENSION INTRA-ARTICULAR; INTRALESIONAL; INTRAMUSCULAR; SOFT TISSUE
Status: COMPLETED | OUTPATIENT
Start: 2025-06-25 | End: 2025-06-25

## 2025-06-26 RX ORDER — METHYLPREDNISOLONE ACETATE 80 MG/ML
1 INJECTION, SUSPENSION INTRA-ARTICULAR; INTRALESIONAL; INTRAMUSCULAR; SOFT TISSUE
Status: COMPLETED | OUTPATIENT
Start: 2025-06-25 | End: 2025-06-25

## 2025-06-26 NOTE — PROGRESS NOTES
Subjective   Ashley Mata is a 73 y.o. female for follow-up    History of Present Illness    73-year-old postmenopausal white female presents after having a follow-up bone density assessment.  She has continued hormone replacement therapy.  She is status post hysterectomy in the past.  There is a family history of breast cancer in her daughter.    The following portions of the patient's history were reviewed and updated as appropriate: allergies, current medications, past family history, past medical history, past social history, past surgical history and problem list.    Review of Systems   Genitourinary: Negative for pelvic pain, vaginal bleeding and vaginal discharge.       Objective   Physical Exam   The patient declined a physical exam today.    Assessment/Plan   Ashley was seen today for follow-up.    Diagnoses and all orders for this visit:    Menopause    Family history of breast cancer in first degree relative    Postmenopausal hormone replacement therapy    We reviewed her bone density assessment that was performed today.  There was no evidence of osteoporosis.  We discussed the pros and cons of hormone replacement therapy and its relationship to cardiovascular disease and breast cancer.  The patient was reassured.  She would like to continue hormone replacement therapy for now.    Total visit time 25 minutes with 100% of the visit spent in consultation regarding hormone replacement therapy and test results.           
None

## 2025-07-23 ENCOUNTER — OFFICE VISIT (OUTPATIENT)
Dept: ORTHOPEDIC SURGERY | Facility: CLINIC | Age: 80
End: 2025-07-23
Payer: MEDICARE

## 2025-07-23 VITALS — HEIGHT: 65 IN | TEMPERATURE: 97.5 F | WEIGHT: 124.4 LBS | BODY MASS INDEX: 20.73 KG/M2

## 2025-07-23 DIAGNOSIS — Z96.611 STATUS POST REVERSE TOTAL REPLACEMENT OF RIGHT SHOULDER: Primary | ICD-10-CM

## 2025-07-23 DIAGNOSIS — Z09 SURGERY FOLLOW-UP: ICD-10-CM

## 2025-07-23 NOTE — PROGRESS NOTES
"Ashley Mata : 1945 MRN: [id number] DATE: 2025    DIAGNOSIS:  Follow up for right shoulder arthroplasty      SUBJECTIVE:  Patient returns today for 1 year follow up of a right shoulder replacement.  She says that the shoulder seems to be doing fairly well.  She does not have any significant pain.  Her only complaint is persistent weakness.  She does feel like the symptoms are overall improved.  She feels like the shoulder is better than before the surgery.    OBJECTIVE:    Temp 97.5 °F (36.4 °C) (Temporal)   Ht 163.8 cm (64.5\")   Wt 56.4 kg (124 lb 6.4 oz)   LMP  (LMP Unknown)   BMI 21.02 kg/m²     Family History   Problem Relation Age of Onset    Colon polyps Mother     Alzheimer's disease Mother     Hearing loss Mother     Osteoporosis Mother     Dementia Mother     Cancer Father         Lung Cancer    Early death Father         59 years old    Breast cancer Daughter 52    Dementia Maternal Aunt     Malig Hyperthermia Neg Hx      Past Medical History:   Diagnosis Date    Allergic rhinitis 2015--patient was evaluated by ENT and was started on generic Flonase and patient reports this has improved her symptoms quite a bit.   2015--allergy testing revealed positive reactivity to cat, dust mite, cockroach, mold spores, and grass pollen. Environmental control measures.    Alopecia 2017    Evaluated and treated by the dermatologist.    Benign essential hypertension 2016    Chronic pain of both knees 2023    Chronic toe pain, right foot 10/19/2021    2021--patient reports a 1 year history of progressively worsening right toe pain that particular involves the second toe.  On exam she has obvious hammertoe which is causing irritation.  There is some hammering of the third digit as well.  Patient referred to orthopedist who specializes in foot problems such as Dr. Vieira.  In the meantime patient should use over-the-counter toe pads to    Colon " polyp 2022 Benign    Depression with anxiety 01/03/2025    Diverticulosis of colon 03/17/2009    03/10/2017--colonoscopy revealed many small and large mouth diverticula in the sigmoid colon and descending colon.  Nonthrombosed external hemorrhoids and internal hemorrhoids noted.  Otherwise, normal colonoscopy.  03/17/2009--colonoscopy revealed external hemorrhoids, torturous colon with a sigmoid stricture, sigmoid diverticulosis.    Family history of breast cancer in first degree relative 07/13/2020    Family history of colonic polyps 04/01/2022    Gastroesophageal reflux disease without esophagitis 03/17/2009 06/09/2015--EGD revealed Z line irregular, 35 cm from incisors. Biopsied. Small hiatal hernia. Gastritis. Biopsied. Bilious gastric fluid. Fluid aspiration performed. Normal duodenal bulb and second part of duodenum. Biopsied. Pathology revealed the antral biopsy revealed small intestinal mucosa with no pathologic diagnosis. Good preservation of villous architecture. Duodenum biopsy revealed largely antral type gastric mucosa with mild patchy superficial chronic gastritis. Gastroesophageal junction revealed squamous and glandular mucosa with mild chronic inflammation. Negative for intestinal metaplasia or dysplasia. There appeared to be mislabeling of the antral biopsies and duodenal biopsies.   04/17/2012--EGD revealed irregular Z line, hiatal hernia, gastritis, duodenitis.   03/17/2009--EGD revealed grade B. reflux esophagitis, hiatal hernia, gastritis, a few gastric polyps, duodenitis.    Hammertoe of right foot     Hammertoe of second toe of right foot 10/19/2021    October 19, 2021--patient reports a 1 year history of progressively worsening right toe pain that particular involves the second toe.  On exam she has obvious hammertoe which is causing irritation.  There is some hammering of the third digit as well.  Patient referred to orthopedist who specializes in foot problems such as Dr. Vieira.  In the  meantime patient should use over-the-counter toe pads to    History of acute hepatitis 04/24/2022    May 9, 2022--patient seen in hospital discharge follow-up/transition of care.  I reviewed the documentation including the admission history and physical, hospital course, laboratory and radiographic studies, GI consultation, discharge summary and discharge medications.  I specifically reviewed the tissue pathology report which revealed moderate steatosis with moderate chronic portal inflammation a    History of COVID-19, August 12, 2021; May 17, 2022. 10/19/2021    May 17, 2022--patient again tested positive for COVID.  Ordered by gastroenterology nurse practitioner.  October 19, 2021--patient seen in follow-up and reports her symptoms totally resolved.  She wants to know when she should get her Covid booster vaccine.  I have suggested that she receive it approximately 3 months after initially testing positive.  She has an appointment in November 6, 2021 for    History of Hyperplastic polyps of stomach 06/09/2015 06/09/2015--EGD revealed Z line irregular, 35 cm from incisors. Biopsied. Small hiatal hernia. Gastritis. Biopsied. Bilious gastric fluid. Fluid aspiration performed. Normal duodenal bulb and second part of duodenum. Biopsied. Pathology revealed the antral biopsy revealed small intestinal mucosa with no pathologic diagnosis. Good preservation of villous architecture. Duodenum biopsy revealed large    Hyperlipidemia 07/17/2012 07/17/2012--treatment for hyperlipidemia begun.    Lumbar disc herniation, L4-L5 08/07/2018 01/16/2019--patient seen in follow-up by Dr. Shukla.  She has seen the neurosurgeon who recommended conservative therapy with physical therapy.  Patient reports that has been extremely helpful.  Currently has no significant pain.  08/07/2018--patient seen in follow-up and reports her pain is much better after taking prednisone.  She is now down to half a pill per day and is almost off of  it.  I rev    Lumbar scoliosis 08/07/2018 01/16/2019--patient seen in follow-up by Dr. Shukla.  She has seen the neurosurgeon who recommended conservative therapy with physical therapy.  Patient reports that has been extremely helpful.  Currently has no significant pain.  08/07/2018--patient seen in follow-up and reports her pain is much better after taking prednisone.  She is now down to half a pill per day and is almost off of it.  I rev    Memory loss     Menopausal state 07/13/2020    Multiple environmental allergies 01/30/2015 01/30/2015--allergy testing revealed positive reactivity to cat, dust mite, cockroach, mold spores, and grass pollen. Environmental control measures.    Osteopenia of multiple sites, 2/25/2022--lumbar spine 1.1.  Left femoral neck -1.0.  Right femoral neck -0.3. 04/01/2022 February 25, 2022--DEXA scan reveals lumbar spine T score 1.1.  Left femoral neck T score -1.0.  Right femoral neck T score -0.3.  Mild osteopenia.    Postmenopausal hormone replacement therapy 04/18/2016    Primary hypothyroidism 01/22/2020 January 22, 2020--TSH is elevated at 5.0.  Levothyroxine 50 mcg/day initiated.  Patient will follow-up with nonfasting lab work in about 6 to 8 weeks to reassess.  07/09/2018--routine follow-up.  TSH elevated slightly at 4.31.  Free T4 normal at 1.22.  Free T3 normal at 3.3.  Continued observation.  10/11/2017--thyroid function tests are normal.  09/30/2016--thyroid ultrasound reveals a tiny 2 mm     Primary osteoarthritis involving multiple joints 06/08/2022    Primary osteoarthritis of both hands 12/30/2013 05/12/2014--patient seen in followup and reports that the Vimovo has helped. Uric acid levels are normal at 4.9. C. reactive protein mildly elevated at 2.3. X-rays of the hands reveals radiocarpal, first carpometacarpal, first metacarpophalangeal, and interphalangeal joint degeneration. This process is symmetric. The interphalangeal joint of the left is affected  to the greatest degree. There is right sided scapholunate dissociation as well as deformity of the scaphoid suggesting prior traumatic injury with healing. Soft tissues are satisfactory. Overall appearance is most consistent with osteoarthritis.   05/05/2014--patient presents and reports that she is having worsening right wrist pain primarily at the base of the thumb. No new injury. Bilateral x-rays of the wrists and hands ordered. Uric acid level ordered as well as a CRP. Vimovo 500/20 one by mouth twice a day. Follow up in one week.   03/18/2014--patient reports that her wrist symptoms have improved.   12/30/2013--patient reports a several mon    Primary osteoarthritis of both knees 04/05/2023    Primary osteoarthritis of both wrists 12/30/2013 05/12/2014--patient seen in followup and reports that the Vimovo has helped. Uric acid levels are normal at 4.9. C. reactive protein mildly elevated at 2.3. X-rays of the hands reveals radiocarpal, first carpometacarpal, first metacarpophalangeal, and interphalangeal joint degeneration. This process is symmetric. The interphalangeal joint of the left is affected to the greatest degree. There is right sided scapholunate dissociation as well as deformity of the scaphoid suggesting prior traumatic injury with healing. Soft tissues are satisfactory. Overall appearance is most consistent with osteoarthritis.   05/05/2014--patient presents and reports that she is having worsening right wrist pain primarily at the base of the thumb. No new injury. Bilateral x-rays of the wrists and hands ordered. Uric acid level ordered as well as a CRP. Vimovo 500/20 one by mouth twice a day. Follow up in one week.   03/18/2014--patient reports that her wrist symptoms have improved.   12/30/2013--patient reports a several mon    Rotator cuff syndrome     Shingles     Vitamin D deficiency 04/18/2016     Past Surgical History:   Procedure Laterality Date    COLONOSCOPY  03/17/2009     03/17/2009--colonoscopy revealed external hemorrhoids, torturous colon with a sigmoid stricture, sigmoid diverticulosis.    COLONOSCOPY N/A 03/10/2017    03/10/2017--colonoscopy revealed many small and large mouth diverticula in the sigmoid colon and descending colon.  Nonthrombosed external hemorrhoids and internal hemorrhoids noted.  Otherwise, normal colonoscopy.    COLONOSCOPY N/A 08/18/2022    Procedure: COLONOSCOPY TO 40CM WITH POLYPECTOMY (COLD);  Surgeon: Mario Ray MD;  Location: [facility] ENDOSCOPY;  Service: General;  Laterality: N/A;  PRE- POSITIVE COLOGUARD  POST- POLYP, HEMORRHOIDS    COLONOSCOPY N/A 11/26/2024    Procedure: COLONOSCOPY TO CECUM;  Surgeon: Mario Ray MD;  Location: [facility] ENDOSCOPY;  Service: General;  Laterality: N/A;  PRE- CHANGE IN BOWEL HABITS  POST- DIVERTICULOSIS, HEMORRHOIDS    ENDOSCOPY N/A 08/18/2022    Procedure: ESOPHAGOGASTRODUODENOSCOPY WITH BIOPSIES AND COLD BIOPSY POLYPECTOMY;  Surgeon: Mario Ray MD;  Location: [facility] ENDOSCOPY;  Service: General;  Laterality: N/A;  PRE- ACID REFLUX  POST- GASTRITIS, GASTRIC POLYPS    ERCP WITH SPHINCTEROTOMY/PAPILLOTOMY  08/13/2014 08/13/2014--ERCP, endoscopic sphincterotomy and removal of common bile duct stones. 08/12/2014--laparoscopic cholecystectomy. This was complicated by retained common bile duct stones 2.    ESOPHAGOSCOPY / EGD  06/09/2015 06/09/2015--EGD revealed Z line irregular, 35 cm from incisors. Biopsied. Small hiatal hernia. Gastritis. Biopsied. Bilious gastric fluid. Fluid aspiration performed. Normal duodenal bulb and second part of duodenum. Biopsied. Pathology revealed the antral biopsy revealed small intestinal mucosa with no pathologic diagnosis. Good preservation of villous architecture. Duodenum biopsy revealed large    EYE SURGERY  07/19/2018    Both Eyes; cataracts    FOOT SURGERY  April 11, 2022    Dr. Vieira - toe urgery    HAMMER TOE REPAIR Right 04/11/2022     Procedure: RIGHT SECOND AND THIRD HAMMERTOE REPAIRS;  Surgeon: Brandt Vieira MD;  Location: [facility] OR [facility] [place];  Service: Orthopedics;  Laterality: Right;    JOINT REPLACEMENT  7/18/2024    RSR right shoulder    LAPAROSCOPIC CHOLECYSTECTOMY  08/12/2014 08/13/2014--ERCP, endoscopic sphincterotomy and removal of common bile duct stones. 08/12/2014--laparoscopic cholecystectomy. This was complicated by retained common bile duct stones 2.    ROTATOR CUFF REPAIR Left 07/10/2014    07/10/2014--left rotator cuff repair. 03/18/2014--patient seen in followup and reports that her range of motion has improved and her pain is not debilitating. She feels that she is making progress with physical therapy. Surgery scheduled 07/10/2014. 01/10/2014--patient was seen in evaluation by the orthopedist and he recommended conservative treatment consisting of physical therapy/rehabilitation.    ROTATOR CUFF REPAIR Right 08/03/2016 08/03/2016--arthroscopic right rotator cuff repair.  Debridement of degenerative anterior superior labral tear.    SHOULDER SURGERY  2014 & 2016    Rotator Cuff Surgery both shoulders    SUBTOTAL HYSTERECTOMY  1978    Partial hysterectomy 1978.    TOTAL SHOULDER ARTHROPLASTY W/ DISTAL CLAVICLE EXCISION Right 07/18/2024    Procedure: Right Reverse Total Shoulder Arthroplasty;  Surgeon: Ash Jimenez MD;  Location: [facility] OR [facility] [place];  Service: Orthopedics;  Laterality: Right;    TRIGGER POINT INJECTION  2023    Gel in both knees     Social History     Socioeconomic History    Marital status:     Number of children: 2    Years of education: BA    Highest education level: Associate degree: occupational, technical, or vocational program   Tobacco Use    Smoking status: Never     Passive exposure: Never    Smokeless tobacco: Never   Vaping Use    Vaping status: Never Used   Substance and Sexual Activity    Alcohol use: Yes     Alcohol/week: 2.0 standard drinks of alcohol     Types: 2 Glasses of wine per  week     Comment: SOCIALLY    Drug use: No    Sexual activity: Not Currently     Partners: Male     Birth control/protection: Hysterectomy     10 point review of systems is reviewed with the patient.  Pertinent positives are listed above.  All others are negative.    Exam: The incision is well healed.  No effusion.  FE: 155.  ER 50.  IR L2 . The arm is soft and nontender.  5-/5 strength with elevation and abduction. Intact sensation to light touch.  Palpable radial pulse    DIAGNOSTIC STUDIES    Xrays: AP, scapular Y and axillary views of the right shoulder are ordered and reviewed for evaluation of shoulder replacement. The x-rays demonstrate a well positioned, well aligned replacement without complicating factors noted.  The acromion fracture is poorly visualized.    ASSESSMENT:  1 year follow up for right shoulder replacement with acromion fracture    PLAN: We discussed her options.  She is not interested in any further surgery or other intervention for the shoulder.  Appropriate activity modifications and restrictions discussed.  Antibiotic prophylaxis recommendations discussed.  Continue annual follow-up.    Ash Jimenez MD    07/23/2025

## 2025-07-28 ENCOUNTER — TELEPHONE (OUTPATIENT)
Dept: ORTHOPEDIC SURGERY | Facility: CLINIC | Age: 80
End: 2025-07-28

## 2025-08-10 DIAGNOSIS — G31.84 MILD COGNITIVE IMPAIRMENT: ICD-10-CM

## 2025-08-11 RX ORDER — MEMANTINE HYDROCHLORIDE 10 MG/1
10 TABLET ORAL 2 TIMES DAILY
Qty: 60 TABLET | Refills: 2 | Status: SHIPPED | OUTPATIENT
Start: 2025-08-11

## 2025-08-18 ENCOUNTER — OFFICE VISIT (OUTPATIENT)
Dept: ORTHOPEDIC SURGERY | Facility: CLINIC | Age: 80
End: 2025-08-18
Payer: MEDICARE

## 2025-08-18 VITALS — HEIGHT: 65 IN | WEIGHT: 126.6 LBS | TEMPERATURE: 98.7 F | BODY MASS INDEX: 21.09 KG/M2

## 2025-08-18 DIAGNOSIS — M17.10 ARTHRITIS OF KNEE: Primary | ICD-10-CM

## 2025-08-18 PROBLEM — M17.11 OSTEOARTHRITIS OF RIGHT KNEE: Status: ACTIVE | Noted: 2025-08-18

## 2025-08-18 PROCEDURE — 1160F RVW MEDS BY RX/DR IN RCRD: CPT | Performed by: ORTHOPAEDIC SURGERY

## 2025-08-18 PROCEDURE — 1159F MED LIST DOCD IN RCRD: CPT | Performed by: ORTHOPAEDIC SURGERY

## 2025-08-18 PROCEDURE — 99214 OFFICE O/P EST MOD 30 MIN: CPT | Performed by: ORTHOPAEDIC SURGERY

## 2025-08-22 DIAGNOSIS — E78.2 MIXED HYPERLIPIDEMIA: Chronic | ICD-10-CM

## 2025-08-25 RX ORDER — EZETIMIBE 10 MG/1
10 TABLET ORAL DAILY
Qty: 90 TABLET | Refills: 1 | Status: SHIPPED | OUTPATIENT
Start: 2025-08-25

## (undated) DEVICE — PK SHLDR OPN 40

## (undated) DEVICE — SOL IRR NACL 0.9PCT 3000ML

## (undated) DEVICE — SUT VIC 2/0 CT2 27IN J269H

## (undated) DEVICE — LN SMPL CO2 SHTRM SD STREAM W/M LUER

## (undated) DEVICE — PK ORTHO MINOR TOWER 40

## (undated) DEVICE — GLV SURG BIOGEL LTX PF 8

## (undated) DEVICE — SUT PROLN 4/0 FS2 18IN 8683G

## (undated) DEVICE — MSK PROC CURAPLEX O2 2/ADAPT 7FT

## (undated) DEVICE — SUT SILK 2/0 TIES 18IN A185H

## (undated) DEVICE — TUBING, SUCTION, 1/4" X 10', STRAIGHT: Brand: MEDLINE

## (undated) DEVICE — FRCP BX RADJAW4 NDL 2.8 240CM LG OG BX40

## (undated) DEVICE — SOL ISO/ALC 70PCT 4OZ

## (undated) DEVICE — THE TORRENT IRRIGATION SCOPE CONNECTOR IS USED WITH THE TORRENT IRRIGATION TUBING TO PROVIDE IRRIGATION FLUIDS SUCH AS STERILE WATER DURING GASTROINTESTINAL ENDOSCOPIC PROCEDURES WHEN USED IN CONJUNCTION WITH AN IRRIGATION PUMP (OR ELECTROSURGICAL UNIT).: Brand: TORRENT

## (undated) DEVICE — PAD,ABDOMINAL,8"X10",ST,LF: Brand: MEDLINE

## (undated) DEVICE — STRIP,CLOSURE,WOUND,MEDI-STRIP,1/2X4: Brand: MEDLINE

## (undated) DEVICE — DISPOSABLE TOURNIQUET CUFF SINGLE BLADDER, SINGLE PORT AND QUICK CONNECT CONNECTOR: Brand: COLOR CUFF

## (undated) DEVICE — HANDPIECE SET WITH COAXIAL HIGH FLOW TIP AND SUCTION TUBE: Brand: INTERPULSE

## (undated) DEVICE — PREP SOL POVIDONE/IODINE BT 4OZ

## (undated) DEVICE — STCKNT IMPERV 12IN STRL

## (undated) DEVICE — GLV SURG SIGNATURE ESSENTIAL PF LTX SZ8.5

## (undated) DEVICE — GLV SURG SENSICARE PI MIC PF SZ7 LF STRL

## (undated) DEVICE — NDL HYPO ECLPS SFTY 18G 1 1/2IN

## (undated) DEVICE — SKIN PREP TRAY W/CHG: Brand: MEDLINE INDUSTRIES, INC.

## (undated) DEVICE — SENSR O2 OXIMAX FNGR A/ 18IN NONSTR

## (undated) DEVICE — BIT DRL SCRW PERIPH 2.7MM

## (undated) DEVICE — DRSNG GZ PETROLTM XEROFORM CURAD 1X8IN STRL

## (undated) DEVICE — MARKER,SKIN,WI/RULER AND LABELS: Brand: MEDLINE

## (undated) DEVICE — TRAP FLD MINIVAC MEGADYNE 100ML

## (undated) DEVICE — KT ORCA ORCAPOD DISP STRL

## (undated) DEVICE — 3M™ IOBAN™ 2 ANTIMICROBIAL INCISE DRAPE 6640EZ: Brand: IOBAN™ 2

## (undated) DEVICE — PAD,NON-ADHERENT,3X8,STERILE,LF,1/PK: Brand: MEDLINE

## (undated) DEVICE — PREMIUM WET SKIN PREP TRAY: Brand: MEDLINE INDUSTRIES, INC.

## (undated) DEVICE — CANN O2 ETCO2 FITS ALL CONN CO2 SMPL A/ 7IN DISP LF

## (undated) DEVICE — SUT VIC 4/0 PS1 27IN J935H

## (undated) DEVICE — DUAL CUT SAGITTAL BLADE

## (undated) DEVICE — ADAPT CLN BIOGUARD AIR/H2O DISP

## (undated) DEVICE — NDL HYPO PRECISIONGLIDE REG 25G 1 1/2

## (undated) DEVICE — PATIENT RETURN ELECTRODE, SINGLE-USE, CONTACT QUALITY MONITORING, ADULT, WITH 9FT CORD, FOR PATIENTS WEIGING OVER 33LBS. (15KG): Brand: MEGADYNE

## (undated) DEVICE — GLV SURG BIOGEL LTX PF 6 1/2

## (undated) DEVICE — PIN STNMAN 3.2MM 9IN
Type: IMPLANTABLE DEVICE | Site: SHOULDER | Status: NON-FUNCTIONAL
Removed: 2024-07-18

## (undated) DEVICE — APPL CHLORAPREP HI/LITE 26ML ORNG

## (undated) DEVICE — SYR LL TP 10ML STRL

## (undated) DEVICE — UNDERCAST PADDING: Brand: DEROYAL

## (undated) DEVICE — GRD PIN WHT 0.45

## (undated) DEVICE — DRSNG SURESITE WNDW 4X4.5

## (undated) DEVICE — GLV SURG BIOGEL LTX PF 8 1/2

## (undated) DEVICE — DRAPE,U/ SHT,SPLIT,PLAS,STERIL: Brand: MEDLINE

## (undated) DEVICE — MAT FLR ABSORBENT LG 4FT 10 2.5FT

## (undated) DEVICE — Device: Brand: DEFENDO AIR/WATER/SUCTION AND BIOPSY VALVE

## (undated) DEVICE — SHOE P/OP/CAST O/T FML LG 8/10

## (undated) DEVICE — TBG PENCL TELESCP MEGADYNE SMOKE EVAC 10FT

## (undated) DEVICE — CANN NASL CO2 TRULINK W/O2 A/

## (undated) DEVICE — DRAPE,SHOULDER,BEACH ULTRAGARD: Brand: MEDLINE

## (undated) DEVICE — BNDG ELAS ELITE V/CLOSE 4IN 5YD LF STRL

## (undated) DEVICE — BITEBLOCK OMNI BLOC

## (undated) DEVICE — GLV SURG BIOGEL LTX PF 7

## (undated) DEVICE — BANDAGE,GAUZE,CONFORMING,4"X75",STRL,LF: Brand: MEDLINE